# Patient Record
Sex: MALE | Race: WHITE | NOT HISPANIC OR LATINO | Employment: FULL TIME | URBAN - METROPOLITAN AREA
[De-identification: names, ages, dates, MRNs, and addresses within clinical notes are randomized per-mention and may not be internally consistent; named-entity substitution may affect disease eponyms.]

---

## 2017-03-28 ENCOUNTER — OFFICE VISIT (OUTPATIENT)
Dept: FAMILY MEDICINE | Facility: CLINIC | Age: 22
End: 2017-03-28

## 2017-03-28 DIAGNOSIS — M54.6 ACUTE RIGHT-SIDED THORACIC BACK PAIN: ICD-10-CM

## 2017-03-28 DIAGNOSIS — M25.561 ACUTE PAIN OF RIGHT KNEE: ICD-10-CM

## 2017-03-28 DIAGNOSIS — R07.1 CHEST PAIN ON BREATHING: Primary | ICD-10-CM

## 2017-03-28 ASSESSMENT — PATIENT HEALTH QUESTIONNAIRE - PHQ9: 5. POOR APPETITE OR OVEREATING: NOT AT ALL

## 2017-03-28 ASSESSMENT — ANXIETY QUESTIONNAIRES
1. FEELING NERVOUS, ANXIOUS, OR ON EDGE: NOT AT ALL
2. NOT BEING ABLE TO STOP OR CONTROL WORRYING: NOT AT ALL
7. FEELING AFRAID AS IF SOMETHING AWFUL MIGHT HAPPEN: NOT AT ALL
GAD7 TOTAL SCORE: 0
3. WORRYING TOO MUCH ABOUT DIFFERENT THINGS: NOT AT ALL
5. BEING SO RESTLESS THAT IT IS HARD TO SIT STILL: NOT AT ALL
IF YOU CHECKED OFF ANY PROBLEMS ON THIS QUESTIONNAIRE, HOW DIFFICULT HAVE THESE PROBLEMS MADE IT FOR YOU TO DO YOUR WORK, TAKE CARE OF THINGS AT HOME, OR GET ALONG WITH OTHER PEOPLE: NOT DIFFICULT AT ALL
6. BECOMING EASILY ANNOYED OR IRRITABLE: NOT AT ALL

## 2017-03-28 ASSESSMENT — PAIN SCALES - GENERAL: PAINLEVEL: MODERATE PAIN (4)

## 2017-03-28 NOTE — NURSING NOTE
22 year old  Chief Complaint   Patient presents with     Pain     right leg, knee and rib        Blood pressure 132/81, pulse 64, temperature 97.8  F (36.6  C), temperature source Oral, SpO2 99 %. There is no height or weight on file to calculate BMI.  Patient Active Problem List   Diagnosis     ADHD (attention deficit hyperactivity disorder)     S/P  Shunt at age 3     MRSA (methicillin resistant Staphylococcus aureus) carrier     Cognitive disorder     Pain in joint, shoulder region     Premature ejaculation     Adjustment disorder with mixed anxiety and depressed mood       Wt Readings from Last 2 Encounters:   08/12/16 223 lb (101.2 kg)   05/20/16 216 lb 12 oz (98.3 kg)     BP Readings from Last 3 Encounters:   03/28/17 132/81   09/27/16 123/75   08/12/16 113/74         Current Outpatient Prescriptions   Medication     FLUoxetine (PROZAC) 40 MG capsule     No current facility-administered medications for this visit.        Social History   Substance Use Topics     Smoking status: Never Smoker     Smokeless tobacco: Never Used     Alcohol use No       There are no preventive care reminders to display for this patient.    No results found for: PAP      March 28, 2017 9:17 AM

## 2017-03-28 NOTE — MR AVS SNAPSHOT
After Visit Summary   3/28/2017    Donald Jackson    MRN: [id number]           Patient Information     Date Of Birth          1995        Visit Information        Provider Department      3/28/2017 9:20 AM Cherry Mac MD [facility]        Today's Diagnoses     Chest pain on breathing    -  1    Acute pain of right knee        Acute right-sided thoracic back pain           Follow-ups after your visit        Additional Services     [facility] PT, HAND, AND CHIROPRACTIC REFERRAL       **This order will print in the [facility] Scheduling Office**    Physical Therapy, Hand Therapy and Chiropractic Care are available through:    *[facility]  *[facility]  *[facility]    Call one number to schedule at any of the above locations: [phone].    Your provider has referred you to: Physical Therapy at [facility] or [facility]    Indication/Reason for Referral: Thoracic Pain and right sided  Onset of Illness: 1 week  Therapy Orders: Evaluate and Treat  Special Programs: None  Special Request: None    [employer] Card      Additional Comments for the Therapist or Chiropractor:     Please be aware that coverage of these services is subject to the terms and limitations of your health insurance plan.  Call member services at your health plan with any benefit or coverage questions.      Please bring the following to your appointment:    *Your personal calendar for scheduling future appointments  *Comfortable clothing            SPORTS MEDICINE REFERRAL       Your provider has referred you to:  [facility]: Sports Medicine Clinic [facility] [phone]   [email or web address]    Please be aware that coverage of these services is subject to the terms and limitations of your health insurance plan.  Call member services at your health plan with any benefit or coverage questions.      Please bring the following to your appointment:    >>    Any x-rays, CTs or MRIs which have been performed.  Contact the facility where they were done to arrange for  prior to your scheduled appointment.  Any new CT, MRI or other procedures ordered by your specialist must be performed at a [place] facility or coordinated by your clinic's referral office.    >>   List of current medications   >>   This referral request   >>   Any documents/labs given to you for this referral                  Future tests that were ordered for you today     Open Future Orders        Priority Expected Expires Ordered    XR Chest 2 Views Routine 3/28/2017 3/28/2018 3/28/2017            Who to contact     Please call your clinic at [phone] to:    Ask questions about your health    Make or cancel appointments    Discuss your medicines    Learn about your test results    Speak to your doctor   If you have compliments or concerns about an experience at your clinic, or if you wish to file a complaint, please contact [facility] Patient Relations at [phone] or email us at [email or web address]         Additional Information About Your Visit        [employer] Information     [employer] gives you secure access to your electronic health record. If you see a primary care provider, you can also send messages to your care team and make appointments. If you have questions, please call your primary care clinic.  If you do not have a primary care provider, please call [phone] and they will assist you.      [employer] is an electronic gateway that provides easy, online access to your medical records. With [employer], you can request a clinic appointment, read your test results, renew a prescription or communicate with your care team.     To access your existing account, please contact your [facility] or call [phone] for assistance.        Care EveryWhere ID     This is your Care EveryWhere ID. This could be used by other  organizations to access your [place] medical records  [id number]        Your Vitals Were     Pulse Temperature Pulse Oximetry             64 97.8  F (36.6  C) (Oral) 99%          Blood Pressure from Last 3 Encounters:   03/28/17 132/81   09/27/16 123/75   08/12/16 113/74    Weight from Last 3 Encounters:   08/12/16 223 lb (101.2 kg)   05/20/16 216 lb 12 oz (98.3 kg)   12/26/14 189 lb (85.7 kg)              We Performed the Following     [facility] PT, HAND, AND CHIROPRACTIC REFERRAL     SPORTS MEDICINE REFERRAL        Primary Care Provider Office Phone # Fax #    Edward Strong [phone]       [facility] [address] [place] [id number]        Thank you!     Thank you for choosing [facility]  for your care. Our goal is always to provide you with excellent care. Hearing back from our patients is one way we can continue to improve our services. Please take a few minutes to complete the written survey that you may receive in the mail after your visit with us. Thank you!             Your Updated Medication List - Protect others around you: Learn how to safely use, store and throw away your medicines at www.disposemymeds.org.      Notice  As of 3/28/2017 10:01 AM    You have not been prescribed any medications.

## 2017-03-28 NOTE — PROGRESS NOTES
"Donald Jackson is a 22 year old male here for the following issues:    Rib pain  Donald is a 21 yo male who reports pain at the right side of his back, extending from the shoulder blade, down his back. It is painful when he takes a deep breath but he denies cough, SOB or wheezing. Symptoms ongoing this past week. He stopped working out at the gym as it exacerbated the pain. He took ibuprofen, which helped. He has hx of  shunt that tracks down the right side of his neck. He denies any injury, no headache or fever.        Right knee pain   This has been ongoing x 2 months. Pain started when he was squatting down. R>L. He also reports he twisted his right knee during a basketball game, when he twisted it. He denies swelling. He is wearing a knee brace. No pain at rest. However he reports it feels as though it is going to \"give out\" while walking. If he tries running, then he has worsened pain the next day and has difficulty bearing weight.       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     Situational stress  He was prescribed Fluoxetine 40mg dose daily, last August. He admits to \"missing a lot of doses\". He is not sure he needs it. Relationship with his father has improved. His dad shows an interest in his new granddaughter and wants to help. Relationship with his ex girlfriend has improved as well. He is taking pride in being a good dad.    PHQ9 score = 3  LIAM 7 score = 0    Weight gain  He is currently #235, up from last year. He reports it is due to \"stress eating\".   Wt Readings from Last 2 Encounters:   08/12/16 223 lb (101.2 kg)   05/20/16 216 lb 12 oz (98.3 kg)     He is not eating a very healthy diet. Not consistent with exercising.    Patient Active Problem List   Diagnosis     ADHD (attention " deficit hyperactivity disorder)     S/P  Shunt at age 3     MRSA (methicillin resistant Staphylococcus aureus) carrier     Cognitive disorder     Pain in joint, shoulder region     Premature ejaculation     Adjustment disorder with mixed anxiety and depressed mood       Current Outpatient Prescriptions   Medication Sig Dispense Refill     FLUoxetine (PROZAC) 40 MG capsule Take 1 capsule (40 mg) by mouth daily 90 capsule 1       Allergies   Allergen Reactions     Penicillin [Penicillins]         EXAM  /81 (BP Location: Right arm, Patient Position: Chair, Cuff Size: Adult Large)  Pulse 64  Temp 97.8  F (36.6  C) (Oral)  SpO2 99%  Gen: Alert, pleasant, NAD, overweight  HEENT:  Conjunctiva nl, TM normal bilaterally, OP clear, no posterior erythema  Neck: no LAD. Palpable  shunt at anterior right neck, nontender  COR: S1,S2, no murmur  Lungs: CTA bilaterally, no rhonchi, wheezes or rales  Abdomen: Soft, non tender, normal bowel sounds, no HSM or mass  MS: cannot reproduce the right sided thoracic pain with palpation over the area.   Right knee: no tenderness with palpation over the bony landmarks. No effusion, redness or swelling.     CXR: Calcification and soft tissue prominence surrounding old  shunt along the right chest wall, uncertain etiology, consider chest CT  CT chest :OLD  shunt tracking along right pectoral muscle with calcifications. No fluid collection noted, no infiltrate    Assessment:  (R07.1) Chest pain on breathing  (primary encounter diagnosis)  Comment: right sided chest pain with breathing, no shortness of breath  Plan: XR Chest 2 Views        Refer for CXR. He has history of  shunt, right side, no fever or redness. O2 sat excellent, no cough or SOB  Consider chest CT scan in this patient.   Pt notified of results, benign, suspect musculoskeletal strain     (M25.561) Acute pain of right knee  Comment: knee feels like it wants to give out, ?ACL tear  Plan: SPORTS MEDICINE REFERRAL         Refer to sports medicine, hold on sports for now.     (M54.6) Acute right-sided thoracic back pain  Comment: etiology is unclear, no injury though may re related to musculoskeletal strain. He has a  shunt, no fever. O2 sat 99%  Plan: [facility] PT, HAND, AND CHIROPRACTIC REFERRAL        Refer to [facility] for PT, pursue CXR as above.    Cherry Mac MD  Internal Medicine/Pediatrics

## 2017-03-29 ENCOUNTER — OFFICE VISIT (OUTPATIENT)
Dept: ORTHOPEDICS | Facility: CLINIC | Age: 22
End: 2017-03-29

## 2017-03-29 DIAGNOSIS — G89.29 CHRONIC PAIN OF RIGHT KNEE: Primary | ICD-10-CM

## 2017-03-29 DIAGNOSIS — R29.898 WEAKNESS OF RIGHT HIP: ICD-10-CM

## 2017-03-29 DIAGNOSIS — M25.561 CHRONIC PAIN OF RIGHT KNEE: Primary | ICD-10-CM

## 2017-03-29 ASSESSMENT — ANXIETY QUESTIONNAIRES: GAD7 TOTAL SCORE: 0

## 2017-03-29 ASSESSMENT — PATIENT HEALTH QUESTIONNAIRE - PHQ9: SUM OF ALL RESPONSES TO PHQ QUESTIONS 1-9: 3

## 2017-03-29 NOTE — MR AVS SNAPSHOT
After Visit Summary   3/29/2017    Donald Jackson    MRN: [id number]           Patient Information     Date Of Birth          1995        Visit Information        Provider Department      3/29/2017 3:45 PM Janine Brush MD [facility] Sports Medicine        Today's Diagnoses     Chronic pain of right knee    -  1    Weakness of right hip           Follow-ups after your visit        Additional Services     PHYSICAL THERAPY REFERRAL (Internal)       Physical Therapy Referral                  Follow-up notes from your care team     Return in about 6 weeks (around 5/10/2017), or if symptoms worsen or fail to improve.      Who to contact     Please call your clinic at [phone] to:    Ask questions about your health    Make or cancel appointments    Discuss your medicines    Learn about your test results    Speak to your doctor   If you have compliments or concerns about an experience at your clinic, or if you wish to file a complaint, please contact [facility] Patient Relations at [phone] or email us at [email or web address]         Additional Information About Your Visit        MyChart Information     [employer] gives you secure access to your electronic health record. If you see a primary care provider, you can also send messages to your care team and make appointments. If you have questions, please call your primary care clinic.  If you do not have a primary care provider, please call [phone] and they will assist you.      [employer] is an electronic gateway that provides easy, online access to your medical records. With [employer], you can request a clinic appointment, read your test results, renew a prescription or communicate with your care team.     To access your existing account, please contact your [facility] or call [phone] for assistance.        Care EveryWhere ID     This is your Care EveryWhere ID.  This could be used by other organizations to access your [place] medical records  [id number]         Blood Pressure from Last 3 Encounters:   03/28/17 132/81   09/27/16 123/75   08/12/16 113/74    Weight from Last 3 Encounters:   08/12/16 223 lb (101.2 kg)   05/20/16 216 lb 12 oz (98.3 kg)   12/26/14 189 lb (85.7 kg)              We Performed the Following     PHYSICAL THERAPY REFERRAL (Internal)        Primary Care Provider Office Phone # Fax #    Edward Strong [phone]       [facility] [address] [place] [address]  [facility] [address]        Thank you!     Thank you for choosing [facility] SPORTS MEDICINE  for your care. Our goal is always to provide you with excellent care. Hearing back from our patients is one way we can continue to improve our services. Please take a few minutes to complete the written survey that you may receive in the mail after your visit with us. Thank you!             Your Updated Medication List - Protect others around you: Learn how to safely use, store and throw away your medicines at www.disposemymeds.org.      Notice  As of 3/29/2017  4:32 PM    You have not been prescribed any medications.

## 2017-03-29 NOTE — PROGRESS NOTES
Subjective:   Donald Jackson is a 22 year old male who is here for right knee pain. Works out, does squats and felt a pop then played basketball and felt a pop about 2-3 months ago. Weakness in the leg the following day.   Goes to the gym.  Next day feels like he'll fall.      Background:   Date of injury: 1/2017       PAST MEDICAL, SOCIAL, SURGICAL AND FAMILY HISTORY: He  has a past medical history of Hydrocephalus; Lattice degeneration; and Other forms of retinal detachment(361.89).  He  has a past surgical history that includes Retinal reattachment (10/2010); Retinopexy Laser Prophylaxis Break (s) OD (right eye) (10/2011); shunt in head; strabismus surery; and Tonsillectomy.  His family history includes DIABETES in his father and paternal grandfather. There is no history of Retinal detachment, Amblyopia, or Glaucoma.  He reports that he has never smoked. He has never used smokeless tobacco. He reports that he does not drink alcohol or use illicit drugs.    ALLERGIES: He is allergic to penicillin [penicillins].    CURRENT MEDICATIONS: He currently has no medications in their medication list.     REVIEW OF SYSTEMS: 10 point review of systems is negative except as noted above.     Exam:   There were no vitals taken for this visit.           CONSTITUTIONIAL: healthy, alert, no distress and cooperative  HEAD: Normocephalic. No masses, lesions, tenderness or abnormalities  SKIN: no suspicious lesions or rashes  GAIT: normal  NEUROLOGIC: Non-focal, Normal muscle tone and strength, reflexes normal, sensation grossly normal.  PSYCHIATRIC: affect normal/bright and mentation appears normal.    MUSCULOSKELETAL: right knee pain      Inspection:       AP/lateral alignment normal  Tender: suprapatella, patellar facets      Non-tender: MCL, LCL, lateral joint line, medial joint line, IT band, posterior knee   Active Range of Motion: all normal  Strength: quad  5/5, Hamstrings  5/5, Gastroc  5/5, Tibialis anterior  5/5,  Peroneals  5/5 and core strength  5-/5 hip abductors and other core muscles   Special tests: normal Valgus stress test, normal Varus, negative Lachman's test, negative posterior drawer, no posterolateral corner signs, negative Jarret's, no apprehension with lateral stress of the patella.    Weakness hip abductors     Assessment/Plan:   Pt is an overweight 23 yo male with PMhx of  shunt presenting with right knee pain  1. Right knee pain- small effusion, patella tracking painful, PT suggested  2. Weakness right hip abductors- PT  RTC 6 weeks  X-RAY INTERPRETATION:   X-Ray of the Right Knee: 2-view, ap, lateral   ordered and interpreted in the office today was negative for fracture, subluxation or joint space abnormality.

## 2017-03-29 NOTE — LETTER
3/29/2017      RE: Donald Jackson  [address] [facility] [facility] [address]        Subjective:   Donald Jackson is a 22 year old male who is here for right knee pain. Works out, does squats and felt a pop then played basketball and felt a pop about 2-3 months ago. Weakness in the leg the following day.   Goes to the gym.  Next day feels like he'll fall.      Background:   Date of injury: 1/2017       PAST MEDICAL, SOCIAL, SURGICAL AND FAMILY HISTORY: He  has a past medical history of Hydrocephalus; Lattice degeneration; and Other forms of retinal detachment(361.89).  He  has a past surgical history that includes Retinal reattachment (10/2010); Retinopexy Laser Prophylaxis Break (s) OD (right eye) (10/2011); shunt in head; strabismus surery; and Tonsillectomy.  His family history includes DIABETES in his father and paternal grandfather. There is no history of Retinal detachment, Amblyopia, or Glaucoma.  He reports that he has never smoked. He has never used smokeless tobacco. He reports that he does not drink alcohol or use illicit drugs.    ALLERGIES: He is allergic to penicillin [penicillins].    CURRENT MEDICATIONS: He currently has no medications in their medication list.     REVIEW OF SYSTEMS: 10 point review of systems is negative except as noted above.     Exam:   There were no vitals taken for this visit.           CONSTITUTIONIAL: healthy, alert, no distress and cooperative  HEAD: Normocephalic. No masses, lesions, tenderness or abnormalities  SKIN: no suspicious lesions or rashes  GAIT: normal  NEUROLOGIC: Non-focal, Normal muscle tone and strength, reflexes normal, sensation grossly normal.  PSYCHIATRIC: affect normal/bright and mentation appears normal.    MUSCULOSKELETAL: right knee pain      Inspection:       AP/lateral alignment normal  Tender: suprapatella, patellar facets      Non-tender: MCL, LCL, lateral joint line, medial joint line, IT band, posterior knee   Active Range of Motion: all  normal  Strength: quad  5/5, Hamstrings  5/5, Gastroc  5/5, Tibialis anterior  5/5, Peroneals  5/5 and core strength  5-/5 hip abductors and other core muscles   Special tests: normal Valgus stress test, normal Varus, negative Lachman's test, negative posterior drawer, no posterolateral corner signs, negative Jarret's, no apprehension with lateral stress of the patella.    Weakness hip abductors     Assessment/Plan:   Pt is an overweight 23 yo male with PMhx of  shunt presenting with right knee pain  1. Right knee pain- small effusion, patella tracking painful, PT suggested  2. Weakness right hip abductors- PT  RTC 6 weeks  X-RAY INTERPRETATION:   X-Ray of the Right Knee: 2-view, ap, lateral   ordered and interpreted in the office today was negative for fracture, subluxation or joint space abnormality.    Janine Brush MD

## 2017-04-09 VITALS
OXYGEN SATURATION: 99 % | BODY MASS INDEX: 35.74 KG/M2 | DIASTOLIC BLOOD PRESSURE: 81 MMHG | WEIGHT: 235 LBS | SYSTOLIC BLOOD PRESSURE: 132 MMHG | TEMPERATURE: 97.8 F | HEART RATE: 64 BPM

## 2017-08-30 ENCOUNTER — THERAPY VISIT (OUTPATIENT)
Dept: PHYSICAL THERAPY | Facility: CLINIC | Age: 22
End: 2017-08-30
Payer: COMMERCIAL

## 2017-08-30 DIAGNOSIS — M25.561 CHRONIC PAIN OF BOTH KNEES: Primary | ICD-10-CM

## 2017-08-30 DIAGNOSIS — G89.29 CHRONIC PAIN OF BOTH KNEES: Primary | ICD-10-CM

## 2017-08-30 DIAGNOSIS — M25.562 CHRONIC PAIN OF BOTH KNEES: Primary | ICD-10-CM

## 2017-08-30 PROCEDURE — 97110 THERAPEUTIC EXERCISES: CPT | Mod: GP | Performed by: PHYSICAL THERAPIST

## 2017-08-30 PROCEDURE — 97161 PT EVAL LOW COMPLEX 20 MIN: CPT | Mod: GP | Performed by: PHYSICAL THERAPIST

## 2017-08-30 ASSESSMENT — ACTIVITIES OF DAILY LIVING (ADL)
PAIN: THE SYMPTOM AFFECTS MY ACTIVITY SLIGHTLY
HOW_WOULD_YOU_RATE_THE_OVERALL_FUNCTION_OF_YOUR_KNEE_DURING_YOUR_USUAL_DAILY_ACTIVITIES?: NORMAL
GO DOWN STAIRS: ACTIVITY IS NOT DIFFICULT
STIFFNESS: I DO NOT HAVE THE SYMPTOM
AS_A_RESULT_OF_YOUR_KNEE_INJURY,_HOW_WOULD_YOU_RATE_YOUR_CURRENT_LEVEL_OF_DAILY_ACTIVITY?: NORMAL
KNEEL ON THE FRONT OF YOUR KNEE: ACTIVITY IS FAIRLY DIFFICULT
GO UP STAIRS: ACTIVITY IS NOT DIFFICULT
SQUAT: ACTIVITY IS SOMEWHAT DIFFICULT
SIT WITH YOUR KNEE BENT: ACTIVITY IS NOT DIFFICULT
RAW_SCORE: 60.31
KNEE_ACTIVITY_OF_DAILY_LIVING_SUM: 56
GIVING WAY, BUCKLING OR SHIFTING OF KNEE: I DO NOT HAVE THE SYMPTOM
RISE FROM A CHAIR: ACTIVITY IS NOT DIFFICULT
HOW_WOULD_YOU_RATE_THE_CURRENT_FUNCTION_OF_YOUR_KNEE_DURING_YOUR_USUAL_DAILY_ACTIVITIES_ON_A_SCALE_FROM_0_TO_100_WITH_100_BEING_YOUR_LEVEL_OF_KNEE_FUNCTION_PRIOR_TO_YOUR_INJURY_AND_0_BEING_THE_INABILITY_TO_PERFORM_ANY_OF_YOUR_USUAL_DAILY_ACTIVITIES?: 70
LIMPING: I DO NOT HAVE THE SYMPTOM
STAND: ACTIVITY IS NOT DIFFICULT
SWELLING: I DO NOT HAVE THE SYMPTOM
WALK: NOT ANSWERED
KNEE_ACTIVITY_OF_DAILY_LIVING_SCORE: 86.16
WEAKNESS: THE SYMPTOM AFFECTS MY ACTIVITY SLIGHTLY

## 2017-08-30 NOTE — PROGRESS NOTES
"[facility] Initial Evaluation -- Lower Extremity    Evaluation Date: August 30, 2017  Donald Jackson is a 22 year old male with a (L) knee condition.   Referral: Dr. Brush  Work mechanical stresses: [employer] - Lifting, standing; Sitting (classes)   Employment status: Working normal hours; Student  Leisure mechanical stresses: Lifting weights 6 x week (Legs 1 x week), Cardio 3-4 x week  Functional disability score: See KOS flow sheet  VAS score (0-10): 3-7/10 varying intensity  Patient goals/expectations:  Reduce pain in knee to allow pain free squatting activities    HISTORY:    Present symptoms: L > R ant knee  Pain quality (sharp/shooting/stabbing/aching/burning/cramping):  achy    Present since (onset date): Jan 2017   Symptoms (improving/unchaning/worsening):  unchanging.      Symptoms commenced as a result of: Playing basketball, pivoted wrong and felt \"pop\"  Condition occurred in the following environment: During recreation/sport     Symptoms at onset: (L) knee  Paresthesia (yes/no):  No  Spinal history: No   Cough/Sneeze (pos/neg):  Neg    Constant symptoms: None  Intermittent symptoms: As above    Symptoms are worse with the following: Always Rising, Always First few steps, Sometimes Walking and Always Squatting   Symptoms are better with the following: Always When still and Other - ice    Continued use makes the pain (better/worse/no effect): Worse    Disturbed night (yes/no): No      Pain at rest (yes/no):  No  Site (back/hip/knee/ankle/foot):  NA    Other questions (swelling/clicking/locking/giving way/falling):  No     Previous episodes: No  Previous treatments: None    Specific Questions:  General health (excellent/good/fair/poor):  Good  Pertinent medical history includes: Overweight  Medications (nil/NSAIDS/analg/steroids/anticoag/other):  None  Medical allergies:  None  Imaging (none/Xray/MRI/other):  Xray - Neg  Recent or major surgery (yes/no):  Retina, Shunt  Night pain " (yes/no):  No  Accidents (yes/no):  No  Unexplained weight loss (yes/no):  No  Barriers at home: None  Other red flags: None    Sites for physical examination (back/hip/knee/ankle/foot/other): Knee    EXAMINATION    Posture:  Sitting (good/fair/poor): NT    Correction of Posture (better/worse/no effect/NA): NT  Standing (good/fair/poor): NT  Other observations:  NT    Neurological: (NA/motor/sensory/reflexes/dural): NT    Baselines (pain or functional activity): Painful squatting    Extremities (Hip / Knee / Ankle / Foot): Knee    Movement Loss Javy Mod Min Nil Pain   Flexion   X  (R) 135 (L) 130   Extension    X 5 hyper (B)     Passive Movement (+/- over pressure)/(PDM/ERP):  Min loss w/passive OP (L) ext  Resisted Test Response (pain): Quad 5/5 (B); H-S 5/5 (B); Hip Abd 4/5 (B)  Other Tests: NT    Spine:  Movement loss: NT  Effect of repeated movements: NT  Effect of static positioning: NT  Spine testing (not relevant/relevant/secondary problem): Not relevant    Baseline Symptoms: 2/10-6/10 after 10 reps squatting  Repeated Tests Symptom Response Mechanical Response   Active/Passive movement, resisted test, functional test During -  Produce, Abolish, Increase, Decrease, NE After -  Better, Worse, NB, NW, NE Effect -   ? or ? ROM, strength or key functional test No   Effect   Passive ext w/self OP (Standing) No Effect    No Effect    Inc ROM (L) knee 7-0-132 deg    1-2/10 after 10 reps squatting    Effect of static positioning       NT         Provisional Classification (Extremity/Spine):  Extremity - Derangement      Princicple of Management:   Education:  Etiology, specificity of exercise and rationale behind repeated movements    Equipment provided:  None  Exercise and dosage:  Repeated ext w/self OP x 10-15 reps, 5-6 x daily    ASSESSMENT/PLAN:    Patient is a 22 year old male with both sides knee complaints.    Patient has the following significant findings with corresponding treatment plan.                 Diagnosis 1:  Knee Pain    Pain -  self management, education, directional preference exercise and home program  Decreased ROM/flexibility - manual therapy, therapeutic exercise and home program  Decreased joint mobility - manual therapy, therapeutic exercise and home program  Decreased strength - therapeutic exercise, therapeutic activities and home program  Impaired muscle performance - neuro re-education and home program  Decreased function - therapeutic activities and home program    Therapy Evaluation Codes:   1) History comprised of:   Personal factors that impact the plan of care:      None.    Comorbidity factors that impact the plan of care are:      Overweight.     Medications impacting care: None.  2) Examination of Body Systems comprised of:   Body structures and functions that impact the plan of care:      Knee.   Activity limitations that impact the plan of care are:      Sitting, Squatting/kneeling and Walking.  3) Clinical presentation characteristics are:   Stable/Uncomplicated.  4) Decision-Making    Low complexity using standardized patient assessment instrument and/or measureable assessment of functional outcome.  Cumulative Therapy Evaluation is: Low complexity.    Previous and current functional limitations:  (See Goal Flow Sheet for this information)    Short term and Long term goals: (See Goal Flow Sheet for this information)     Communication ability:  Patient appears to be able to clearly communicate and understand verbal and written communication and follow directions correctly.  Treatment Explanation - The following has been discussed with the patient:   RX ordered/plan of care  Anticipated outcomes  Possible risks and side effects  This patient would benefit from PT intervention to resume normal activities.   Rehab potential is good.    Frequency:  1 X week, once daily  Duration:  for 6 weeks  Discharge Plan:  Achieve all LTG.  Independent in home treatment program.  Reach maximal  therapeutic benefit.    Please refer to the daily flowsheet for treatment today, total treatment time and time spent performing 1:1 timed codes.

## 2018-02-14 PROBLEM — M25.562 CHRONIC PAIN OF BOTH KNEES: Status: RESOLVED | Noted: 2017-08-30 | Resolved: 2018-02-14

## 2018-02-14 PROBLEM — G89.29 CHRONIC PAIN OF BOTH KNEES: Status: RESOLVED | Noted: 2017-08-30 | Resolved: 2018-02-14

## 2018-02-14 PROBLEM — M25.561 CHRONIC PAIN OF BOTH KNEES: Status: RESOLVED | Noted: 2017-08-30 | Resolved: 2018-02-14

## 2018-05-16 ENCOUNTER — OFFICE VISIT (OUTPATIENT)
Dept: URGENT CARE | Facility: URGENT CARE | Age: 23
End: 2018-05-16
Payer: COMMERCIAL

## 2018-05-16 VITALS
DIASTOLIC BLOOD PRESSURE: 72 MMHG | OXYGEN SATURATION: 98 % | HEART RATE: 63 BPM | BODY MASS INDEX: 34.37 KG/M2 | TEMPERATURE: 97.7 F | WEIGHT: 226 LBS | SYSTOLIC BLOOD PRESSURE: 121 MMHG

## 2018-05-16 DIAGNOSIS — S06.0X0A CONCUSSION WITHOUT LOSS OF CONSCIOUSNESS, INITIAL ENCOUNTER: Primary | ICD-10-CM

## 2018-05-16 PROCEDURE — 99214 OFFICE O/P EST MOD 30 MIN: CPT | Performed by: INTERNAL MEDICINE

## 2018-05-16 NOTE — MR AVS SNAPSHOT
After Visit Summary   5/16/2018    Donald Jackson    MRN: [id number]           Patient Information     Date Of Birth          1995        Visit Information        Provider Department      5/16/2018 5:00 PM Kellen Salomon MD [facility]        Today's Diagnoses     Concussion without loss of consciousness, initial encounter    -  1      Care Instructions    Healing after a Concussion  Rest  Rest is the best treatment for a concussion. Avoid physical activity until you see your doctor. Avoid activities that cause your symptoms to get worse or make you feel tired. This includes physical activities, watching TV, texting or playing video games.  Don't nap during the day. If you do nap, make sure it is for less than an hour and before 3 p.m. If you find it is hard to fall asleep, talk to your doctor. You may need medicine to help you sleep.  If symptoms have not become worse, you do not need to be wakened and checked on at night.  School  You can rest your brain by staying at home for 1 to 2 days. It is best to get back into the school routine.   At school, you may have trouble taking tests or working on a computer. Symptoms may get worse in band, choir, busy classes or a noisy lunchroom. A doctor can work with the school if you need a plan to help you succeed.  Work  You may need to change your work routine as you recover. A doctor can help you create a plan for the conditions at your job.  Treat pain    It is best to avoid taking medicines, but if needed, take Tylenol (acetaminophen) for headaches and pain every 4 to 6 hours.    Do not take drugstore medicines such as ibuprofen, Advil, Motrin, Benadryl, Aleve, sleep aids or Tylenol PM. These drugs may cause new problems.    If you cannot manage your pain with Tylenol, call your doctor or go to the emergency department.  Watch symptoms closely  Each day, keep track of your symptoms. This will help your doctor see how well you are  healing. Write down the symptom, how often it occurs, how long it lasts, and what makes it better or worse.  Possible symptoms: headache, stomach upset, feeling confused or dizzy, motion sickness, and personality changes.  Returning to activity  Take your time returning to activity. A doctor can help you decide what levels of activity are best for you. If you're returning to a sport, you should see a health care provider before you do.  If you have questions, call  Your doctor, Concussion hotline: [phone], or Athletic medicine: [phone].   For informational purposes only. Not to replace the advice of your health care provider.  Copyright   2014 [employer]. All rights reserved. [employer] 283008 - Rev 12/16.            Follow-ups after your visit        Who to contact     If you have questions or need follow up information about today's clinic visit or your schedule please contact [facility] directly at [phone].  Normal or non-critical lab and imaging results will be communicated to you by Cloud[employer], letter or phone within 4 business days after the clinic has received the results. If you do not hear from us within 7 days, please contact the clinic through Rumgrt or phone. If you have a critical or abnormal lab result, we will notify you by phone as soon as possible.  Submit refill requests through NetVision or call your pharmacy and they will forward the refill request to us. Please allow 3 business days for your refill to be completed.          Additional Information About Your Visit        [employer] Information     Net[employer] gives you secure access to your electronic health record. If you see a primary care provider, you can also send messages to your care team and make appointments. If you have questions, please call your primary care clinic.  If you do not have a primary care provider, please call [phone] and they will assist you.        Care EveryWhere ID     This is your  Care EveryWhere ID. This could be used by other organizations to access your [facility] medical records  [id number]        Your Vitals Were     Pulse Temperature Pulse Oximetry BMI (Body Mass Index)          63 97.7  F (36.5  C) (Oral) 98% 34.37 kg/m2         Blood Pressure from Last 3 Encounters:   05/16/18 121/72   03/28/17 132/81   09/27/16 123/75    Weight from Last 3 Encounters:   05/16/18 226 lb (102.5 kg)   03/28/17 235 lb (106.6 kg)   08/12/16 223 lb (101.2 kg)              Today, you had the following     No orders found for display       Primary Care Provider Office Phone # Fax #    Edward Strong [phone]       0 [address] [address]        Equal Access to Services     ÁNGELA DEL TORO : Hadii seven Duran, wasimon church, qayemi kaalmaneelam martins, emilie adorno . So [facility] [place] [facility] [phone].    ATENCIÓN: Si habla español, tiene a basurto disposición servicios gratuitos de asistencia lingüística. Taya al [phone].    We comply with applicable federal civil rights laws and [place] laws. We do not discriminate on the basis of race, color, national origin, age, disability, sex, sexual orientation, or gender identity.            Thank you!     Thank you for choosing [facility]  for your [facility]. Our goal is always to provide you with excellent care. Hearing back from our patients is one way we can continue to improve our services. Please take a few minutes to complete the written survey that you may receive in the mail after your visit with us. Thank you!             Your Updated Medication List - Protect others around you: Learn how to safely use, store and throw away your medicines at www.disposemymeds.org.      Notice  As of 5/16/2018  5:49 PM    You have not been prescribed any medications.

## 2018-05-16 NOTE — PATIENT INSTRUCTIONS
Healing after a Concussion  Rest  Rest is the best treatment for a concussion. Avoid physical activity until you see your doctor. Avoid activities that cause your symptoms to get worse or make you feel tired. This includes physical activities, watching TV, texting or playing video games.  Don't nap during the day. If you do nap, make sure it is for less than an hour and before 3 p.m. If you find it is hard to fall asleep, talk to your doctor. You may need medicine to help you sleep.  If symptoms have not become worse, you do not need to be wakened and checked on at night.  School  You can rest your brain by staying at home for 1 to 2 days. It is best to get back into the school routine.   At school, you may have trouble taking tests or working on a computer. Symptoms may get worse in band, choir, busy classes or a noisy lunchroom. A doctor can work with the school if you need a plan to help you succeed.  Work  You may need to change your work routine as you recover. A doctor can help you create a plan for the conditions at your job.  Treat pain    It is best to avoid taking medicines, but if needed, take Tylenol (acetaminophen) for headaches and pain every 4 to 6 hours.    Do not take drugstore medicines such as ibuprofen, Advil, Motrin, Benadryl, Aleve, sleep aids or Tylenol PM. These drugs may cause new problems.    If you cannot manage your pain with Tylenol, call your doctor or go to the emergency department.  Watch symptoms closely  Each day, keep track of your symptoms. This will help your doctor see how well you are healing. Write down the symptom, how often it occurs, how long it lasts, and what makes it better or worse.  Possible symptoms: headache, stomach upset, feeling confused or dizzy, motion sickness, and personality changes.  Returning to activity  Take your time returning to activity. A doctor can help you decide what levels of activity are best for you. If you're returning to a sport, you should  see a health care provider before you do.  If you have questions, call  Your doctor, Concussion hotline: [phone], or Athletic medicine: [phone].   For informational purposes only. Not to replace the advice of your health care provider.  Copyright   2014 [employer]. All rights reserved. [employer] 446167 - Rev 12/16.

## 2018-05-16 NOTE — PROGRESS NOTES
SUBJECTIVE:  Donald Jackson is an 23 year old male who presents for head injury.  Yesterday evening was working on his car when the simon of the car came down some and hit his head.  No loc.  Did not have any dizziness when it happened.  Just felt the pain from the bump initially.  Slept okay last night.  Headache today on top of right head where it hit.  Did feel slightly nauseated earlier today, no vomiting, eating fine.  No numbness or weakness anywhere.  Has h/o retinal detachment, but no changes from baseline. No incoordination today.  A couple times today he's forgotten something he was going to do, but doesn't notice any significant memory loss.   No speech changes or word finding troubles.       has a past medical history of Hydrocephalus; Lattice degeneration; and Other forms of retinal detachment(361.89).  shunt, prematurity.    Family History   Problem Relation Age of Onset     DIABETES Father      Type 2      DIABETES Paternal Grandfather      Type 2      Retinal detachment No family hx of      Amblyopia No family hx of      Glaucoma No family hx of        ALLERGIES:  Penicillin [penicillins]    No current outpatient prescriptions on file.     No current facility-administered medications for this visit.          ROS:  ROS is done and is negative for general, constitutional, eye, ENT, Respiratory, cardiovascular, GI, , Skin, musculoskeletal except as noted elsewhere.      OBJECTIVE:  /72  Pulse 63  Temp 97.7  F (36.5  C) (Oral)  Wt 226 lb (102.5 kg)  SpO2 98%  BMI 34.37 kg/m2  GENERAL APPEARANCE: Alert, in no acute distress  HEAD: old surgical scars present.  No edema, bruising or erythema  EYES: normal, EOMs intact and PERRLA  EARS: External ears normal. Canals clear. TM's normal.  NOSE:normal  OROPHARYNX:normal  NECK:No adenopathy,masses or thyromegaly  RESP: normal and clear to auscultation  CV:regular rate and rhythm and no murmurs, clicks, or gallops  ABDOMEN: Abdomen soft, non-tender.  BS normal. No masses, organomegaly  SKIN: no ulcers, lesions or rash  MUSCULOSKELETAL:Musculoskeletal normal  NEURO: CN 2-12 grossly intact.  Strength 5/5 and symmetric in bilateral upper and lower extremities.  DTRs 2+ and symmetric in bilateral upper and lower extremities.  Sensation to light touch grossly intact in bilateral upper and lower extremities.    RESULTS  .  No results found for this or any previous visit (from the past 48 hour(s)).    ASSESSMENT/PLAN:    ASSESSMENT / PLAN:  (S06.0X0A) Concussion without loss of consciousness, initial encounter  (primary encounter diagnosis)  Comment: hx and sxs c/w mild concussion.  Currently no sign of fracture or bleed or nerve damage  Plan: I reviewed supportive care, expected course, and signs of concern.  Follow up as needed or if he does not improve within 5 day(s) or if worsens in any way.  Reviewed red flag symptoms and is to go to the ER if experiences any of these.      See [facility] for orders, medications, letters, patient instructions    Kellen Salomon M.D.

## 2019-06-20 NOTE — PROGRESS NOTES
SUBJECTIVE:   CC: Donald Jacksno is an 24 year old male who presents for preventive health visit. He is a new patient to this provider. Last CPE was in 2014. Past medical history is significant for  shunt age 3 with mild cognitive impairment, depression and ADHD.  He is doing well overall.  He is working and has a 2 year old daughter that he takes care of 1/2 time.    He has the following additional concerns he would like to discuss:  1. Left Shoulder Pain. No known injury.   Joint or Musculoskeletal Pain  Onset: one month  Description:   Location: Left shoulder AC joint  Character: Dull ache  Intensity: moderate  Progression of Symptoms: worsening  Accompanying Signs & Symptoms: Other symptoms: none  History: Previous similar pain: no    Precipitating factors: Trauma or overuse: YES  Alleviating factors: Improved by: Icy Hot      2. Right Foot/Ankle pain  Joint or Musculoskeletal Pain.    Description:   Location: right ankle   Character: Dull ache  Intensity: moderate  Progression of Symptoms: worsening when cardio increased  Accompanying Signs & Symptoms: Other symptoms: none  History: Previous similar pain: no    Precipitating factors: Trauma or overuse: YES  Alleviating factors: Improved by: Ice      3. Latissimus Dorsi pain  Joint or Musculoskeletal Pain. No known injury. When he is breathing, he has pain.   Description:   Location: Right latissimus dorsi  Character: Dull ache  Intensity: moderate  Progression of Symptoms: same  Accompanying Signs & Symptoms: Other symptoms: none  History: Previous similar pain: no    Precipitating factors: Trauma or overuse: YES  Alleviating factors: Improved by: massage    He admits that all of the above are likely due to marked increase in physical activity.  He has been working out regularly and quite strenuously with weight lifting for the past several months. He has lost weight and wants to continue with this. None of the above have stopped him from  exercising.      Healthy Habits:    Do you get at least three servings of calcium containing foods daily (dairy, green leafy vegetables, etc.)? yes    Amount of exercise or daily activities, outside of work: 4-5 day(s) per week, 1.5 hrs/day.     Problems taking medications regularly No    Medication side effects: No    Have you had an eye exam in the past two years? yes    Do you see a dentist twice per year? No, his insurance does not cover    Do you have sleep apnea, excessive snoring or daytime drowsiness?no  STD testing offered and declined.  Immunizations reviewed and are up to date.      Today's PHQ-2 Score:   PHQ-2 ( 1999 Pf[employer]) 6/26/2019   Q1: Little interest or pleasure in doing things 0   Q2: Feeling down, depressed or hopeless 1   PHQ-2 Score 1       Patient Active Problem List    Diagnosis Date Noted     Adjustment disorder with mixed anxiety and depressed mood 08/12/2016     Priority: Medium     Premature ejaculation 05/18/2015     Priority: Medium     Pain in joint, shoulder region 03/13/2015     Priority: Medium     Cognitive disorder 07/31/2013     Priority: Medium     MRSA (methicillin resistant Staphylococcus aureus) carrier 02/20/2009     Priority: Medium     With recurring abscesses       ADHD (attention deficit hyperactivity disorder) 11/14/2008     Priority: Medium     S/P  Shunt at age 3 11/14/2008     Priority: Medium       Past Medical History:   Diagnosis Date     Hydrocephalus      Lattice degeneration      Other forms of retinal detachment(361.89)        Past Surgical History:   Procedure Laterality Date     RETINAL REATTACHMENT  10/2010    left eye     RETINOPEXY LASER PROPHYLAXIS BREAK(S) OD (RIGHT EYE)  10/2011     shunt in head      a couple as a child     strabismus surery      age 5     TONSILLECTOMY       wisdom teeth         Family History   Problem Relation Age of Onset     Diabetes Father         Type 2      Diabetes Paternal Grandfather         Type 2      Retinal detachment  No family hx of      Amblyopia No family hx of      Glaucoma No family hx of        Social History     Tobacco Use     Smoking status: Never Smoker     Smokeless tobacco: Never Used   Substance Use Topics     Alcohol use: No       Social History     Social History Narrative    FAMILY INFORMATION     Date: May 23, 2008    Parent #1      Name: STEPHANIA FIGUEROA  Gender: MALE    : 1968     Education: DVM/MSC/PHD   Occupation: RESEARCH ASSOCIATE        Siblings:  HENRY CEJA        Relationship Status of Parent(s):     Who does the child live with? PARENTS    What language(s) is/are spoken at home? Albanian/ENGLISH            Lives alone.  Has a daughter 4-5 days per week.  Dad is in [place]. Mom is in [place]. Sister is in [place] in medical school.  Born in [place] came to [place] age 1. Parents .    Has a good support system.    Feels safe in all environments.    Wears seatbelt 100% of the time    Denies history of abuse, past or present, physical, sexual or emotional.    Annalise Carreon PA-C    19                   No current outpatient medications on file.       Reviewed orders with patient. Reviewed health maintenance and updated orders accordingly - Yes  BP Readings from Last 3 Encounters:   19 119/72   18 121/72   17 132/81    Wt Readings from Last 3 Encounters:   19 93.2 kg (205 lb 8 oz)   18 102.5 kg (226 lb)   17 106.6 kg (235 lb)                Reviewed and updated as needed this visit by clinical staff  Tobacco  Allergies  Meds  Problems  Med Hx  Surg Hx  Fam Hx  Soc Hx          Reviewed and updated as needed this visit by Provider  Tobacco  Allergies  Meds  Problems  Med Hx  Surg Hx  Fam Hx  Soc Hx           ROS:  CONSTITUTIONAL: NEGATIVE for fever, chills, change in weight  INTEGUMENTARY/SKIN: NEGATIVE for worrisome rashes, moles or lesions  EYES: NEGATIVE for vision changes or irritation  ENT: NEGATIVE for ear, mouth and throat  "problems  RESP: NEGATIVE for significant cough or SOB  CV: NEGATIVE for chest pain, palpitations or peripheral edema  GI: NEGATIVE for nausea, abdominal pain, heartburn, or change in bowel habits   male: negative for dysuria, hematuria, decreased urinary stream, erectile dysfunction, urethral discharge  MUSCULOSKELETAL:POSITIVE  for LEFT shoulder, RIGHT ankle, and RIGHT mid back pain.  NEURO: NEGATIVE for weakness, dizziness or paresthesias  PSYCHIATRIC: NEGATIVE for changes in mood or affect    OBJECTIVE:   /72 (BP Location: Right arm, Patient Position: Sitting, Cuff Size: Adult Large)   Pulse 52   Temp 97.9  F (36.6  C) (Oral)   Ht 1.727 m (5' 7.99\")   Wt 93.2 kg (205 lb 8 oz)   SpO2 99%   BMI 31.25 kg/m    EXAM:  GENERAL: healthy, alert and no distress  EYES: Eyes grossly normal to inspection, PERRL and conjunctivae and sclerae normal  HENT: ear canals and TM's normal, nose and mouth without ulcers or lesions  NECK: no adenopathy, no asymmetry, masses, or scars and thyroid normal to palpation.  No bruits.  shunt palpable on the right  RESP: lungs clear to auscultation - no rales, rhonchi or wheezes  CV: regular rate and rhythm, normal S1 S2, no S3 or S4, no murmur, click or rub, no peripheral edema and peripheral pulses strong  ABDOMEN: soft, nontender, no hepatosplenomegaly, no masses and bowel sounds normal  MS: no gross musculoskeletal defects noted, no clubbing, edema or cyanosis of extremities.  Left shoulder: No obvious deformity.  Mild tenderness over the AC joint and the bicep tendon insertions. HEATHER.  Strength intact  Palpable tenderness over the right lateral area.    Right ankle/heel without obvious deformity.  HEATHER. Strength intact.  No pain currently.  No redness or swelling.   Pulses = and appropriate bilaterally to DP and PT  SKIN: no suspicious lesions or rashes  NEURO: Normal strength and tone, mentation intact and speech normal  PSYCH: mentation appears normal, affect " "normal/bright  LYMPH: no cervical, supraclavicular, axillary, or inguinal adenopathy      ASSESSMENT/PLAN:       ICD-10-CM    1. Routine general medical examination at a health care facility Z00.00 CBC with Diff Plt ([facility])   2. Acute pain of left shoulder M25.512 [facility] PT, HAND, AND CHIROPRACTIC REFERRAL   3. Screening for lipid disorders Z13.220 Lipid Panel ([facility])       Shoulder pain appears to be mild AC joint injury. Recommend PHYSICAL THERAPY, REST on weights until you see the PHYSICAL THERAPY.  Other issues are from overuse.  Rest encouraged, Ice, Ibuprofen and stretching.    COUNSELING:  Reviewed preventive health counseling, as reflected in patient instructions       Regular exercise       Healthy diet/nutrition       Vision screening       Immunizations    Up to date.       Osteoporosis Prevention/Bone Health    Estimated body mass index is 31.25 kg/m  as calculated from the following:    Height as of this encounter: 1.727 m (5' 7.99\").    Weight as of this encounter: 93.2 kg (205 lb 8 oz).       reports that he has never smoked. He has never used smokeless tobacco.      Counseling Resources:  ATP IV Guidelines  Pooled Cohorts Equation Calculator  FRAX Risk Assessment  ICSI Preventive Guidelines  Dietary Guidelines for Americans, 2010  USDA's MyPlate  ASA Prophylaxis  Lung CA Screening    Bekah Carreon PA-C  [facility]    IRangel, am serving as a scribe to document services personally performed by Bekah Carreon PA-C, based on data collection and the provider's statements to me. Bekah Carreon PA-C, has reviewed, edited, and approved the above note.     "

## 2019-06-20 NOTE — PATIENT INSTRUCTIONS
Rest ice and ibuprofen for soft tissue/overuse issues that we talked about.    I am always happy to send you to PHYSICAL THERAPY especially for the left shoulder AC joint issue.  Order placed.    Stretching all areas.  Decrease amount of weights and may need to rest for 5-7 days.        Preventive Health Recommendations  Male Ages 21 - 25     Yearly exam:             See your health care provider every year in order to  o   Review health changes.   o   Discuss preventive care.    o   Review your medicines if your doctor has prescribed any.    You should be tested each year for STDs (sexually transmitted diseases).     Talk to your provider about cholesterol testing.      If you are at risk for diabetes, you should have a diabetes test (fasting glucose).    Shots: Get a flu shot each year. Get a tetanus shot every 10 years.     Nutrition:    Eat at least 5 servings of fruits and vegetables daily.     Eat whole-grain bread, whole-wheat pasta and brown rice instead of white grains and rice.     Get adequate calcium and Vitamin D.     Lifestyle    Exercise for at least 150 minutes a week (30 minutes a day, 5 days a week). This will help you control your weight and prevent disease.     Limit alcohol to one drink per day.     No smoking.     Wear sunscreen to prevent skin cancer.     See your dentist every six months for an exam and cleaning.

## 2019-06-26 ENCOUNTER — OFFICE VISIT (OUTPATIENT)
Dept: FAMILY MEDICINE | Facility: CLINIC | Age: 24
End: 2019-06-26

## 2019-06-26 VITALS
HEART RATE: 52 BPM | WEIGHT: 205.5 LBS | HEIGHT: 68 IN | TEMPERATURE: 97.9 F | BODY MASS INDEX: 31.14 KG/M2 | DIASTOLIC BLOOD PRESSURE: 72 MMHG | SYSTOLIC BLOOD PRESSURE: 119 MMHG | OXYGEN SATURATION: 99 %

## 2019-06-26 DIAGNOSIS — Z13.220 SCREENING FOR LIPID DISORDERS: ICD-10-CM

## 2019-06-26 DIAGNOSIS — Z00.00 ROUTINE GENERAL MEDICAL EXAMINATION AT A HEALTH CARE FACILITY: Primary | ICD-10-CM

## 2019-06-26 DIAGNOSIS — M25.512 ACUTE PAIN OF LEFT SHOULDER: ICD-10-CM

## 2019-06-26 LAB
% GRANULOCYTES: 55.3 %G (ref 40–75)
CHOLEST SERPL-MCNC: 171 MG/DL (ref 0–200)
CHOLEST/HDLC SERPL: 2.9 {RATIO} (ref 0–5)
ERYTHROCYTE [DISTWIDTH] IN BLOOD BY AUTOMATED COUNT: 14.2 %
FASTING SPECIMEN: YES
GRANULOCYTES #: 3.9 K/UL (ref 1.6–8.3)
HCT VFR BLD AUTO: 46.2 % (ref 40–53)
HDLC SERPL-MCNC: 60 MG/DL
HEMOGLOBIN: 14.5 G/DL (ref 13.3–17.7)
LDLC SERPL CALC-MCNC: 101 MG/DL (ref 0–129)
LYMPHOCYTES # BLD AUTO: 2.5 K/UL (ref 0.8–5.3)
LYMPHOCYTES NFR BLD AUTO: 34.9 %L (ref 20–48)
MCH RBC QN AUTO: 25.2 PG (ref 26.5–35)
MCHC RBC AUTO-ENTMCNC: 31.4 G/DL (ref 32–36)
MCV RBC AUTO: 80.3 FL (ref 78–100)
MID #: 0.7 K/UL (ref 0–2.2)
MID %: 9.8 %M (ref 0–20)
PLATELET # BLD AUTO: 183 K/UL (ref 150–450)
RBC # BLD AUTO: 5.75 M/UL (ref 4.4–5.9)
TRIGL SERPL-MCNC: 49 MG/DL (ref 0–150)
VLDL-CHOLESTEROL: 10 (ref 7–32)
WBC # BLD AUTO: 7.1 K/UL (ref 4–11)

## 2019-06-26 SDOH — HEALTH STABILITY: PHYSICAL HEALTH: ON AVERAGE, HOW MANY DAYS PER WEEK DO YOU ENGAGE IN MODERATE TO STRENUOUS EXERCISE (LIKE A BRISK WALK)?: 4 DAYS

## 2019-06-26 SDOH — HEALTH STABILITY: PHYSICAL HEALTH: ON AVERAGE, HOW MANY MINUTES DO YOU ENGAGE IN EXERCISE AT THIS LEVEL?: 90 MIN

## 2019-06-26 SDOH — ECONOMIC STABILITY: FOOD INSECURITY: WITHIN THE PAST 12 MONTHS, YOU WORRIED THAT YOUR FOOD WOULD RUN OUT BEFORE YOU GOT THE MONEY TO BUY MORE.: NEVER TRUE

## 2019-06-26 SDOH — ECONOMIC STABILITY: FOOD INSECURITY: WITHIN THE PAST 12 MONTHS, THE FOOD YOU BOUGHT JUST DIDN'T LAST AND YOU DIDN'T HAVE MONEY TO GET MORE.: NEVER TRUE

## 2019-06-26 SDOH — ECONOMIC STABILITY: FOOD INSECURITY: HOW HARD IS IT FOR YOU TO PAY FOR THE VERY BASICS LIKE FOOD, HOUSING, MEDICAL CARE, AND HEATING?: NOT HARD AT ALL

## 2019-06-26 SDOH — ECONOMIC STABILITY: TRANSPORTATION INSECURITY: IN THE PAST 12 MONTHS, HAS LACK OF TRANSPORTATION KEPT YOU FROM MEDICAL APPOINTMENTS OR FROM GETTING MEDICATIONS?: NO

## 2019-06-26 SDOH — HEALTH STABILITY: MENTAL HEALTH
DO YOU FEEL STRESS - TENSE, RESTLESS, NERVOUS, OR ANXIOUS, OR UNABLE TO SLEEP AT NIGHT BECAUSE YOUR MIND IS TROUBLED ALL THE TIME - THESE DAYS?: NOT AT ALL

## 2019-06-26 ASSESSMENT — MIFFLIN-ST. JEOR: SCORE: 1891.51

## 2019-06-26 ASSESSMENT — ACTIVITIES OF DAILY LIVING (ADL): LACK_OF_TRANSPORTATION: NO

## 2019-06-26 NOTE — NURSING NOTE
"24 year old  Chief Complaint   Patient presents with     Physical     Shoulder Pain     left shoulder pain     Musculoskeletal Problem     right foot/ankle pain aound the heel     Musculoskeletal Problem     lat pain, thinks pulled muscle       Blood pressure 119/72, pulse 52, temperature 97.9  F (36.6  C), temperature source Oral, height 1.727 m (5' 7.99\"), weight 93.2 kg (205 lb 8 oz), SpO2 99 %. Body mass index is 31.25 kg/m .  Patient Active Problem List   Diagnosis     ADHD (attention deficit hyperactivity disorder)     S/P  Shunt at age 3     MRSA (methicillin resistant Staphylococcus aureus) carrier     Cognitive disorder     Pain in joint, shoulder region     Premature ejaculation     Adjustment disorder with mixed anxiety and depressed mood       Wt Readings from Last 2 Encounters:   06/26/19 93.2 kg (205 lb 8 oz)   05/16/18 102.5 kg (226 lb)     BP Readings from Last 3 Encounters:   06/26/19 119/72   05/16/18 121/72   03/28/17 132/81         No current outpatient medications on file.     No current facility-administered medications for this visit.        Social History     Tobacco Use     Smoking status: Never Smoker     Smokeless tobacco: Never Used   Substance Use Topics     Alcohol use: No     Drug use: No       Health Maintenance Due   Topic Date Due     HIV SCREENING  01/06/2009     PHQ-2  01/01/2019       No results found for: PAP      June 26, 2019 9:39 AM    "

## 2019-07-08 ENCOUNTER — THERAPY VISIT (OUTPATIENT)
Dept: PHYSICAL THERAPY | Facility: CLINIC | Age: 24
End: 2019-07-08
Attending: PHYSICIAN ASSISTANT

## 2019-07-08 DIAGNOSIS — M25.512 ACUTE PAIN OF LEFT SHOULDER: ICD-10-CM

## 2019-07-08 PROCEDURE — 97110 THERAPEUTIC EXERCISES: CPT | Mod: GP | Performed by: PHYSICAL THERAPIST

## 2019-07-08 PROCEDURE — 97161 PT EVAL LOW COMPLEX 20 MIN: CPT | Mod: GP | Performed by: PHYSICAL THERAPIST

## 2019-07-08 PROCEDURE — 97530 THERAPEUTIC ACTIVITIES: CPT | Mod: GP | Performed by: PHYSICAL THERAPIST

## 2019-07-08 NOTE — PROGRESS NOTES
[facility] Initial Evaluation -- Upper Extremity    Evaluation Date: July 8, 2019  Donald Jackson is a 24 year old male with a Lt shoulder condition.   Referral: GP  Work mechanical stresses: Prolonged sitting, computer work  Employment status:  Working, normal hours  Leisure mechanical stresses: Cardio and weights 4-5 x week  Functional disability score (SPADI): See flowsheet  VAS score (0-10): 5/10  Handedness (R/L):  Right  Patient goals/expectations:  Reduce pain and perform upper body workouts without pain    HISTORY    Present symptoms: Lt superior shoulder.    Pain quality (sharp/shooting/stabbing/aching/burning/cramping):  sharp    Present since (onset date):  May 2019    Symptoms (improving/unchanging/worsening):  unchanging.    Symptoms commenced as a result of: Lifting weights   Condition occurred in the following environment: Recreation/sport    Symptoms at onset: As above  Paresthesia (yes/no):  No  Spinal history: No   Cough/Sneeze (pos/neg):  Neg    Constant symptoms: None  Intermittent symptoms: As above    Symptoms are worse with the following: Sometimes Bending, Sometimes Turning neck, Sometimes Dressing, Always Reaching, Sometimes When still, Sometimes Sleeping (prone/sup/side R/L) - Lt and Time of day - Always as the day progresses and Always PM   Symptoms are better with the following: Other - Sometimes stretching    Continued use makes the pain (better/worse/no effect): Worse    Disturbed night (yes/no):  No    Pain at rest (yes/no): Yes  Site (neck/shoulder/elbow/wrist/hand): shoulder    Other questions (swelling/catching/clicking/locking/subluxing):  None    Previous episodes: No  Previous treatments: None    Specific Questions:  General health (excellent/good/fair/poor):  Good  Pertinent medical history includes: see chart  Medications (nil/NSAIDS/analg/steroids/anticoag/other):  NSAIDS  Medical allergies:  None  Imaging (None/Xray/MRI/Other): None  Recent or major  surgery (yes/no): See chart  Night pain (yes/no): No  Accidents (yes/no): No  Unexplained weight loss (yes/no): No  Barriers at home: None  Other red flags: None    Sites for physical examination (neck/shoulder/elbow/wrist/hand):  Neck, shoulder    EXAMINATION    Posture:  Sitting (good/fair/poor): Poor  Correction of posture (better/worse/no effect/NA): NE  Standing (good/fair/poor):   Other observations:      Neurological (NA/motor/sensory/reflexes/dural): See strength testing below.    Baselines (pain or functional activity): Reaching overhead, lifting, turning neck side-side    Extremities (Shoulder/Elbow/Wrist/Hand): Shoulder    Movement Loss Javy Mod Min Nil Pain   Flexion    X Slight ERP   Extension    X    Abduction    X Slight ERP   Internal Rotation    X    External Rotation    X       Passive Movement (+/- overpressure)/(PDM/ERP):  NT  Resisted Test Response (pain): Flex 5/5 (B); Abd 5/5 (B); ER 5/5 (B); IR 5/5 (B); Elbow Flex 5/5 (B)  Other Tests: NT    Spine:  Movement Loss: Flex WNL; Retr WNL; Ext WNL; SB WNL (B); Rotation 5/5 (B).  Effect of repeated movements: Retr/Ext - NE, NE, NE ROM  Effect of static positioning: NT  Spine testing (not relevant/relevant/secondary problem): ?    Baseline Symptoms:   Repeated Tests Symptom Response Mechanical Response   Active/Passive movement, resisted test, functional test During - Produce, Abolish, Increase, Decrease, NE After -   Better, Worse, NB, NW, NE Effect -   ? or ? ROM, strength or key functional test No Effect   NT due to screening and clearing C/S       Effect of static positioning       NT           Provisional Classification (Extremity/Spine): Spine - Inconclusive/Other - Mechanically Inconclusive      Principle of Management:  Education:  Posture, specificity of exercise and rationale with repeated movements, test/re test with provoking activities  Equipment provided:  None  Exercise and dosage:  Retr/ext 10-15 reps every 2  hrs    ASSESSMENT/PLAN:    Patient is a 24 year old male with left side shoulder complaints.    Patient has the following significant findings with corresponding treatment plan.                Diagnosis 1:  Lt Shoulder pain    Pain -  self management, education, directional preference exercise and home program  Decreased ROM/flexibility - manual therapy, therapeutic exercise and home program  Decreased joint mobility - manual therapy, therapeutic exercise and home program  Decreased strength - therapeutic exercise, therapeutic activities and home program  Impaired muscle performance - neuro re-education and home program  Decreased function - therapeutic activities and home program  Impaired posture - neuro re-education and home program    .Previous and current functional limitations:  (See Goal Flow Sheet for this information)    Short term and Long term goals: (See Goal Flow Sheet for this information)     Communication ability:  Patient appears to be able to clearly communicate and understand verbal and written communication and follow directions correctly.  Treatment Explanation - The following has been discussed with the patient:   RX ordered/plan of care  Anticipated outcomes  Possible risks and side effects  This patient would benefit from PT intervention to resume normal activities.   Rehab potential is good.    Frequency:  1 X week, once daily  Duration:  for 6 weeks  Discharge Plan:  Achieve all LTG.  Independent in home treatment program.  Reach maximal therapeutic benefit.    Please refer to the daily flowsheet for treatment today, total treatment time and time spent performing 1:1 timed codes.

## 2019-07-29 ENCOUNTER — THERAPY VISIT (OUTPATIENT)
Dept: PHYSICAL THERAPY | Facility: CLINIC | Age: 24
End: 2019-07-29

## 2019-07-29 DIAGNOSIS — M25.512 ACUTE PAIN OF LEFT SHOULDER: ICD-10-CM

## 2019-07-29 PROCEDURE — 97110 THERAPEUTIC EXERCISES: CPT | Mod: GP | Performed by: PHYSICAL THERAPIST

## 2019-07-29 PROCEDURE — 97112 NEUROMUSCULAR REEDUCATION: CPT | Mod: GP | Performed by: PHYSICAL THERAPIST

## 2019-10-02 ENCOUNTER — HEALTH MAINTENANCE LETTER (OUTPATIENT)
Age: 24
End: 2019-10-02

## 2020-01-31 ENCOUNTER — OFFICE VISIT (OUTPATIENT)
Dept: OPHTHALMOLOGY | Facility: CLINIC | Age: 25
End: 2020-01-31
Attending: OPHTHALMOLOGY
Payer: COMMERCIAL

## 2020-01-31 DIAGNOSIS — H35.413 BILATERAL RETINAL LATTICE DEGENERATION: ICD-10-CM

## 2020-01-31 DIAGNOSIS — Z86.69 HISTORY OF RETINAL DETACHMENT: Primary | ICD-10-CM

## 2020-01-31 PROCEDURE — G0463 HOSPITAL OUTPT CLINIC VISIT: HCPCS | Mod: ZF

## 2020-01-31 ASSESSMENT — CUP TO DISC RATIO
OS_RATIO: 0.4
OD_RATIO: 0.5

## 2020-01-31 ASSESSMENT — EXTERNAL EXAM - RIGHT EYE: OD_EXAM: NORMAL

## 2020-01-31 ASSESSMENT — VISUAL ACUITY
CORRECTION_TYPE: GLASSES
OD_CC: 20/30
METHOD: SNELLEN - LINEAR
OD_CC+: +2
OS_CC+: -1
OS_CC: 20/20

## 2020-01-31 ASSESSMENT — TONOMETRY
IOP_METHOD: TONOPEN
OS_IOP_MMHG: 10
OD_IOP_MMHG: 10

## 2020-01-31 ASSESSMENT — REFRACTION_WEARINGRX
OS_AXIS: 084
OS_SPHERE: -9.75
OD_AXIS: 089
OD_SPHERE: -6.75
OS_CYLINDER: +4.25
SPECS_TYPE: SVL
OD_CYLINDER: +2.75

## 2020-01-31 ASSESSMENT — SLIT LAMP EXAM - LIDS
COMMENTS: NORMAL
COMMENTS: NORMAL

## 2020-01-31 ASSESSMENT — EXTERNAL EXAM - LEFT EYE: OS_EXAM: NORMAL

## 2020-01-31 ASSESSMENT — CONF VISUAL FIELD: OD_INFERIOR_NASAL_RESTRICTION: 3

## 2020-01-31 NOTE — NURSING NOTE
Chief Complaints and History of Present Illnesses   Patient presents with     Floaters Right Eye     Chief Complaint(s) and History of Present Illness(es)     Floaters Right Eye               Comments     New flashes and floaters in RE for the past week. Pt notes nasal part of his vision has appeared darker.  Pt also notes pressure like eye pain in RE for the past month.  No redness or dryness.    SHERINE Hoffman January 31, 2020 7:51 AM

## 2020-01-31 NOTE — PROGRESS NOTES
CC: H/o RD    INTERVAL HISTORY: Last visit here 11/2016. Notes new flashes and floaters in the right eye x 1 month - worse in the past week. Denies VF changes. Also notes pressure like sensation in right eye for the past month.     HPI: Hx of RD OS s/p SB 2010 and multiple laser pexy OD.  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 Retinopathy of prematurity (ROP)   shunt  H/o amblyopia OD Tx with patches, had surgery OD in past per patietn        PAST OCULAR HISTORY  Retinal pexy OD multiple 6541-1099  SBP OS 2010  ?strabismus surgery OD (patient unsure as child)      RETINAL IMAGING  None      ASSESSMENT & PLAN:  1.  RD OD  s/p pexy 10/2011, 2010   - Far IT periphery from lattice with atrophic holes   - No further spread of Retinal detachment , well contained by laser   - No new retinal tears/RD    - recheck 1 year   - d/w patient S/Sx RD 1/2020    2. H/o RD OS   -S/p SCLERAL BUCKLE 2010   -attached    3. Lattice OU   - No tears/rd noted on depressed exam   - Retinal detachment/retinal tear precautions discussed with patient    - Contact clinic immediately for new floaters/flashers or shadows in vision      4. Syneresis OU      5. H/o amblyopia OD   - ?prior strabismus surgery, unsure   - was premature         Return to clinic:  1 year, OCT OU    Edvin Corral MD  Ophthalmology Resident, PGY-3    ATTESTATION     Attending Physician Attestation:      Complete documentation of historical and exam elements from today's encounter can be found in the full encounter summary report (not reduplicated in this progress note).  I personally obtained the chief complaint(s) and history of present illness.  I confirmed and edited as necessary the review of systems, past medical/surgical history, family history, social history, and examination findings as documented by others; and I examined the patient myself.  I personally reviewed the relevant tests, images, and reports as documented above.  I personally reviewed the ophthalmic  test(s) associated with this encounter, agree with the interpretation(s) as documented by the resident/fellow, and have edited the corresponding report(s) as necessary.   I formulated and edited as necessary the assessment and plan and discussed the findings and management plan with the patient and family    Shasha Santos MD, PhD  , Vitreoretinal Surgery  Department of Ophthalmology  [facility]

## 2020-04-15 PROBLEM — M25.512 ACUTE PAIN OF LEFT SHOULDER: Status: RESOLVED | Noted: 2019-07-08 | Resolved: 2020-04-15

## 2020-04-15 NOTE — PROGRESS NOTES
Discharge Note    Progress reporting period is from initial evaluation date (please see noted date below) to Jul 29, 2019.  Linked Episodes   Type: Episode: Status: Noted: Resolved: Last update: Updated by:   PHYSICAL THERAPY Lt Shoulder 7.8.19 Active 7/8/2019 7/29/2019 11:40 AM Ifeanyi Li, PT      Comments:       Donald failed to follow up and current status is unknown.  Please see information below for last relevant information on current status.  Patient seen for 2 visits.    SUBJECTIVE  Subjective changes noted by patient:  pt reports he has been doing the cervical extension with retraction. atleast 2x per day, 30 repetitions. pt reports it has helped considerably, will do it and it goes away. pt states he is sore and did deadlifts which increased his pain.   .  Current pain level is 0/10.     Previous pain level was  2/10.   Changes in function:  Yes (See Goal flowsheet attached for changes in current functional level)  Adverse reaction to treatment or activity: None    OBJECTIVE  Changes noted in objective findings: Cervical AROM: cervical retraction min limitation. cervical extension min limitation. nil loss flexion, rotation or sidebending B     ASSESSMENT/PLAN  Diagnosis: Lt Shoulder Pain   Updated problem list and treatment plan:   Pain - HEP  STG/LTGs have been met or progress has been made towards goals:  Yes, please see goal flowsheet for most current information  Assessment of Progress: current status is unknown.    Last current status: Pt is progressing well   Self Management Plans:  HEP  I have re-evaluated this patient and find that the nature, scope, duration and intensity of the therapy is appropriate for the medical condition of the patient.  [place] continues to require the following intervention to meet STG and LTG's:  HEP.    Recommendations:  Discharge with current home program.  Patient to follow up with MD as needed.    Please refer to the daily flowsheet for treatment today, total  treatment time and time spent performing 1:1 timed codes.

## 2020-08-16 ENCOUNTER — OFFICE VISIT (OUTPATIENT)
Dept: URGENT CARE | Facility: URGENT CARE | Age: 25
End: 2020-08-16
Payer: COMMERCIAL

## 2020-08-16 ENCOUNTER — ANCILLARY PROCEDURE (OUTPATIENT)
Dept: GENERAL RADIOLOGY | Facility: CLINIC | Age: 25
End: 2020-08-16
Attending: PHYSICIAN ASSISTANT
Payer: COMMERCIAL

## 2020-08-16 VITALS
TEMPERATURE: 97.7 F | HEART RATE: 85 BPM | WEIGHT: 215 LBS | SYSTOLIC BLOOD PRESSURE: 142 MMHG | BODY MASS INDEX: 32.7 KG/M2 | DIASTOLIC BLOOD PRESSURE: 88 MMHG | RESPIRATION RATE: 16 BRPM | OXYGEN SATURATION: 99 %

## 2020-08-16 DIAGNOSIS — R51.9 NONINTRACTABLE HEADACHE, UNSPECIFIED CHRONICITY PATTERN, UNSPECIFIED HEADACHE TYPE: ICD-10-CM

## 2020-08-16 DIAGNOSIS — R07.9 RIGHT-SIDED CHEST PAIN: ICD-10-CM

## 2020-08-16 DIAGNOSIS — Z98.2 S/P VP SHUNT: Primary | ICD-10-CM

## 2020-08-16 LAB
BASOPHILS # BLD AUTO: 0.1 10E9/L (ref 0–0.2)
BASOPHILS NFR BLD AUTO: 0.6 %
D DIMER PPP FEU-MCNC: <0.3 UG/ML FEU (ref 0–0.5)
DIFFERENTIAL METHOD BLD: ABNORMAL
EOSINOPHIL # BLD AUTO: 0 10E9/L (ref 0–0.7)
EOSINOPHIL NFR BLD AUTO: 0.2 %
ERYTHROCYTE [DISTWIDTH] IN BLOOD BY AUTOMATED COUNT: 14.9 % (ref 10–15)
ERYTHROCYTE [SEDIMENTATION RATE] IN BLOOD BY WESTERGREN METHOD: 1 MM/H (ref 0–15)
HCT VFR BLD AUTO: 47.1 % (ref 40–53)
HGB BLD-MCNC: 15.2 G/DL (ref 13.3–17.7)
LYMPHOCYTES # BLD AUTO: 2.1 10E9/L (ref 0.8–5.3)
LYMPHOCYTES NFR BLD AUTO: 26.5 %
MCH RBC QN AUTO: 25.5 PG (ref 26.5–33)
MCHC RBC AUTO-ENTMCNC: 32.3 G/DL (ref 31.5–36.5)
MCV RBC AUTO: 79 FL (ref 78–100)
MONOCYTES # BLD AUTO: 0.6 10E9/L (ref 0–1.3)
MONOCYTES NFR BLD AUTO: 7 %
NEUTROPHILS # BLD AUTO: 5.3 10E9/L (ref 1.6–8.3)
NEUTROPHILS NFR BLD AUTO: 65.7 %
PLATELET # BLD AUTO: 188 10E9/L (ref 150–450)
RBC # BLD AUTO: 5.97 10E12/L (ref 4.4–5.9)
TROPONIN I SERPL-MCNC: <0.015 UG/L (ref 0–0.04)
WBC # BLD AUTO: 8 10E9/L (ref 4–11)

## 2020-08-16 PROCEDURE — 86140 C-REACTIVE PROTEIN: CPT | Performed by: PHYSICIAN ASSISTANT

## 2020-08-16 PROCEDURE — 85379 FIBRIN DEGRADATION QUANT: CPT | Performed by: PHYSICIAN ASSISTANT

## 2020-08-16 PROCEDURE — 99215 OFFICE O/P EST HI 40 MIN: CPT | Performed by: PHYSICIAN ASSISTANT

## 2020-08-16 PROCEDURE — 71046 X-RAY EXAM CHEST 2 VIEWS: CPT

## 2020-08-16 PROCEDURE — 85652 RBC SED RATE AUTOMATED: CPT | Performed by: PHYSICIAN ASSISTANT

## 2020-08-16 PROCEDURE — 85025 COMPLETE CBC W/AUTO DIFF WBC: CPT | Performed by: PHYSICIAN ASSISTANT

## 2020-08-16 PROCEDURE — 36415 COLL VENOUS BLD VENIPUNCTURE: CPT | Performed by: PHYSICIAN ASSISTANT

## 2020-08-16 PROCEDURE — 84484 ASSAY OF TROPONIN QUANT: CPT | Performed by: PHYSICIAN ASSISTANT

## 2020-08-16 PROCEDURE — 93000 ELECTROCARDIOGRAM COMPLETE: CPT | Performed by: PHYSICIAN ASSISTANT

## 2020-08-16 NOTE — PROGRESS NOTES
SUBJECTIVE:  Donald Jackson is a 25 year old male who presents to the office with the CC of chest pain.  Patient complains of chest pain.  The pain is characterized as mild and moderate sharp located left and right side chest at times with radiation to none. Symptoms began 4 day(s) ago, intermittent episodes     Pain is associated with painful breathing  Pain is exacerbated by deep breaths  Pain is relieved by motrin.  Cardiac risk factors: none  Also having headaches at times, has V/P shunt    Past Medical History:   Diagnosis Date     Hydrocephalus (H)      Lattice degeneration      Other forms of retinal detachment(361.89)      Allergies   Allergen Reactions     Penicillin [Penicillins]      Social History     Tobacco Use     Smoking status: Never Smoker     Smokeless tobacco: Never Used   Substance Use Topics     Alcohol use: No     Family History   Problem Relation Age of Onset     Diabetes Father         Type 2      Diabetes Paternal Grandfather         Type 2      Retinal detachment No family hx of      Amblyopia No family hx of      Glaucoma No family hx of      Macular Degeneration No family hx of        ROS:CONSTITUTIONAL:NEGATIVE for fever, chills, change in weight  INTEGUMENTARY/SKIN: NEGATIVE for worrisome rashes, moles or lesions  ENT/MOUTH: NEGATIVE for ear, mouth and throat problems  RESP:POSITIVE for painful breathing at times  CV: POSITIVE for right side chest wall pain, tenderness  GI: NEGATIVE for nausea, abdominal pain, heartburn, or change in bowel habits  : negative for dysuria, hematuria, decreased urinary stream, erectile dysfunction  MUSCULOSKELETAL: POSITIVE  for chest wall tenderness  HEAD: POSITIVE for mild headaches, right side  NEURO: NEGATIVE for weakness, dizziness or paresthesias    EXAM:  BP (!) 142/88   Pulse 85   Temp 97.7  F (36.5  C) (Tympanic)   Resp 16   Wt 97.5 kg (215 lb)   SpO2 99%   BMI 32.70 kg/m    GENERAL APPEARANCE: healthy, alert and no distress  EYES: EOMI,   PERRL, conjunctiva clear  HENT: ear canals and TM's normal.  Nose and mouth without ulcers, erythema or lesions  NECK: supple, nontender, no lymphadenopathy  RESP: lungs clear to auscultation - no rales, rhonchi or wheezes  CV: regular rates and rhythm, normal S1 S2, no murmur noted  ABDOMEN:  soft, nontender, no HSM or masses and bowel sounds normal  Extremities: no peripheral edema or tenderness, peripheral pulses normal  MS: extremities normal- no gross deformities noted, no erythema, FROM noted in all extremities  NEURO: Normal strength and tone, sensory exam grossly normal,  normal speech and mentation  SKIN: no suspicious lesions or rashes     Office EKG demonstrates:  appears normal, NSR,normal axis, normal intervals, no acute ST/T changes c/w ischemia,no LVH by voltage criteria,unchanged from previous tracings    Results for orders placed or performed in visit on 08/16/20   XR Chest 2 Views     Status: None    Narrative    CHEST TWO VIEWS    8/16/2020 1:36 PM     HISTORY: Right-sided chest pain    COMPARISON: Chest CT on 3/29/2017.      Impression    IMPRESSION: PA and lateral views of the chest were obtained.  Cardiomediastinal silhouette is within normal limits. No suspicious  focal pulmonary opacities. No significant pleural effusion or  pneumothorax.    OSMIN STRAUSS MD   Results for orders placed or performed in visit on 08/16/20   CBC with platelets differential     Status: Abnormal   Result Value Ref Range    WBC 8.0 4.0 - 11.0 10e9/L    RBC Count 5.97 (H) 4.4 - 5.9 10e12/L    Hemoglobin 15.2 13.3 - 17.7 g/dL    Hematocrit 47.1 40.0 - 53.0 %    MCV 79 78 - 100 fl    MCH 25.5 (L) 26.5 - 33.0 pg    MCHC 32.3 31.5 - 36.5 g/dL    RDW 14.9 10.0 - 15.0 %    Platelet Count 188 150 - 450 10e9/L    % Neutrophils 65.7 %    % Lymphocytes 26.5 %    % Monocytes 7.0 %    % Eosinophils 0.2 %    % Basophils 0.6 %    Absolute Neutrophil 5.3 1.6 - 8.3 10e9/L    Absolute Lymphocytes 2.1 0.8 - 5.3 10e9/L    Absolute  Monocytes 0.6 0.0 - 1.3 10e9/L    Absolute Eosinophils 0.0 0.0 - 0.7 10e9/L    Absolute Basophils 0.1 0.0 - 0.2 10e9/L    Diff Method Automated Method    D dimer, quantitative     Status: None   Result Value Ref Range    D Dimer <0.3 0.0 - 0.50 ug/ml FEU   ESR: Erythrocyte sedimentation rate     Status: None   Result Value Ref Range    Sed Rate 1 0 - 15 mm/h   Troponin I     Status: None   Result Value Ref Range    Troponin I ES <0.015 0.000 - 0.045 ug/L       Assessment  / IMPRESSION/PLAN      ICD-10-CM    1. S/P  Shunt at age 3  Z98.2 Referral to Acute and Diagnostic Services (Day of diagnostic / First order acute)   2. Nonintractable headache, unspecified chronicity pattern, unspecified headache type  R51 ESR: Erythrocyte sedimentation rate     CRP, inflammation     Referral to Acute and Diagnostic Services (Day of diagnostic / First order acute)   3. Right-sided chest pain  R07.9 CBC with platelets differential     D dimer, quantitative     ESR: Erythrocyte sedimentation rate     Troponin I     XR Chest 2 Views       Orders Placed This Encounter     XR Chest 2 Views     CBC with platelets differential     D dimer, quantitative     ESR: Erythrocyte sedimentation rate     Troponin I     CRP, inflammation     Referral to Acute and Diagnostic Services (Day of diagnostic / First order acute)       EKG and troponin neg for cardiac damage  D-dimer negative for PE  CBC neg for infection  CRP and ESR pending, evaluate for Temporal Arteritis  Patient referred to [facility] for CT head due to headache and hx of  shunt

## 2020-08-17 LAB — CRP SERPL-MCNC: <2.9 MG/L (ref 0–8)

## 2020-08-18 ENCOUNTER — OFFICE VISIT (OUTPATIENT)
Dept: PEDIATRICS | Facility: CLINIC | Age: 25
End: 2020-08-18
Attending: PHYSICIAN ASSISTANT
Payer: COMMERCIAL

## 2020-08-18 ENCOUNTER — HOSPITAL ENCOUNTER (OUTPATIENT)
Dept: CT IMAGING | Facility: CLINIC | Age: 25
Discharge: HOME | End: 2020-08-18
Attending: PHYSICIAN ASSISTANT | Admitting: PHYSICIAN ASSISTANT
Payer: COMMERCIAL

## 2020-08-18 VITALS
OXYGEN SATURATION: 100 % | SYSTOLIC BLOOD PRESSURE: 115 MMHG | TEMPERATURE: 98.3 F | DIASTOLIC BLOOD PRESSURE: 65 MMHG | HEART RATE: 70 BPM

## 2020-08-18 DIAGNOSIS — R51.9 NONINTRACTABLE HEADACHE, UNSPECIFIED CHRONICITY PATTERN, UNSPECIFIED HEADACHE TYPE: ICD-10-CM

## 2020-08-18 DIAGNOSIS — Z98.2 S/P VP SHUNT: ICD-10-CM

## 2020-08-18 PROCEDURE — 99214 OFFICE O/P EST MOD 30 MIN: CPT | Performed by: PHYSICIAN ASSISTANT

## 2020-08-18 PROCEDURE — 70450 CT HEAD/BRAIN W/O DYE: CPT

## 2020-08-18 SDOH — SOCIAL STABILITY: SOCIAL NETWORK: ARE YOU MARRIED, WIDOWED, DIVORCED, SEPARATED, NEVER MARRIED, OR LIVING WITH A PARTNER?: NEVER MARRIED

## 2020-08-18 SDOH — HEALTH STABILITY: MENTAL HEALTH: HOW OFTEN DO YOU HAVE 6 OR MORE DRINKS ON ONE OCCASION?: NEVER

## 2020-08-18 SDOH — HEALTH STABILITY: MENTAL HEALTH: HOW OFTEN DO YOU HAVE A DRINK CONTAINING ALCOHOL?: NEVER

## 2020-08-18 SDOH — SOCIAL STABILITY: SOCIAL NETWORK: HOW OFTEN DO YOU ATTENT MEETINGS OF THE CLUB OR ORGANIZATION YOU BELONG TO?: NEVER

## 2020-08-18 SDOH — SOCIAL STABILITY: SOCIAL NETWORK: HOW OFTEN DO YOU GET TOGETHER WITH FRIENDS OR RELATIVES?: NEVER

## 2020-08-18 SDOH — HEALTH STABILITY: PHYSICAL HEALTH: ON AVERAGE, HOW MANY MINUTES DO YOU ENGAGE IN EXERCISE AT THIS LEVEL?: 50 MIN

## 2020-08-18 SDOH — HEALTH STABILITY: PHYSICAL HEALTH: ON AVERAGE, HOW MANY DAYS PER WEEK DO YOU ENGAGE IN MODERATE TO STRENUOUS EXERCISE (LIKE A BRISK WALK)?: 5 DAYS

## 2020-08-18 SDOH — HEALTH STABILITY: MENTAL HEALTH: HOW MANY STANDARD DRINKS CONTAINING ALCOHOL DO YOU HAVE ON A TYPICAL DAY?: PATIENT DECLINED

## 2020-08-18 SDOH — ECONOMIC STABILITY: INCOME INSECURITY: IN THE LAST 12 MONTHS, WAS THERE A TIME WHEN YOU WERE NOT ABLE TO PAY THE MORTGAGE OR RENT ON TIME?: NO

## 2020-08-18 SDOH — SOCIAL STABILITY: SOCIAL NETWORK
DO YOU BELONG TO ANY CLUBS OR ORGANIZATIONS SUCH AS CHURCH GROUPS UNIONS, FRATERNAL OR ATHLETIC GROUPS, OR SCHOOL GROUPS?: NO

## 2020-08-18 SDOH — SOCIAL STABILITY: SOCIAL NETWORK: HOW OFTEN DO YOU ATTEND CHURCH OR RELIGIOUS SERVICES?: NEVER

## 2020-08-18 SDOH — SOCIAL STABILITY: SOCIAL NETWORK: IN A TYPICAL WEEK, HOW MANY TIMES DO YOU TALK ON THE PHONE WITH FAMILY, FRIENDS, OR NEIGHBORS?: TWICE A WEEK

## 2020-08-18 NOTE — PROGRESS NOTES
SUBJECTIVE:   Donald Jackson is a 25 year old male presenting with a chief complaint of having headaches on the right side.  Onset of symptoms was 1 month(s) ago.  Course of illness is same.    Severity mild to moderate  Current and Associated symptoms: had headache  Treatment measures tried include OTC medications.  Predisposing factors include hx of hydrocephalus and  Shunt.    Past Medical History:   Diagnosis Date     Hydrocephalus (H)      Lattice degeneration      Other forms of retinal detachment(361.89)         Allergies   Allergen Reactions     Penicillin [Penicillins]      Family History   Problem Relation Age of Onset     Diabetes Father         Type 2      Diabetes Paternal Grandfather         Type 2      Retinal detachment No family hx of      Amblyopia No family hx of      Glaucoma No family hx of      Macular Degeneration No family hx of          Social History     Tobacco Use     Smoking status: Never Smoker     Smokeless tobacco: Never Used   Substance Use Topics     Alcohol use: No     Frequency: Never     Drinks per session: Patient refused     Binge frequency: Never       ROS:  CONSTITUTIONAL:POSITIVE  for mild headache  INTEGUMENTARY/SKIN: NEGATIVE for worrisome rashes, moles or lesions  EYES: POSITIVE for right eye irritation  ENT/MOUTH: NEGATIVE for ear, mouth and throat problems  RESP:NEGATIVE for significant cough or SOB  CV: NEGATIVE for chest pain, palpitations or peripheral edema  GI: NEGATIVE for nausea, abdominal pain, heartburn, or change in bowel habits  : negative for dysuria, hematuria, decreased urinary stream, erectile dysfunction  MUSCULOSKELETAL: NEGATIVE for significant arthralgias or myalgia  NEURO: NEGATIVE for weakness, dizziness or paresthesias    OBJECTIVE  :/65   Pulse 70   Temp 98.3  F (36.8  C) (Oral)   SpO2 100%   GENERAL APPEARANCE: healthy, alert and no distress  EYES: EOMI,  PERRL, conjunctiva clear  HENT: ear canals and TM's normal.  Nose and mouth  without ulcers, erythema or lesions  NECK: supple, nontender, no lymphadenopathy  RESP: lungs clear to auscultation - no rales, rhonchi or wheezes  CV: regular rates and rhythm, normal S1 S2, no murmur noted  ABDOMEN:  soft, nontender, no HSM or masses and bowel sounds normal  Extremities: no peripheral edema or tenderness, peripheral pulses normal  MS: extremities normal- no gross deformities noted, no erythema, FROM noted in all extremities  NEURO: Normal strength and tone, sensory exam grossly normal,  normal speech and mentation  SKIN: no suspicious lesions or rashes    Results for orders placed or performed during the hospital encounter of 08/18/20   CT Head w/o Contrast     Status: None    Narrative    CT SCAN OF THE HEAD WITHOUT CONTRAST   8/18/2020 11:17 AM     HISTORY: Headache, acute; normal neuro exam. Patient has a  shunt.    TECHNIQUE:  Axial images of the head and coronal reformations without  IV contrast material. Radiation dose for this scan was reduced using  automated exposure control, adjustment of the mA and/or kV according  to patient size, or iterative reconstruction technique.    COMPARISON: Head CT 11/9/2004.    FINDINGS: Right frontal approach ventriculostomy catheter remains in  place with tip terminating in the vicinity of the third ventricle.  Ventricular size is similar to prior with slitlike appearance of the  frontal horns of the lateral ventricles and slight prominence of the  posterior horns. 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 No new loss of gray-white matter differentiation. No  abnormal extra-axial fluid collection. No acute intracranial  hemorrhage. No midline shift or herniation.    The visualized portions of the sinuses and mastoids appear normal. The  bony calvarium and bones of the skull base appear intact.       Impression    IMPRESSION:     1. No evidence of acute intracranial pathology.  2. Right frontal approach ventriculostomy catheter  remains in place  with tip terminating in the vicinity of the third ventricle.  Ventricular size is unchanged since 3/25/2003.  3. 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    ISSA ANDUJAR MD       ASSESSMENT/PLAN      ICD-10-CM    1. S/P  Shunt at age 3  Z98.2 Referral to Acute and Diagnostic Services (Day of diagnostic / First order acute)     CT Head w/o Contrast   2. Nonintractable headache, unspecified chronicity pattern, unspecified headache type  R51 Referral to Acute and Diagnostic Services (Day of diagnostic / First order acute)     CT Head w/o Contrast       Orders Placed This Encounter     CT Head w/o Contrast     Ct scans shows no signs of hydrocephalus and shunt appears to be working  Motrin for headaches  Follow up with PCP as needed

## 2021-01-11 ENCOUNTER — OFFICE VISIT (OUTPATIENT)
Dept: OPHTHALMOLOGY | Facility: CLINIC | Age: 26
End: 2021-01-11
Attending: OPHTHALMOLOGY
Payer: COMMERCIAL

## 2021-01-11 DIAGNOSIS — H35.413 BILATERAL RETINAL LATTICE DEGENERATION: Primary | ICD-10-CM

## 2021-01-11 DIAGNOSIS — H35.413 BILATERAL RETINAL LATTICE DEGENERATION: ICD-10-CM

## 2021-01-11 PROCEDURE — 92134 CPTRZ OPH DX IMG PST SGM RTA: CPT | Performed by: OPHTHALMOLOGY

## 2021-01-11 PROCEDURE — G0463 HOSPITAL OUTPT CLINIC VISIT: HCPCS

## 2021-01-11 PROCEDURE — 99213 OFFICE O/P EST LOW 20 MIN: CPT | Performed by: OPHTHALMOLOGY

## 2021-01-11 ASSESSMENT — REFRACTION_WEARINGRX
OD_CYLINDER: +2.75
OS_SPHERE: -9.75
SPECS_TYPE: SVL
OS_CYLINDER: +4.25
OD_SPHERE: -6.75
OD_AXIS: 089
OS_AXIS: 084

## 2021-01-11 ASSESSMENT — VISUAL ACUITY
OS_CC: 20/20-
CORRECTION_TYPE: GLASSES
OD_CC: 20/30-2
METHOD: SNELLEN - LINEAR

## 2021-01-11 ASSESSMENT — TONOMETRY
OD_IOP_MMHG: 12
OS_IOP_MMHG: 12
IOP_METHOD: TONOPEN

## 2021-01-11 ASSESSMENT — EXTERNAL EXAM - LEFT EYE: OS_EXAM: NORMAL

## 2021-01-11 ASSESSMENT — CUP TO DISC RATIO
OS_RATIO: 0.4
OD_RATIO: 0.5

## 2021-01-11 ASSESSMENT — SLIT LAMP EXAM - LIDS
COMMENTS: NORMAL
COMMENTS: NORMAL

## 2021-01-11 ASSESSMENT — CONF VISUAL FIELD
OD_NORMAL: 1
OS_NORMAL: 1

## 2021-01-11 ASSESSMENT — EXTERNAL EXAM - RIGHT EYE: OD_EXAM: NORMAL

## 2021-01-11 NOTE — PROGRESS NOTES
CC: H/o RD    INTERVAL HISTORY: VA stable, no new F/F.  Fatigues when reading computer       PMH: Hx of RD OS s/p SB 2010 and multiple laser pexy OD.  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 Retinopathy of prematurity (ROP)   shunt  H/o amblyopia OD Tx with patches, had surgery OD in past per patietn        PAST OCULAR HISTORY  Retinal pexy OD multiple 7724-2974  SBP OS 2010  ?strabismus surgery OD (patient unsure as child)      RETINAL IMAGING  OCT 1-11-21  OD - retina normal, PHF attached  OS - retina normal, PH Fattached        ASSESSMENT & PLAN:  #.  RD OD  s/p pexy 10/2011, 2010   - Far IT periphery from lattice with atrophic holes   - No further spread of Retinal detachment , well contained by laser   - No new retinal tears/RD    - recheck 1 year   - d/w patient S/Sx RD 1/2021    #. H/o RD OS   -S/p SCLERAL BUCKLE 2010   -attached    #. Lattice OU      #. Syneresis OU      #. H/o amblyopia OD   - ?prior strabismus surgery, unsure   - was premature      # DIANE    - ATs         Return to clinic:  1 year, OCT OU, DFE OU        ATTESTATION     Attending Physician Attestation:      Complete documentation of historical and exam elements from today's encounter can be found in the full encounter summary report (not reduplicated in this progress note).  I personally obtained the chief complaint(s) and history of present illness.  I confirmed and edited as necessary the review of systems, past medical/surgical history, family history, social history, and examination findings as documented by others; and I examined the patient myself.  I personally reviewed the relevant tests, images, and reports as documented above.  I formulated and edited as necessary the assessment and plan and discussed the findings and management plan with the patient and family    Shasha Santos MD, PhD  , Vitreoretinal Surgery  Department of Ophthalmology  [facility]

## 2021-01-15 ENCOUNTER — HEALTH MAINTENANCE LETTER (OUTPATIENT)
Age: 26
End: 2021-01-15

## 2021-04-25 ENCOUNTER — MYC MEDICAL ADVICE (OUTPATIENT)
Dept: OPHTHALMOLOGY | Facility: CLINIC | Age: 26
End: 2021-04-25

## 2021-04-27 NOTE — TELEPHONE ENCOUNTER
Spoke to pt at 0935    New right eye floaters noticed more with computer work past two days  No flashing    Pain behind eye also past 2 days noticed more with computer work    No changes past 2 days     Vision stable    Offered appt this AM or afternoon and pt unable school/work    Scheduled tomorrow with Dr. Martines    Pt aware of date/time/location and aware to call for any sudden changes today    Roberto Enriquez RN 9:41 AM 04/27/21

## 2021-05-18 DIAGNOSIS — H35.413 BILATERAL RETINAL LATTICE DEGENERATION: Primary | ICD-10-CM

## 2021-09-04 ENCOUNTER — HEALTH MAINTENANCE LETTER (OUTPATIENT)
Age: 26
End: 2021-09-04

## 2021-12-22 ENCOUNTER — TELEPHONE (OUTPATIENT)
Dept: FAMILY MEDICINE | Facility: CLINIC | Age: 26
End: 2021-12-22
Payer: COMMERCIAL

## 2021-12-22 NOTE — TELEPHONE ENCOUNTER
Called patient because he was scheduled for a breathing problem, trying to gather more information. Patient states he is breathing well. His main concern is right sided upper back pain that is worse with movement and notices that when he breathes he can feel it. He denies recent or current cough, fever, feeling ill. He is going about his daily activities and sleeping per usual. He admits to concern that it could be related to his  shunt, but does not have any reason to associate the two. He is able to take a deep breath in without problem. He denies chest pain, difficulty breathing, SOB. OK for clinic visit and call back if symptoms worsen or change. Patient verbalized understanding and agrees with this plan.   Alejandra Menendez, MS RN-BC  12/22/21  4:24 PM

## 2021-12-22 NOTE — PROGRESS NOTES
SUBJECTIVE:   Donald Figueroa is a 26 year old male who presents to clinic today to discuss the following problem(s).    Abdominal pain  - LUQ for the past week  - RUQ started about a day later  - hasn't been working out as regularly and eating like crazy from stress  - on his feet all the time  - 40 lb weight gain over the past year  - most recently, patient reports he took some sort of high magnesium cleanse medication which caused significant diarrhea for a day, just prior to when symptoms started  - no blood with BMs  - less frequent BMs since his magnesium bowel cleanse  - non-tender to palpation  - no associated nausea, fever or chills, body aches  - no previous history of this   - no association with eating fatty or spicy foods     ROS: 10 point ROS neg other than the symptoms noted above in the HPI.      Past Medical History:   Diagnosis Date     Hydrocephalus (H)      Lattice degeneration      Other forms of retinal detachment(361.89)      Past Surgical History:   Procedure Laterality Date     RETINAL REATTACHMENT  10/2010    left eye     RETINOPEXY LASER PROPHYLAXIS BREAK(S) OD (RIGHT EYE)  10/2011     shunt in head      a couple as a child     strabismus surery      age 5     TONSILLECTOMY       wisdom teeth       Family History   Problem Relation Age of Onset     Diabetes Father         Type 2      Diabetes Paternal Grandfather         Type 2      Retinal detachment No family hx of      Amblyopia No family hx of      Glaucoma No family hx of      Macular Degeneration No family hx of      Social History     Tobacco Use     Smoking status: Never Smoker     Smokeless tobacco: Never Used   Substance Use Topics     Alcohol use: No     Drug use: No     Social History     Social History Narrative    FAMILY INFORMATION     Date: May 23, 2008    Parent #1      Name: STEPHANIA FIGUEROA  Gender: MALE    : 1968     Education: DVM/MSC/PHD   Occupation: RESEARCH ASSOCIATE        Siblings:  NAWAF BURTONHENRY  "       Relationship Status of Parent(s):     Who does the child live with? PARENTS    What language(s) is/are spoken at home? [place]an/ENGLISH            Lives alone.  Has a daughter 4-5 days per week.  Dad is in [place]. Mom is in [place]. Sister is in [place] in medical school.  Born in [place] came to [place] age 1. Parents .    Has a good support system.    Feels safe in all environments.    Wears seatbelt 100% of the time    Denies history of abuse, past or present, physical, sexual or emotional.    Annalise Carreon PA-C    06/26/19                   No current outpatient medications on file.     No current facility-administered medications for this visit.     I have reviewed the patient's past medical, surgical, family, and social history.     OBJECTIVE:   /79   Pulse 77   Temp 97.5  F (36.4  C)   Ht 1.727 m (5' 7.99\")   Wt 114.8 kg (253 lb 0.6 oz)   SpO2 95%   BMI 38.48 kg/m      Constitutional: well-appearing, appears stated age  Eyes: conjunctivae without erythema, sclera anicteric.   Cardiac: regular rate and rhythm, normal S1/S2, no murmur/rubs/gallops  Respiratory: lungs clear to auscultation bilaterally, normal work of breathing, no wheezes/crackles  Abdomen: soft, non-tender, non-distended, BS+, no palpable masses or HSM  Skin: no rashes, lesions, or wounds  Psych: affect is full and appropriate, speech is fluent and non-pressured    ASSESSMENT AND PLAN:     Donald was seen today for pain.    Diagnoses and all orders for this visit:    Abdominal pain, right upper quadrant    Abdominal pain, left upper quadrant    Broad DDx including constipation, viral gastritis, gallbladder, pancreas, GERD, muscle strain, kidney or kidney stone, diverticulitis. Given onset following magnesium bowel cleanse, chief suspicion is for possible post cleanse reaction. Possible gut musa change, possible increased gut irritability following cleanse. Reassured against infection given absence of fever or ongoing " nausea. Urinary issues seem less likely given bilateral symptoms. Reassured patient that I anticipate symptoms will resolve spontaneously. Could try heating pads and probiotics as well at OTC pain relievers. If symptoms persist or worsen I would consider U/S vs XR vs CT scan. Could also consider liver and pancreas labs.       Roberto Gomez MD  [facility]  12/22/2021, 5:59 PM

## 2021-12-23 ENCOUNTER — OFFICE VISIT (OUTPATIENT)
Dept: FAMILY MEDICINE | Facility: CLINIC | Age: 26
End: 2021-12-23
Payer: COMMERCIAL

## 2021-12-23 VITALS
TEMPERATURE: 97.5 F | HEIGHT: 68 IN | SYSTOLIC BLOOD PRESSURE: 127 MMHG | HEART RATE: 77 BPM | DIASTOLIC BLOOD PRESSURE: 79 MMHG | BODY MASS INDEX: 38.35 KG/M2 | OXYGEN SATURATION: 95 % | WEIGHT: 253.04 LBS

## 2021-12-23 DIAGNOSIS — R10.12 ABDOMINAL PAIN, LEFT UPPER QUADRANT: ICD-10-CM

## 2021-12-23 DIAGNOSIS — R10.11 ABDOMINAL PAIN, RIGHT UPPER QUADRANT: Primary | ICD-10-CM

## 2021-12-23 ASSESSMENT — MIFFLIN-ST. JEOR: SCORE: 2097.15

## 2021-12-23 ASSESSMENT — PAIN SCALES - GENERAL: PAINLEVEL: MODERATE PAIN (4)

## 2021-12-23 NOTE — NURSING NOTE
"26 year old  Chief Complaint   Patient presents with     Pain     right side upper back pain for about a week       Blood pressure 127/79, pulse 77, temperature 97.5  F (36.4  C), height 1.727 m (5' 7.99\"), weight 114.8 kg (253 lb 0.6 oz), SpO2 95 %. Body mass index is 38.48 kg/m .  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     No current facility-administered medications for this visit.       Social History     Tobacco Use     Smoking status: Never Smoker     Smokeless tobacco: Never Used   Substance Use Topics     Alcohol use: No     Drug use: No       Health Maintenance Due   Topic Date Due     ADVANCE CARE PLANNING  Never done     HIV SCREENING  Never done     HEPATITIS C SCREENING  Never done     PREVENTIVE CARE VISIT  06/26/2020     PHQ-2  01/01/2021     INFLUENZA VACCINE (1) 09/01/2021     COVID-19 Vaccine (3 - Booster for Pfizer series) 12/02/2021       No results found for: PAP      December 23, 2021 9:49 AM  "

## 2022-01-12 DIAGNOSIS — H35.413 BILATERAL RETINAL LATTICE DEGENERATION: Primary | ICD-10-CM

## 2022-01-13 ENCOUNTER — OFFICE VISIT (OUTPATIENT)
Dept: OPHTHALMOLOGY | Facility: CLINIC | Age: 27
End: 2022-01-13
Attending: OPHTHALMOLOGY

## 2022-01-13 DIAGNOSIS — H35.413 BILATERAL RETINAL LATTICE DEGENERATION: ICD-10-CM

## 2022-01-13 PROCEDURE — 92134 CPTRZ OPH DX IMG PST SGM RTA: CPT | Performed by: OPHTHALMOLOGY

## 2022-01-13 PROCEDURE — 92250 FUNDUS PHOTOGRAPHY W/I&R: CPT | Performed by: OPHTHALMOLOGY

## 2022-01-13 PROCEDURE — 92014 COMPRE OPH EXAM EST PT 1/>: CPT | Mod: GC | Performed by: OPHTHALMOLOGY

## 2022-01-13 PROCEDURE — G0463 HOSPITAL OUTPT CLINIC VISIT: HCPCS | Performed by: TECHNICIAN/TECHNOLOGIST

## 2022-01-13 PROCEDURE — 99207 FUNDUS AUTOFLUORESCENCE IMAGE (FAF) OU (BOTH EYES): CPT | Performed by: OPHTHALMOLOGY

## 2022-01-13 ASSESSMENT — REFRACTION_WEARINGRX
OD_SPHERE: -6.75
OD_CYLINDER: +2.75
SPECS_TYPE: SVL
OD_AXIS: 089
OS_AXIS: 084
OS_SPHERE: -9.75
OS_CYLINDER: +4.25

## 2022-01-13 ASSESSMENT — VISUAL ACUITY
METHOD: SNELLEN - LINEAR
CORRECTION_TYPE: GLASSES
OD_CC+: -2
OS_CC: 20/25
OD_CC: 20/25

## 2022-01-13 ASSESSMENT — EXTERNAL EXAM - LEFT EYE: OS_EXAM: NORMAL

## 2022-01-13 ASSESSMENT — SLIT LAMP EXAM - LIDS
COMMENTS: NORMAL
COMMENTS: NORMAL

## 2022-01-13 ASSESSMENT — CONF VISUAL FIELD
OD_NORMAL: 1
METHOD: COUNTING FINGERS
OS_NORMAL: 1

## 2022-01-13 ASSESSMENT — CUP TO DISC RATIO
OD_RATIO: 0.5
OS_RATIO: 0.4

## 2022-01-13 ASSESSMENT — TONOMETRY
IOP_METHOD: ICARE
OS_IOP_MMHG: 11
OD_IOP_MMHG: 11

## 2022-01-13 ASSESSMENT — EXTERNAL EXAM - RIGHT EYE: OD_EXAM: NORMAL

## 2022-01-13 NOTE — PROGRESS NOTES
"CC: H/o RD    INTERVAL HISTORY: VA was stable until about three days ago, when he had onset of new floaters and new superonasal \"curtain\" like obscuration. He has a history of flashes right eye, perhaps more frequent this week. He denies left eye vision changes. No recent trauma.     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 Retinopathy of prematurity (ROP)   shunt  H/o amblyopia OD Tx with patches, had surgery OD in past per patient    PAST OCULAR HISTORY  Retinal pexy OD multiple [id number]  SBP OS 2010  ?strabismus surgery OD (patient unsure as child)    RETINAL IMAGING  OCT 1/13/22  OD - central retina normal, PHF attached  OS - central retina normal, PHF attached    ASSESSMENT & PLAN:    #  RD OD  s/p pexy 10/2011, 2010   - Far IT periphery from lattice with atrophic holes   - Temporal SRF, noted prior, well contained by laser   - No new retinal tears/RD    - d/w patient S/Sx RD 1/2022    # H/o RD OS   -S/p SCLERAL BUCKLE 2010   -attached    # Lattice OU  # Syneresis OU  # H/o amblyopia OD  - ?prior strabismus surgery, unsure  - was premature  # DIANE    - ATs    RTC: as scheduled with dr. Santos   Retina detachment precautions were discussed with the patient (presence or increased in flashes, floaters or a curtain in the visual field) and was asked to return if any of the those occur   ~~~~~~~~~~~~~~~~~~~~~~~~~~~~~~~~~~   Complete documentation of historical and exam elements from today's encounter can be found in the full encounter summary report (not reduplicated in this progress note).  I personally obtained the chief complaint(s) and history of present illness.  I confirmed and edited as necessary the review of systems, past medical/surgical history, family history, social history, and examination findings as documented by others; and I examined the patient myself.  I personally reviewed the relevant tests, images, and reports as documented above.  I " formulated and edited as necessary the assessment and plan and discussed the findings and management plan with the patient and family    Meaghan Malagon MD   of Ophthalmology.  Retina Service   Department of Ophthalmology and Visual Neurosciences   [facility]  Phone: [phone]   Fax: [phone]

## 2022-02-19 ENCOUNTER — HEALTH MAINTENANCE LETTER (OUTPATIENT)
Age: 27
End: 2022-02-19

## 2022-03-01 ENCOUNTER — OFFICE VISIT (OUTPATIENT)
Dept: URGENT CARE | Facility: URGENT CARE | Age: 27
End: 2022-03-01
Payer: COMMERCIAL

## 2022-03-01 ENCOUNTER — TELEPHONE (OUTPATIENT)
Dept: NURSING | Facility: CLINIC | Age: 27
End: 2022-03-01

## 2022-03-01 VITALS
HEART RATE: 88 BPM | OXYGEN SATURATION: 96 % | WEIGHT: 262.4 LBS | BODY MASS INDEX: 39.91 KG/M2 | DIASTOLIC BLOOD PRESSURE: 72 MMHG | SYSTOLIC BLOOD PRESSURE: 107 MMHG | TEMPERATURE: 99.4 F

## 2022-03-01 DIAGNOSIS — N34.2 URETHRITIS: ICD-10-CM

## 2022-03-01 DIAGNOSIS — R30.0 DYSURIA: ICD-10-CM

## 2022-03-01 DIAGNOSIS — Z20.822 SUSPECTED 2019 NOVEL CORONAVIRUS INFECTION: Primary | ICD-10-CM

## 2022-03-01 DIAGNOSIS — R51.9 ACUTE NONINTRACTABLE HEADACHE, UNSPECIFIED HEADACHE TYPE: ICD-10-CM

## 2022-03-01 LAB
ALBUMIN UR-MCNC: NEGATIVE MG/DL
AMORPH CRY #/AREA URNS HPF: ABNORMAL /HPF
APPEARANCE UR: CLEAR
BACTERIA #/AREA URNS HPF: ABNORMAL /HPF
BASOPHILS # BLD AUTO: 0 10E3/UL (ref 0–0.2)
BASOPHILS NFR BLD AUTO: 0 %
BILIRUB UR QL STRIP: NEGATIVE
COLOR UR AUTO: YELLOW
EOSINOPHIL # BLD AUTO: 0 10E3/UL (ref 0–0.7)
EOSINOPHIL NFR BLD AUTO: 0 %
ERYTHROCYTE [DISTWIDTH] IN BLOOD BY AUTOMATED COUNT: 13.9 % (ref 10–15)
GLUCOSE UR STRIP-MCNC: NEGATIVE MG/DL
HCT VFR BLD AUTO: 48.1 % (ref 40–53)
HGB BLD-MCNC: 15.2 G/DL (ref 13.3–17.7)
HGB UR QL STRIP: ABNORMAL
IMM GRANULOCYTES # BLD: 0 10E3/UL
IMM GRANULOCYTES NFR BLD: 0 %
KETONES UR STRIP-MCNC: NEGATIVE MG/DL
LEUKOCYTE ESTERASE UR QL STRIP: NEGATIVE
LYMPHOCYTES # BLD AUTO: 2.2 10E3/UL (ref 0.8–5.3)
LYMPHOCYTES NFR BLD AUTO: 31 %
MCH RBC QN AUTO: 25 PG (ref 26.5–33)
MCHC RBC AUTO-ENTMCNC: 31.6 G/DL (ref 31.5–36.5)
MCV RBC AUTO: 79 FL (ref 78–100)
MONOCYTES # BLD AUTO: 1.2 10E3/UL (ref 0–1.3)
MONOCYTES NFR BLD AUTO: 16 %
MUCOUS THREADS #/AREA URNS LPF: PRESENT /LPF
NEUTROPHILS # BLD AUTO: 3.8 10E3/UL (ref 1.6–8.3)
NEUTROPHILS NFR BLD AUTO: 52 %
NITRATE UR QL: NEGATIVE
PH UR STRIP: 7 [PH] (ref 5–7)
PLATELET # BLD AUTO: 183 10E3/UL (ref 150–450)
RBC # BLD AUTO: 6.07 10E6/UL (ref 4.4–5.9)
RBC #/AREA URNS AUTO: ABNORMAL /HPF
SARS-COV-2 RNA RESP QL NAA+PROBE: POSITIVE
SP GR UR STRIP: 1.02 (ref 1–1.03)
UROBILINOGEN UR STRIP-ACNC: 0.2 E.U./DL
WBC # BLD AUTO: 7.2 10E3/UL (ref 4–11)
WBC #/AREA URNS AUTO: ABNORMAL /HPF

## 2022-03-01 PROCEDURE — U0003 INFECTIOUS AGENT DETECTION BY NUCLEIC ACID (DNA OR RNA); SEVERE ACUTE RESPIRATORY SYNDROME CORONAVIRUS 2 (SARS-COV-2) (CORONAVIRUS DISEASE [COVID-19]), AMPLIFIED PROBE TECHNIQUE, MAKING USE OF HIGH THROUGHPUT TECHNOLOGIES AS DESCRIBED BY CMS-2020-01-R: HCPCS | Performed by: NURSE PRACTITIONER

## 2022-03-01 PROCEDURE — U0005 INFEC AGEN DETEC AMPLI PROBE: HCPCS | Performed by: NURSE PRACTITIONER

## 2022-03-01 PROCEDURE — 85025 COMPLETE CBC W/AUTO DIFF WBC: CPT | Performed by: NURSE PRACTITIONER

## 2022-03-01 PROCEDURE — 99204 OFFICE O/P NEW MOD 45 MIN: CPT | Performed by: NURSE PRACTITIONER

## 2022-03-01 PROCEDURE — 36415 COLL VENOUS BLD VENIPUNCTURE: CPT | Performed by: NURSE PRACTITIONER

## 2022-03-01 PROCEDURE — 81001 URINALYSIS AUTO W/SCOPE: CPT | Performed by: NURSE PRACTITIONER

## 2022-03-01 RX ORDER — DOXYCYCLINE 100 MG/1
100 CAPSULE ORAL 2 TIMES DAILY
Qty: 14 CAPSULE | Refills: 0 | Status: SHIPPED | OUTPATIENT
Start: 2022-03-01 | End: 2022-03-08

## 2022-03-01 RX ORDER — ACETAMINOPHEN 500 MG
1000 TABLET ORAL ONCE
Status: COMPLETED | OUTPATIENT
Start: 2022-03-01 | End: 2022-03-01

## 2022-03-01 RX ADMIN — Medication 1000 MG: at 10:33

## 2022-03-01 ASSESSMENT — ENCOUNTER SYMPTOMS
MYALGIAS: 1
COUGH: 1
FEVER: 1
HEADACHES: 1
CHILLS: 1
FATIGUE: 1
DYSURIA: 1

## 2022-03-01 NOTE — TELEPHONE ENCOUNTER
Patient classified as COVID treatment eligible by Epic high risk algorithm:  Yes    Coronavirus (COVID-19) Notification    Reason for call  Notify of POSITIVE COVID-19 lab result, assess symptoms,  review [facility] recommendations    Lab Result   Lab test for 2019-nCoV rRt-PCR or SARS-COV-2 PCR  Oropharyngeal AND/OR nasopharyngeal swabs were POSITIVE for 2019-nCoV RNA [OR] SARS-COV-2 RNA (COVID-19) RNA     We have been unable to reach patient by phone at this time to notify of their Positive COVID-19 result.    Left voicemail message requesting a call back to [phone] [facility] for results.        A Positive COVID-19 letter will be sent via [employer] or the mail. (Exception, no letters sent to Presurgerical/Preprocedure Patients)    Bharati Au

## 2022-03-01 NOTE — TELEPHONE ENCOUNTER
Coronavirus (COVID-19) Notification    Caller Name (Patient, parent, daughter/son, grandparent, etc)  Patient     Reason for call  Notify of Positive Coronavirus (COVID-19) lab results, assess symptoms,  review [facility] recommendations    Lab Result    Lab test:  2019-nCoV rRt-PCR or SARS-CoV-2 PCR    Oropharyngeal AND/OR nasopharyngeal swabs is POSITIVE for 2019-nCoV RNA/SARS-COV-2 PCR (COVID-19 virus)      Gather patient reported symptoms   Assessment   Current Symptoms at time of phone call, reported by patient: (if no symptoms, document: No symptoms] Headache, body aches, sore throat   Date of symptom(s) onset (if applicable) 2/27/2022     If at time of call, Patients symptoms have worsened, the Patient should contact 911 or have someone drive them to Emergency Dept promptly:      If Patient calling 911, inform 911 personal that you have tested positive for the Coronavirus (COVID-19).  Place mask on and await 911 to arrive.    If Emergency Dept, If possible, please have another adult drive you to the Emergency Dept but you need to wear mask when in contact with other people.      Treatment Options:   Patient classified as COVID treatment eligible by Epic high risk algorithm: Yes  Is the patient symptomatic at the time of result notification? Yes. Was the onset of symptoms within the last 5 days? Yes.   There are now oral medications available for the treatment of COVID-19.  Taking one of these medications within the first five days of symptoms (when people may not yet feel severely ill) has been shown to make people feel better, prevent them from getting sicker, and preventing hospitalization and death.   Does the patient agree to have a visit with a provider to discuss treatment options? Yes. Is the patient seen at [facility]?  No: Warm transfer caller to [phone] to be scheduled with a virtual urgent provider.  During transfer, instruct  on appropriate time frame for visit  (within 5 days of symptoms onset). If patient is on day 5 and unable to be scheduled, scheduling team will provide [facility] website.     Review information with Patient    Your result was positive. This means you have COVID-19 (coronavirus).    How can I protect others?    These guidelines are for isolating before returning to work, school or .    If you DO have symptoms    Stay home and away from others     For at least 5 days after your symptoms started, AND    You are fever free for 24 hours (with no medicine that reduces fever), AND    Your other symptoms are better    Wear a mask for 10 full days anytime you are around others    If you DON'T have symptoms    Stay home and away from others for at least 5 days after your positive test    Wear a mask for 10 full days anytime you are around others    There may be different guidelines for healthcare facilities.  Please check with the specific sites before arriving.    If you have been told by a doctor that you were severely ill with COVID-19 or are immunocompromised, you should isolate for at least 10 days.    You should not go back to work until you meet the guidelines above for ending your home isolation. You don't need to be retested for COVID-19 before going back to work--studies show that you won't spread the virus if it's been at least 10 days since your symptoms started (or 20 days, if you have a weak immune system).    Employers, schools, and daycares: This is an official notice for this person's medical guidelines for returning in-person.  They must meet the above guidelines before going back to work, school or  in person.    You will receive a positive COVID-19 letter via [employer] or the mail soon with additional self-care information (exception, no letters will be sent to presurgical/preprocedure patients).    Would you like me to review some of that information with you now?  Yes    How can I take care of myself?      Get lots of rest. Drink  extra fluids (unless a doctor has told you not to).      Take Tylenol (acetaminophen) for fever or pain. If you have liver or kidney problems, ask your family doctor if it's okay to take Tylenol.     Take either:     650 mg (two 325 mg pills) every 4 to 6 hours, or     1,000 mg (two 500 mg pills) every 8 hours as needed.     Note: Do not take more than 3,000 mg in one day. Acetaminophen is found in many medicines (both prescribed and over-the-counter medicines). Read all labels to be sure you don't take too much.    For children, check the Tylenol bottle for the right dose (based on their age or weight).      If you have other health problems (like cancer, heart failure, an organ transplant or severe kidney disease): Call your specialty clinic if you don't feel better in the next 2 days.      Know when to call 911: Emergency warning signs include:    Trouble breathing or shortness of breath    Pain or pressure in the chest that doesn't go away    Feeling confused like you haven't felt before, or not being able to wake up    Bluish-colored lips or face        If you were tested for an upcoming procedure, please contact your provider for next steps.    Isabel Cheema LPN

## 2022-03-01 NOTE — PROGRESS NOTES
SUBJECTIVE:   Donald Jackson is a 27 year old male presenting with a chief complaint of   Chief Complaint   Patient presents with     Headache     Pt having hedache for 2 days, pt has a shunt since birth, pt is having pain on the right side of his head, pt took ibuprofen last night it did help with headache     Chills     pt had chills Sunday and Monday , pt dsaid chills are gone today     Fatigue     pt feels sleepy        He is an established patient of [facility].    URI Adult    Onset of symptoms was 2 day(s) ago.  Course of illness is worsening.    Severity moderate  Current and Associated symptoms: chills, cough - non-productive, bodyache, headache, fatigue and sleepy  Treatment measures tried include None tried.  Predisposing factors include None.    Urinary problem  Also reports burning sensation when he passes urine in the last 2 days  He denies urinary frequency, abdominal pain, back pain.  He denies exposure to STDs.      Review of Systems   Constitutional: Positive for chills, fatigue and fever.   Respiratory: Positive for cough.    Genitourinary: Positive for dysuria.   Musculoskeletal: Positive for myalgias.   Neurological: Positive for headaches.   All other systems reviewed and are negative.      Past Medical History:   Diagnosis Date     Hydrocephalus (H)      Lattice degeneration      Other forms of retinal detachment(361.89)      Family History   Problem Relation Age of Onset     Diabetes Father         Type 2      Diabetes Paternal Grandfather         Type 2      Retinal detachment No family hx of      Amblyopia No family hx of      Glaucoma No family hx of      Macular Degeneration No family hx of      Current Outpatient Medications   Medication Sig Dispense Refill     doxycycline hyclate (VIBRAMYCIN) 100 MG capsule Take 1 capsule (100 mg) by mouth 2 times daily for 7 days 14 capsule 0     Social History     Tobacco Use     Smoking status: Never Smoker     Smokeless tobacco: Never Used   Substance  Use Topics     Alcohol use: No       OBJECTIVE  /72   Pulse 88   Temp 99.4  F (37.4  C) (Axillary)   Wt 119 kg (262 lb 6.4 oz)   SpO2 96%   BMI 39.91 kg/m      Physical Exam  Vitals and nursing note reviewed.   Constitutional:       General: He is not in acute distress.     Appearance: He is well-developed. He is not diaphoretic.   HENT:      Head: Normocephalic and atraumatic.      Right Ear: Tympanic membrane and external ear normal.      Left Ear: Tympanic membrane and external ear normal.   Eyes:      Pupils: Pupils are equal, round, and reactive to light.   Pulmonary:      Effort: Pulmonary effort is normal. No respiratory distress.      Breath sounds: Normal breath sounds.   Musculoskeletal:      Cervical back: Normal range of motion and neck supple.   Lymphadenopathy:      Cervical: No cervical adenopathy.   Skin:     General: Skin is warm and dry.   Neurological:      Mental Status: He is alert.      Cranial Nerves: No cranial nerve deficit.         Labs:  Results for orders placed or performed in visit on 03/01/22 (from the past 24 hour(s))   UA Macro with Reflex to Micro and Culture - lab collect    Specimen: Urine, Midstream   Result Value Ref Range    Color Urine Yellow Colorless, Straw, Light Yellow, Yellow    Appearance Urine Clear Clear    Glucose Urine Negative Negative mg/dL    Bilirubin Urine Negative Negative    Ketones Urine Negative Negative mg/dL    Specific Gravity Urine 1.020 1.003 - 1.035    Blood Urine Small (A) Negative    pH Urine 7.0 5.0 - 7.0    Protein Albumin Urine Negative Negative mg/dL    Urobilinogen Urine 0.2 0.2, 1.0 E.U./dL    Nitrite Urine Negative Negative    Leukocyte Esterase Urine Negative Negative   CBC with Platelets & Differential    Narrative    The following orders were created for panel order CBC with Platelets & Differential.  Procedure                               Abnormality         Status                     ---------                                -----------         ------                     CBC with platelets and d...[[id number]]  Abnormal            Final result                 Please view results for these tests on the individual orders.   CBC with platelets and differential   Result Value Ref Range    WBC Count 7.2 4.0 - 11.0 10e3/uL    RBC Count 6.07 (H) 4.40 - 5.90 10e6/uL    Hemoglobin 15.2 13.3 - 17.7 g/dL    Hematocrit 48.1 40.0 - 53.0 %    MCV 79 78 - 100 fL    MCH 25.0 (L) 26.5 - 33.0 pg    MCHC 31.6 31.5 - 36.5 g/dL    RDW 13.9 10.0 - 15.0 %    Platelet Count 183 150 - 450 10e3/uL    % Neutrophils 52 %    % Lymphocytes 31 %    % Monocytes 16 %    % Eosinophils 0 %    % Basophils 0 %    % Immature Granulocytes 0 %    Absolute Neutrophils 3.8 1.6 - 8.3 10e3/uL    Absolute Lymphocytes 2.2 0.8 - 5.3 10e3/uL    Absolute Monocytes 1.2 0.0 - 1.3 10e3/uL    Absolute Eosinophils 0.0 0.0 - 0.7 10e3/uL    Absolute Basophils 0.0 0.0 - 0.2 10e3/uL    Absolute Immature Granulocytes 0.0 <=0.4 10e3/uL   Urine Microscopic   Result Value Ref Range    Bacteria Urine Few (A) None Seen /HPF    RBC Urine 5-10 (A) 0-2 /HPF /HPF    WBC Urine 0-5 0-5 /HPF /HPF    Mucus Urine Present (A) None Seen /LPF    Amorphous Crystals Urine Few (A) None Seen /HPF    Narrative    Urine Culture not indicated       ASSESSMENT:      ICD-10-CM    1. Suspected 2019 novel coronavirus infection  Z20.822 Symptomatic; Unknown COVID-19 Virus (Coronavirus) by PCR Nose   2. Acute nonintractable headache, unspecified headache type  R51.9 CBC with Platelets & Differential     acetaminophen (TYLENOL) tablet 1,000 mg   3. Dysuria  R30.0 UA Macro with Reflex to Micro and Culture - lab collect     Urine Microscopic   4. Urethritis  N34.2 doxycycline hyclate (VIBRAMYCIN) 100 MG capsule        PLAN:  Plan of care as above  I recommend follow up with PCP in 3 days or sooner if symptoms are getting worse  Advised to take medications as prescribed  Side effects of medications discussed  Over the counter  medications discussed  Worrisome symptoms are discussed and instructions to go to the ER.  All questions are answered and patient verbalized understanding and agrees with this plan.  Praveena Sepulveda  [facility]  Family Nurse Practitoner          Patient Instructions       Patient Education     Urethritis in Men  The urethra is the tube that runs from the bladder through the penis. When the urethra is inflamed, it is called urethritis. The urethra becomes swollen and causes burning pain when you urinate. Other symptoms of urethritis may include itching or tingling of the penis, or pus discharge from the penis. You may also have pain with sex and masturbation. Some men may not have symptoms.      An inflamed urethra can cause pain during urination.   What causes urethritis?  Urethritis can be caused by a bacterial or viral infection. Such an infection can lead to conditions such as a urinary tract infection (UTI) or sexually transmitted infections (STIs). Urethritis can also be caused by injury or sensitivity or allergy to chemicals in lotions and other products.   How is urethritis diagnosed?  Your healthcare provider will examine you and ask about your symptoms and health history. You may also have one or more of the following tests:     Urine test. This is done to take urine samples and have them checked for problems.    Blood test. A blood sample is taken and checked for problems.    Urethral discharge culture. A fluid sample is taken from inside the urethra and checked under a microscope. To get the sample, a cotton swab is inserted into the opening of the penis and into the urethra.    Cystoscopy. This test lets the healthcare provider look for problems in the urinary tract. The test uses a thin, flexible tube (cystoscope). It has a light and camera attached. The scope is inserted into the urethra.  How is urethritis treated?  Treatment depends on the cause of urethritis. If it s from a bacterial infection, medicines  that fight infection (antibiotics) will be given. Your healthcare provider can tell you more about your treatment options. In the meantime, your symptoms can be treated. To ease pain and swelling, anti-inflammatory medicines, such as ibuprofen, may be given. If not treated, symptoms may get worse. Also, scar tissue can form in the urethra, causing it to narrow.   When to call your healthcare provider   Call your healthcare provider right away if you have any of the following:     Fever of  100.4  F ( 38 C ) or higher, or as directed by your provider    Blood in the urine or semen    Burning pain with urination    Increased urge to urinate    Discharge from the penis    Itching, soreness, or swelling in the penis or groin    Pain during sex or masturbation    Inability to urinate  Preventing STIs  When it comes to sex, it s important to take care and be safe. Any sexual contact with the penis, vagina, anus, or mouth can spread an STI. The only sure way to prevent STIs is not having sex. But there are ways to make sex safer. Use a latex condom each time you have sex. And talk with your partner about STIs before you have sex.   [employer] last reviewed this educational content on 2/1/2020 2000-2021 [employer]. All rights reserved. This information is not intended as a substitute for professional medical care. Always follow your healthcare professional's instructions.           Patient Education       COVID-19: Vaccines and Prevention  The best prevention is to have no contact with the SARS-CoV-2 virus, to follow safety precautions, and to get vaccinated. The FDA has approved several vaccines to prevent COVID-19. One vaccine has been approved for people as young as 5. Vaccines are available to prevent COVID-19 and reduce the severity of illness if you get the virus. No vaccine is ever 100% effective in preventing any illness, but the COVID-19 vaccines work well and are safe. Talk with your healthcare  provider about your risks and which vaccine may be best for you and your family.  Expert groups, including ACOG and CDC, advise pregnant or breastfeeding people to be vaccinated.  The vaccines are given as a shot (injection) in the arm muscle. There are 2 COVID-19 vaccine choices:    1-dose vaccine (Earl & Earl)    2-dose vaccine (either Pfizer or Moderna). If you get the 2-dose vaccine, the second dose is given several weeks after the first.  Normally healthy people are considered fully vaccinated 2 weeks after getting the 1-dose or the second shot of the 2-dose vaccine.  Who needs a 3-dose primary series?  People with a very weak immune system may not build up enough antibodies to fight COVID-19 after getting the first 2 doses of the 2-dose vaccine. This includes people who have had a solid organ transplant or who have a condition such as cancer or HIV that causes a very weak immune system. For these people, an extra dose of the 2-dose vaccine (Pfizer or Moderna) is advised. It's considered part of their vaccine series (initial series), not a booster. The extra shot is given at least 28 days after the second dose. Talk with your healthcare provider about your case and risk.  Booster shots  People age 12 or older can get a COVID-19 booster shot. Talk with your healthcare provider about which vaccine is advised for you depending on your age and needs.  Pfizer booster  A booster shot of the Pfizer vaccine is available for people as young as 12. It's given several months after the initial series.  Moderna booster  A Moderna booster shot is available for people 18 or older. It's given several months after the initial series.  Earl & Earl (J&J) booster  A booster shot of the Earl & Earl vaccine is available for people 18 or older. It's given at least 2 months after the initial J&J shot.  Mix and match  If you are 18 or older, you may choose which vaccine you get as a booster. This is called mix and  "match. Talk with your healthcare provider to learn more.  Take precautions: Travel and other outings  Stay informed about COVID-19 in your area. Follow local instructions about being in public. Be aware of events in your community that may be postponed or canceled, such as school and sporting events. You may be advised not to attend public gatherings.   Follow safety precautions in your community. You may be advised to stay at least 6 feet from others as much as possible. This is called social distancing. You may be advised to wear a mask. You may also be advised to stay at home and isolate yourself as much as possible. You may hear terms such as self-isolate, quarantine, stay at home, and shelter in place.  CDC recommends not traveling until you are fully vaccinated against COVID-19. This is because travel raises your chance of getting and spreading the infection. You are considered fully vaccinated 2 weeks after getting either the 1-dose or the last dose of the 2-dose vaccine. Be aware of travel precautions, both within the [place] and abroad. When traveling in the [place] be aware of mask requirements on public transportation such as airplanes, subways, and trains. Here are the most current CDC travel guidelines.    When you are at home    Wash your hands often. Use soap and clean, running water for at least 20 seconds.    If you don't have access to soap and water, use an alcohol-based hand  often. Make sure it has at least 60% alcohol.    Don't touch your eyes, nose, or mouth unless you have clean hands.    Don t kiss someone who is sick.    If you need to cough or sneeze, do it into a tissue. Then throw the tissue into the trash. If you don't have tissues, cough or sneeze into the bend of your elbow.    When possible, don't touch \"high-touch\" shared surfaces such as doorknobs and handles, cabinet handles, and light switches.    Clean frequently-touched home surfaces often with disinfectant. This includes " desk surfaces, printers, phones, kitchen counters, tables, fridge door handle, bathroom surfaces, and any soiled surface. Closely follow disinfectant label instructions. You can find them at [facility][note 3] s detailed cleaning website.    Check your home supplies. Consider keeping a 2-week supply of medicines, food, and other needed household items. Ask your healthcare provider and check with your insurance plan about ordering a 3-month supply of medicines you need regularly.    Make a plan for childcare, work, and ways to stay in touch with others. Know who will help you if you get sick.    Don't be around people who are sick.    Don t share eating or drinking utensils with sick people.    If you leave home      Stay informed about all safety instructions in your area.    Stay at least 6 feet away from all people as advised. This is called social distancing.    When possible, don't touch high-touch public surfaces such as doorknobs and handles, cabinet handles, and light switches. If you touch these surfaces, try to clean them first with a disinfecting wipe. Or touch them using a tissue or paper towel.    Use an alcohol-based hand  often. Make sure it has at least 60% alcohol.    Don't touch your eyes, nose, or mouth unless you have clean hands.    If you need to cough or sneeze, do it into a tissue. Then throw the tissue into the trash. If you don't have tissues, cough or sneeze into the bend of your elbow.    Wear a mask as advised. The CDC advises wearing a medical mask or cloth face mask with several layers of washable, breathable fabric and a nose wire. Or you can wear a disposable medical mask with a cloth mask over it. Wear a well-fitting mask that is snug around your nose, mouth, and chin. See CDC's guide to masks.  ? CDC advises all people older than 2 who are not fully vaccinated to wear a mask and stay 6 feet away from others while in public.  ? CDC recommends indoor masking for all teachers, staff,  students, and visitors to schools, regardless of vaccination status.  ? CDC advises people with weak immune systems, even if fully vaccinated, to continue wearing masks and to stay 6 feet away from others while in public.  ? Like other viruses, the virus that causes COVID-19 changes (mutates) all the time. This leads to several variants that are easily spread, including the delta and omicron variants. To protect against variants, CDC advises all people over age 2, including those who are fully vaccinated, to wear a mask indoors in public settings if you are in an area of high numbers of COVID-19 cases. See CDC's county data website for current transmission information in your area.    Regardless of vaccination status, certain people should not wear a face mask. This includes:  ? Children younger than 2 years old  ? Anyone with a health, developmental, or mental health condition that can be made worse by wearing a mask  ? Anyone who is unconscious or unable to remove the face covering without help.  If you are at a work site    Tell your supervisor if you are well but live with someone who has COVID-19.    Stay at least 6 feet away from all people.    Don't shake hands with anyone.    Don't attend in-person meetings, or limit how many you attend. Meet over phone or video if possible.    Don't use other people's desks, phones, equipment, or offices, if possible.    Wash your hands often. Use soap and clean, running water for at least 20 seconds.    If you don't have access to soap and water, use an alcohol-based hand  often. Make sure it has at least 60% alcohol.    Don't touch your eyes, nose, or mouth unless you have clean hands.    Wear a face mask as advised by your employer, CDC guidance, and your community's instructions.    When possible, don't touch high-touch public surfaces such as doorknobs and handles, cabinet handles, and light switches. If you touch these surfaces, clean them first with a  disinfecting wipe. Or touch them using a tissue or paper towel.    Use office dorothy one person at a time.    Consider not having office coffee or tea, or group foods.    Don t have meals in groups.    Clean work surfaces often with disinfectant. This includes desk surfaces, photocopier, printer, phones, kitchen counters, fridge door handle, bathroom surfaces, and others.    Don t touch other people s personal work tools, such as phones, keyboards, pens, and other items.    Don t touch other people s eating or drinking utensils.    If you need to cough or sneeze, do it into a tissue. Then throw the tissue into the trash. If you don't have tissues, cough or sneeze into the bend of your elbow.    If you have been exposed to a person with COVID-19  The risk of getting COVID-19 is much lower after exposure if you are fully vaccinated and boosted.  If you:    Received a booster shot    OR completed the initial series of Pfizer or Moderna vaccine within the last 5 months    OR completed the initial shot of J&J vaccine within the last 2 months  You don't need to stay home if you've been exposed and don't have symptoms . But you should get tested at least 5 days after being exposed and monitor for symptoms. Know your testing options with the CDC's COVID-19 Viral Testing Tool. Wear a well-fitting mask around others for 10 days after exposure.  If you have been exposed and have symptoms , get tested and stay home. If you test result is positive, isolate at home for 5 days. If you don't have symptoms or your symptoms are getting better after 5 days, you can leave your house. Continue to wear a mask around others for 5 more days. If you have a fever, continue to stay home until your fever goes away.  If you are not fully vaccinated or boosted and have been exposed to someone who is suspected of having COVID-19 or has tested positive for it:    Call your healthcare provider and follow all instructions. Stay home away from  others and monitor your health. This is called quarantine. CDC advises that you quarantine to help prevent the spread of COVID-19 once you've been exposed to someone with the infection. CDC advises quarantine for 5 days after exposure, followed by wearing a mask around others for 5 more days. But they recognize the hardship for many who are unable to quarantine. CDC advises if you can t quarantine, you must wear a mask at all times when around others for 10 days after exposure. Get tested at least 5 days after being exposed and watch for symptoms. If your test result is positive, isolate at home for 5 days. If you don't have symptoms or your symptoms are getting better after 5 days, you can leave your house. Continue to wear a mask around others for 5 days. If you have a fever, continue to stay home until your fever goes away. If your test is negative, you can stop isolation 5 days after exposure, but wear a mask around others for 10 days after exposure.    Take your temperature every morning and evening. This is to check for fever. Keep a record of the readings. If possible, stay away from others, especially those who are at higher risk of getting very sick from COVID-19.    Watch for symptoms of the virus such as cough or trouble breathing. If you develop any symptoms, isolate yourself right away. Call your provider first before going to any clinic or hospital. See  CDC's coronavirus self-.    If you've had COVID-19 in the past and have been re-exposed, contact your healthcare provider for advice. You are less likely to develop COVID-19 again, but it's possible. This is because of new variants of the virus and because some people can't mount a strong immune response after the illness. CDC recommends for all those exposed to get tested at least 5 days after exposure. If you have symptoms, quarantine right away and continue until a negative test confirms symptoms are not from COVID-19.  When to call your  healthcare provider  Call your healthcare provider if you think you have COVID-19 symptoms. These can include fever, cough, and trouble breathing. They may also include body aches, headache, chills or repeated shaking with chills, sore throat, loss of taste or smell, or diarrhea. Follow your healthcare provider's specific instructions.  Do I still need a flu shot?  During a pandemic, it's especially important to keep up on recommended vaccines for other illnesses. This is more true if you're at higher risk for severe illness from COVID-19, the flu, or pneumonia. This includes older adults and those who have long-term (chronic) health conditions. Getting a yearly flu vaccine is advised for everyone 6 months old and older, with rare exceptions. Health experts advise the flu vaccine to protect you and others. The flu vaccine helps protect those at high-risk for serious illness, and lowers the strain on hospitals during the COVID-19 pandemic.     Last modified date: 1/7/2022  [employer] last reviewed this educational content on 12/1/2021 2000-2022 [employer]. All rights reserved. This information is not intended as a substitute for professional medical care. Always follow your healthcare professional's instructions.

## 2022-03-01 NOTE — PATIENT INSTRUCTIONS
Patient Education     Urethritis in Men  The urethra is the tube that runs from the bladder through the penis. When the urethra is inflamed, it is called urethritis. The urethra becomes swollen and causes burning pain when you urinate. Other symptoms of urethritis may include itching or tingling of the penis, or pus discharge from the penis. You may also have pain with sex and masturbation. Some men may not have symptoms.      An inflamed urethra can cause pain during urination.   What causes urethritis?  Urethritis can be caused by a bacterial or viral infection. Such an infection can lead to conditions such as a urinary tract infection (UTI) or sexually transmitted infections (STIs). Urethritis can also be caused by injury or sensitivity or allergy to chemicals in lotions and other products.   How is urethritis diagnosed?  Your healthcare provider will examine you and ask about your symptoms and health history. You may also have one or more of the following tests:     Urine test. This is done to take urine samples and have them checked for problems.    Blood test. A blood sample is taken and checked for problems.    Urethral discharge culture. A fluid sample is taken from inside the urethra and checked under a microscope. To get the sample, a cotton swab is inserted into the opening of the penis and into the urethra.    Cystoscopy. This test lets the healthcare provider look for problems in the urinary tract. The test uses a thin, flexible tube (cystoscope). It has a light and camera attached. The scope is inserted into the urethra.  How is urethritis treated?  Treatment depends on the cause of urethritis. If it s from a bacterial infection, medicines that fight infection (antibiotics) will be given. Your healthcare provider can tell you more about your treatment options. In the meantime, your symptoms can be treated. To ease pain and swelling, anti-inflammatory medicines, such as ibuprofen, may be given. If not  treated, symptoms may get worse. Also, scar tissue can form in the urethra, causing it to narrow.   When to call your healthcare provider   Call your healthcare provider right away if you have any of the following:     Fever of  100.4  F ( 38 C ) or higher, or as directed by your provider    Blood in the urine or semen    Burning pain with urination    Increased urge to urinate    Discharge from the penis    Itching, soreness, or swelling in the penis or groin    Pain during sex or masturbation    Inability to urinate  Preventing STIs  When it comes to sex, it s important to take care and be safe. Any sexual contact with the penis, vagina, anus, or mouth can spread an STI. The only sure way to prevent STIs is not having sex. But there are ways to make sex safer. Use a latex condom each time you have sex. And talk with your partner about STIs before you have sex.   [employer] last reviewed this educational content on 2/1/2020 2000-2021 [employer]. All rights reserved. This information is not intended as a substitute for professional medical care. Always follow your healthcare professional's instructions.           Patient Education       COVID-19: Vaccines and Prevention  The best prevention is to have no contact with the SARS-CoV-2 virus, to follow safety precautions, and to get vaccinated. The FDA has approved several vaccines to prevent COVID-19. One vaccine has been approved for people as young as 5. Vaccines are available to prevent COVID-19 and reduce the severity of illness if you get the virus. No vaccine is ever 100% effective in preventing any illness, but the COVID-19 vaccines work well and are safe. Talk with your healthcare provider about your risks and which vaccine may be best for you and your family.  Expert groups, including ACOG and CDC, advise pregnant or breastfeeding people to be vaccinated.  The vaccines are given as a shot (injection) in the arm muscle. There are 2 COVID-19  vaccine choices:    1-dose vaccine (Earl & Earl)    2-dose vaccine (either Pfizer or Moderna). If you get the 2-dose vaccine, the second dose is given several weeks after the first.  Normally healthy people are considered fully vaccinated 2 weeks after getting the 1-dose or the second shot of the 2-dose vaccine.  Who needs a 3-dose primary series?  People with a very weak immune system may not build up enough antibodies to fight COVID-19 after getting the first 2 doses of the 2-dose vaccine. This includes people who have had a solid organ transplant or who have a condition such as cancer or HIV that causes a very weak immune system. For these people, an extra dose of the 2-dose vaccine (Pfizer or Moderna) is advised. It's considered part of their vaccine series (initial series), not a booster. The extra shot is given at least 28 days after the second dose. Talk with your healthcare provider about your case and risk.  Booster shots  People age 12 or older can get a COVID-19 booster shot. Talk with your healthcare provider about which vaccine is advised for you depending on your age and needs.  Pfizer booster  A booster shot of the Pfizer vaccine is available for people as young as 12. It's given several months after the initial series.  Moderna booster  A Moderna booster shot is available for people 18 or older. It's given several months after the initial series.  Earl & Earl (J&J) booster  A booster shot of the Earl & Earl vaccine is available for people 18 or older. It's given at least 2 months after the initial J&J shot.  Mix and match  If you are 18 or older, you may choose which vaccine you get as a booster. This is called mix and match. Talk with your healthcare provider to learn more.  Take precautions: Travel and other outings  Stay informed about COVID-19 in your area. Follow local instructions about being in public. Be aware of events in your community that may be postponed or canceled,  "such as school and sporting events. You may be advised not to attend public gatherings.   Follow safety precautions in your community. You may be advised to stay at least 6 feet from others as much as possible. This is called social distancing. You may be advised to wear a mask. You may also be advised to stay at home and isolate yourself as much as possible. You may hear terms such as self-isolate, quarantine, stay at home, and shelter in place.  CDC recommends not traveling until you are fully vaccinated against COVID-19. This is because travel raises your chance of getting and spreading the infection. You are considered fully vaccinated 2 weeks after getting either the 1-dose or the last dose of the 2-dose vaccine. Be aware of travel precautions, both within the [place] and abroad. When traveling in the [place] be aware of mask requirements on public transportation such as airplanes, subways, and trains. Here are the most current CDC travel guidelines.    When you are at home    Wash your hands often. Use soap and clean, running water for at least 20 seconds.    If you don't have access to soap and water, use an alcohol-based hand  often. Make sure it has at least 60% alcohol.    Don't touch your eyes, nose, or mouth unless you have clean hands.    Don t kiss someone who is sick.    If you need to cough or sneeze, do it into a tissue. Then throw the tissue into the trash. If you don't have tissues, cough or sneeze into the bend of your elbow.    When possible, don't touch \"high-touch\" shared surfaces such as doorknobs and handles, cabinet handles, and light switches.    Clean frequently-touched home surfaces often with disinfectant. This includes desk surfaces, printers, phones, kitchen counters, tables, fridge door handle, bathroom surfaces, and any soiled surface. Closely follow disinfectant label instructions. You can find them at [facility] s detailed cleaning website.    Check your home supplies. Consider " keeping a 2-week supply of medicines, food, and other needed household items. Ask your healthcare provider and check with your insurance plan about ordering a 3-month supply of medicines you need regularly.    Make a plan for childcare, work, and ways to stay in touch with others. Know who will help you if you get sick.    Don't be around people who are sick.    Don t share eating or drinking utensils with sick people.    If you leave home      Stay informed about all safety instructions in your area.    Stay at least 6 feet away from all people as advised. This is called social distancing.    When possible, don't touch high-touch public surfaces such as doorknobs and handles, cabinet handles, and light switches. If you touch these surfaces, try to clean them first with a disinfecting wipe. Or touch them using a tissue or paper towel.    Use an alcohol-based hand  often. Make sure it has at least 60% alcohol.    Don't touch your eyes, nose, or mouth unless you have clean hands.    If you need to cough or sneeze, do it into a tissue. Then throw the tissue into the trash. If you don't have tissues, cough or sneeze into the bend of your elbow.    Wear a mask as advised. The CDC advises wearing a medical mask or cloth face mask with several layers of washable, breathable fabric and a nose wire. Or you can wear a disposable medical mask with a cloth mask over it. Wear a well-fitting mask that is snug around your nose, mouth, and chin. See CDC's guide to masks.  ? CDC advises all people older than 2 who are not fully vaccinated to wear a mask and stay 6 feet away from others while in public.  ? CDC recommends indoor masking for all teachers, staff, students, and visitors to schools, regardless of vaccination status.  ? CDC advises people with weak immune systems, even if fully vaccinated, to continue wearing masks and to stay 6 feet away from others while in public.  ? Like other viruses, the virus that causes  COVID-19 changes (mutates) all the time. This leads to several variants that are easily spread, including the delta and omicron variants. To protect against variants, CDC advises all people over age 2, including those who are fully vaccinated, to wear a mask indoors in public settings if you are in an area of high numbers of COVID-19 cases. See CDC's county data website for current transmission information in your area.    Regardless of vaccination status, certain people should not wear a face mask. This includes:  ? Children younger than 2 years old  ? Anyone with a health, developmental, or mental health condition that can be made worse by wearing a mask  ? Anyone who is unconscious or unable to remove the face covering without help.  If you are at a work site    Tell your supervisor if you are well but live with someone who has COVID-19.    Stay at least 6 feet away from all people.    Don't shake hands with anyone.    Don't attend in-person meetings, or limit how many you attend. Meet over phone or video if possible.    Don't use other people's desks, phones, equipment, or offices, if possible.    Wash your hands often. Use soap and clean, running water for at least 20 seconds.    If you don't have access to soap and water, use an alcohol-based hand  often. Make sure it has at least 60% alcohol.    Don't touch your eyes, nose, or mouth unless you have clean hands.    Wear a face mask as advised by your employer, CDC guidance, and your community's instructions.    When possible, don't touch high-touch public surfaces such as doorknobs and handles, cabinet handles, and light switches. If you touch these surfaces, clean them first with a disinfecting wipe. Or touch them using a tissue or paper towel.    Use office dorothy one person at a time.    Consider not having office coffee or tea, or group foods.    Don t have meals in groups.    Clean work surfaces often with disinfectant. This includes desk  surfaces, photocopier, printer, phones, kitchen counters, fridge door handle, bathroom surfaces, and others.    Don t touch other people s personal work tools, such as phones, keyboards, pens, and other items.    Don t touch other people s eating or drinking utensils.    If you need to cough or sneeze, do it into a tissue. Then throw the tissue into the trash. If you don't have tissues, cough or sneeze into the bend of your elbow.    If you have been exposed to a person with COVID-19  The risk of getting COVID-19 is much lower after exposure if you are fully vaccinated and boosted.  If you:    Received a booster shot    OR completed the initial series of Pfizer or Moderna vaccine within the last 5 months    OR completed the initial shot of J&J vaccine within the last 2 months  You don't need to stay home if you've been exposed and don't have symptoms . But you should get tested at least 5 days after being exposed and monitor for symptoms. Know your testing options with the CDC's COVID-19 Viral Testing Tool. Wear a well-fitting mask around others for 10 days after exposure.  If you have been exposed and have symptoms , get tested and stay home. If you test result is positive, isolate at home for 5 days. If you don't have symptoms or your symptoms are getting better after 5 days, you can leave your house. Continue to wear a mask around others for 5 more days. If you have a fever, continue to stay home until your fever goes away.  If you are not fully vaccinated or boosted and have been exposed to someone who is suspected of having COVID-19 or has tested positive for it:    Call your healthcare provider and follow all instructions. Stay home away from others and monitor your health. This is called quarantine. CDC advises that you quarantine to help prevent the spread of COVID-19 once you've been exposed to someone with the infection. CDC advises quarantine for 5 days after exposure, followed by wearing a mask around  others for 5 more days. But they recognize the hardship for many who are unable to quarantine. CDC advises if you can t quarantine, you must wear a mask at all times when around others for 10 days after exposure. Get tested at least 5 days after being exposed and watch for symptoms. If your test result is positive, isolate at home for 5 days. If you don't have symptoms or your symptoms are getting better after 5 days, you can leave your house. Continue to wear a mask around others for 5 days. If you have a fever, continue to stay home until your fever goes away. If your test is negative, you can stop isolation 5 days after exposure, but wear a mask around others for 10 days after exposure.    Take your temperature every morning and evening. This is to check for fever. Keep a record of the readings. If possible, stay away from others, especially those who are at higher risk of getting very sick from COVID-19.    Watch for symptoms of the virus such as cough or trouble breathing. If you develop any symptoms, isolate yourself right away. Call your provider first before going to any clinic or hospital. See  CDC's coronavirus self-.    If you've had COVID-19 in the past and have been re-exposed, contact your healthcare provider for advice. You are less likely to develop COVID-19 again, but it's possible. This is because of new variants of the virus and because some people can't mount a strong immune response after the illness. CDC recommends for all those exposed to get tested at least 5 days after exposure. If you have symptoms, quarantine right away and continue until a negative test confirms symptoms are not from COVID-19.  When to call your healthcare provider  Call your healthcare provider if you think you have COVID-19 symptoms. These can include fever, cough, and trouble breathing. They may also include body aches, headache, chills or repeated shaking with chills, sore throat, loss of taste or smell, or  diarrhea. Follow your healthcare provider's specific instructions.  Do I still need a flu shot?  During a pandemic, it's especially important to keep up on recommended vaccines for other illnesses. This is more true if you're at higher risk for severe illness from COVID-19, the flu, or pneumonia. This includes older adults and those who have long-term (chronic) health conditions. Getting a yearly flu vaccine is advised for everyone 6 months old and older, with rare exceptions. Health experts advise the flu vaccine to protect you and others. The flu vaccine helps protect those at high-risk for serious illness, and lowers the strain on hospitals during the COVID-19 pandemic.     Last modified date: 1/7/2022  [employer] last reviewed this educational content on 12/1/2021 2000-2022 [employer]. All rights reserved. This information is not intended as a substitute for professional medical care. Always follow your healthcare professional's instructions.

## 2022-03-02 ENCOUNTER — VIRTUAL VISIT (OUTPATIENT)
Dept: FAMILY MEDICINE | Facility: CLINIC | Age: 27
End: 2022-03-02
Payer: COMMERCIAL

## 2022-03-02 DIAGNOSIS — U07.1 INFECTION DUE TO 2019 NOVEL CORONAVIRUS: Primary | ICD-10-CM

## 2022-03-02 PROCEDURE — 99213 OFFICE O/P EST LOW 20 MIN: CPT | Mod: 95 | Performed by: PREVENTIVE MEDICINE

## 2022-03-02 NOTE — PROGRESS NOTES
"Donald is a 27 year old who is being evaluated via a billable telephone visit.      What phone number would you like to be contacted at? [phone]  How would you like to obtain your AVS? MyChart    Assessment & Plan     Infection due to 2019 novel coronavirus  -MASSBP score of 4  -discussed options for treatment including Molnupiravir, Paxlovid, and monoclonal antibodies   -side effects of medications reviewed  -patient will decide if he would like to proceed with treatment  -also provided information on [facility] through [facility]     23 minutes spent on the date of the encounter doing chart review, history and exam, documentation and further activities per the note       BMI:   Estimated body mass index is 39.91 kg/m  as calculated from the following:    Height as of 12/23/21: 1.727 m (5' 7.99\").    Weight as of 3/1/22: 119 kg (262 lb 6.4 oz).     Return in about 3 days (around 3/5/2022) if symptoms worsen or fail to improve.    Candi Martinez MD MPH    [facility]    Subjective   Donald is a 27 year old who presents for the following health issues:    HPI       COVID-19 Symptom Review  How many days ago did these symptoms start? 4-5 days ago    Are any of the following symptoms significant for you?    New or worsening difficulty breathing? No    Worsening cough? Yes, I am coughing up mucus.    Fever or chills? No    Headache: YES    Sore throat: YES    Chest pain: YES    Diarrhea: YES    Body aches? YES    What treatments has patient tried? Tylenol patient seen urgent care 3/1/22  Does patient live in a nursing home, group home, or shelter? no  Does patient have a way to get food/medications during quarantined? NO    Symptoms started with chills on 2/27/22  Tested positive on 3/1/22  Still with headaches+  No fever  Chills are better  No emesis  No difficulties breathing                      MASSBP Score 3/2/2022   Age Greater than or equal to 65 years 0   BMI greater than or " equal to 35 kg/m2 2   Has Diabetes Mellitus 0   Has Chronic Kidney Disease 0   Has Cardiovascular Disease and 55 years or older 0   Has Chronic Respiratory Disease and 55 years or older 0   Has Hypertension and 55 years or older 0   Is Immunocompromised 0   Is Pregnant 0   Member of BIPOC community (Black/, /, ,  or , or  or [place] Native)  2   MASSBP Score 4   Has the patient had a positive COVID test outside our system?  No   What day did symptoms start?  2/27/2022     Review of Systems   Constitutional, HEENT, cardiovascular, pulmonary, gi and gu systems are negative, except as otherwise noted.      Objective           Vitals:  No vitals were obtained today due to virtual visit.    Physical Exam   healthy, alert and no distress  PSYCH: Alert and oriented times 3; coherent speech, normal   rate and volume, able to articulate logical thoughts, able   to abstract reason, no tangential thoughts, no hallucinations   or delusions  His affect is normal  RESP: No cough, no audible wheezing, able to talk in full sentences  Remainder of exam unable to be completed due to telephone visits    No results found for any visits on 03/02/22.    Office Visit on 03/01/2022   Component Date Value Ref Range Status     Color Urine 03/01/2022 Yellow  Colorless, Straw, Light Yellow, Yellow Final     Appearance Urine 03/01/2022 Clear  Clear Final     Glucose Urine 03/01/2022 Negative  Negative mg/dL Final     Bilirubin Urine 03/01/2022 Negative  Negative Final     Ketones Urine 03/01/2022 Negative  Negative mg/dL Final     Specific Gravity Urine 03/01/2022 1.020  1.003 - 1.035 Final     Blood Urine 03/01/2022 Small (A) Negative Final     pH Urine 03/01/2022 7.0  5.0 - 7.0 Final     Protein Albumin Urine 03/01/2022 Negative  Negative mg/dL Final     Urobilinogen Urine 03/01/2022 0.2  0.2, 1.0 E.U./dL Final     Nitrite Urine 03/01/2022 Negative  Negative  Final     Leukocyte Esterase Urine 03/01/2022 Negative  Negative Final     SARS CoV2 PCR 03/01/2022 Positive (A) Negative, Testing sent to reference lab. Results will be returned via unsolicited result Final    POSITIVE: SARS-CoV-2 (COVID-19) RNA detected, presumed positive.     WBC Count 03/01/2022 7.2  4.0 - 11.0 10e3/uL Final     RBC Count 03/01/2022 6.07 (A) 4.40 - 5.90 10e6/uL Final     Hemoglobin 03/01/2022 15.2  13.3 - 17.7 g/dL Final     Hematocrit 03/01/2022 48.1  40.0 - 53.0 % Final     MCV 03/01/2022 79  78 - 100 fL Final     MCH 03/01/2022 25.0 (A) 26.5 - 33.0 pg Final     MCHC 03/01/2022 31.6  31.5 - 36.5 g/dL Final     RDW 03/01/2022 13.9  10.0 - 15.0 % Final     Platelet Count 03/01/2022 183  150 - 450 10e3/uL Final     % Neutrophils 03/01/2022 52  % Final     % Lymphocytes 03/01/2022 31  % Final     % Monocytes 03/01/2022 16  % Final     % Eosinophils 03/01/2022 0  % Final     % Basophils 03/01/2022 0  % Final     % Immature Granulocytes 03/01/2022 0  % Final     Absolute Neutrophils 03/01/2022 3.8  1.6 - 8.3 10e3/uL Final     Absolute Lymphocytes 03/01/2022 2.2  0.8 - 5.3 10e3/uL Final     Absolute Monocytes 03/01/2022 1.2  0.0 - 1.3 10e3/uL Final     Absolute Eosinophils 03/01/2022 0.0  0.0 - 0.7 10e3/uL Final     Absolute Basophils 03/01/2022 0.0  0.0 - 0.2 10e3/uL Final     Absolute Immature Granulocytes 03/01/2022 0.0  <=0.4 10e3/uL Final     Bacteria Urine 03/01/2022 Few (A) None Seen /HPF Final     RBC Urine 03/01/2022 5-10 (A) 0-2 /HPF /HPF Final     WBC Urine 03/01/2022 0-5  0-5 /HPF /HPF Final     Mucus Urine 03/01/2022 Present (A) None Seen /LPF Final     Amorphous Crystals Urine 03/01/2022 Few (A) None Seen /HPF Final         Phone call duration: 11 minutes

## 2022-09-21 DIAGNOSIS — H35.413 BILATERAL RETINAL LATTICE DEGENERATION: Primary | ICD-10-CM

## 2022-10-22 ENCOUNTER — HEALTH MAINTENANCE LETTER (OUTPATIENT)
Age: 27
End: 2022-10-22

## 2022-11-29 ENCOUNTER — OFFICE VISIT (OUTPATIENT)
Dept: OPHTHALMOLOGY | Facility: CLINIC | Age: 27
End: 2022-11-29
Attending: OPHTHALMOLOGY
Payer: COMMERCIAL

## 2022-11-29 DIAGNOSIS — G89.29 CHRONIC NONINTRACTABLE HEADACHE, UNSPECIFIED HEADACHE TYPE: Primary | ICD-10-CM

## 2022-11-29 DIAGNOSIS — R51.9 CHRONIC NONINTRACTABLE HEADACHE, UNSPECIFIED HEADACHE TYPE: Primary | ICD-10-CM

## 2022-11-29 DIAGNOSIS — H35.413 BILATERAL RETINAL LATTICE DEGENERATION: ICD-10-CM

## 2022-11-29 PROCEDURE — G0463 HOSPITAL OUTPT CLINIC VISIT: HCPCS | Mod: 25

## 2022-11-29 PROCEDURE — 99213 OFFICE O/P EST LOW 20 MIN: CPT | Mod: GC | Performed by: OPHTHALMOLOGY

## 2022-11-29 PROCEDURE — 92134 CPTRZ OPH DX IMG PST SGM RTA: CPT | Performed by: OPHTHALMOLOGY

## 2022-11-29 ASSESSMENT — REFRACTION_WEARINGRX
OS_SPHERE: -9.75
OS_CYLINDER: +4.25
OD_CYLINDER: +2.75
OD_SPHERE: -6.75
OD_AXIS: 089
OS_AXIS: 084
SPECS_TYPE: SVL

## 2022-11-29 ASSESSMENT — CONF VISUAL FIELD
OS_INFERIOR_NASAL_RESTRICTION: 0
OD_NORMAL: 1
OD_SUPERIOR_NASAL_RESTRICTION: 0
OD_INFERIOR_TEMPORAL_RESTRICTION: 0
OS_INFERIOR_TEMPORAL_RESTRICTION: 0
OS_SUPERIOR_TEMPORAL_RESTRICTION: 0
OS_NORMAL: 1
OD_SUPERIOR_TEMPORAL_RESTRICTION: 0
OD_INFERIOR_NASAL_RESTRICTION: 0
OS_SUPERIOR_NASAL_RESTRICTION: 0

## 2022-11-29 ASSESSMENT — VISUAL ACUITY
OS_CC: 20/25
METHOD: SNELLEN - LINEAR
CORRECTION_TYPE: GLASSES
OD_CC: 20/30

## 2022-11-29 ASSESSMENT — EXTERNAL EXAM - LEFT EYE: OS_EXAM: NORMAL

## 2022-11-29 ASSESSMENT — CUP TO DISC RATIO
OD_RATIO: 0.5
OS_RATIO: 0.3

## 2022-11-29 ASSESSMENT — SLIT LAMP EXAM - LIDS
COMMENTS: NORMAL
COMMENTS: NORMAL

## 2022-11-29 ASSESSMENT — EXTERNAL EXAM - RIGHT EYE: OD_EXAM: NORMAL

## 2022-11-29 ASSESSMENT — TONOMETRY
IOP_METHOD: TONOPEN
OD_IOP_MMHG: 10
OS_IOP_MMHG: 10

## 2022-11-29 NOTE — PROGRESS NOTES
CC: H/o RD    INTERVAL HISTORY: No flashes or shadows. Had mild/mod pain around right eye, brief episodes moderate intensity, multiple per day, lasted several weeks but that is gone now. When he had this pain he felt nauseous too. Vision seems stable.       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  RD OD  s/p pexy 10/2011, 2010   - Far IT periphery from lattice with atrophic holes   - No further spread of Retinal detachment, well contained by laser   - No new retinal tears/RD    - recheck 1 year   - d/w patient S/Sx RD 1/2021    #. H/o RD OS   -S/p SCLERAL BUCKLE 2010   -attached    #. Lattice OU      #. Syneresis OU      #. H/o amblyopia OD   - ?prior strabismus surgery, unsure   - was premature    # DIANE    - artificial tears      # Pain OD   - ?etiology   - no inflammation   - patient has  shunt, will see neurologist soon (11/2022)       Return to clinic:  1 year, OCT OU, DFE each eye    Trina Mcdaniel MD  Ophthalmology Resident, PGY-3  [facility]          ATTESTATION     Attending Physician Attestation:      Complete documentation of historical and exam elements from today's encounter can be found in the full encounter summary report (not reduplicated in this progress note).  I personally obtained the chief complaint(s) and history of present illness.  I confirmed and edited as necessary the review of systems, past medical/surgical history, family history, social history, and examination findings as documented by others; and I examined the patient myself.  I personally reviewed  the relevant tests, images, and reports as documented above.  I personally reviewed the ophthalmic test(s) associated with this encounter, agree with the interpretation(s) as documented by the resident/fellow, and have edited the corresponding report(s) as necessary.   I formulated and edited as necessary the assessment and plan and discussed the findings and management plan with the patient and family    Shasha Santos MD, PhD  , Vitreoretinal Surgery  Department of Ophthalmology  [facility]

## 2022-11-29 NOTE — NURSING NOTE
Chief Complaints and History of Present Illnesses   Patient presents with     Lattice Degeneration Follow Up     Chief Complaint(s) and History of Present Illness(es)     Lattice Degeneration Follow Up            Laterality: both eyes    Duration: 10 months          Comments    Pt. States that he has had an occasional pain/throbbing sensation on the right side of head. Has not happened the last 2 weeks. No new flashes or floaters BE. No change in VA BE.   Mandy Cox COT 12:08 PM November 29, 2022

## 2022-11-30 ENCOUNTER — TELEPHONE (OUTPATIENT)
Dept: NEUROSURGERY | Facility: CLINIC | Age: 27
End: 2022-11-30

## 2022-11-30 ASSESSMENT — ENCOUNTER SYMPTOMS
NERVOUS/ANXIOUS: 0
DECREASED CONCENTRATION: 0
INSOMNIA: 0
PANIC: 0
DEPRESSION: 1

## 2022-12-01 NOTE — TELEPHONE ENCOUNTER
RECORDS RECEIVED FROM: internal   REASON FOR VISIT: Chronic nonintractable headache, unspecified headache type   Date of Appt: 12/6/22   NOTES (FOR ALL VISITS) STATUS DETAILS   OFFICE NOTE from referring provider Internal Dr Shasha Santos / Dr Trina Mcdaniel (resident) @ [facility]:  11/29/22 1/11/21 1/31/20   OFFICE NOTE from other specialist Internal Dr Malagon @ [facility]:  1/13/22    Jose WALKER @ [facility]:  8/18/20 8/16/20   MEDICATION LIST Internal    IMAGING  (FOR ALL VISITS)     CT (HEAD, NECK, SPINE) Internal [facility]:  CT Head 8/18/20

## 2022-12-06 ENCOUNTER — OFFICE VISIT (OUTPATIENT)
Dept: NEUROSURGERY | Facility: CLINIC | Age: 27
End: 2022-12-06
Payer: COMMERCIAL

## 2022-12-06 ENCOUNTER — PRE VISIT (OUTPATIENT)
Dept: NEUROSURGERY | Facility: CLINIC | Age: 27
End: 2022-12-06

## 2022-12-06 VITALS
HEART RATE: 78 BPM | RESPIRATION RATE: 16 BRPM | DIASTOLIC BLOOD PRESSURE: 83 MMHG | OXYGEN SATURATION: 97 % | SYSTOLIC BLOOD PRESSURE: 126 MMHG

## 2022-12-06 DIAGNOSIS — R51.9 CHRONIC NONINTRACTABLE HEADACHE, UNSPECIFIED HEADACHE TYPE: ICD-10-CM

## 2022-12-06 DIAGNOSIS — G89.29 CHRONIC NONINTRACTABLE HEADACHE, UNSPECIFIED HEADACHE TYPE: ICD-10-CM

## 2022-12-06 PROCEDURE — 99203 OFFICE O/P NEW LOW 30 MIN: CPT | Performed by: NEUROLOGICAL SURGERY

## 2022-12-06 ASSESSMENT — PAIN SCALES - GENERAL: PAINLEVEL: MILD PAIN (3)

## 2022-12-06 NOTE — PROGRESS NOTES
Service Date: 2022    Roberto Gomez MD  [facility]  [address] [place] [address]  [address]    RE:  Donald Figueroa  MRN:  [id number]  :  1995    Dear Dr. Gomez:    I had the opportunity to see Mr. Figueroa today in my clinic.  As you know, he is a 27-year-old man who was born in [place] with prematurity.  He had a shunt placed.  He had 3 or 4 surgeries since then until the age of 3 but has not had any revisions. About a month ago, he had an episode where he was having pain along the right side of his face, and this subsequently just went away on its own.  He said he has been under a lot of stress recently form a personal standpoint, but that has abated now.  He feels that his thinking is back to the way it normally was.  He does have ophthalmologic disorders including his retina.  This is being followed closely.    On examination, this is a very pleasant gentleman, in no apparent distress.  His shunt incisions appear to have healed nicely.  He had one in the frontal area and one in the parietal area.  The shunt on palpation appears normal.  Vision: He was able to read without too much difficulties.  Extraocular muscles are normal.  Face is symmetric.  Hearing is grossly intact.  He has no drift.  Strength is 5/5.  He does have some difficulty with coordination of his feet and his gait is somewhat wide based.  His station is normal.    His last CT scan was in .  It shows his ventricles to be well decompressed.  The shunt appears to be in good placement.    Mr. Figueroa is completely asymptomatic.  He has not had any imaging since , so I would like to get one in 2 years' time.  He should visit with us every 2 years but certainly be in contact with us if he develops any symptoms.    Sincerely,    Juan Carlos Danielson MD        D: 2022   T: 2022   MT: vasu    Name:     DONALD FIGUEROA  MRN:      [id number]        Account:      [id number]   :      1995            Service Date: 12/06/2022       Document: [id number]

## 2022-12-06 NOTE — LETTER
2022       RE: Donald Jackson  [address]  [place] [address]     Dear Colleague,    Thank you for referring your patient, Donald Jackson, to the [facility] [place] [facility]. Please see a copy of my visit note below.    Service Date: 2022    Roberto Gomez MD  [facility]  [address] [place]  [address]    RE:  Donald Jackson  MRN:  [id number]  :  1995    Dear Dr. Gomez:    I had the opportunity to see Mr. Jackson today in my clinic.  As you know, he is a 27-year-old man who was born in [place] with prematurity.  He had a shunt placed.  He had 3 or 4 surgeries since then until the age of 3 but has not had any revisions. About a month ago, he had an episode where he was having pain along the right side of his face, and this subsequently just went away on its own.  He said he has been under a lot of stress recently form a personal standpoint, but that has abated now.  He feels that his thinking is back to the way it normally was.  He does have ophthalmologic disorders including his retina.  This is being followed closely.    On examination, this is a very pleasant gentleman, in no apparent distress.  His shunt incisions appear to have healed nicely.  He had one in the frontal area and one in the parietal area.  The shunt on palpation appears normal.  Vision: He was able to read without too much difficulties.  Extraocular muscles are normal.  Face is symmetric.  Hearing is grossly intact.  He has no drift.  Strength is 5/5.  He does have some difficulty with coordination of his feet and his gait is somewhat wide based.  His station is normal.    His last CT scan was in .  It shows his ventricles to be well decompressed.  The shunt appears to be in good placement.    Mr. Jackson is completely asymptomatic.  He has not had any imaging since , so I would like to get one in 2  years' time.  He should visit with us every 2 years but certainly be in contact with us if he develops any symptoms.      D: 2022   T: 2022   MT: vasu    Name:     CAROLINE FIGUEROA  MRN:      [id number]        Account:      [id number]   :      1995           Service Date: 2022       Document: [id number]        Again, thank you for allowing me to participate in the care of your patient.      Sincerely,    Juan Carlos Danielson MD

## 2023-04-01 ENCOUNTER — HEALTH MAINTENANCE LETTER (OUTPATIENT)
Age: 28
End: 2023-04-01

## 2023-07-11 ENCOUNTER — E-VISIT (OUTPATIENT)
Dept: FAMILY MEDICINE | Facility: CLINIC | Age: 28
End: 2023-07-11

## 2023-07-11 DIAGNOSIS — F43.23 ADJUSTMENT DISORDER WITH MIXED ANXIETY AND DEPRESSED MOOD: Primary | ICD-10-CM

## 2023-07-11 PROCEDURE — 99421 OL DIG E/M SVC 5-10 MIN: CPT | Performed by: PREVENTIVE MEDICINE

## 2023-07-11 ASSESSMENT — PATIENT HEALTH QUESTIONNAIRE - PHQ9
SUM OF ALL RESPONSES TO PHQ QUESTIONS 1-9: 6
10. IF YOU CHECKED OFF ANY PROBLEMS, HOW DIFFICULT HAVE THESE PROBLEMS MADE IT FOR YOU TO DO YOUR WORK, TAKE CARE OF THINGS AT HOME, OR GET ALONG WITH OTHER PEOPLE: SOMEWHAT DIFFICULT
SUM OF ALL RESPONSES TO PHQ QUESTIONS 1-9: 6

## 2023-07-12 ASSESSMENT — PATIENT HEALTH QUESTIONNAIRE - PHQ9: SUM OF ALL RESPONSES TO PHQ QUESTIONS 1-9: 6

## 2023-07-12 NOTE — PATIENT INSTRUCTIONS
Using Antidepressants (Adult)  Depression is a type of mood disorder. It affects the way you think and feel. The most common symptom is a feeling of deep sadness. This feeling doesn't go away or get better on its own. But most types of depression can be helped with therapy and antidepressant medicines. ( Note: This covers antidepressant use in adults only.)     What do antidepressants do?  Antidepressants are medicines. They help restore the balance of certain chemicals in your brain. This can help ease your depression. You will likely feel better in 4 to 6 weeks. But you may need to take antidepressants for a year or more. This is to keep your symptoms from coming back. Some people need to take antidepressants for life. There are several types of these medicines. The main types are described below.   Selective serotonin reuptake inhibitors (SSRIs)  SSRIs work well for the treatment of depression. They tend to have fewer side effects than other antidepressants. Side effects can include:     Anxiety    Trouble sleeping    Nausea    Diarrhea    Sexual dysfunction    Headaches  In rare cases, they may make you more depressed. SSRIs shouldn t be mixed with certain other medicines. Tell your healthcare provider all the medicines, herbs, and supplements you are taking.   Tricyclic antidepressants  Tricyclics help severe or long-term depression. They have been used for many years with good results. Side effects can include:     Blurred vision    Dry mouth    Constipation  Monoamine oxidase inhibitors (MAOIs)  If tricyclics or SSRIs don't work for you, your healthcare provider may prescribe an MAOI. These types of medicines can work very well. But people taking MAOIs must stay away from certain foods and medicines. See below and ask your healthcare provider for more information.   Lithium  Lithium is a medicine that helps even out mood. It is used for:     Bipolar disorder    Unipolar depression not helped by other  antidepressants    A sudden (acute) episode of unipolar depression    Maintenance medicine to prevent more unipolar depression  Side effects can include:    Weight gain    Trembling    Loose stool    Nausea  Things to avoid if you take an MAOI  If you take an MAOI, don t have any of these foods, drinks, or medicines:     Beans    Aged cheese    Chocolate    Red wine    Most cold medicines    Other medicines (ask your healthcare provider)  To reduce the risk of lithium poisoning  Lithium poisoning means there is too much of it in your body. This can happen if you take too much in 1 dose or over time. To reduce the risk of lithium poisoning:     Take only the prescribed amount of lithium. If your depressive symptoms get worse, contact your healthcare provider. Never change your medicine dose on your own.    Drink plenty of fluids other than coffee, tea, and soda.    Limit salt in your diet.    Talk with your pharmacist before you use any other medicines. This includes prescribed medicines and over-the-counter medicines. This is to be sure the medicines won t interact with the lithium.    Never share your medicines or use another person's medicines, even if it is the same medicine and dose.    Keep all of your follow-up appointments.    Have your lithium blood level routinely checked as advised. You will need blood tests more often when your symptoms are not in control.  If you have side effects  The side effects of antidepressants are usually mild. But if you have side effects that bother you a lot, call your healthcare provider. They may change the dose or type of medicine to help. Never change your medicine dose or stop taking medicines on your own.   [employer] last reviewed this educational content on 11/1/2021 2000-2023 [employer]. All rights reserved. This information is not intended as a substitute for professional medical care. Always follow your healthcare professional's  instructions.          Depression: Tips to Help Yourself  As your healthcare providers help treat your depression, you can also help yourself. Keep in mind that your illness affects you emotionally, physically, mentally, and socially. So full recovery will take time. Take care of your body and your soul, and be patient with yourself as you get better.   Self-care    Educate yourself. Read about treatment and medicine options. If you have the energy, attend local conferences or support groups. Keep a list of useful websites and helpful books and use them as needed. This illness is not your fault. Don t blame yourself for your depression.    Manage early symptoms. If you notice symptoms returning, have triggers, or identify other factors that may lead to a depressive episode, get help as soon as possible. Ask trusted friends and family to monitor your behavior and let you know if they see anything of concern.    Work with your provider. Find a provider you can trust. Communicate honestly with that person. Share information on your treatment for depression and your reaction to medicines. You may need to try different medicines before finding the right one.    Be prepared for a crisis. Know what to do if you have a crisis. Keep the phone number of a crisis hotline handy. Know where your community's urgent care centers and the closest emergency department are.    Hold off on big decisions. Depression can cloud your judgment. So wait until you feel better before making major life decisions. These include changing jobs, moving, making an expensive purchase, or getting  or .    Be patient. Recovering from depression is a process. Don t be discouraged if it takes some time to feel better.    Keep it simple. Depression saps your energy and concentration. So you won t be able to do all the things you used to do. Set small goals and do what you can.    Be with others. Don t isolate yourself--you ll only feel  worse. Try to be with other people. And take part in fun activities when you can. Go to a movie, ballgame, [employer] service, or social event. Talk openly with people you can trust. And accept help when it s offered.    Take care of your body    People with depression often lose the desire to take care of themselves. That only makes their problems worse. During treatment and afterward, make a point to:     Exercise. It s a great way to take care of your body. And studies have shown that exercise helps fight depression. Aim for 30 minutes of moderate activity a day. Walking in small blocks of time (5-10 minutes) is a good way to start, but anything that gets you moving (gardening, house cleaning) counts.    Not use drugs or alcohol. These may ease the pain in the short term. But they ll only make your problems worse in the long run.    Get relief from stress. Ask your healthcare provider for relaxation exercises and techniques to help ease stress. Consider activities like meditation, yoga, progressive muscle relaxation, or jeovanny chi.    Eat right. A balanced and healthy diet helps keep your body healthy.    Get adequate sleep. Aim for 8 hours per night. Too much or too little sleep can cause other physical and emotional problems.  [employer] last reviewed this educational content on 1/1/2022 2000-2023 [employer]. All rights reserved. This information is not intended as a substitute for professional medical care. Always follow your healthcare professional's instructions.

## 2023-08-28 ENCOUNTER — TELEPHONE (OUTPATIENT)
Dept: FAMILY MEDICINE | Facility: CLINIC | Age: 28
End: 2023-08-28

## 2023-08-28 NOTE — TELEPHONE ENCOUNTER
Patient Quality Outreach    Patient is due for the following:   Physical Preventive Adult Physical      Topic Date Due    COVID-19 Vaccine (3 - Pfizer series) 07/28/2021    Diptheria Tetanus Pertussis (DTAP/TDAP/TD) Vaccine (9 - Td or Tdap) 06/26/2022    Flu Vaccine (1) 09/01/2023       Next Steps:   Schedule a Adult Preventative    Type of outreach:    Sent Smartdate message.    Next Steps:  Reach out within 90 days via Smartdate.    Max number of attempts reached: No. Will try again in 90 days if patient still on fail list.    Questions for provider review:    None           Beulah Corral MA        continue to monitor

## 2023-12-26 ENCOUNTER — TRANSFERRED RECORDS (OUTPATIENT)
Dept: HEALTH INFORMATION MANAGEMENT | Facility: CLINIC | Age: 28
End: 2023-12-26

## 2024-01-05 ENCOUNTER — OFFICE VISIT (OUTPATIENT)
Dept: FAMILY MEDICINE | Facility: CLINIC | Age: 29
End: 2024-01-05
Payer: COMMERCIAL

## 2024-01-05 VITALS
DIASTOLIC BLOOD PRESSURE: 88 MMHG | SYSTOLIC BLOOD PRESSURE: 132 MMHG | HEART RATE: 82 BPM | TEMPERATURE: 98.1 F | OXYGEN SATURATION: 98 %

## 2024-01-05 DIAGNOSIS — Z90.49 HISTORY OF APPENDECTOMY: Primary | ICD-10-CM

## 2024-01-05 LAB
BASO+EOS+MONOS # BLD AUTO: 0.8 10E3/UL (ref 0–2.2)
BASO+EOS+MONOS NFR BLD AUTO: 7 %
BASOPHILS # BLD AUTO: ABNORMAL 10*3/UL
BASOPHILS NFR BLD AUTO: ABNORMAL %
EOSINOPHIL # BLD AUTO: ABNORMAL 10*3/UL
EOSINOPHIL NFR BLD AUTO: ABNORMAL %
ERYTHROCYTE [DISTWIDTH] IN BLOOD BY AUTOMATED COUNT: 14 % (ref 10–15)
HCT VFR BLD AUTO: 42.6 % (ref 40–53)
HGB BLD-MCNC: 13.1 G/DL (ref 13.3–17.7)
IMM GRANULOCYTES # BLD: ABNORMAL 10*3/UL
IMM GRANULOCYTES NFR BLD: ABNORMAL %
LYMPHOCYTES # BLD AUTO: 3.2 10E3/UL (ref 0.8–5.3)
LYMPHOCYTES NFR BLD AUTO: 29 %
MCH RBC QN AUTO: 23.8 PG (ref 26.5–33)
MCHC RBC AUTO-ENTMCNC: 30.8 G/DL (ref 31.5–36.5)
MCV RBC AUTO: 78 FL (ref 78–100)
MONOCYTES # BLD AUTO: ABNORMAL 10*3/UL
MONOCYTES NFR BLD AUTO: ABNORMAL %
NEUTROPHILS # BLD AUTO: 6.8 10E3/UL (ref 1.6–8.3)
NEUTROPHILS NFR BLD AUTO: 63 %
PLATELET # BLD AUTO: 243 10E3/UL (ref 150–450)
RBC # BLD AUTO: 5.5 10E6/UL (ref 4.4–5.9)
WBC # BLD AUTO: 10.8 10E3/UL (ref 4–11)

## 2024-01-05 PROCEDURE — 80053 COMPREHEN METABOLIC PANEL: CPT | Performed by: FAMILY MEDICINE

## 2024-01-05 RX ORDER — IBUPROFEN 600 MG/1
600 TABLET, FILM COATED ORAL EVERY 6 HOURS PRN
COMMUNITY
End: 2024-07-31

## 2024-01-05 NOTE — PROGRESS NOTES
"  Assessment & Plan   Problem List Items Addressed This Visit    None  Visit Diagnoses       History of appendectomy    -  Primary    Relevant Orders    Adult General Surg Referral    Comprehensive metabolic panel    CBC with platelets differential          Agreed to referral to general surgery as noted above and will check basic labs today. Considered referral to ID as well but for now will hold off as infection appears to be under control.      31 minutes spent on the date of the encounter doing chart review, history and exam, documentation and further activities as noted.      MD SUSANNAH Lane [facility]    Subjective   Donald is a 28 year old, presenting for the following health issues:  Hospital F/U (Recent appendectomy in early Dec, appendix ruptured during surgery, returned to hospital with sepsis. Looking to switch from care with [facility] to the [facility] )      HPI     Recent appendectomy  - laparoscopic appy c/b intraoperative perforation  - discharged but returned less than 24 hours later with symptoms of infection  - suspected \"stump appendicitis\"  - repeat CT scan showed multiple small abscesses   - drain placed, culture grew pseudomonas aeruginsa  - drain study showed fistulous connection to appendiceal stump and cecum   - most recent ABX of levofloxacin and flagyl  Today  - feeling OK, has been walking at the gym  - does not want to go back to [facility] after the traumatic experience  - has finished his ABX   - would like to talk with general surgery at [facility]        Review of Systems   Constitutional, HEENT, cardiovascular, pulmonary, gi and gu systems are negative, except as otherwise noted.      Objective    /88 (BP Location: Left arm, Patient Position: Sitting, Cuff Size: Adult Large)   Pulse 82   Temp 98.1  F (36.7  C) (Skin)   SpO2 98%   There is no height or weight on file to calculate BMI.  Physical Exam   GENERAL: healthy, alert and no " distress  NECK: no adenopathy, no asymmetry, masses, or scars and thyroid normal to palpation  RESP: lungs clear to auscultation - no rales, rhonchi or wheezes  CV: regular rate and rhythm, normal S1 S2, no S3 or S4, no murmur, click or rub, no peripheral edema and peripheral pulses strong  ABDOMEN: scarring from recent surgeries shows no drainage, bleeding, erythema or swelling  MS: no gross musculoskeletal defects noted, no edema

## 2024-01-05 NOTE — TELEPHONE ENCOUNTER
REFERRAL INFORMATION:  Referring Provider: Dr. Roberto Gomez  Referring Clinic: [facility]  Reason for Visit/Diagnosis: HX of Appendectomy       FUTURE VISIT INFORMATION:  Appointment Date: 1/12/2024  Appointment Time: 7 AM     NOTES RECORD STATUS  DETAILS   OFFICE NOTE from Referring Provider Internal [facility]:  1/5/24, 12/23/21 - PCC OV with Dr. Gomez   OFFICE NOTE from Other Specialists Care Everywhere [facility]:  12/21/23 - GEN SURG OV with DENISE Irizarry  12/21/23 - ID OV with Dr. Messer  6/4/23 - UC OV with DENISE Smiley    [facility]:  12/3/23 - UC OV with DENISE Wade   HOSPITAL DISCHARGE SUMMARY/ ED VISITS  Care Everywhere [facility]:  12/5/23 - Admission with Dr. Herrera  12/3/23 - ED OV with Dr. Malcolm Lozano:  11/10/15 - ED OV with Dr. Jarrett   OPERATIVE REPORT Care Everywhere [facility]:  12/4/23 - OP Note for LAPAROSCOPIC APPENDECTOMY with Dr. Salcedo   PERTINENT LABS Care Everywhere / Internal    PATHOLOGY REPORTS (RELATED) Care Everywhere [facility]:  12/4/23 - Appendix (Case: [id number])   IMAGING (CT, MRI, US, XR)  Received [facility]:  12/15/23, 12/12/23, 12/6/23 - CT Abd/pelvis  12/12/23 - CT Peritoneal  12/12/23 - CT Abscess/Hematoma  12/6/23 - XR Abdomen    [facility]:  12/3/23 - CT Abd/pelvis     Records Requested    Facility  [facility]  Fax: [phone]  [facility]  Fax: [phone]   Outcome * 1/5/24 3:52 PM Faxed urgent request to [facility] and [facility] For images to be pushed to [place] PACs. - Kellen    * 1/9/24 7:44 AM Images received from [facility] and [facility] and attached to the patient in PACs. - Kellen

## 2024-01-05 NOTE — LETTER
January 5, 2024      RE: Donald Jackson  [address]  [place] [address]       To whom it may concern:    Donald Jackson was seen in our clinic today. He  may return to work with the following: Light duty-unable to lift more than 20 pounds on or about 1/5/2024  .    Sincerely,          Roberto Gomez MD

## 2024-01-05 NOTE — NURSING NOTE
"Donald  28 year old    Chief Complaint   Patient presents with    Hospital F/U     Recent appendectomy in early Dec, appendix ruptured during surgery, returned to hospital with sepsis. Looking [facility].             Blood pressure 132/88, pulse 82, temperature 98.1  F (36.7  C), temperature source Skin, SpO2 98%. There is no height or weight on file to calculate BMI.    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    Cognitive disorder    Pain in joint, shoulder region    Premature ejaculation    Adjustment disorder with mixed anxiety and depressed mood              Wt Readings from Last 2 Encounters:   03/01/22 119 kg (262 lb 6.4 oz)   12/23/21 114.8 kg (253 lb 0.6 oz)       BP Readings from Last 3 Encounters:   01/05/24 132/88   12/06/22 126/83   03/01/22 107/72                Current Outpatient Medications   Medication    ibuprofen (ADVIL/MOTRIN) 600 MG tablet     No current facility-administered medications for this visit.              Social History     Tobacco Use    Smoking status: Never    Smokeless tobacco: Never   Substance Use Topics    Alcohol use: No    Drug use: No              Health Maintenance Due   Topic Date Due    ADVANCE CARE PLANNING  Never done    HIV SCREENING  Never done    HEPATITIS C SCREENING  Never done    YEARLY PREVENTIVE VISIT  06/26/2020    DTAP/TDAP/TD IMMUNIZATION (9 - Td or Tdap) 06/26/2022    INFLUENZA VACCINE (1) 09/01/2023    COVID-19 Vaccine (3 - 2023-24 season) 09/01/2023            No results found for: \"PAP\"           January 5, 2024 2:30 PM    "

## 2024-01-06 LAB
ALBUMIN SERPL BCG-MCNC: 3.9 G/DL (ref 3.5–5.2)
ALP SERPL-CCNC: 80 U/L (ref 40–150)
ALT SERPL W P-5'-P-CCNC: 59 U/L (ref 0–70)
ANION GAP SERPL CALCULATED.3IONS-SCNC: 10 MMOL/L (ref 7–15)
AST SERPL W P-5'-P-CCNC: 26 U/L (ref 0–45)
BILIRUB SERPL-MCNC: 0.3 MG/DL
BUN SERPL-MCNC: 15 MG/DL (ref 6–20)
CALCIUM SERPL-MCNC: 9.1 MG/DL (ref 8.6–10)
CHLORIDE SERPL-SCNC: 102 MMOL/L (ref 98–107)
CREAT SERPL-MCNC: 0.87 MG/DL (ref 0.67–1.17)
DEPRECATED HCO3 PLAS-SCNC: 24 MMOL/L (ref 22–29)
EGFRCR SERPLBLD CKD-EPI 2021: >90 ML/MIN/1.73M2
GLUCOSE SERPL-MCNC: 77 MG/DL (ref 70–99)
POTASSIUM SERPL-SCNC: 4.1 MMOL/L (ref 3.4–5.3)
PROT SERPL-MCNC: 7.4 G/DL (ref 6.4–8.3)
SODIUM SERPL-SCNC: 136 MMOL/L (ref 135–145)

## 2024-01-10 ENCOUNTER — MYC MEDICAL ADVICE (OUTPATIENT)
Dept: FAMILY MEDICINE | Facility: CLINIC | Age: 29
End: 2024-01-10

## 2024-01-12 ENCOUNTER — PRE VISIT (OUTPATIENT)
Dept: SURGERY | Facility: CLINIC | Age: 29
End: 2024-01-12

## 2024-01-26 ENCOUNTER — PATIENT OUTREACH (OUTPATIENT)
Dept: SURGERY | Facility: CLINIC | Age: 29
End: 2024-01-26
Payer: COMMERCIAL

## 2024-01-26 NOTE — PROGRESS NOTES
Patient Telephone Reminder Call    Date of call:  01/26/24  Phone numbers:  Cell number on file:    Telephone Information:   Mobile [phone]       Reached patient/confirmed appointment:  No - left message:   on voicemail  Appointment with:   Dr. John Mariee  Reason for visit:  Appy  Remind patient that this visit is a consultation only, NO procedure:  Yes  Can this visit be changed to a video visit:  No

## 2024-01-27 ENCOUNTER — APPOINTMENT (OUTPATIENT)
Dept: GENERAL RADIOLOGY | Facility: CLINIC | Age: 29
DRG: 031 | End: 2024-01-27
Attending: EMERGENCY MEDICINE
Payer: COMMERCIAL

## 2024-01-27 ENCOUNTER — APPOINTMENT (OUTPATIENT)
Dept: CT IMAGING | Facility: CLINIC | Age: 29
DRG: 031 | End: 2024-01-27
Attending: EMERGENCY MEDICINE
Payer: COMMERCIAL

## 2024-01-27 ENCOUNTER — HOSPITAL ENCOUNTER (INPATIENT)
Facility: CLINIC | Age: 29
LOS: 10 days | Discharge: HOME | DRG: 031 | End: 2024-02-06
Attending: EMERGENCY MEDICINE | Admitting: NEUROLOGICAL SURGERY
Payer: COMMERCIAL

## 2024-01-27 DIAGNOSIS — T85.618A SHUNT MALFUNCTION, INITIAL ENCOUNTER: ICD-10-CM

## 2024-01-27 DIAGNOSIS — Z98.2 S/P VENTRICULAR SHUNT PLACEMENT: Primary | ICD-10-CM

## 2024-01-27 LAB
ALBUMIN SERPL BCG-MCNC: 4.2 G/DL (ref 3.5–5.2)
ALP SERPL-CCNC: 94 U/L (ref 40–150)
ALT SERPL W P-5'-P-CCNC: 53 U/L (ref 0–70)
ANION GAP SERPL CALCULATED.3IONS-SCNC: 12 MMOL/L (ref 7–15)
APTT PPP: 29 SECONDS (ref 22–38)
AST SERPL W P-5'-P-CCNC: 25 U/L (ref 0–45)
BASOPHILS # BLD AUTO: 0.1 10E3/UL (ref 0–0.2)
BASOPHILS NFR BLD AUTO: 0 %
BILIRUB SERPL-MCNC: 0.6 MG/DL
BUN SERPL-MCNC: 14.5 MG/DL (ref 6–20)
CALCIUM SERPL-MCNC: 9.5 MG/DL (ref 8.6–10)
CHLORIDE SERPL-SCNC: 103 MMOL/L (ref 98–107)
CREAT SERPL-MCNC: 0.93 MG/DL (ref 0.67–1.17)
CRP SERPL-MCNC: 23.4 MG/L
DEPRECATED HCO3 PLAS-SCNC: 23 MMOL/L (ref 22–29)
EGFRCR SERPLBLD CKD-EPI 2021: >90 ML/MIN/1.73M2
EOSINOPHIL # BLD AUTO: 0 10E3/UL (ref 0–0.7)
EOSINOPHIL NFR BLD AUTO: 0 %
ERYTHROCYTE [DISTWIDTH] IN BLOOD BY AUTOMATED COUNT: 15.3 % (ref 10–15)
FLUAV RNA SPEC QL NAA+PROBE: NEGATIVE
FLUBV RNA RESP QL NAA+PROBE: NEGATIVE
GLUCOSE BLDC GLUCOMTR-MCNC: 93 MG/DL (ref 70–99)
GLUCOSE SERPL-MCNC: 95 MG/DL (ref 70–99)
HCO3 BLDV-SCNC: 23 MMOL/L (ref 21–28)
HCT VFR BLD AUTO: 45.8 % (ref 40–53)
HGB BLD-MCNC: 14.8 G/DL (ref 13.3–17.7)
IMM GRANULOCYTES # BLD: 0.1 10E3/UL
IMM GRANULOCYTES NFR BLD: 1 %
LACTATE BLD-SCNC: 0.9 MMOL/L
LYMPHOCYTES # BLD AUTO: 3 10E3/UL (ref 0.8–5.3)
LYMPHOCYTES NFR BLD AUTO: 22 %
MAGNESIUM SERPL-MCNC: 1.8 MG/DL (ref 1.7–2.3)
MCH RBC QN AUTO: 23.8 PG (ref 26.5–33)
MCHC RBC AUTO-ENTMCNC: 32.3 G/DL (ref 31.5–36.5)
MCV RBC AUTO: 74 FL (ref 78–100)
MONOCYTES # BLD AUTO: 0.8 10E3/UL (ref 0–1.3)
MONOCYTES NFR BLD AUTO: 6 %
NEUTROPHILS # BLD AUTO: 9.9 10E3/UL (ref 1.6–8.3)
NEUTROPHILS NFR BLD AUTO: 71 %
NRBC # BLD AUTO: 0 10E3/UL
NRBC BLD AUTO-RTO: 0 /100
PCO2 BLDV: 31 MM HG (ref 40–50)
PH BLDV: 7.49 [PH] (ref 7.32–7.43)
PLATELET # BLD AUTO: 295 10E3/UL (ref 150–450)
PO2 BLDV: 41 MM HG (ref 25–47)
POTASSIUM SERPL-SCNC: 3.9 MMOL/L (ref 3.4–5.3)
PROCALCITONIN SERPL IA-MCNC: 0.05 NG/ML
PROT SERPL-MCNC: 8 G/DL (ref 6.4–8.3)
RBC # BLD AUTO: 6.21 10E6/UL (ref 4.4–5.9)
RSV RNA SPEC NAA+PROBE: NEGATIVE
SAO2 % BLDV: 81 % (ref 70–75)
SARS-COV-2 RNA RESP QL NAA+PROBE: NEGATIVE
SODIUM SERPL-SCNC: 138 MMOL/L (ref 135–145)
TROPONIN T SERPL HS-MCNC: <6 NG/L
WBC # BLD AUTO: 13.9 10E3/UL (ref 4–11)

## 2024-01-27 PROCEDURE — 87075 CULTR BACTERIA EXCEPT BLOOD: CPT

## 2024-01-27 PROCEDURE — 250N000011 HC RX IP 250 OP 636: Performed by: EMERGENCY MEDICINE

## 2024-01-27 PROCEDURE — 99285 EMERGENCY DEPT VISIT HI MDM: CPT | Performed by: EMERGENCY MEDICINE

## 2024-01-27 PROCEDURE — 99285 EMERGENCY DEPT VISIT HI MDM: CPT | Mod: 25 | Performed by: EMERGENCY MEDICINE

## 2024-01-27 PROCEDURE — 87637 SARSCOV2&INF A&B&RSV AMP PRB: CPT | Performed by: EMERGENCY MEDICINE

## 2024-01-27 PROCEDURE — 70450 CT HEAD/BRAIN W/O DYE: CPT

## 2024-01-27 PROCEDURE — 36415 COLL VENOUS BLD VENIPUNCTURE: CPT

## 2024-01-27 PROCEDURE — 85730 THROMBOPLASTIN TIME PARTIAL: CPT

## 2024-01-27 PROCEDURE — 87040 BLOOD CULTURE FOR BACTERIA: CPT | Performed by: EMERGENCY MEDICINE

## 2024-01-27 PROCEDURE — 84145 PROCALCITONIN (PCT): CPT | Performed by: EMERGENCY MEDICINE

## 2024-01-27 PROCEDURE — 84484 ASSAY OF TROPONIN QUANT: CPT | Performed by: EMERGENCY MEDICINE

## 2024-01-27 PROCEDURE — 82945 GLUCOSE OTHER FLUID: CPT

## 2024-01-27 PROCEDURE — 85025 COMPLETE CBC W/AUTO DIFF WBC: CPT | Performed by: EMERGENCY MEDICINE

## 2024-01-27 PROCEDURE — 87205 SMEAR GRAM STAIN: CPT

## 2024-01-27 PROCEDURE — 84157 ASSAY OF PROTEIN OTHER: CPT

## 2024-01-27 PROCEDURE — 70250 X-RAY EXAM OF SKULL: CPT

## 2024-01-27 PROCEDURE — 74177 CT ABD & PELVIS W/CONTRAST: CPT

## 2024-01-27 PROCEDURE — 82803 BLOOD GASES ANY COMBINATION: CPT

## 2024-01-27 PROCEDURE — 250N000009 HC RX 250: Performed by: EMERGENCY MEDICINE

## 2024-01-27 PROCEDURE — 71046 X-RAY EXAM CHEST 2 VIEWS: CPT

## 2024-01-27 PROCEDURE — 250N000011 HC RX IP 250 OP 636

## 2024-01-27 PROCEDURE — 80053 COMPREHEN METABOLIC PANEL: CPT | Performed by: EMERGENCY MEDICINE

## 2024-01-27 PROCEDURE — 89050 BODY FLUID CELL COUNT: CPT

## 2024-01-27 PROCEDURE — 36415 COLL VENOUS BLD VENIPUNCTURE: CPT | Performed by: EMERGENCY MEDICINE

## 2024-01-27 PROCEDURE — 86140 C-REACTIVE PROTEIN: CPT | Performed by: EMERGENCY MEDICINE

## 2024-01-27 PROCEDURE — 83735 ASSAY OF MAGNESIUM: CPT | Performed by: EMERGENCY MEDICINE

## 2024-01-27 PROCEDURE — 200N000002 HC R&B ICU UMMC

## 2024-01-27 PROCEDURE — 258N000003 HC RX IP 258 OP 636: Performed by: EMERGENCY MEDICINE

## 2024-01-27 PROCEDURE — 85610 PROTHROMBIN TIME: CPT

## 2024-01-27 PROCEDURE — 74018 RADEX ABDOMEN 1 VIEW: CPT

## 2024-01-27 RX ORDER — HYDROMORPHONE HYDROCHLORIDE 1 MG/ML
0.3 INJECTION, SOLUTION INTRAMUSCULAR; INTRAVENOUS; SUBCUTANEOUS ONCE
Status: COMPLETED | OUTPATIENT
Start: 2024-01-27 | End: 2024-01-27

## 2024-01-27 RX ORDER — LORAZEPAM 2 MG/ML
1 INJECTION INTRAMUSCULAR ONCE
Status: COMPLETED | OUTPATIENT
Start: 2024-01-27 | End: 2024-01-27

## 2024-01-27 RX ORDER — FENTANYL CITRATE 50 UG/ML
50 INJECTION, SOLUTION INTRAMUSCULAR; INTRAVENOUS ONCE
Status: COMPLETED | OUTPATIENT
Start: 2024-01-27 | End: 2024-01-27

## 2024-01-27 RX ORDER — IOPAMIDOL 755 MG/ML
125 INJECTION, SOLUTION INTRAVASCULAR ONCE
Status: COMPLETED | OUTPATIENT
Start: 2024-01-27 | End: 2024-01-27

## 2024-01-27 RX ORDER — IOPAMIDOL 755 MG/ML
100 INJECTION, SOLUTION INTRAVASCULAR ONCE
Status: DISCONTINUED | OUTPATIENT
Start: 2024-01-27 | End: 2024-01-27

## 2024-01-27 RX ORDER — ONDANSETRON 2 MG/ML
4 INJECTION INTRAMUSCULAR; INTRAVENOUS ONCE
Status: COMPLETED | OUTPATIENT
Start: 2024-01-27 | End: 2024-01-27

## 2024-01-27 RX ADMIN — SODIUM CHLORIDE, POTASSIUM CHLORIDE, SODIUM LACTATE AND CALCIUM CHLORIDE 1000 ML: 600; 310; 30; 20 INJECTION, SOLUTION INTRAVENOUS at 14:10

## 2024-01-27 RX ADMIN — IOPAMIDOL 129 ML: 755 INJECTION, SOLUTION INTRAVENOUS at 15:01

## 2024-01-27 RX ADMIN — FENTANYL CITRATE 50 MCG: 50 INJECTION, SOLUTION INTRAMUSCULAR; INTRAVENOUS at 21:14

## 2024-01-27 RX ADMIN — LORAZEPAM 1 MG: 2 INJECTION INTRAMUSCULAR; INTRAVENOUS at 21:22

## 2024-01-27 RX ADMIN — ONDANSETRON 4 MG: 2 INJECTION INTRAMUSCULAR; INTRAVENOUS at 14:05

## 2024-01-27 RX ADMIN — HYDROMORPHONE HYDROCHLORIDE 0.3 MG: 1 INJECTION, SOLUTION INTRAMUSCULAR; INTRAVENOUS; SUBCUTANEOUS at 17:17

## 2024-01-27 RX ADMIN — SODIUM CHLORIDE 72 ML: 9 INJECTION, SOLUTION INTRAVENOUS at 15:09

## 2024-01-27 ASSESSMENT — VISUAL ACUITY: OU: 1

## 2024-01-27 ASSESSMENT — ACTIVITIES OF DAILY LIVING (ADL)
ADLS_ACUITY_SCORE: 35
ADLS_ACUITY_SCORE: 35
ADLS_ACUITY_SCORE: 33
ADLS_ACUITY_SCORE: 35

## 2024-01-27 NOTE — ED TRIAGE NOTES
Patient is tearful with vomit on his sweater in triage - states to be in a lot of emotional pain (denies drinking/drugs), but states he has been nausea/vomiting/dizzy for about 24 hours now. Continues to cry.      Triage Assessment (Adult)       Row Name 01/27/24 1239          Triage Assessment    Airway WDL WDL        Respiratory WDL    Respiratory WDL WDL        Skin Circulation/Temperature WDL    Skin Circulation/Temperature WDL WDL        Cardiac WDL    Cardiac WDL WDL        Peripheral/Neurovascular WDL    Peripheral Neurovascular WDL WDL        Cognitive/Neuro/Behavioral WDL    Cognitive/Neuro/Behavioral WDL WDL

## 2024-01-27 NOTE — ED PROVIDER NOTES
"ED Provider Note  [facility]      History     Chief Complaint   Patient presents with     Nausea & Vomiting     Patient is tearful with vomit on his sweater in triage - states to be in a lot of emotional pain (denies drinking/drugs), but states he has been nausea/vomiting/dizzy for about 24 hours now. Continues to cry.      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  He is alert oriented and responsive, but appears confused about recent events and this is corroborated by a friend accompanying him today, who states that pt has been confused and forgetful since yesterday.  On presentation he is in obvious distress, tearing, and gagging/heaving.  Vitals are stable with mild hypertension.  Pt endorses abdominal tenderness, though states that it is not severe, however his  reports that yesterday pt was screaming in distress and complaining of severe abdominal pain.  Pt has no recollection of these events.    Patient endorses headache, especially \"pain behind my eyes\" and photophobia.  Denies neck stiffness or nuchal rigidity. Denies blurry vision, diplopia. Denies focal weakness, numbness, tingling, or other focal neurologic deficits. Denies fever or chills, however historical reliability is lacking and  is unable to comment on fevers/chills or other constitutional symptoms.    He denies drug use but his  states that he was in his room with a bottle of ibuprofen and possibly other medications yesterday and that he took \"edibles\" yesterday that contained cannabinoids. Both patient and his  deny any history of " "cannabis hyperemesis.    Chart Review: Donald Jackson was hospitalized at [facility] between 12/3/2023-12/5/2023 after presenting with symptoms of acute appendicitis.  Laparoscopic appendectomy was complicated by intraoperative perforation.  He was discharged from that hospitalization and but returned less than 24 hours later with symptoms of sepsis and was re-admitted from 12/5-12/15/2023. Repeat abdominal CT scan revealed multiple small abscesses during this second admission.  The drain was placed and revealed a fistula between the appendiceal stump and the cecum. Cultures collected from drain output grew Pseudomonas aeruginosa.  He was treated with levofloxacin and metronidazole and was discharged from the second hospitalization.    Medical History:  Adjustment disorder with mixed anxiety and depressed mood  Cognitive disorder  MRSA  ADHD  Chronic pain both knees  Lattice degeneration    Surgical History:  Appendectomy c/p intraoperative rupture 12/2023   shunt at age 3  Retinal reattachment  Retinopexy laser ppx (retinopathy of prematurity)  Strabismus  Tonsillectomy  [place] tooth extraction    Physical Exam   BP: (!) 134/92  Pulse: 91  Temp: 99.1  F (37.3  C)  Resp: 18  Height: 172.7 cm (5' 8\")  Weight: 119.7 kg (263 lb 14.4 oz)  SpO2: 99 %  Physical Exam  Constitutional:       Appearance: He is not ill-appearing.   HENT:      Head: Normocephalic and atraumatic.   Eyes:      General: Lids are normal. Vision grossly intact.      Extraocular Movements: Extraocular movements intact.      Right eye: Nystagmus present.      Left eye: Nystagmus present.      Conjunctiva/sclera:      Right eye: Right conjunctiva is not injected. No chemosis.     Left eye: Left conjunctiva is not injected. No chemosis.  Cardiovascular:      Rate and Rhythm: Normal rate and regular rhythm.      Heart sounds: Normal heart sounds.   Pulmonary:      Effort: Pulmonary effort is normal.      Breath sounds: Normal breath sounds. "   Abdominal:      Tenderness: There is abdominal tenderness. There is no guarding or rebound.      Comments: Laparoscopy scars visible with granulation tissue; no skin breakdown or drainage appreciated   Musculoskeletal:      Cervical back: Normal range of motion and neck supple. No rigidity or tenderness.   Neurological:      Mental Status: He is alert. He is disoriented and confused.      Cranial Nerves: No facial asymmetry.   Psychiatric:         Attention and Perception: He is attentive.         Mood and Affect: Mood is anxious. Affect is labile.         Behavior: Behavior is cooperative.         Thought Content: Thought content normal.         Cognition and Memory: Memory is impaired. He exhibits impaired recent memory. He does not exhibit impaired remote memory.         ED Course, Procedures, & Data      Procedures                No results found for any visits on 01/27/24.  Medications - No data to display  Labs Ordered and Resulted from Time of ED Arrival to Time of ED Departure - No data to display  No orders to display          Critical care was not performed.     Medical Decision Making  The patient's presentation was of high complexity (an acute health issue posing potential threat to life or bodily function).    The patient's evaluation involved:  review of external note(s) from 1 sources (PCP notes)  ordering and/or review of 3+ test(s) in this encounter (see separate area of note for details)  discussion of management or test interpretation with another health professional (NSG)    The patient's management necessitated moderate risk (prescription drug management including medications given in the ED), high risk (a decision regarding hospitalization) and further care after sign-out to  (see their note for further management).      Assessment & Plan    Donald Jackson is a 29 year old male with recent history of acute appendicitis and laparoscopic appendectomy complicated by appendiceal rupture  and intra-abdominal abscess.  Of note, he has a longstanding ventriculoperitoneal shunt which, in the context of intraperitoneal infection, places him at increased risk for communicating CNS infection. Given his AMS and risk factors, infectious workup was conducted with low clinical threshold for empiric treatment to cover CNS infection.    On examination, he had moderate tenderness to deep palpation in the RLQ but no rebound tenderness, no Rosving sign, no Psoas sign. He did not exhibit neck discomfort/nuchal rigidity or tenderness, and no Kernig or Brudzinski sign. The right fundus showed signs of prior retinopexy but there was no overt swelling of either optic disc. Nystagmus was noted, worse in extreme gaze directions, but this is more likely related to his history of ROP than a focal neurologic deficit or indicative of elevated intracranial pressure. He had no other focal weakness, paresthesia, and denies dysarthria, dysphagia, alcohol use disorder    Laboratory/Imaging Workup  Lactate: not elevated (0.9)  Blood Culture x 2 pending:   CBC: leukocytosis (13.9) with neutrophil predominance (9.9). RBCs are elevated (6.21) with low MCV and MCH and increased RDW, possibly due to increased hematopoiesis in the wake of stress/surgery during December.  CMP: no abnormalities  Troponin T: no abnormalities  CRP: modestly elevated at 23.40  XR Shunt Study: Connected shunt tube appears intact  CT Head w/o contrast: No evidence of acute intracranial hemorrhage or mass effect. Right frontal approach ventricular shunt catheters, tip within the left lateral ventricle. Interval increase in size of the lateral ventricles with colpocephaly,   concerning for shunt malfunction. Transependymal edema is noted. Obstruction    Presentation today with neutrophilic leukocytosis, confusion, mild tachycardia is concerning for CNS infection. CRP is modestly elevated and lactate is within normal limits. Vitals are stable but pulse is  elevated; he is not hypotensive. The most consequential diagnosis is CNS infection given his nausea, headache, confusion, altered mental status, and significant risk factor of recent intra-abdominal abscess in conjunction with long-standing ventriculoperitoneal shut. AMS due to sepsis/non-CNS infection is also possible. He is afebrile but meets SIRS criteria by pulse and WBCs.     Differential includes shunt malfunction with increased intracranial pressure, alcohol/drug intoxication, occult trauma, neoplasm/paraneoplastic syndrome, poisoning, psychogenic, or a CNS vascular accident. In the absence of focal neurologic deficits, trauma, neoplasm, and CNS vascular accident are unlikely. UDS pending.     On head CT, the ventricular shunt catheter tip is positioned appropriately in the left lateral ventricle. Interval increase in size of the lateral ventricles with colpocephaly raises concern for shunt malfunction.    Plan: Consult neurosurgery and neurology for guidance on risk/yield of lumbar puncture. Given concerns for shunt malfunction, recommend admission to [facility].    --    ED Attending Physician Attestation    I Kellen Lucas MD, cared for this patient with the Medical Student. I performed, or re-performed, the physical exam and medical decision-making. I have verified the accuracy of all the medical student findings and documentation above, and have edited as necessary.    Summary of HPI, PE, ED Course   Patient is a 29 year old male evaluated in the emergency department for headache, confusion and vomiting. Exam notable for nonfocal exam without meningismus but is slightly delayed in responses.  Inflammatory markers elevated.  Patient pending CT head and abdomen at time of sign out to Dr. Kramer.    Final diagnoses:   Shunt malfunction, initial encounter   Charles Jean, MS4  January 27, 2024 3:01 PM    Kellen Lucas MD  [facility] EMERGENCY DEPARTMENT  1/27/2024      Kellen Lucas MD  01/28/24 [id number]

## 2024-01-27 NOTE — LETTER
[facility] NEURO SURGICAL ICU  [address] [place] [address]  Phone: [phone]    January 28, 2024        Donald TYRA Renetta  [address]  [place] [address]          To whom it may concern:    RE: Otis F Renetta    The patient is currently admitted to the hospital for treatment, and as such, is unable to work at this time. It is unknown how long his treatment will last.     Sincerely,                Wilmer Schultz MD  [facility]  On-call: [phone]

## 2024-01-27 NOTE — ED PROVIDER NOTES
Patient signed out to me at change of shift by Dr. Monson, please see her note for full details.  Briefly, this patient has a history of a  shunt, placed at the age of 3, who apparently has not had any sort of neurosurgical intervention or revision since that time.  He now comes in complaining of nausea, vomiting, confusion, and some forgetfulness and confusion.  Of note, patient reports that yesterday he did consume some edibles.  Additionally, on December 5, he had appendicitis which was documented to rupture intraoperatively.    At time of signout, CTs were pending.  I did receive a phone call from the radiology staff indicating that there appears to be evidence of a shunt malfunction due to the degree of ventriculomegaly.    I subsequently spoke to neurosurgery who did review the images.  They are very concerned about this.  They did ask that we expand his infectious workup and I have added on a UA and a chest x-ray.  I have also added on INR in anticipation of operative intervention.  Abdominal CT is currently pending read.    I did ask patient placement if there was any chance we could directly admit this patient to 6A to the neurosurgery service, and this is not a possibility at this time.  Therefore he will need to go to the [facility] for stat neurosurgery consultation and consideration of operative intervention.  Spoke to Dr. Monroe in the [facility].     Jayshree Kramer MD  01/27/24 [id number]

## 2024-01-28 ENCOUNTER — APPOINTMENT (OUTPATIENT)
Dept: CT IMAGING | Facility: CLINIC | Age: 29
DRG: 031 | End: 2024-01-28
Payer: COMMERCIAL

## 2024-01-28 LAB
ALBUMIN UR-MCNC: 30 MG/DL
AMPHETAMINES UR QL SCN: ABNORMAL
ANION GAP SERPL CALCULATED.3IONS-SCNC: 11 MMOL/L (ref 7–15)
APPEARANCE CSF: CLEAR
APPEARANCE UR: CLEAR
BARBITURATES UR QL SCN: ABNORMAL
BENZODIAZ UR QL SCN: ABNORMAL
BILIRUB UR QL STRIP: NEGATIVE
BUN SERPL-MCNC: 12.9 MG/DL (ref 6–20)
BZE UR QL SCN: ABNORMAL
CALCIUM SERPL-MCNC: 9.2 MG/DL (ref 8.6–10)
CANNABINOIDS UR QL SCN: ABNORMAL
CHLORIDE SERPL-SCNC: 104 MMOL/L (ref 98–107)
COLOR CSF: COLORLESS
COLOR UR AUTO: YELLOW
CREAT SERPL-MCNC: 0.93 MG/DL (ref 0.67–1.17)
DEPRECATED HCO3 PLAS-SCNC: 21 MMOL/L (ref 22–29)
EGFRCR SERPLBLD CKD-EPI 2021: >90 ML/MIN/1.73M2
ERYTHROCYTE [DISTWIDTH] IN BLOOD BY AUTOMATED COUNT: 15.7 % (ref 10–15)
FENTANYL UR QL: ABNORMAL
GLUCOSE CSF-MCNC: 59 MG/DL (ref 40–70)
GLUCOSE SERPL-MCNC: 94 MG/DL (ref 70–99)
GLUCOSE UR STRIP-MCNC: NEGATIVE MG/DL
HCT VFR BLD AUTO: 47.3 % (ref 40–53)
HGB BLD-MCNC: 14.5 G/DL (ref 13.3–17.7)
HGB UR QL STRIP: ABNORMAL
KETONES UR STRIP-MCNC: 20 MG/DL
LEUKOCYTE ESTERASE UR QL STRIP: NEGATIVE
MAGNESIUM SERPL-MCNC: 1.9 MG/DL (ref 1.7–2.3)
MCH RBC QN AUTO: 24 PG (ref 26.5–33)
MCHC RBC AUTO-ENTMCNC: 30.7 G/DL (ref 31.5–36.5)
MCV RBC AUTO: 78 FL (ref 78–100)
MUCOUS THREADS #/AREA URNS LPF: PRESENT /LPF
NITRATE UR QL: NEGATIVE
OPIATES UR QL SCN: ABNORMAL
PCP QUAL URINE (ROCHE): ABNORMAL
PH UR STRIP: 6 [PH] (ref 5–7)
PHOSPHATE SERPL-MCNC: 3.5 MG/DL (ref 2.5–4.5)
PLATELET # BLD AUTO: 253 10E3/UL (ref 150–450)
POTASSIUM SERPL-SCNC: 4.3 MMOL/L (ref 3.4–5.3)
PROT CSF-MCNC: 8.3 MG/DL (ref 15–45)
RBC # BLD AUTO: 6.04 10E6/UL (ref 4.4–5.9)
RBC URINE: 4 /HPF
SODIUM SERPL-SCNC: 136 MMOL/L (ref 135–145)
SP GR UR STRIP: 1.03 (ref 1–1.03)
SQUAMOUS EPITHELIAL: <1 /HPF
TUBE # CSF: NORMAL
UROBILINOGEN UR STRIP-MCNC: NORMAL MG/DL
WBC # BLD AUTO: 7.3 10E3/UL (ref 4–11)
WBC URINE: <1 /HPF

## 2024-01-28 PROCEDURE — 8C01X6J COLLECTION OF CEREBROSPINAL FLUID FROM INDWELLING DEVICE IN NERVOUS SYSTEM: ICD-10-PCS | Performed by: NEUROLOGICAL SURGERY

## 2024-01-28 PROCEDURE — 250N000013 HC RX MED GY IP 250 OP 250 PS 637: Performed by: STUDENT IN AN ORGANIZED HEALTH CARE EDUCATION/TRAINING PROGRAM

## 2024-01-28 PROCEDURE — 81001 URINALYSIS AUTO W/SCOPE: CPT | Performed by: EMERGENCY MEDICINE

## 2024-01-28 PROCEDURE — 250N000011 HC RX IP 250 OP 636

## 2024-01-28 PROCEDURE — 36415 COLL VENOUS BLD VENIPUNCTURE: CPT

## 2024-01-28 PROCEDURE — 80307 DRUG TEST PRSMV CHEM ANLYZR: CPT | Performed by: EMERGENCY MEDICINE

## 2024-01-28 PROCEDURE — 250N000013 HC RX MED GY IP 250 OP 250 PS 637

## 2024-01-28 PROCEDURE — 70450 CT HEAD/BRAIN W/O DYE: CPT

## 2024-01-28 PROCEDURE — 85027 COMPLETE CBC AUTOMATED: CPT

## 2024-01-28 PROCEDURE — 0W110JG BYPASS CRANIAL CAVITY TO PERITONEAL CAVITY WITH SYNTHETIC SUBSTITUTE, OPEN APPROACH: ICD-10-PCS | Performed by: NEUROLOGICAL SURGERY

## 2024-01-28 PROCEDURE — 83735 ASSAY OF MAGNESIUM: CPT

## 2024-01-28 PROCEDURE — 80048 BASIC METABOLIC PNL TOTAL CA: CPT

## 2024-01-28 PROCEDURE — 250N000011 HC RX IP 250 OP 636: Performed by: STUDENT IN AN ORGANIZED HEALTH CARE EDUCATION/TRAINING PROGRAM

## 2024-01-28 PROCEDURE — 258N000003 HC RX IP 258 OP 636: Performed by: NEUROLOGICAL SURGERY

## 2024-01-28 PROCEDURE — 250N000011 HC RX IP 250 OP 636: Performed by: NEUROLOGICAL SURGERY

## 2024-01-28 PROCEDURE — 99255 IP/OBS CONSLTJ NEW/EST HI 80: CPT | Performed by: INTERNAL MEDICINE

## 2024-01-28 PROCEDURE — 84100 ASSAY OF PHOSPHORUS: CPT

## 2024-01-28 PROCEDURE — 70450 CT HEAD/BRAIN W/O DYE: CPT | Mod: 26 | Performed by: RADIOLOGY

## 2024-01-28 PROCEDURE — 999N000128 HC STATISTIC PERIPHERAL IV START W/O US GUIDANCE

## 2024-01-28 PROCEDURE — 200N000002 HC R&B ICU UMMC

## 2024-01-28 RX ORDER — CEFEPIME HYDROCHLORIDE 2 G/1
2 INJECTION, POWDER, FOR SOLUTION INTRAVENOUS EVERY 8 HOURS
Status: DISCONTINUED | OUTPATIENT
Start: 2024-01-28 | End: 2024-02-03

## 2024-01-28 RX ORDER — METRONIDAZOLE 250 MG/1
500 TABLET ORAL 3 TIMES DAILY
Status: DISCONTINUED | OUTPATIENT
Start: 2024-01-28 | End: 2024-02-03

## 2024-01-28 RX ORDER — DIPHENHYDRAMINE HCL 25 MG
50 CAPSULE ORAL EVERY 8 HOURS
Status: DISCONTINUED | OUTPATIENT
Start: 2024-01-29 | End: 2024-01-30

## 2024-01-28 RX ORDER — FLUMAZENIL 0.1 MG/ML
0.2 INJECTION, SOLUTION INTRAVENOUS
Status: ACTIVE | OUTPATIENT
Start: 2024-01-28 | End: 2024-01-30

## 2024-01-28 RX ORDER — LIDOCAINE 40 MG/G
CREAM TOPICAL
Status: DISCONTINUED | OUTPATIENT
Start: 2024-01-28 | End: 2024-02-05 | Stop reason: HOSPADM

## 2024-01-28 RX ORDER — NALOXONE HYDROCHLORIDE 0.4 MG/ML
0.4 INJECTION, SOLUTION INTRAMUSCULAR; INTRAVENOUS; SUBCUTANEOUS
Status: ACTIVE | OUTPATIENT
Start: 2024-01-28 | End: 2024-01-30

## 2024-01-28 RX ORDER — NALOXONE HYDROCHLORIDE 0.4 MG/ML
0.2 INJECTION, SOLUTION INTRAMUSCULAR; INTRAVENOUS; SUBCUTANEOUS
Status: ACTIVE | OUTPATIENT
Start: 2024-01-28 | End: 2024-01-30

## 2024-01-28 RX ORDER — DIPHENHYDRAMINE HCL 25 MG
50 CAPSULE ORAL ONCE
Status: COMPLETED | OUTPATIENT
Start: 2024-01-28 | End: 2024-01-28

## 2024-01-28 RX ORDER — ONDANSETRON 2 MG/ML
4 INJECTION INTRAMUSCULAR; INTRAVENOUS EVERY 6 HOURS PRN
Status: DISCONTINUED | OUTPATIENT
Start: 2024-01-28 | End: 2024-02-05

## 2024-01-28 RX ORDER — ACETAMINOPHEN 325 MG/1
650 TABLET ORAL EVERY 6 HOURS PRN
Status: DISCONTINUED | OUTPATIENT
Start: 2024-01-28 | End: 2024-02-05

## 2024-01-28 RX ADMIN — ACETAMINOPHEN 650 MG: 325 TABLET, FILM COATED ORAL at 09:43

## 2024-01-28 RX ADMIN — METRONIDAZOLE 500 MG: 500 TABLET ORAL at 21:06

## 2024-01-28 RX ADMIN — DIPHENHYDRAMINE HYDROCHLORIDE 50 MG: 25 CAPSULE ORAL at 20:06

## 2024-01-28 RX ADMIN — VANCOMYCIN HYDROCHLORIDE 2500 MG: 500 INJECTION, POWDER, LYOPHILIZED, FOR SOLUTION INTRAVENOUS at 18:49

## 2024-01-28 RX ADMIN — CEFEPIME 2 G: 2 INJECTION, POWDER, FOR SOLUTION INTRAVENOUS at 17:07

## 2024-01-28 RX ADMIN — ONDANSETRON 4 MG: 2 INJECTION INTRAMUSCULAR; INTRAVENOUS at 21:55

## 2024-01-28 ASSESSMENT — ACTIVITIES OF DAILY LIVING (ADL)
ADLS_ACUITY_SCORE: 22

## 2024-01-28 ASSESSMENT — VISUAL ACUITY: OU: NORMAL ACUITY

## 2024-01-28 NOTE — CONSULTS
"    [facility]    Consult Note - EGS Service  Date of Admission:  1/27/2024  Consult Requested by: NIELS   Reason for Consult:  shunt pseudocyst vs abscess    Assessment & Plan: Surgery   Donald Jackson is a 29 year old male admitted on 1/27/2024. He was found to have a 1.5 x 1.1 x 0.7 cm peritoneal fluid collection near the tip of  shunt catheter. His vitals are within normal limits and he no longer has leukocytosis. No acute surgical intervention nor need for antibiotics from an EGS standpoint.    - no acute surgical intervention   - no need for antibiotics from an EGS perspective   - EGS will sign off at this time. Thank you for allowing our team to participate in this patient's care.        Drains: PRESENT         - 1 Closed/Suction Drain(s)     Clinically Significant Risk Factors Present on Admission                       # Severe Obesity: Estimated body mass index is 40.12 kg/m  as calculated from the following:    Height as of this encounter: 1.727 m (5' 8\").    Weight as of this encounter: 119.7 kg (263 lb 14.3 oz).            The patient's care was discussed with the attending on service, Dr. Nicole.     Curtis Robbins MD  [facility]  Non-urgent messages: Securely message with [employer] (more info)  Text page via [employer] Paging/Directory     ______________________________________________________________________    Chief Complaint   BELL and N/V    History of Present Illness   Donald Jackson is a 29 year old male with PMH of prematurity, congenital hydrocephalus (s/p  shunt at ~age 3) recent acute appendicitis (admitted 12/03/2023) with laparoscopic appendectomy complicated by intraoperative perforation (12/5/2023) who presents with 2-3 days of headache, nausea, vomiting, and confusion. EGS consulted for  shunt associated fluid collection.     Pt had lap appendectomy in early December for acute appendicitis. " "His post operative course was complicated by intra-abdominal abscess that required drain placement. He has since had his drain(s) removed. He reports that he has had intermittent RLQ abdominal pain since then and has been able to \"wean\" himself down to \"one ibuprofen\" per day.    Past Medical History    Past Medical History:   Diagnosis Date    Hydrocephalus (H)     Lattice degeneration     Other forms of retinal detachment(361.89)        Past Surgical History   Past Surgical History:   Procedure Laterality Date    RETINAL REATTACHMENT  10/2010    left eye    RETINOPEXY LASER PROPHYLAXIS BREAK(S) OD (RIGHT EYE)  10/2011    shunt in head      a couple as a child    strabismus surery      age 5    TONSILLECTOMY      wisdom teeth       Prior to Admission Medications   Current Facility-Administered Medications   Medication    acetaminophen (TYLENOL) tablet 650 mg    flumazenil (ROMAZICON) injection 0.2 mg    lidocaine (LMX4) cream    lidocaine 1 % 1 mL    lidocaine 1 % 20 mL    naloxone (NARCAN) injection 0.2 mg    naloxone (NARCAN) injection 0.2 mg    naloxone (NARCAN) injection 0.4 mg    naloxone (NARCAN) injection 0.4 mg    sodium chloride (PF) 0.9% PF flush 3 mL    sodium chloride (PF) 0.9% PF flush 3 mL        Physical Exam   Vital Signs: Temp: 98.8  F (37.1  C) Temp src: Oral BP: (!) 128/98 Pulse: 87   Resp: 24 SpO2: 97 % O2 Device: None (Room air)    Weight: 263 lbs 14.25 oz  Intake/Output Summary (Last 24 hours) at 1/28/2024 1333  Last data filed at 1/28/2024 1200  Gross per 24 hour   Intake 360 ml   Output 529 ml   Net -169 ml     GENERAL: NAD, resting comfortably in bed  HEENT: atraumatic, normocephalic, no scleral icterus  CARDIO: normal rate and regular rhythm, no LE edema  PULM: no increased WOB, CTAB  ABD: non-distended, soft, and non-tender; incisions C/D/I  NEURO: CN II-XII grossly intact, no focal neuro deficits  PSYCH: appropriate affect and behavior    Data     I have personally reviewed the following " data over the past 24 hrs:    7.3  \   14.5   / 253     136 104 12.9 /  94   4.3 21 (L) 0.93 \     ALT: 53 AST: 25 AP: 94 TBILI: 0.6   ALB: 4.2 TOT PROTEIN: 8.0 LIPASE: N/A     Trop: <6 BNP: N/A     Procal: 0.05 CRP: 23.40 (H) Lactic Acid: 0.9       INR:  1.13 PTT:  29   D-dimer:  N/A Fibrinogen:  N/A       Imaging results reviewed over the past 24 hrs:   Recent Results (from the past 24 hour(s))   XR Shunt Malfunction Surgery Survey    Narrative    EXAM: XR SHUNT MALFUNCTION SURVEY  LOCATION: [facility]  DATE: 1/27/2024    INDICATION: headache s p  shunt  COMPARISON: None.      Impression    IMPRESSION: There is an abandoned shunt catheter at the right chest. The connected shunt tube is intact over the calvarium, neck, chest, and abdomen. No kinks or breaks. Excreted contrast is noted within the renal collecting system.   CT Head w/o Contrast    Addendum: 1/27/2024    Addendum/  impression:    Dr. Reid discussed the results with Dr. Kramer on 1/27/2024 3:57 PM CST by telephone.      Narrative    EXAM: CT HEAD W/O CONTRAST  LOCATION: [facility]  DATE: 1/27/2024    INDICATION: headache  COMPARISON: 08/18/2020  TECHNIQUE: Routine CT Head without IV contrast. Multiplanar reformats. Dose reduction techniques were used.    FINDINGS:  INTRACRANIAL CONTENTS: No evidence of acute intracranial hemorrhage or mass effect. Right frontal approach ventricular shunt catheters, tip within the left lateral ventricle. Interval increase in size of the lateral ventricles with colpocephaly,   concerning for shunt malfunction. Transependymal edema is noted. Obstruction    VISUALIZED ORBITS/SINUSES/MASTOIDS: The globes are unremarkable. The partially imaged paranasal sinuses, mastoid air cells and middle ear cavities are unremarkable.    BONES/SOFT TISSUES: The visualized skull base and calvarium are unremarkable.      Impression     IMPRESSION:    1.  No evidence of acute intracranial hemorrhage or mass effect.  2.  Right frontal approach ventricular shunt catheter, tip within the left lateral ventricle. Interval increase in size of the lateral ventricles with colpocephaly, concerning for shunt malfunction.   CT Abdomen Pelvis w Contrast    Narrative    EXAM: CT ABDOMEN PELVIS W CONTRAST  LOCATION: [facility]  DATE: 1/27/2024    INDICATION: Right lower quadrant pain, vomiting  COMPARISON: CT 12/15/2023  TECHNIQUE: CT scan of the abdomen and pelvis was performed following injection of IV contrast. Multiplanar reformats were obtained. Dose reduction techniques were used.  CONTRAST: 129mL isovue 370    FINDINGS:   LOWER CHEST: Unremarkable.    HEPATOBILIARY: Normal.    PANCREAS: Normal.    SPLEEN: Normal.    ADRENAL GLANDS: Normal.    KIDNEYS/BLADDER: Likely small bilateral renal cysts, no specific follow-up recommended. No hydronephrosis. Urinary bladder is unremarkable.    BOWEL: Prior appendectomy with interval resolution of right lower quadrant fluid collections. No obstruction or acute inflammatory change.    LYMPH NODES/PERITONEUM: No suspicious lymphadenopathy. Very small rim-enhancing fluid collection just to the right of the midline associated with the tip of one of the ventriculoperitoneal shunt catheter, measuring 1.5 x 1.1 x 0.7 cm (series 6 image   154). There is some surrounding fat stranding. No additional fluid collections visualized.    VASCULATURE: Unremarkable.    PELVIC ORGANS: Normal.    MUSCULOSKELETAL: No acute bony abnormality. Stable position of ventriculoperitoneal shunt catheters, larger diameter catheter tip in the right upper quadrant, smaller diameter catheter in the pelvis just to the right of the midline as above. No definite   kinking or discontinuity. Stable postsurgical changes in the anterior abdominal wall.      Impression    IMPRESSION:   1.  Very small peritoneal  fluid collection at the level of the pelvic inlet just to the right of the midline associated with one of the ventriculoperitoneal shunt catheter tips. Differential includes small abscess related to prior peritonitis versus   developing CSF pseudocyst.  2.  Interval removal of percutaneous drainage catheter without evidence of residual or recurrent fluid collection in the paracolic gutter.   Chest XR,  PA & LAT    Narrative    EXAM: XR CHEST 2 VIEWS  LOCATION: [facility]  DATE: 1/27/2024    INDICATION: leukocytosis; eval for infiltrate  COMPARISON: 08/16/2020      Impression    IMPRESSION: Similar appearance of the right  shunt is including a prior abandoned shunt with partial calcification along the tract of the catheter. Lungs are clear. No effusions. Heart size is normal. No acute osseous findings.   CT Head w/o Contrast    Narrative    EXAM: CT HEAD W/O CONTRAST  1/28/2024 4:53 AM     HISTORY:  Eval stability of vents s/p shunt externalization       COMPARISON:  1/27/2024    TECHNIQUE: Using multidetector thin collimation helical acquisition  technique, axial, coronal and sagittal CT images from the skull base  to the vertex were obtained without intravenous contrast.   (topogram) image(s) also obtained and reviewed.    FINDINGS:  Post surgical changes of right frontal approach ventriculostomy with  catheter terminating in the right lateral ventricle near the foramen  of Haile. Slightly decreased size of the temporal horn, trigone, and  occipital horns of the lateral ventricles. Stable size of the anterior  horns. Similar degree of transependymal flow.    No acute intracranial hemorrhage, mass effect, or midline shift. No  acute loss of gray-white matter differentiation in the cerebral  hemispheres. Clear basal cisterns.    The bony calvaria in the bones of the skull base are normal.  Pneumatized petrous temporal bones. The visualized portions of  the  paranasal sinuses and mastoid air cells are clear. Orbits are  unremarkable.       Impression    IMPRESSION: Slightly decreased ventriculomegaly with unchanged degree  of transependymal flow.    I have personally reviewed the examination and initial interpretation  and I agree with the findings.    MAKENNA MENDOZA MD         SYSTEM ID:  [id number]

## 2024-01-28 NOTE — H&P
"[facility]       NEUROSURGERY H&P NOTE    HPI:  Donald Jackson is a 29 year old male with PMH of prematurity, congenital hydrocephalus (s/p  shunt at ~age 3) recent acute appendicitis (admitted 12/03/2023) with laparoscopic appendectomy complicated by intraoperative perforation (12/5/2023) who presents with 2-3 days of headache, nausea, vomiting, and confusion. He is alert oriented and responsive, but appears confused about recent events and this is corroborated by a friend accompanying him today, who states that pt has been confused and forgetful since yesterday. He has been having on and off headaches for the past week, but more so in the last day. His headaches and pounding, especially behind the eyes, and associated with nausea and photophobia. He is repeating sentences and feels confused.     He denies recent fevers, chills chest pain, shortness of breath, and denies headaches, weakness, LOC, numbness/weakness/paresthesias in extremities, changes in sensation, taste, smell. He denies drug use but his  states that he was in his room with a bottle of ibuprofen and possibly other medications yesterday and that he took \"edibles\" yesterday that contained cannabinoids.     PAST MEDICAL HISTORY:   Past Medical History:   Diagnosis Date    Hydrocephalus (H)     Lattice degeneration     Other forms of retinal detachment(361.89)        PAST SURGICAL HISTORY:   Past Surgical History:   Procedure Laterality Date    RETINAL REATTACHMENT  10/2010    left eye    RETINOPEXY LASER PROPHYLAXIS BREAK(S) OD (RIGHT EYE)  10/2011    shunt in head      a couple as a child    strabismus surery      age 5    TONSILLECTOMY      wisdom teeth         FAMILY HISTORY:   Family History   Problem Relation Age of Onset    Diabetes Father         Type 2     Diabetes Paternal Grandfather         Type 2     Retinal detachment No family hx of     Amblyopia No family hx of     Glaucoma No " "family hx of     Macular Degeneration No family hx of        SOCIAL HISTORY:   Social History     Tobacco Use    Smoking status: Never    Smokeless tobacco: Never   Substance Use Topics    Alcohol use: No       MEDICATIONS:  No current outpatient medications on file.       Allergies:  Allergies   Allergen Reactions    Penicillins Hives and Unknown       ROS: 10 point ROS of systems including Constitutional, Eyes, Respiratory, Cardiovascular, Gastroenterology, Genitourinary, Integumentary, Muscularskeletal, Psychiatric were all negative except for pertinent positives noted in my HPI.    Physical exam:   Blood pressure 127/77, pulse 99, temperature 98.4  F (36.9  C), temperature source Oral, resp. rate 19, height 1.727 m (5' 8\"), weight 119.7 kg (263 lb 14.3 oz), SpO2 100%.  General: awake and alert  HEENT: Shunt incisions healed well  PULM: breathing comfortably on room air  NEUROLOGIC:  -- Awake; Alert; oriented x 3, confused with the situation and repeats sentences  -- Follows commands briskly  -- +repetition, calculation, and naming  -- Speech fluent, spontaneous. No aphasia or dysarthria.  -- no gaze preference. No apparent hemineglect.  Cranial Nerves:  -- visual fields full to confrontation, PERRL 3-2mm bilat and brisk, extraocular movements intact  -- face symmetrical, tongue midline  -- sensory V1-V3 intact bilaterally  -- palate elevates symmetrically, uvula midline  -- hearing grossly intact bilat  -- Trapezii 5/5 strength bilat symmetric  -- Cerebellar: Finger nose finger without dysmetria, intact rapid alternating motions bilaterally    Motor:  Normal bulk / tone; no tremor, rigidity, or bradykinesia.  No muscle wasting or fasciculations  No Pronator Drift     Delt Bi Tri Hand Flexion/  Extension Iliopsoas Quadriceps Hamstrings Tibialis Anterior Gastroc    C5 C6 C7 C8/T1 L2 L3 L4-S1 L4 S1   R 5 5 5 5 5 5 5 5 5   L 5 5 5 5 5 5 5 5 5     Sensory:  intact to LT x 4 extremities       Reflexes: no clonus       " Bi Tri BR Elena Pat Ach Bab     C5-6 C7-8 C6 UMN L2-4 S1 UMN   R 2+ 2+ 2+ Norm 2+ 2+ Norm   L 2+ 2+ 2+ Norm 2+ 2+ Norm      Gait: Defered      IMAGING:  Recent Results (from the past 24 hour(s))   XR Shunt Malfunction Surgery Survey    Narrative    EXAM: XR SHUNT MALFUNCTION SURVEY  LOCATION: [facility]  DATE: 1/27/2024    INDICATION: headache s p  shunt  COMPARISON: None.      Impression    IMPRESSION: There is an abandoned shunt catheter at the right chest. The connected shunt tube is intact over the calvarium, neck, chest, and abdomen. No kinks or breaks. Excreted contrast is noted within the renal collecting system.   CT Head w/o Contrast    Addendum: 1/27/2024    Addendum/  impression:    Dr. Reid discussed the results with Dr. Kramer on 1/27/2024 3:57 PM CST by telephone.      Narrative    EXAM: CT HEAD W/O CONTRAST  LOCATION: [facility]  DATE: 1/27/2024    INDICATION: headache  COMPARISON: 08/18/2020  TECHNIQUE: Routine CT Head without IV contrast. Multiplanar reformats. Dose reduction techniques were used.    FINDINGS:  INTRACRANIAL CONTENTS: No evidence of acute intracranial hemorrhage or mass effect. Right frontal approach ventricular shunt catheters, tip within the left lateral ventricle. Interval increase in size of the lateral ventricles with colpocephaly,   concerning for shunt malfunction. Transependymal edema is noted. Obstruction    VISUALIZED ORBITS/SINUSES/MASTOIDS: The globes are unremarkable. The partially imaged paranasal sinuses, mastoid air cells and middle ear cavities are unremarkable.    BONES/SOFT TISSUES: The visualized skull base and calvarium are unremarkable.      Impression    IMPRESSION:    1.  No evidence of acute intracranial hemorrhage or mass effect.  2.  Right frontal approach ventricular shunt catheter, tip within the left lateral ventricle. Interval increase in size of the lateral  ventricles with colpocephaly, concerning for shunt malfunction.   CT Abdomen Pelvis w Contrast    Narrative    EXAM: CT ABDOMEN PELVIS W CONTRAST  LOCATION: [facility]  DATE: 1/27/2024    INDICATION: Right lower quadrant pain, vomiting  COMPARISON: CT 12/15/2023  TECHNIQUE: CT scan of the abdomen and pelvis was performed following injection of IV contrast. Multiplanar reformats were obtained. Dose reduction techniques were used.  CONTRAST: 129mL isovue 370    FINDINGS:   LOWER CHEST: Unremarkable.    HEPATOBILIARY: Normal.    PANCREAS: Normal.    SPLEEN: Normal.    ADRENAL GLANDS: Normal.    KIDNEYS/BLADDER: Likely small bilateral renal cysts, no specific follow-up recommended. No hydronephrosis. Urinary bladder is unremarkable.    BOWEL: Prior appendectomy with interval resolution of right lower quadrant fluid collections. No obstruction or acute inflammatory change.    LYMPH NODES/PERITONEUM: No suspicious lymphadenopathy. Very small rim-enhancing fluid collection just to the right of the midline associated with the tip of one of the ventriculoperitoneal shunt catheter, measuring 1.5 x 1.1 x 0.7 cm (series 6 image   154). There is some surrounding fat stranding. No additional fluid collections visualized.    VASCULATURE: Unremarkable.    PELVIC ORGANS: Normal.    MUSCULOSKELETAL: No acute bony abnormality. Stable position of ventriculoperitoneal shunt catheters, larger diameter catheter tip in the right upper quadrant, smaller diameter catheter in the pelvis just to the right of the midline as above. No definite   kinking or discontinuity. Stable postsurgical changes in the anterior abdominal wall.      Impression    IMPRESSION:   1.  Very small peritoneal fluid collection at the level of the pelvic inlet just to the right of the midline associated with one of the ventriculoperitoneal shunt catheter tips. Differential includes small abscess related to prior peritonitis  versus   developing CSF pseudocyst.  2.  Interval removal of percutaneous drainage catheter without evidence of residual or recurrent fluid collection in the paracolic gutter.   Chest XR,  PA & LAT    Narrative    EXAM: XR CHEST 2 VIEWS  LOCATION: [facility]  DATE: 1/27/2024    INDICATION: leukocytosis; eval for infiltrate  COMPARISON: 08/16/2020      Impression    IMPRESSION: Similar appearance of the right  shunt is including a prior abandoned shunt with partial calcification along the tract of the catheter. Lungs are clear. No effusions. Heart size is normal. No acute osseous findings.         LABS:   Last Comprehensive Metabolic Panel:  Lab Results   Component Value Date     01/27/2024    POTASSIUM 3.9 01/27/2024    CHLORIDE 103 01/27/2024    CO2 23 01/27/2024    ANIONGAP 12 01/27/2024    GLC 93 01/27/2024    BUN 14.5 01/27/2024    CR 0.93 01/27/2024    GFRESTIMATED >90 01/27/2024    ANTHONY 9.5 01/27/2024         Lab Results   Component Value Date    WBC 13.9 01/27/2024    WBC 8.0 08/16/2020     Lab Results   Component Value Date    RBC 6.21 01/27/2024    RBC 5.97 08/16/2020     Lab Results   Component Value Date    HGB 14.8 01/27/2024    HGB 15.2 08/16/2020     Lab Results   Component Value Date    HCT 45.8 01/27/2024    HCT 47.1 08/16/2020     Lab Results   Component Value Date    MCV 74 01/27/2024    MCV 79 08/16/2020     Lab Results   Component Value Date    MCH 23.8 01/27/2024    MCH 25.5 08/16/2020     Lab Results   Component Value Date    MCHC 32.3 01/27/2024    MCHC 32.3 08/16/2020     Lab Results   Component Value Date    RDW 15.3 01/27/2024    RDW 14.9 08/16/2020     Lab Results   Component Value Date     01/27/2024     08/16/2020     INR   Date Value Ref Range Status   01/27/2024 1.13 0.85 - 1.15 Final        ASSESSMENT:  Donald Jackson is a 29 year old male with PMH of prematurity, congenital hydrocephalus (s/p  shunt at ~age 3)  "recent acute appendicitis (admitted 12/03/2023) with laparoscopic appendectomy complicated by intraoperative perforation (12/5/2023) who presents with 2-3 days of headache, nausea, vomiting, and confusion with CT head concerning for ventriculomegaly and a small peritoneal fluid collection at  the ventriculoperitoneal shunt catheter tip concerning for abscess versus CSF pseudocyst.    Clinically Significant Risk Factors Present on Admission                       # Severe Obesity: Estimated body mass index is 40.12 kg/m  as calculated from the following:    Height as of this encounter: 1.727 m (5' 8\").    Weight as of this encounter: 119.7 kg (263 lb 14.3 oz).           # Compression of brain     PLAN:  Shunt tap and shunt externalization now  CT head in the AM  Infectious work up  General surgery and ID consultations  Will hold off on antibiotics  Daily labs  Regular diet after shunt externalization    Gaurav Parry MD  Neurosurgery Resident  Pager- [phone]    The patient was discussed with Dr. Sheppard, neurosurgery chief resident, and Dr. Martinez, neurosurgery staff.    Please contact neurosurgery resident on call with questions.    Dial * * [phone], enter [phone] when prompted.     I have seen this patient with the resident and formulated a plan and agree with this note.  AMP   "

## 2024-01-28 NOTE — PLAN OF CARE
Neuro: intact, BOYCE, PERRLA, up with SBA, ambulated halls, tylenol for headache x1;  shunt drainage clear, > 20 x2 today, team notified will continue to monitor  Cardio: SR, SBP <140, afebrile; started abx  Respiratory: RA  GI/: voiding, BM; regular diet  Skin: intact      Handoff given to following RN. For VS and complete assessments, please see documentation flowsheets.

## 2024-01-28 NOTE — CONSULTS
General ID Green Service: Consult Note     Patient:  Donald Jackson, Date of birth 1995, Medical record number [id number]  Date of Visit:  January 28, 2024  Reason for consult: possible shunt infection         Assessment and Recommendations:     ID Problem list:  1. Headache, abdominal pain, chills  2. Shunt malfunction with concern for  shunt infection  a. Presumed complication of prior appendicitis and intraabdominal infection at OSH (12/2023)  b. Residual peritoneal fluid collection associated with one of the  shunt catheter tips on CT abdomen 1/27 concerning for abscess vs pseudocyst on radiologist read  c. S/p shunt externalization 1/28 by neurosurgery  3. H/o recent appendicitis c/b perforation, s/p appendectomy (12/5/2023 at [facility]) c/b peritonitis and intraabdominal abscesses  a. S/p IR drain 12/12//23 at OSH , cultures reportedly only grew Pseudomonas on aerobic cultures (fungal and anaerobic cultures were negative)  b. Follows with [facility], Dr. Don Messer  4. History of  shunt  5. History of penicillin allergy (hives)      Recs:  1. Start empiric IV vancomycin (dose by pharmacy), IV cefepime 2g q8hr, and PO metronidazole 500 q8hr; can plan to de-escalate based on culture data  2. Follow up pending 1/27 CSF cultures and fluid studies  3. Follow up pending blood cultures  4. Shunt management as per neurosurgery      Discussion:   Findings overall concerning for possible  shunt infection due to recent OSH appendicitis/perforation/peritonitis/intraabdominal abscesses where shunt infection was suspected but not externalized at that time. While awaiting on CSF cultures and blood cultures to guide antibiotic management, would suggest initiation of empiric antibiotics covering broadly for common intraabdominal bacteria (including for Pseudomonas that grew on OSH cultures previously).          Recs paged to primary team. Thank you for allowing us to participate in the care  of this patient. ID will continue to follow. Dr. Hernandez will assume care of ID Malcolm service tomorrow, 1/29. Can see [facility] schedule for paging details if questions.     80 minutes spent by me on the date of the encounter doing chart review, history and exam, documentation and further activities per the note    Richi Mejía MD    Infectious Diseases   01/28/2024           History of Present Illness:     29M with PMH including congenital hydrocephalus s/p  shunt (at age ~4 yo), recent acute appendicitis (admitted at [facility] 12/5-12/16, s/p appendectomy 12/5/23) c/b perforation, peritonitis, and intraabdominal abscesses, who was admitted 1/27 due to headaches.     Per patient report, he was admitted at [facility] 12/5-12/16/2023 for acute appendicitis with course complicated by perforation, sepsis, peritonitis, and intraabdominal abscesses. There was some concern at that time that his  shunt may have gotten infected in the course of his hospitalization there, but it was not externalized at that time. He got an IR drain 12/12 (cultuers reportedly only grew Pseudomonas) and was discharged on po levofloxacin, flagyl, fluconazole which he continued through 12/30 and then stopped under guidance of his [facility] ID provider, Dr. Don Messer. No PICC was placed at that time. No issues with taking the oral antibiotics. No rashes. He also had an abdominal drain that was removed on 12/21. He says his abdominal pain never fully resolved after his hospitalization there, and slowly worsened in recent weeks -- mainly central to right sided abdominal pain. He says over past ~3 days the abdominal pain was unbearable. He also noted new onset right sided headaches over past 3-days, confusion (per friend who lives with him), nausea/vomiting, and chills. No known fevers at home. No blurry vision. No neck stiffness. No other focal symptoms aside from 1-day diarrhea (resolved). He was admitted 1/27 for  surgical management, now s/p shunt externalization by neurosurgery on 1/28 with cultures pending.     He says headaches and abdominal pain have improved since admission. No focal complaints currently. No known history of MDR infections nor c.diff.          Review of Systems:   ROS obtained, pertinent positives and negatives as above.       Past Medical History:     Past Medical History:   Diagnosis Date     Hydrocephalus (H)      Lattice degeneration      Other forms of retinal detachment(361.89)      Past Surgical History:   Procedure Laterality Date     RETINAL REATTACHMENT  10/2010    left eye     RETINOPEXY LASER PROPHYLAXIS BREAK(S) OD (RIGHT EYE)  10/2011     shunt in head      a couple as a child     strabismus surery      age 5     TONSILLECTOMY       wisdom teeth       Otherwise as per HPI      Allergies:      Allergies   Allergen Reactions     Penicillins Hives and Unknown            Current Antimicrobials:   n/a       Family History:   Reviewed and noncontributory       Social History:     Social History     Tobacco Use     Smoking status: Never     Smokeless tobacco: Never   Substance Use Topics     Alcohol use: No     Drug use: No     L[place] [facility] [facility]  Only recent travel was to [place]  Otherwise as per HPI         Physical Exam:   Ranges forvital signs:  Temp:  [98  F (36.7  C)-98.8  F (37.1  C)] 98.8  F (37.1  C)  Pulse:  [69-99] 87  Resp:  [10-24] 24  BP: (118-139)/() 128/98  SpO2:  [95 %-100 %] 97 %  GENERAL:  Adult male, sitting in bed in no acute distress.   ENT:  Head is normocephalic, atraumatic. No tenderness on palation of scalp. Oropharynx is moist-appearing.  EYES:  Eyes have anicteric sclerae.   NECK:  Supple.  LUNGS:  Unlabored breathing on room air.  ABDOMEN:  Non-distended, soft, nontender..  EXT: Extremities warm and well perfused.  SKIN:  No acute rashes visible on exposed skin.  NEUROLOGIC:  Awake, alert, interactive. Externalized  catheter in place.  No neck stiffness.          Laboratory Data:     Inflammatory Markers    Recent Labs   Lab Test 24  1405 20  1319   SED  --  1   CRPI 23.40*  --        Hematology Studies    Recent Labs   Lab Test 24  0605 24  1405 24  1450 22  1038 20  1319 19  1004   WBC 7.3 13.9* 10.8 7.2 8.0  --    ANEU  --   --   --   --  5.3  --    AEOS  --   --   --   --  0.0  --    HGB 14.5 14.8 13.1* 15.2 15.2 14.5   MCV 78 74* 78 79 79 80.3    295 243 183 188  --        Metabolic Studies     Recent Labs   Lab Test 24  0605 24  1405 24  1450    138 136   POTASSIUM 4.3 3.9 4.1   CHLORIDE 104 103 102   CO2 * 23 24   BUN 12.9 14.5 15.0   CR 0.93 0.93 0.87   GFRESTIMATED >90 >90 >90       Hepatic Studies    Recent Labs   Lab Test 24  1405 24  1450   BILITOTAL 0.6 0.3   ALKPHOS 94 80   ALBUMIN 4.2 3.9   AST 25 26   ALT 53 59       Microbiology:  Culture   Date Value Ref Range Status   2024 No growth after 12 hours  Preliminary   2024 No growth after 12 hours  Preliminary       Urine Studies    Recent Labs   Lab Test 22  1038   LEUKEST Negative   WBCU 0-5       OSH ([facility]) culture data:  23 blood cultures: no growth  23 Cdiff screen: negative  23 IR aerobic cultures: +Pseudomonas, negative anaerobic cultures, negative fungal cultures  Organism Antibiotic Susceptibility   Pseudomonas aeruginosa Piperacillin/Tazobactam 16 mcg/mL: Susceptible   Pseudomonas aeruginosa Cefepime 4 mcg/mL: Susceptible   Pseudomonas aeruginosa Ciprofloxacin <=0.25 mcg/mL: Susceptible   Pseudomonas aeruginosa Levofloxacin <=0.5 mcg/mL: Susceptible   Pseudomonas aeruginosa Meropenem <=0.5 mcg/mL: Susceptible   Pseudomonas aeruginosa Tobramycin <=2 mcg/mL: Susceptible   Pseudomonas aeruginosa Ceftazidime 4 mcg/mL: Susceptible   Pseudomonas aeruginosa Aztreonam 16 mcg/mL: Intermediate              Imagin/27/24 CT head  report:  IMPRESSION:    1.  No evidence of acute intracranial hemorrhage or mass effect.  2.  Right frontal approach ventricular shunt catheter, tip within the left lateral ventricle. Interval increase in size of the lateral ventricles with colpocephaly, concerning for shunt malfunction.    1/27/24 CT abd/pelvis report:  IMPRESSION:   1.  Very small peritoneal fluid collection at the level of the pelvic inlet just to the right of the midline associated with one of the ventriculoperitoneal shunt catheter tips. Differential includes small abscess related to prior peritonitis versus   developing CSF pseudocyst.  2.  Interval removal of percutaneous drainage catheter without evidence of residual or recurrent fluid collection in the paracolic gutter.

## 2024-01-28 NOTE — PROGRESS NOTES
Admitted/transferred from: ER   Reason for admission/transfer:  shunt malfuction  2 RN skin assessment: completed by Ayala WOODRUFF and Bijan DAVALOS  Result of skin assessment and interventions/actions: Skin intact  Height, weight, drug calc weight: Done  Patient belongings (see Flowsheet)  MDRO education added to care planYes  ?

## 2024-01-28 NOTE — ED NOTES
Bed: UUEDH-G  Expected date:   Expected time:   Means of arrival:   Comments:  Hold: [facility] Transfer

## 2024-01-28 NOTE — PROCEDURES
Shunt Tap Procedure Note    Pre-procedure diagnosis:  Ventriculomegaly  Post-procedure diagnosis:  Same    Indications:  shunt malfunction     The reservoir was located and cleaned with chloraprep x2.  Using sterile technique the reservoir was located and punctured with a 25 gauge butterfly needle.  Some spontaneous flow was obtained, but the flow was not totally brisk.  A primed manometer was attached but the opening pressure could not be measures.  The needle was removed and site cleaned again with chloraprep.  The csf fluid was aspirated- total volume of ~3 cc.  The patient tolerated the procedure well.      Gaurav Parry MD  Neurosurgery Resident  Pager [phone]

## 2024-01-28 NOTE — PHARMACY-VANCOMYCIN DOSING SERVICE
Pharmacy Vancomycin Initial Note  Date of Service 2024  Patient's  1995  29 year old, male    Indication: Abscess and possible  shunt infection    Current estimated CrCl = Estimated Creatinine Clearance: 146 mL/min (based on SCr of 0.93 mg/dL).    Creatinine for last 3 days  2024:  2:05 PM Creatinine 0.93 mg/dL  2024:  6:05 AM Creatinine 0.93 mg/dL    Recent Vancomycin Level(s) for last 3 days  No results found for requested labs within last 3 days.      Vancomycin IV Administrations (past 72 hours)        No vancomycin orders with administrations in past 72 hours.                    Nephrotoxins and other renal medications (From now, onward)      Start     Dose/Rate Route Frequency Ordered Stop    24 0100  vancomycin (VANCOCIN) 1,500 mg in 0.9% NaCl 250 mL intermittent infusion         1,500 mg  over 90 Minutes Intravenous EVERY 8 HOURS 24 1630      24 1630  vancomycin (VANCOCIN) 2,500 mg in sodium chloride 0.9 % 500 mL intermittent infusion         2,500 mg  over 120 Minutes Intravenous ONCE 24 1630              Contrast Orders - past 72 hours (72h ago, onward)      Start     Dose/Rate Route Frequency Stop    24 1455  iopamidol (ISOVUE-370) solution 100 mL  Status:  Discontinued         100 mL Intravenous ONCE 24 1454    24 1455  iopamidol (ISOVUE-370) solution 125 mL         125 mL Intravenous ONCE 24 1501               Plan:  Start vancomycin 2500 mg IV x1 as loading dose (~20 mg/kg), followed by 1500 mg IV q8h.   Vancomycin monitoring method: Trough (Method 2 = manual dose calculation)  Vancomycin therapeutic monitoring goal: 15-20 mg/L  Pharmacy will check vancomycin levels as appropriate in 1-3 Days.    Serum creatinine levels will be ordered daily for the first week of therapy and at least twice weekly for subsequent weeks.      Yoselin Lizama [facility]

## 2024-01-28 NOTE — PROCEDURES
Shunt Externalization Procedure Note     Pre-procedure diagnosis:  Ventriculomegaly  Post-procedure diagnosis:  Same     Indications:  Shunt malfunction with concern for intraabdominal infection     The patient's right upper chest was shaved and was cleaned with ChloraPrep x 2.  The old shunt tubing and the new shunt tubing were located and at the level of clavicle the new shunt tubing was on the lateral aspect based on palpation as well as the imaging.  The area was draped off in a sterile fashion.  10 cc of local anesthetic was administered at the planned incision site ensuring that the shunt tubing was not damaged.  A 2 cm incision was made and blunt dissection was used to locate the new shunt tubing on the lateral aspect.  Using a right angle forceps the shunt tube was brought outside and the inferior aspect was bluntly dissected.  Two 2-0 silk ties were used to tie off the distal aspect of the shunt tubing and the shunt tube was about the level of the silk tie.  The proximal portion had brisk flow, and the CSF was clear.  20 cc of CSF was aspirated gently.  No resistance was noted.  CSF was sent for culture and analysis.  The proximal portion was then connected to Salcedo drainage system.  3-0 nylon was used to close the incision in a running fashion ensuring no damage to the shunt tubing.  The externalized portion of the shunt was placed on some gauze and was covered with Ioban. The patient tolerated the procedure well.     Dr. Larkin was present and available throughout the procedure    Gaurav Parry MD  Neurosurgery Resident  Pager: [phone]     Telephone Encounter by Yolanda Lemon LPN at 33/69/32 52:30 PM     Author:  Yolanda Lemon LPN Service:  (none) Author Type:  Registered Nurse     Filed:  02/20/18 02:14 PM Encounter Date:  2/9/2018 Status:  Signed     :  Yolanda Lemon LPN (Registered Nurse)            Addressed in my chart, orders faxed to [DN1.1M]       Revision History        User Key Date/Time User Provider Type Action    > DN1.1 02/20/18 02:14 PM Yolanda Lemon LPN Registered Nurse Sign    M - Manual

## 2024-01-29 ENCOUNTER — APPOINTMENT (OUTPATIENT)
Dept: CT IMAGING | Facility: CLINIC | Age: 29
DRG: 031 | End: 2024-01-29
Payer: COMMERCIAL

## 2024-01-29 LAB
ANION GAP SERPL CALCULATED.3IONS-SCNC: 8 MMOL/L (ref 7–15)
BUN SERPL-MCNC: 17.2 MG/DL (ref 6–20)
CALCIUM SERPL-MCNC: 8.7 MG/DL (ref 8.6–10)
CHLORIDE SERPL-SCNC: 103 MMOL/L (ref 98–107)
CREAT SERPL-MCNC: 1.02 MG/DL (ref 0.67–1.17)
DEPRECATED HCO3 PLAS-SCNC: 23 MMOL/L (ref 22–29)
EGFRCR SERPLBLD CKD-EPI 2021: >90 ML/MIN/1.73M2
ERYTHROCYTE [DISTWIDTH] IN BLOOD BY AUTOMATED COUNT: 14.9 % (ref 10–15)
GLUCOSE SERPL-MCNC: 89 MG/DL (ref 70–99)
HCT VFR BLD AUTO: 44.1 % (ref 40–53)
HGB BLD-MCNC: 14 G/DL (ref 13.3–17.7)
MCH RBC QN AUTO: 24.1 PG (ref 26.5–33)
MCHC RBC AUTO-ENTMCNC: 31.7 G/DL (ref 31.5–36.5)
MCV RBC AUTO: 76 FL (ref 78–100)
PLATELET # BLD AUTO: 243 10E3/UL (ref 150–450)
POTASSIUM SERPL-SCNC: 3.9 MMOL/L (ref 3.4–5.3)
RBC # BLD AUTO: 5.82 10E6/UL (ref 4.4–5.9)
SODIUM SERPL-SCNC: 134 MMOL/L (ref 135–145)
WBC # BLD AUTO: 9.1 10E3/UL (ref 4–11)

## 2024-01-29 PROCEDURE — 200N000002 HC R&B ICU UMMC

## 2024-01-29 PROCEDURE — 258N000003 HC RX IP 258 OP 636: Performed by: NEUROLOGICAL SURGERY

## 2024-01-29 PROCEDURE — 250N000013 HC RX MED GY IP 250 OP 250 PS 637: Performed by: NEUROLOGICAL SURGERY

## 2024-01-29 PROCEDURE — 85014 HEMATOCRIT: CPT | Performed by: NURSE PRACTITIONER

## 2024-01-29 PROCEDURE — 250N000011 HC RX IP 250 OP 636: Performed by: NEUROLOGICAL SURGERY

## 2024-01-29 PROCEDURE — 70450 CT HEAD/BRAIN W/O DYE: CPT | Mod: 26 | Performed by: RADIOLOGY

## 2024-01-29 PROCEDURE — 250N000011 HC RX IP 250 OP 636

## 2024-01-29 PROCEDURE — 80048 BASIC METABOLIC PNL TOTAL CA: CPT | Performed by: NURSE PRACTITIONER

## 2024-01-29 PROCEDURE — 250N000013 HC RX MED GY IP 250 OP 250 PS 637

## 2024-01-29 PROCEDURE — 999N000127 HC STATISTIC PERIPHERAL IV START W US GUIDANCE

## 2024-01-29 PROCEDURE — 70450 CT HEAD/BRAIN W/O DYE: CPT

## 2024-01-29 PROCEDURE — 36415 COLL VENOUS BLD VENIPUNCTURE: CPT | Performed by: NURSE PRACTITIONER

## 2024-01-29 RX ADMIN — METRONIDAZOLE 500 MG: 500 TABLET ORAL at 08:20

## 2024-01-29 RX ADMIN — VANCOMYCIN HYDROCHLORIDE 1500 MG: 10 INJECTION, POWDER, LYOPHILIZED, FOR SOLUTION INTRAVENOUS at 20:35

## 2024-01-29 RX ADMIN — DIPHENHYDRAMINE HYDROCHLORIDE 50 MG: 25 CAPSULE ORAL at 11:12

## 2024-01-29 RX ADMIN — ACETAMINOPHEN 650 MG: 325 TABLET, FILM COATED ORAL at 04:18

## 2024-01-29 RX ADMIN — CEFEPIME 2 G: 2 INJECTION, POWDER, FOR SOLUTION INTRAVENOUS at 01:38

## 2024-01-29 RX ADMIN — CEFEPIME 2 G: 2 INJECTION, POWDER, FOR SOLUTION INTRAVENOUS at 10:00

## 2024-01-29 RX ADMIN — HYALURONIDASE (HUMAN RECOMBINANT) 150 UNITS: 150 INJECTION, SOLUTION SUBCUTANEOUS at 15:11

## 2024-01-29 RX ADMIN — DIPHENHYDRAMINE HYDROCHLORIDE 50 MG: 25 CAPSULE ORAL at 19:44

## 2024-01-29 RX ADMIN — VANCOMYCIN HYDROCHLORIDE 1500 MG: 10 INJECTION, POWDER, LYOPHILIZED, FOR SOLUTION INTRAVENOUS at 11:49

## 2024-01-29 RX ADMIN — CEFEPIME 2 G: 2 INJECTION, POWDER, FOR SOLUTION INTRAVENOUS at 18:01

## 2024-01-29 RX ADMIN — METRONIDAZOLE 500 MG: 500 TABLET ORAL at 13:33

## 2024-01-29 RX ADMIN — METRONIDAZOLE 500 MG: 500 TABLET ORAL at 20:35

## 2024-01-29 ASSESSMENT — ACTIVITIES OF DAILY LIVING (ADL)
ADLS_ACUITY_SCORE: 22
ADLS_ACUITY_SCORE: 18
ADLS_ACUITY_SCORE: 22

## 2024-01-29 ASSESSMENT — VISUAL ACUITY
OU: BASELINE;GLASSES
OU: NORMAL ACUITY

## 2024-01-29 NOTE — PLAN OF CARE
Shift Summary:  Neuro: A&Ox4, SBA, strengths equal, PERRLA, tylenol x1 for headache, intermittently agitated/frustrated.  shunt drainage 0-32 ml/hr clear output. MD notified of 32 ml/hr output.  CV: afebrile, NSR 80s-100s, SBP goal <150, no interventions needed.   Resp: RA, LS clear  GI/: Regular diet, voiding spontaneously, 1 loose BM, intermittent nausea, emesis x1, PRN zofran given.  Skin: Flushed w/ severe itchiness of chest, arms, and face at start of shift, vanco paused and MD notified. Benadryl given with eventual relief, pt no longer complains of itchiness. No other skin issues noted.  Access: PIV x1    Plan: transfer to   For vital signs and complete assessments, please see documentation flowsheets.

## 2024-01-29 NOTE — PHARMACY-ADMISSION MEDICATION HISTORY
Pharmacist Admission Medication History    Admission medication history is complete. The information provided in this note is only as accurate as the sources available at the time of the update.    Information Source(s): Patient and CareEverywhere/SureScripts via in-person    Pertinent Information: Patient able to recall all medications, states no daily prescription medications at home and only taking ibuprofen as needed for pain control after recent appendix surgery    Changes made to PTA medication list:  Added: None  Deleted: None  Changed: None    Allergies reviewed with patient and updates made in EHR: yes    Medication History Completed By: Domingo Tapia [facility] 1/29/2024 9:31 AM    PTA Med List   Medication Sig Last Dose    ibuprofen (ADVIL/MOTRIN) 600 MG tablet Take 600 mg by mouth every 6 hours as needed for moderate pain Past Month

## 2024-01-29 NOTE — PROGRESS NOTES
[facility], [place]   01/29/2024  Neurosurgery Progress Note:    Assessment:  Donald Jackson is a 29 year old male with PMH of prematurity, congenital hydrocephalus (s/p  shunt at ~age 3) recent acute appendicitis (admitted 12/03/2023) with laparoscopic appendectomy complicated by intraoperative perforation (12/5/2023) who presents with 2-3 days of headache, nausea, vomiting, and confusion with CT head concerning for ventriculomegaly and a small peritoneal fluid collection at  the ventriculoperitoneal shunt catheter tip concerning for abscess versus CSF pseudocyst.     PPD-1 externalization of shunt.    Plan:  -- Follow CSF cultures  -- Continue vancomycin + cefepime + flagyl: will discuss with ID/pharmacy re: vancomycin reaction  - Serial neuro exams  - Pain control  - HOB > 30 degrees  - Advance diet as tolerates  - Bowel regimen  - PRN antiemetics  - IVF until taking adequate PO  - PT/OT  - SCDs for DVT proph  -- Okay to transfer to the floor  -----------------------------------  Luis E Baires  Neurosurgery Resident PGY3    Interval History: Developed red itchy rash throughout after vancomycin was started. Infusion stopped. Benadryl given. Stable exam. No change in exam.    Objective:   Temp:  [97.5  F (36.4  C)-98.8  F (37.1  C)] 98.4  F (36.9  C)  Pulse:  [] 94  Resp:  [9-29] 20  BP: (108-136)/() 108/59  SpO2:  [92 %-100 %] 98 %  I/O last 3 completed shifts:  In: 1695 [P.O.:1080; I.V.:615]  Out: 646 [Urine:350; Drains:296]    Gen: Appears comfortable, NAD  Neurologic:  - Alert & Oriented to person, place, time, and situation  - Follows commands briskly  - Speech fluent, spontaneous. No aphasia or dysarthria.  - No gaze preference. No apparent hemineglect.  - PERRL, EOMI  - Face symmetric with sensation intact to light touch  - Palate elevates symmetrically, uvula midline, tongue protrudes midline  - Trapezii muscles 5/5 bilaterally  - No pronator drift     Del Tr Bi WE  WF Gr   R 5 5 5 5 5 5   L 5 5 5 5 5 5    HF KE KF DF PF EHL   R 5 5 5 5 5 5   L 5 5 5 5 5 5     Reflexes 2+ throughout    Sensation intact and symmetric to light touch throughout    LABS:  Recent Labs   Lab 01/28/24  0605 01/27/24  2101 01/27/24  1405     --  138   POTASSIUM 4.3  --  3.9   CHLORIDE 104  --  103   CO2 21*  --  23   ANIONGAP 11  --  12   GLC 94 93 95   BUN 12.9  --  14.5   CR 0.93  --  0.93   ANTHONY 9.2  --  9.5       Recent Labs   Lab 01/28/24  0605   WBC 7.3   RBC 6.04*   HGB 14.5   HCT 47.3   MCV 78   MCH 24.0*   MCHC 30.7*   RDW 15.7*          IMAGING:  Recent Results (from the past 24 hour(s))   CT Head w/o Contrast    Narrative    EXAM: CT HEAD W/O CONTRAST  1/28/2024 4:53 AM     HISTORY:  Eval stability of vents s/p shunt externalization       COMPARISON:  1/27/2024    TECHNIQUE: Using multidetector thin collimation helical acquisition  technique, axial, coronal and sagittal CT images from the skull base  to the vertex were obtained without intravenous contrast.   (topogram) image(s) also obtained and reviewed.    FINDINGS:  Post surgical changes of right frontal approach ventriculostomy with  catheter terminating in the right lateral ventricle near the foramen  of Haile. Slightly decreased size of the temporal horn, trigone, and  occipital horns of the lateral ventricles. Stable size of the anterior  horns. Similar degree of transependymal flow.    No acute intracranial hemorrhage, mass effect, or midline shift. No  acute loss of gray-white matter differentiation in the cerebral  hemispheres. Clear basal cisterns.    The bony calvaria in the bones of the skull base are normal.  Pneumatized petrous temporal bones. The visualized portions of the  paranasal sinuses and mastoid air cells are clear. Orbits are  unremarkable.       Impression    IMPRESSION: Slightly decreased ventriculomegaly with unchanged degree  of transependymal flow.    I have personally reviewed the examination  and initial interpretation  and I agree with the findings.    MAKENNA MENDOZA MD         SYSTEM ID:  [id number]     I have seen this patient with the resident and formulated a plan and agree with this note.  AMP

## 2024-01-29 NOTE — PLAN OF CARE
Major Shift Events:  Alert and oriented x4, following commands and moving all extremities. Pupils equal and reactive. Drain with OP 1-16 clear. SR, BP stable, no interventions. RA, lungs clear. X1 Bm, voids spontaneously. Regular diet, good appetite. R piv infusing vanco caused extravasation. Work up completed and antidote given. Slightly swollen and erythremia.  SBA.   Plan: transfer to the floor when a bed becomes available.   For vital signs and complete assessments, please see documentation flowsheets.

## 2024-01-30 LAB
ANION GAP SERPL CALCULATED.3IONS-SCNC: 9 MMOL/L (ref 7–15)
BUN SERPL-MCNC: 15.2 MG/DL (ref 6–20)
CALCIUM SERPL-MCNC: 8.6 MG/DL (ref 8.6–10)
CHLORIDE SERPL-SCNC: 107 MMOL/L (ref 98–107)
CREAT SERPL-MCNC: 1.13 MG/DL (ref 0.67–1.17)
DEPRECATED HCO3 PLAS-SCNC: 22 MMOL/L (ref 22–29)
EGFRCR SERPLBLD CKD-EPI 2021: 90 ML/MIN/1.73M2
ERYTHROCYTE [DISTWIDTH] IN BLOOD BY AUTOMATED COUNT: 15.1 % (ref 10–15)
GLUCOSE SERPL-MCNC: 98 MG/DL (ref 70–99)
HCT VFR BLD AUTO: 40.8 % (ref 40–53)
HGB BLD-MCNC: 12.7 G/DL (ref 13.3–17.7)
MCH RBC QN AUTO: 23.5 PG (ref 26.5–33)
MCHC RBC AUTO-ENTMCNC: 31.1 G/DL (ref 31.5–36.5)
MCV RBC AUTO: 75 FL (ref 78–100)
PLATELET # BLD AUTO: 227 10E3/UL (ref 150–450)
POTASSIUM SERPL-SCNC: 3.9 MMOL/L (ref 3.4–5.3)
RBC # BLD AUTO: 5.41 10E6/UL (ref 4.4–5.9)
SODIUM SERPL-SCNC: 138 MMOL/L (ref 135–145)
VANCOMYCIN SERPL-MCNC: 24.7 UG/ML
WBC # BLD AUTO: 7.7 10E3/UL (ref 4–11)

## 2024-01-30 PROCEDURE — 999N000127 HC STATISTIC PERIPHERAL IV START W US GUIDANCE

## 2024-01-30 PROCEDURE — 258N000003 HC RX IP 258 OP 636: Performed by: NEUROLOGICAL SURGERY

## 2024-01-30 PROCEDURE — 36415 COLL VENOUS BLD VENIPUNCTURE: CPT | Performed by: NEUROLOGICAL SURGERY

## 2024-01-30 PROCEDURE — 250N000013 HC RX MED GY IP 250 OP 250 PS 637: Performed by: NEUROLOGICAL SURGERY

## 2024-01-30 PROCEDURE — 250N000011 HC RX IP 250 OP 636: Performed by: NEUROLOGICAL SURGERY

## 2024-01-30 PROCEDURE — 250N000013 HC RX MED GY IP 250 OP 250 PS 637

## 2024-01-30 PROCEDURE — 200N000002 HC R&B ICU UMMC

## 2024-01-30 PROCEDURE — 80048 BASIC METABOLIC PNL TOTAL CA: CPT | Performed by: NURSE PRACTITIONER

## 2024-01-30 PROCEDURE — 99233 SBSQ HOSP IP/OBS HIGH 50: CPT | Performed by: INTERNAL MEDICINE

## 2024-01-30 PROCEDURE — 85027 COMPLETE CBC AUTOMATED: CPT | Performed by: NURSE PRACTITIONER

## 2024-01-30 PROCEDURE — 36415 COLL VENOUS BLD VENIPUNCTURE: CPT | Performed by: NURSE PRACTITIONER

## 2024-01-30 PROCEDURE — 250N000011 HC RX IP 250 OP 636

## 2024-01-30 PROCEDURE — 80202 ASSAY OF VANCOMYCIN: CPT | Performed by: NEUROLOGICAL SURGERY

## 2024-01-30 RX ORDER — CARBOXYMETHYLCELLULOSE SODIUM 5 MG/ML
1 SOLUTION/ DROPS OPHTHALMIC
Status: DISCONTINUED | OUTPATIENT
Start: 2024-01-30 | End: 2024-02-06 | Stop reason: HOSPADM

## 2024-01-30 RX ORDER — HEPARIN SODIUM 5000 [USP'U]/.5ML
5000 INJECTION, SOLUTION INTRAVENOUS; SUBCUTANEOUS EVERY 8 HOURS
Status: DISCONTINUED | OUTPATIENT
Start: 2024-01-30 | End: 2024-02-06 | Stop reason: HOSPADM

## 2024-01-30 RX ORDER — DIPHENHYDRAMINE HCL 25 MG
50 CAPSULE ORAL EVERY 8 HOURS
Status: DISCONTINUED | OUTPATIENT
Start: 2024-01-31 | End: 2024-01-31

## 2024-01-30 RX ADMIN — VANCOMYCIN HYDROCHLORIDE 1500 MG: 10 INJECTION, POWDER, LYOPHILIZED, FOR SOLUTION INTRAVENOUS at 03:39

## 2024-01-30 RX ADMIN — DIPHENHYDRAMINE HYDROCHLORIDE 50 MG: 25 CAPSULE ORAL at 10:39

## 2024-01-30 RX ADMIN — HEPARIN SODIUM 5000 UNITS: 5000 INJECTION, SOLUTION INTRAVENOUS; SUBCUTANEOUS at 19:50

## 2024-01-30 RX ADMIN — CEFEPIME 2 G: 2 INJECTION, POWDER, FOR SOLUTION INTRAVENOUS at 18:25

## 2024-01-30 RX ADMIN — VANCOMYCIN HYDROCHLORIDE 1500 MG: 10 INJECTION, POWDER, LYOPHILIZED, FOR SOLUTION INTRAVENOUS at 19:00

## 2024-01-30 RX ADMIN — METRONIDAZOLE 500 MG: 500 TABLET ORAL at 19:50

## 2024-01-30 RX ADMIN — DIPHENHYDRAMINE HYDROCHLORIDE 50 MG: 25 CAPSULE ORAL at 18:25

## 2024-01-30 RX ADMIN — Medication 1 DROP: at 07:47

## 2024-01-30 RX ADMIN — CEFEPIME 2 G: 2 INJECTION, POWDER, FOR SOLUTION INTRAVENOUS at 01:05

## 2024-01-30 RX ADMIN — METRONIDAZOLE 500 MG: 500 TABLET ORAL at 14:26

## 2024-01-30 RX ADMIN — METRONIDAZOLE 500 MG: 500 TABLET ORAL at 07:47

## 2024-01-30 RX ADMIN — DIPHENHYDRAMINE HYDROCHLORIDE 50 MG: 25 CAPSULE ORAL at 03:06

## 2024-01-30 RX ADMIN — CEFEPIME 2 G: 2 INJECTION, POWDER, FOR SOLUTION INTRAVENOUS at 10:39

## 2024-01-30 RX ADMIN — VANCOMYCIN HYDROCHLORIDE 1500 MG: 10 INJECTION, POWDER, LYOPHILIZED, FOR SOLUTION INTRAVENOUS at 11:15

## 2024-01-30 ASSESSMENT — ACTIVITIES OF DAILY LIVING (ADL)
ADLS_ACUITY_SCORE: 18

## 2024-01-30 ASSESSMENT — VISUAL ACUITY
OU: BASELINE;GLASSES
OU: BASELINE;GLASSES

## 2024-01-30 NOTE — PROGRESS NOTES
Neurocritical Care Multi-Disciplinary Note    Reason for critical care admission: Status post externalization of shunt  Primary Team: NIELS  Date of Service:  01/30/2024  Date of Admission:  1/27/2024  Hospital Day: 4    Patient condition reviewed and discussed while on multidisciplinary rounds today. Please note these minor interventions that were initiated:  None.    The Neurocritical Care service will continue to follow peripherally while the patient is within the ICU. We are readily available should issues arise. Please feel free to contact us for critical care issues with which we may be of assistance. For all other concerns, please contact primary service first.     Please contact NCC team phone at *[phone]    MERRICK Caba, CNP  Neurocritical Care *[phone]

## 2024-01-30 NOTE — PROGRESS NOTES
[facility], [place]   01/30/2024  Neurosurgery Progress Note:    Assessment:  Donald Jackson is a 29 year old male with PMH of prematurity, congenital hydrocephalus (s/p  shunt at ~age 3) recent acute appendicitis (admitted 12/03/2023) with laparoscopic appendectomy complicated by intraoperative perforation (12/5/2023) who presents with 2-3 days of headache, nausea, vomiting, and confusion with CT head concerning for ventriculomegaly and a small peritoneal fluid collection at  the ventriculoperitoneal shunt catheter tip concerning for abscess versus CSF pseudocyst.     PPD-2 externalization of shunt.    Plan:  -- Follow CSF cultures  -- Continue vancomycin (w benadryl) + cefepime + flagyl:  - Serial neuro exams  - Pain control  - HOB > 30 degrees  - Advance diet as tolerates  - Bowel regimen  - PRN antiemetics  - IVF until taking adequate PO  - PT/OT  - SCDs for DVT proph  - Will start SQH  -- Okay to transfer to the floor  -----------------------------------  Gaurav Parry MD  Neurosurgery Resident  Pager: [phone]    Interval History: Exam improving from a cognitive standpoint./ Headaches minimal.    Objective:   Temp:  [97.6  F (36.4  C)-98.6  F (37  C)] 98  F (36.7  C)  Pulse:  [] 93  Resp:  [10-18] 16  BP: (113-144)/(54-98) 113/54  SpO2:  [95 %-100 %] 99 %  I/O last 3 completed shifts:  In: 1100 [P.O.:540; I.V.:560]  Out: 224 [Drains:224]    Gen: Appears comfortable, NAD  Neurologic:  - Alert & Oriented to person, place, time, and situation  - Follows commands briskly  - Speech fluent, spontaneous. No aphasia or dysarthria.  - No gaze preference. No apparent hemineglect.  - PERRL, EOMI  - Face symmetric with sensation intact to light touch  - Palate elevates symmetrically, uvula midline, tongue protrudes midline  - Trapezii muscles 5/5 bilaterally  - No pronator drift     Del Tr Bi WE WF Gr   R 5 5 5 5 5 5   L 5 5 5 5 5 5    HF KE KF DF PF EHL   R 5 5 5 5 5 5   L 5 5 5 5 5 5      Reflexes 2+ throughout    Sensation intact and symmetric to light touch throughout    LABS:  Recent Labs   Lab 01/29/24  0737 01/28/24  0605 01/27/24  2101 01/27/24  1405   * 136  --  138   POTASSIUM 3.9 4.3  --  3.9   CHLORIDE 103 104  --  103   CO2 23 21*  --  23   ANIONGAP 8 11  --  12   GLC 89 94 93 95   BUN 17.2 12.9  --  14.5   CR 1.02 0.93  --  0.93   ANTHONY 8.7 9.2  --  9.5       Recent Labs   Lab 01/29/24  0737   WBC 9.1   RBC 5.82   HGB 14.0   HCT 44.1   MCV 76*   MCH 24.1*   MCHC 31.7   RDW 14.9          IMAGING:  No results found for this or any previous visit (from the past 24 hour(s)).    I have seen this patient with the resident and formulated a plan and agree with this note.  AMP

## 2024-01-30 NOTE — PLAN OF CARE
Major Shift Events:  A&O x4. PERRLA intact. Moves all extremities. Up ad janene in room.  Externalized shunt to gravity drainage. Output clear, 4-11 mL out/hr. Drain site intact. VSS. Afebrile. Room air, lungs clear euqal bilaterally. Reg diet. Voids spontaneously. BM this shift. R forearm extravasation site improving. Pt denies pain overnight.    Plan: Monitor neuro status/externalized shunt. Treat and prevent infection. Transfer to floor when bed available. Monitor and treat per POC.   For vital signs and complete assessments, please see documentation flowsheets.

## 2024-01-30 NOTE — PROGRESS NOTES
General ID Service: Follow Up Note      Patient:  Donald Jackson, Date of birth 1995, Medical record number [id number]  Date of Visit:  January 30, 2024         Assessment and Recommendations:   ID Problem List:    Headache, abdominal pain, chills  Shunt malfunction with concern for  shunt infection  CSF studies wnl- Glucose 59, colorless fluid, protein 8.3, cultures ngtd from 1/27  Residual peritoneal fluid collection associated with one of the  shunt catheter tips on CT abdomen 1/27 concerning for abscess vs pseudocyst on radiologist read  S/p shunt externalization 1/28 by neurosurgery  H/o recent appendicitis c/b perforation, s/p appendectomy (12/5/2023 at [facility]) c/b peritonitis and intraabdominal abscesses  S/p IR drain 12/12//23 at OSH , cultures reportedly only grew Pseudomonas on aerobic cultures (fungal and anaerobic cultures were negative)  Followed with [facility], Dr. Don Messer  Pt quit antibiotics on 12/29/23  History of  shunt  History of penicillin allergy (hives)    Recommendations:    -repeat CRP  -stop vancomycin  -hold cefepime/flagyl   -ok to place shunt in pleural space per NSGY, would wait at least a few days to replace shunt in abdomen to evaluate possible abscess.     Discussion:    Patient with recent appendicitis c/b perforation, s/p appendectomy (12/5/2023 at [facility]) c/b peritonitis and intraabdominal abscesses growing pseudomonas. He has had an extended course of antibiotics and was much improved. CRP 12/21 was down to 3.1 (upper limit of normal 0.5). He was still on levofloxacin and flagyl as of 12/21. Appears the flagyl went through 12/30 and levofloxacin went through 12/31. ID note says fluconazole was stopped after discharge at [facility]. Abdominal drain was removed 12/26.    He presented here with malfunctioning shunt and fluid collection at the end in the abdomen. Question is if this is an abscess or pseudocyst. The shunt labs and  cultures are completely normal suggesting shunt is not infected. Hard to know if the distal shunt is infected.    Most symptoms could be explained by a shunt malfunction with pseudocyst. However, the elevated CRP is a little concerning.     I see no need for vancomycin so I would stop it. I would recheck CRP to see if that resolved with externalization of shunt. I would hold Cefepime and flagyl as he has had an extended course of antibiotics and unclear if the fluid collection is an abscess. His resolution of symptoms and well appearance make that less likely. I would observe off antibiotics a few days to evaluate.     I think if the shunt can go in the pleural space that would be fine as NSGY is able to do it. I would wait a few days before replacing it in the abdomen and decide based on the CRP.     Recs were discussed with primary team today. Don't hesitate to call with questions.     Attestation:  I have reviewed today's vital signs, medications, labs and imaging.  Katrina Hernandez MD  Pager [phone]          Interval History:       Patient feels very well. No headache. Chills. Nausea or vomiting. Patient is bored and hoping to get to floor to play video games while he waits. Reasonable and conversant.          Review of Systems:   Full 9 pt ROS obtained, pertinent positives and negatives as above.          Current Antimicrobials   Current:    Cefepime  Flagyl  Vancomycin         Physical Exam:   Ranges for vital signs:  Temp:  [97.6  F (36.4  C)-98.6  F (37  C)] 98.5  F (36.9  C)  Pulse:  [] 97  Resp:  [16-18] 16  BP: (113-144)/(54-81) 119/79  SpO2:  [94 %-100 %] 94 %    Intake/Output Summary (Last 24 hours) at 1/30/2024 1125  Last data filed at 1/30/2024 1100  Gross per 24 hour   Intake 1100 ml   Output 208 ml   Net 892 ml     Exam:  GENERAL:  well-developed, well-nourished, sitting in bed in no acute distress.   ENT:  Head is normocephalic, has bandage on top of head. Shunt externalized. Oropharynx is  moist without exudates or ulcers.  EYES:  Eyes have anicteric sclerae.    NECK:  Supple.  LUNGS:  Clear to auscultation.  CARDIOVASCULAR:  Regular rate and rhythm with no murmurs, gallops or rubs.  ABDOMEN:  Normal bowel sounds, soft, nontender.  EXT: Extremities warm and without edema.  SKIN:  No acute rashes.  Line is in place without any surrounding erythema.  NEUROLOGIC:  Grossly nonfocal.         Laboratory Data:   Reviewed.  Pertinent for: WBC 7.7, CRP 23 on 1/27    Culture data:    Collected Updated Procedure Result Status    01/27/2024 2301 01/30/2024 0728 Cerebrospinal fluid Aerobic Bacterial Culture Routine With Gram Stain [[id number]]    Cerebrospinal fluid from Drain    Preliminary result Component Value   Culture No growth after 2 days P   Gram Stain Result No organisms seen P    1+ WBC seen P             01/27/2024 2301 01/28/2024 0232 CSF Cell Count with Differential: [[id number]]    Cerebrospinal fluid from Drain    Final result Component Value   No component results          01/27/2024 2301 01/30/2024 0047 Anaerobic CSF culture [[id number]]   Cerebrospinal fluid from Drain    Preliminary result Component Value   Culture No anaerobic organisms isolated after 2 days P             01/27/2024 2301 01/28/2024 0232 Cell Count CSF [[id number]]   Cerebrospinal fluid from Drain    Final result Component Value   Tube Number none   Color Colorless   Clarity Clear          01/27/2024 1640 01/27/2024 1741 Symptomatic Influenza A/B, RSV, & SARS-CoV2 PCR (COVID-19) Nose [[id number]]    Swab from Nose    Final result Component Value   Influenza A PCR Negative   Influenza B PCR Negative   RSV PCR Negative   SARS CoV2 PCR Negative   NEGATIVE: SARS-CoV-2 (COVID-19) RNA not detected, presumed negative.          01/27/2024 1637 01/29/2024 2003 Blood Culture Peripheral Blood [[id number]]   Peripheral Blood    Preliminary result Component Value   Culture No growth after 2 days P             01/27/2024 1637  01/29/2024 2003 Blood Culture Peripheral Blood [[id number]]   Peripheral Blood    Preliminary result Component Value   Culture No growth after 2 days P                      Imaging:      CT HEAD W/O CONTRAST 1/29/2024 4:19 AM    History: assess for stability of vents in patient with externalized   shunt    Comparison: CT 1/28/2024, 1/27/2024.    Technique: Using multidetector thin collimation helical acquisition  technique, axial, coronal and sagittal CT images from the skull base  to the vertex were obtained without intravenous contrast.   (topogram) image(s) also obtained and reviewed.    Findings: Right frontal approach ventriculostomy catheter tip  terminates within the right lateral ventricle near the foramen of  Monro. There is associated markedly decreased size of the lateral and  third ventricles. Decreased periventricular white matter  hypoattenuation, likely related to transependymal flow. There is no  intracranial hemorrhage, mass effect, or midline shift. Gray/white  matter differentiation in both cerebral hemispheres is preserved. The  basal cisterns are clear.    The bony calvaria and the bones of the skull base are normal. The  visualized portions of the paranasal sinuses and mastoid air cells are  clear. Left scleral band.  Impression:  Impression:  1. New ventriculostomy catheter and resolution of hydrocephalus.  2. Decreased transependymal flow of CSF.    I have personally reviewed the examination and initial interpretation  and I agree with the findings.    WU MORELAND MD        SYSTEM ID:  [id number] Head w/o Contrast [[id number]]Resulted: 01/28/24 0603Order Status: CompletedUpdated: 01/28/24 0606Narrative:  EXAM: CT HEAD W/O CONTRAST  1/28/2024 4:53 AM    HISTORY:  Eval stability of vents s/p shunt externalization      COMPARISON:  1/27/2024    TECHNIQUE: Using multidetector thin collimation helical acquisition  technique, axial, coronal and sagittal CT images from the skull base  to  the vertex were obtained without intravenous contrast.   (topogram) image(s) also obtained and reviewed.    FINDINGS:  Post surgical changes of right frontal approach ventriculostomy with  catheter terminating in the right lateral ventricle near the foramen  of Haile. Slightly decreased size of the temporal horn, trigone, and  occipital horns of the lateral ventricles. Stable size of the anterior  horns. Similar degree of transependymal flow.    No acute intracranial hemorrhage, mass effect, or midline shift. No  acute loss of gray-white matter differentiation in the cerebral  hemispheres. Clear basal cisterns.    The bony calvaria in the bones of the skull base are normal.  Pneumatized petrous temporal bones. The visualized portions of the  paranasal sinuses and mastoid air cells are clear. Orbits are  unremarkable.  Impression:  IMPRESSION: Slightly decreased ventriculomegaly with unchanged degree  of transependymal flow.    I have personally reviewed the examination and initial interpretation  and I agree with the findings.    MAKENNA MENDOZA MD        SYSTEM ID:  [id number] XR, PA & LAT [[id number]]Collected: 01/27/24 1707Order Status: CompletedUpdated: 01/27/24 1732Narrative:  EXAM: XR CHEST 2 VIEWS  LOCATION: [facility]  DATE: 1/27/2024    INDICATION: leukocytosis; eval for infiltrate  COMPARISON: 08/16/2020  Impression:  IMPRESSION: Similar appearance of the right  shunt is including a prior abandoned shunt with partial calcification along the tract of the catheter. Lungs are clear. No effusions. Heart size is normal. No acute osseous findings.CT Abdomen Pelvis w Contrast [[id number]]Collected: 01/27/24 1528Order Status: CompletedUpdated: 01/27/24 1617Narrative:  EXAM: CT ABDOMEN PELVIS W CONTRAST  LOCATION: [facility]  DATE: 1/27/2024    INDICATION: Right lower quadrant pain, vomiting  COMPARISON: CT  12/15/2023  TECHNIQUE: CT scan of the abdomen and pelvis was performed following injection of IV contrast. Multiplanar reformats were obtained. Dose reduction techniques were used.  CONTRAST: 129mL isovue 370    FINDINGS:  LOWER CHEST: Unremarkable.    HEPATOBILIARY: Normal.    PANCREAS: Normal.    SPLEEN: Normal.    ADRENAL GLANDS: Normal.    KIDNEYS/BLADDER: Likely small bilateral renal cysts, no specific follow-up recommended. No hydronephrosis. Urinary bladder is unremarkable.    BOWEL: Prior appendectomy with interval resolution of right lower quadrant fluid collections. No obstruction or acute inflammatory change.    LYMPH NODES/PERITONEUM: No suspicious lymphadenopathy. Very small rim-enhancing fluid collection just to the right of the midline associated with the tip of one of the ventriculoperitoneal shunt catheter, measuring 1.5 x 1.1 x 0.7 cm (series 6 image  154). There is some surrounding fat stranding. No additional fluid collections visualized.    VASCULATURE: Unremarkable.    PELVIC ORGANS: Normal.    MUSCULOSKELETAL: No acute bony abnormality. Stable position of ventriculoperitoneal shunt catheters, larger diameter catheter tip in the right upper quadrant, smaller diameter catheter in the pelvis just to the right of the midline as above. No definite  kinking or discontinuity. Stable postsurgical changes in the anterior abdominal wall.  Impression:  IMPRESSION:  1.  Very small peritoneal fluid collection at the level of the pelvic inlet just to the right of the midline associated with one of the ventriculoperitoneal shunt catheter tips. Differential includes small abscess related to prior peritonitis versus  developing CSF pseudocyst.  2.  Interval removal of percutaneous drainage catheter without evidence of residual or recurrent fluid collection in the paracolic gutter.CT Head w/o Contrast [[id number]]Collected: 01/27/24 1526Order Status: CompletedUpdated: 01/27/24 1600Addenda:Addendum/ impression:    Dr. Reid discussed the results with Dr. Kramer on 1/27/2024 3:57 PM   CST by telephone.Signed: 01/27/24 1557 by Nick Reid MDNarrative:  EXAM: CT HEAD W/O CONTRAST  LOCATION: [facility]  DATE: 1/27/2024    INDICATION: headache  COMPARISON: 08/18/2020  TECHNIQUE: Routine CT Head without IV contrast. Multiplanar reformats. Dose reduction techniques were used.    FINDINGS:  INTRACRANIAL CONTENTS: No evidence of acute intracranial hemorrhage or mass effect. Right frontal approach ventricular shunt catheters, tip within the left lateral ventricle. Interval increase in size of the lateral ventricles with colpocephaly,  concerning for shunt malfunction. Transependymal edema is noted. Obstruction    VISUALIZED ORBITS/SINUSES/MASTOIDS: The globes are unremarkable. The partially imaged paranasal sinuses, mastoid air cells and middle ear cavities are unremarkable.    BONES/SOFT TISSUES: The visualized skull base and calvarium are unremarkable.  Impression:  IMPRESSION:    1.  No evidence of acute intracranial hemorrhage or mass effect.  2.  Right frontal approach ventricular shunt catheter, tip within the left lateral ventricle. Interval increase in size of the lateral ventricles with colpocephaly, concerning for shunt malfunction.XR Shunt Malfunction Surgery Survey [[id number]]Collected: 01/27/24 1521Order Status: CompletedUpdated: 01/27/24 1526Narrative:  EXAM: XR SHUNT MALFUNCTION SURVEY  LOCATION: [facility]  DATE: 1/27/2024    INDICATION: headache s p  shunt  COMPARISON: None.  Impression:  IMPRESSION: There is an abandoned shunt catheter at the right chest. The connected shunt tube is intact over the calvarium, neck, chest, and abdomen. No kinks or breaks. Excreted contrast is noted within the renal collecting system.   Statement Selected

## 2024-01-31 LAB
ANION GAP SERPL CALCULATED.3IONS-SCNC: 9 MMOL/L (ref 7–15)
APPEARANCE CSF: CLEAR
BASOPHILS # BLD AUTO: 0 10E3/UL (ref 0–0.2)
BASOPHILS NFR BLD AUTO: 0 %
BUN SERPL-MCNC: 13.1 MG/DL (ref 6–20)
CALCIUM SERPL-MCNC: 8.6 MG/DL (ref 8.6–10)
CHLORIDE SERPL-SCNC: 108 MMOL/L (ref 98–107)
COLOR CSF: COLORLESS
CREAT SERPL-MCNC: 0.98 MG/DL (ref 0.67–1.17)
CRP SERPL-MCNC: 17.8 MG/L
DEPRECATED HCO3 PLAS-SCNC: 22 MMOL/L (ref 22–29)
EGFRCR SERPLBLD CKD-EPI 2021: >90 ML/MIN/1.73M2
EOSINOPHIL # BLD AUTO: 0.2 10E3/UL (ref 0–0.7)
EOSINOPHIL NFR BLD AUTO: 3 %
ERYTHROCYTE [DISTWIDTH] IN BLOOD BY AUTOMATED COUNT: 15 % (ref 10–15)
ERYTHROCYTE [DISTWIDTH] IN BLOOD BY AUTOMATED COUNT: 15.2 % (ref 10–15)
ERYTHROCYTE [SEDIMENTATION RATE] IN BLOOD BY WESTERGREN METHOD: 9 MM/HR (ref 0–15)
GLUCOSE CSF-MCNC: 70 MG/DL (ref 40–70)
GLUCOSE SERPL-MCNC: 88 MG/DL (ref 70–99)
HCT VFR BLD AUTO: 42.5 % (ref 40–53)
HCT VFR BLD AUTO: 42.8 % (ref 40–53)
HGB BLD-MCNC: 13 G/DL (ref 13.3–17.7)
HGB BLD-MCNC: 13.3 G/DL (ref 13.3–17.7)
IMM GRANULOCYTES # BLD: 0.1 10E3/UL
IMM GRANULOCYTES NFR BLD: 1 %
LYMPHOCYTES # BLD AUTO: 2 10E3/UL (ref 0.8–5.3)
LYMPHOCYTES NFR BLD AUTO: 28 %
MCH RBC QN AUTO: 23.6 PG (ref 26.5–33)
MCH RBC QN AUTO: 24.1 PG (ref 26.5–33)
MCHC RBC AUTO-ENTMCNC: 30.6 G/DL (ref 31.5–36.5)
MCHC RBC AUTO-ENTMCNC: 31.1 G/DL (ref 31.5–36.5)
MCV RBC AUTO: 76 FL (ref 78–100)
MCV RBC AUTO: 79 FL (ref 78–100)
MONOCYTES # BLD AUTO: 0.7 10E3/UL (ref 0–1.3)
MONOCYTES NFR BLD AUTO: 9 %
NEUTROPHILS # BLD AUTO: 4.1 10E3/UL (ref 1.6–8.3)
NEUTROPHILS NFR BLD AUTO: 59 %
NRBC # BLD AUTO: 0 10E3/UL
NRBC BLD AUTO-RTO: 0 /100
PLATELET # BLD AUTO: 225 10E3/UL (ref 150–450)
PLATELET # BLD AUTO: 232 10E3/UL (ref 150–450)
POTASSIUM SERPL-SCNC: 3.9 MMOL/L (ref 3.4–5.3)
PROT CSF-MCNC: 7.3 MG/DL (ref 15–45)
RBC # BLD AUTO: 5.4 10E6/UL (ref 4.4–5.9)
RBC # BLD AUTO: 5.63 10E6/UL (ref 4.4–5.9)
RBC # CSF MANUAL: 42 /UL (ref 0–2)
SODIUM SERPL-SCNC: 139 MMOL/L (ref 135–145)
TUBE # CSF: ABNORMAL
WBC # BLD AUTO: 6.4 10E3/UL (ref 4–11)
WBC # BLD AUTO: 7.1 10E3/UL (ref 4–11)
WBC # CSF MANUAL: 3 /UL (ref 0–5)

## 2024-01-31 PROCEDURE — 250N000013 HC RX MED GY IP 250 OP 250 PS 637

## 2024-01-31 PROCEDURE — 89050 BODY FLUID CELL COUNT: CPT

## 2024-01-31 PROCEDURE — 250N000011 HC RX IP 250 OP 636

## 2024-01-31 PROCEDURE — 87075 CULTR BACTERIA EXCEPT BLOOD: CPT

## 2024-01-31 PROCEDURE — 85025 COMPLETE CBC W/AUTO DIFF WBC: CPT | Performed by: NURSE PRACTITIONER

## 2024-01-31 PROCEDURE — 36415 COLL VENOUS BLD VENIPUNCTURE: CPT

## 2024-01-31 PROCEDURE — 36415 COLL VENOUS BLD VENIPUNCTURE: CPT | Performed by: NURSE PRACTITIONER

## 2024-01-31 PROCEDURE — 250N000013 HC RX MED GY IP 250 OP 250 PS 637: Performed by: NEUROLOGICAL SURGERY

## 2024-01-31 PROCEDURE — 84157 ASSAY OF PROTEIN OTHER: CPT

## 2024-01-31 PROCEDURE — 85652 RBC SED RATE AUTOMATED: CPT

## 2024-01-31 PROCEDURE — 87205 SMEAR GRAM STAIN: CPT

## 2024-01-31 PROCEDURE — 999N000248 HC STATISTIC IV INSERT WITH US BY RN

## 2024-01-31 PROCEDURE — 82945 GLUCOSE OTHER FLUID: CPT

## 2024-01-31 PROCEDURE — 85027 COMPLETE CBC AUTOMATED: CPT

## 2024-01-31 PROCEDURE — 86140 C-REACTIVE PROTEIN: CPT

## 2024-01-31 PROCEDURE — 258N000003 HC RX IP 258 OP 636: Performed by: NEUROLOGICAL SURGERY

## 2024-01-31 PROCEDURE — 120N000002 HC R&B MED SURG/OB UMMC

## 2024-01-31 PROCEDURE — 80048 BASIC METABOLIC PNL TOTAL CA: CPT | Performed by: NURSE PRACTITIONER

## 2024-01-31 PROCEDURE — 250N000011 HC RX IP 250 OP 636: Performed by: NEUROLOGICAL SURGERY

## 2024-01-31 RX ADMIN — HEPARIN SODIUM 5000 UNITS: 5000 INJECTION, SOLUTION INTRAVENOUS; SUBCUTANEOUS at 12:30

## 2024-01-31 RX ADMIN — CEFEPIME 2 G: 2 INJECTION, POWDER, FOR SOLUTION INTRAVENOUS at 01:53

## 2024-01-31 RX ADMIN — HEPARIN SODIUM 5000 UNITS: 5000 INJECTION, SOLUTION INTRAVENOUS; SUBCUTANEOUS at 20:02

## 2024-01-31 RX ADMIN — HEPARIN SODIUM 5000 UNITS: 5000 INJECTION, SOLUTION INTRAVENOUS; SUBCUTANEOUS at 03:16

## 2024-01-31 RX ADMIN — DIPHENHYDRAMINE HYDROCHLORIDE 50 MG: 25 CAPSULE ORAL at 03:16

## 2024-01-31 RX ADMIN — Medication 1 DROP: at 22:09

## 2024-01-31 RX ADMIN — VANCOMYCIN HYDROCHLORIDE 1250 MG: 10 INJECTION, POWDER, LYOPHILIZED, FOR SOLUTION INTRAVENOUS at 04:22

## 2024-01-31 RX ADMIN — METRONIDAZOLE 500 MG: 500 TABLET ORAL at 08:35

## 2024-01-31 ASSESSMENT — ACTIVITIES OF DAILY LIVING (ADL)
ADLS_ACUITY_SCORE: 18

## 2024-01-31 NOTE — PLAN OF CARE
Major Shift Events:  Patient AxOx4, Neuros intact. VSS on RA. Denies pain/discomfort. EVD open to drain at all times. Drain Below EAM. Hourly output ranging from 3-17ml. EVD drain bag changed. Patient up SBA, with steady gait. Good appetite, voiding spontaneously, LBM yesterday.   Plan: Transfer to  when able. Cont. Monitor CSF output.   For vital signs and complete assessments, please see documentation flowsheets.     Goal Outcome Evaluation:      Plan of Care Reviewed With: patient    Overall Patient Progress: improvingOverall Patient Progress: improving

## 2024-01-31 NOTE — PLAN OF CARE
Transferred to: Unit 6A  at 1615  Belongings: Sent with patient   Jim removed? No: No jim in place  Central line removed? NA  Chart and medications sent with patient Yes  Family notified: Yes; patient will notify family.     Report called to 6A RN.  VSS. Neuro assessment intact. External drain output charted every hour. Denies pain or discomforts. IV TKO. Up SBA/IND

## 2024-01-31 NOTE — PHARMACY-VANCOMYCIN DOSING SERVICE
Pharmacy Vancomycin Note  Date of Service 2024  Patient's  1995   29 year old, male    Indication:  Abscess and possible  shunt infection  Day of Therapy: 3  Current vancomycin regimen:  1500 mg IV q8h  Current vancomycin monitoring method: Trough (Method 2 = manual dose calculation)  Current vancomycin therapeutic monitoring goal: 15-20 mg/L- closer to 20 mg/L for indication    Current estimated CrCl = Estimated Creatinine Clearance: 120.2 mL/min (based on SCr of 1.13 mg/dL).    Creatinine for last 3 days  2024:  6:05 AM Creatinine 0.93 mg/dL  2024:  7:37 AM Creatinine 1.02 mg/dL  2024:  7:20 AM Creatinine 1.13 mg/dL    Recent Vancomycin Levels (past 3 days)  2024:  5:46 PM Vancomycin 24.7 ug/mL    Vancomycin IV Administrations (past 72 hours)                     vancomycin (VANCOCIN) 1,500 mg in 0.9% NaCl 250 mL intermittent infusion (mg) 1,500 mg New Bag 24 1900     1,500 mg New Bag  1115     1,500 mg New Bag  0339     1,500 mg New Bag 24 2035     1,500 mg New Bag  1149    vancomycin (VANCOCIN) 2,500 mg in sodium chloride 0.9 % 500 mL intermittent infusion (mg) 2,500 mg New Bag 24 1849                    Nephrotoxins and other renal medications (From now, onward)      Start     Dose/Rate Route Frequency Ordered Stop    24 0400  vancomycin (VANCOCIN) 1,250 mg in 0.9% NaCl 250 mL intermittent infusion        See Hyperspace for full Linked Orders Report.    1,250 mg  over 90 Minutes Intravenous EVERY 8 HOURS 24 1905                 Contrast Orders - past 72 hours (72h ago, onward)      None            Interpretation of levels and current regimen:  Vancomycin level is reflective of supratherapeutic level    Has serum creatinine changed greater than 50% in last 72 hours: No    Urine output:  urine output is not being measured in volume    Renal Function: Stable    Plan:  Decrease Dose to Vancomycin 1250 mg iv q8h  Vancomycin monitoring  method: Trough (Method 2 = manual dose calculation)  Vancomycin therapeutic monitoring goal: 15-20 mg/L  Pharmacy will check vancomycin levels as appropriate in 1-3 Days.  Serum creatinine levels will be ordered daily for the first week of therapy and at least twice weekly for subsequent weeks.    Samara Hernandez, [place]

## 2024-01-31 NOTE — PROGRESS NOTES
Neurocritical Care Multi-Disciplinary Note    Reason for critical care admission: Status post externalization of shunt  Primary Team: [facility]  Date of Service:  01/31/2024  Date of Admission:  1/27/2024  Hospital Day: 5      29 year old male with PMH of prematurity, congenital hydrocephalus (s/p  shunt at ~age 3) ventriculomegaly and a small peritoneal fluid collection at  the ventriculoperitoneal shunt catheter tip concerning for abscess versus CSF pseudocyst.     PPD-3 externalization of shunt. Cont ABX per neurosurgery team     Patient condition reviewed and discussed while on multidisciplinary rounds today. Please note these minor interventions that were initiated:  None   Per Neurosurge ok to transfer to the floor     The Neurocritical Care service will continue to follow peripherally while the patient is within the ICU. We are readily available should issues arise. Please feel free to contact us for critical care issues with which we may be of assistance. For all other concerns, please contact primary service first.     Please contact [facility] team phone at *[phone]    Vania Lee [facility]   Neurocritical Care *[phone]

## 2024-01-31 NOTE — PLAN OF CARE
Major Shift Events:  No issues overnight. VSS. Neuros intact. IV abx continues. Voiding independently. Tolerating diet.   Plan: Transfer to floor.   For vital signs and complete assessments, please see documentation flowsheets.       Goal Outcome Evaluation:      Plan of Care Reviewed With: patient    Overall Patient Progress: improvingOverall Patient Progress: improving

## 2024-01-31 NOTE — PROGRESS NOTES
[facility], [place]   01/31/2024  Neurosurgery Progress Note:    Assessment:  Donald Jackson is a 29 year old male with PMH of prematurity, congenital hydrocephalus (s/p  shunt at ~age 3) recent acute appendicitis (admitted 12/03/2023) with laparoscopic appendectomy complicated by intraoperative perforation (12/5/2023) who presents with 2-3 days of headache, nausea, vomiting, and confusion with CT head concerning for ventriculomegaly and a small peritoneal fluid collection at  the ventriculoperitoneal shunt catheter tip concerning for abscess versus CSF pseudocyst.     PPD-3 externalization of shunt.    Plan:  -- Follow CSF cultures  -- Continue vancomycin (w benadryl) + cefepime + flagyl: Ongoing discussions with ID regarding shunt reimplantation timing  - Serial neuro exams  - Pain control  - HOB > 30 degrees  - Advance diet as tolerates  - Bowel regimen  - PRN antiemetics  - IVF until taking adequate PO  - PT/OT  - SCDs for DVT proph  - SQH  -- Okay to transfer to the floor  -----------------------------------  Gaurav Parry MD  Neurosurgery Resident  Pager: [phone]    Interval History: Exam improving from a cognitive standpoint./ Headaches minimal.    Objective:   Temp:  [97.6  F (36.4  C)-98.6  F (37  C)] 98.6  F (37  C)  Pulse:  [86-97] 86  Resp:  [16-18] 16  BP: (111-135)/(54-86) 135/74  SpO2:  [94 %-100 %] 100 %  I/O last 3 completed shifts:  In: 1110 [P.O.:720; I.V.:390]  Out: 186 [Drains:186]    Gen: Appears comfortable, NAD  Neurologic:  - Alert & Oriented to person, place, time, and situation  - Follows commands briskly  - Speech fluent, spontaneous. No aphasia or dysarthria.  - No gaze preference. No apparent hemineglect.  - PERRL, EOMI  - Face symmetric with sensation intact to light touch  - Palate elevates symmetrically, uvula midline, tongue protrudes midline  - Trapezii muscles 5/5 bilaterally  - No pronator drift     Del Tr Bi WE WF Gr   R 5 5 5 5 5 5   L 5 5 5 5 5 5     HF KE KF DF PF EHL   R 5 5 5 5 5 5   L 5 5 5 5 5 5     Reflexes 2+ throughout    Sensation intact and symmetric to light touch throughout    LABS:  Recent Labs   Lab 01/30/24  0720 01/29/24  0737 01/28/24  0605    134* 136   POTASSIUM 3.9 3.9 4.3   CHLORIDE 107 103 104   CO2 22 23 21*   ANIONGAP 9 8 11   GLC 98 89 94   BUN 15.2 17.2 12.9   CR 1.13 1.02 0.93   ANTHONY 8.6 8.7 9.2       Recent Labs   Lab 01/30/24  0720   WBC 7.7   RBC 5.41   HGB 12.7*   HCT 40.8   MCV 75*   MCH 23.5*   MCHC 31.1*   RDW 15.1*          IMAGING:  No results found for this or any previous visit (from the past 24 hour(s)).    I have seen this patient with the resident and formulated a plan and agree with this note.  AMP

## 2024-01-31 NOTE — PLAN OF CARE
Arrived from:   Belongings/meds: Phone, phone , wallet, De[employer], water bottle  2 RN Skin Assessment Completed by: Terri ORTEGA and Dora AVILEZ  Non-intact findings documented (yes/no/NA): Externalized shunt chest site covered, CDI. Band aid on head from  shunt tap. Old lap sites from appendectomy 12/2023. Old L forearm extravasation site red, marked w/ no extension

## 2024-02-01 ENCOUNTER — HOME INFUSION (PRE-WILLOW HOME INFUSION) (OUTPATIENT)
Dept: PHARMACY | Facility: CLINIC | Age: 29
End: 2024-02-01
Payer: COMMERCIAL

## 2024-02-01 LAB
ANION GAP SERPL CALCULATED.3IONS-SCNC: 9 MMOL/L (ref 7–15)
BACTERIA BLD CULT: NO GROWTH
BACTERIA BLD CULT: NO GROWTH
BUN SERPL-MCNC: 12.7 MG/DL (ref 6–20)
CALCIUM SERPL-MCNC: 9.2 MG/DL (ref 8.6–10)
CHLORIDE SERPL-SCNC: 104 MMOL/L (ref 98–107)
CREAT SERPL-MCNC: 0.98 MG/DL (ref 0.67–1.17)
DEPRECATED HCO3 PLAS-SCNC: 25 MMOL/L (ref 22–29)
EGFRCR SERPLBLD CKD-EPI 2021: >90 ML/MIN/1.73M2
ERYTHROCYTE [DISTWIDTH] IN BLOOD BY AUTOMATED COUNT: 15.6 % (ref 10–15)
ERYTHROCYTE [SEDIMENTATION RATE] IN BLOOD BY WESTERGREN METHOD: 7 MM/HR (ref 0–15)
GLUCOSE SERPL-MCNC: 93 MG/DL (ref 70–99)
HCT VFR BLD AUTO: 44.6 % (ref 40–53)
HGB BLD-MCNC: 14 G/DL (ref 13.3–17.7)
MCH RBC QN AUTO: 23.8 PG (ref 26.5–33)
MCHC RBC AUTO-ENTMCNC: 31.4 G/DL (ref 31.5–36.5)
MCV RBC AUTO: 76 FL (ref 78–100)
PLATELET # BLD AUTO: 247 10E3/UL (ref 150–450)
POTASSIUM SERPL-SCNC: 4 MMOL/L (ref 3.4–5.3)
RBC # BLD AUTO: 5.88 10E6/UL (ref 4.4–5.9)
SODIUM SERPL-SCNC: 138 MMOL/L (ref 135–145)
WBC # BLD AUTO: 7.2 10E3/UL (ref 4–11)

## 2024-02-01 PROCEDURE — 99231 SBSQ HOSP IP/OBS SF/LOW 25: CPT

## 2024-02-01 PROCEDURE — 80048 BASIC METABOLIC PNL TOTAL CA: CPT | Performed by: NURSE PRACTITIONER

## 2024-02-01 PROCEDURE — 85027 COMPLETE CBC AUTOMATED: CPT | Performed by: NURSE PRACTITIONER

## 2024-02-01 PROCEDURE — 250N000011 HC RX IP 250 OP 636

## 2024-02-01 PROCEDURE — 120N000002 HC R&B MED SURG/OB UMMC

## 2024-02-01 PROCEDURE — 85652 RBC SED RATE AUTOMATED: CPT

## 2024-02-01 PROCEDURE — 36415 COLL VENOUS BLD VENIPUNCTURE: CPT

## 2024-02-01 PROCEDURE — 99233 SBSQ HOSP IP/OBS HIGH 50: CPT | Performed by: INTERNAL MEDICINE

## 2024-02-01 RX ADMIN — HEPARIN SODIUM 5000 UNITS: 5000 INJECTION, SOLUTION INTRAVENOUS; SUBCUTANEOUS at 04:02

## 2024-02-01 RX ADMIN — HEPARIN SODIUM 5000 UNITS: 5000 INJECTION, SOLUTION INTRAVENOUS; SUBCUTANEOUS at 20:08

## 2024-02-01 RX ADMIN — HEPARIN SODIUM 5000 UNITS: 5000 INJECTION, SOLUTION INTRAVENOUS; SUBCUTANEOUS at 12:45

## 2024-02-01 ASSESSMENT — ACTIVITIES OF DAILY LIVING (ADL)
ADLS_ACUITY_SCORE: 18
DEPENDENT_IADLS:: INDEPENDENT
ADLS_ACUITY_SCORE: 18

## 2024-02-01 NOTE — PROGRESS NOTES
General ID Service: Follow Up Note      Patient:  Donald Jackson, Date of birth 1995, Medical record number [id number]  Date of Visit:  January 30, 2024         Assessment and Recommendations:   ID Problem List:    Headache, abdominal pain, chills- all much improved  Shunt malfunction with concern for  shunt infection  CSF studies wnl- Glucose 59, colorless fluid, protein 8.3, cultures ngtd from 1/27  Residual peritoneal fluid collection associated with one of the  shunt catheter tips on CT abdomen 1/27 concerning for abscess vs pseudocyst on radiologist read  S/p shunt externalization 1/28 by neurosurgery  H/o recent appendicitis c/b perforation, s/p appendectomy (12/5/2023 at [facility]) c/b peritonitis and intraabdominal abscesses  S/p IR drain 12/12//23 at OSH , cultures reportedly only grew Pseudomonas on aerobic cultures (fungal and anaerobic cultures were negative)  Followed with [facility], Dr. Don Messer  Pt quit antibiotics on 12/29/23  History of  shunt  History of penicillin allergy (hives)    Recommendations:    -patient doing well off antibiotics  -CRP decreased to 18 from 23, would repeat tomorrow or Saturday  -I would re-image with a CT abd/pelvis to re-evaluate fluid collection in abdomen tomorrow     Discussion:    Patient with recent appendicitis c/b perforation, s/p appendectomy (12/5/2023 at [facility]) c/b peritonitis and intraabdominal abscesses growing pseudomonas. He has had an extended course of antibiotics and was much improved. CRP 12/21 was down to 3.1 (upper limit of normal 0.5). He was still on levofloxacin and flagyl as of 12/21. Appears the flagyl went through 12/30 and levofloxacin went through 12/31. ID note says fluconazole was stopped after discharge at [facility]. Abdominal drain was removed 12/26.    He presented here with malfunctioning shunt and fluid collection at the end in the abdomen. Question is if this is an abscess or  pseudocyst. The shunt labs and cultures are completely normal suggesting shunt is not infected. Hard to know if the distal shunt is infected.    Most symptoms could be explained by a shunt malfunction with pseudocyst. However, the elevated CRP is a little concerning. It decreased to 17.8 but is still slightly elevated.    I think to decide if the shunt can go back in the abdomen I would image tomorrow and repeat CRP over the weekend.     We will follow along.     Attestation:  I have reviewed today's vital signs, medications, labs and imaging.  Katrina Hernandez MD  Pager [phone]          Interval History:       Patient feels very well. He says his abdominal pain is less than 1 and no more headaches or chills.          Review of Systems:   Full 9 pt ROS obtained, pertinent positives and negatives as above.          Current Antimicrobials   Current:    none         Physical Exam:   Ranges for vital signs:  Temp:  [97.9  F (36.6  C)-99  F (37.2  C)] 97.9  F (36.6  C)  Pulse:  [67-92] 72  Resp:  [16-20] 16  BP: (111-137)/(58-99) 115/58  SpO2:  [98 %-100 %] 100 %    Intake/Output Summary (Last 24 hours) at 1/30/2024 1125  Last data filed at 1/30/2024 1100  Gross per 24 hour   Intake 1100 ml   Output 208 ml   Net 892 ml     Exam:  GENERAL:  well-developed, well-nourished, sitting in bed in no acute distress.   ENT:  Head is normocephalic, has bandage on top of head. Shunt externalized. Oropharynx is moist without exudates or ulcers.  EYES:  Eyes have anicteric sclerae.    NECK:  Supple.  LUNGS:  Clear to auscultation.  CARDIOVASCULAR:  Regular rate and rhythm with no murmurs, gallops or rubs.  ABDOMEN:  Normal bowel sounds, soft, nontender.  EXT: Extremities warm and without edema.  SKIN:  No acute rashes.  Line is in place without any surrounding erythema.  NEUROLOGIC:  Grossly nonfocal.         Laboratory Data:   Reviewed.  Pertinent for: WBC 7.7, CRP 23 on 1/27    Culture data:    Collected Updated Procedure Result  Status    01/27/2024 2301 01/30/2024 0728 Cerebrospinal fluid Aerobic Bacterial Culture Routine With Gram Stain [[id number]]    Cerebrospinal fluid from Drain    Preliminary result Component Value   Culture No growth after 2 days P   Gram Stain Result No organisms seen P    1+ WBC seen P             01/27/2024 2301 01/28/2024 0232 CSF Cell Count with Differential: [[id number]]    Cerebrospinal fluid from Drain    Final result Component Value   No component results          01/27/2024 2301 01/30/2024 0047 Anaerobic CSF culture [[id number]]   Cerebrospinal fluid from Drain    Preliminary result Component Value   Culture No anaerobic organisms isolated after 2 days P             01/27/2024 2301 01/28/2024 0232 Cell Count CSF [[id number]]   Cerebrospinal fluid from Drain    Final result Component Value   Tube Number none   Color Colorless   Clarity Clear          01/27/2024 1640 01/27/2024 1741 Symptomatic Influenza A/B, RSV, & SARS-CoV2 PCR (COVID-19) Nose [[id number]]    Swab from Nose    Final result Component Value   Influenza A PCR Negative   Influenza B PCR Negative   RSV PCR Negative   SARS CoV2 PCR Negative   NEGATIVE: SARS-CoV-2 (COVID-19) RNA not detected, presumed negative.          01/27/2024 1637 01/29/2024 2003 Blood Culture Peripheral Blood [[id number]]   Peripheral Blood    Preliminary result Component Value   Culture No growth after 2 days P             01/27/2024 1637 01/29/2024 2003 Blood Culture Peripheral Blood [[id number]]   Peripheral Blood    Preliminary result Component Value   Culture No growth after 2 days P                      Imaging:      CT HEAD W/O CONTRAST 1/29/2024 4:19 AM    History: assess for stability of vents in patient with externalized   shunt    Comparison: CT 1/28/2024, 1/27/2024.    Technique: Using multidetector thin collimation helical acquisition  technique, axial, coronal and sagittal CT images from the skull base  to the vertex were obtained without  intravenous contrast.   (topogram) image(s) also obtained and reviewed.    Findings: Right frontal approach ventriculostomy catheter tip  terminates within the right lateral ventricle near the foramen of  Monro. There is associated markedly decreased size of the lateral and  third ventricles. Decreased periventricular white matter  hypoattenuation, likely related to transependymal flow. There is no  intracranial hemorrhage, mass effect, or midline shift. Gray/white  matter differentiation in both cerebral hemispheres is preserved. The  basal cisterns are clear.    The bony calvaria and the bones of the skull base are normal. The  visualized portions of the paranasal sinuses and mastoid air cells are  clear. Left scleral band.  Impression:  Impression:  1. New ventriculostomy catheter and resolution of hydrocephalus.  2. Decreased transependymal flow of CSF.    I have personally reviewed the examination and initial interpretation  and I agree with the findings.    WU MORELAND MD        SYSTEM ID:  [id number] Head w/o Contrast [[id number]]Resulted: 01/28/24 0603Order Status: CompletedUpdated: 01/28/24 0606Narrative:  EXAM: CT HEAD W/O CONTRAST  1/28/2024 4:53 AM    HISTORY:  Eval stability of vents s/p shunt externalization      COMPARISON:  1/27/2024    TECHNIQUE: Using multidetector thin collimation helical acquisition  technique, axial, coronal and sagittal CT images from the skull base  to the vertex were obtained without intravenous contrast.   (topogram) image(s) also obtained and reviewed.    FINDINGS:  Post surgical changes of right frontal approach ventriculostomy with  catheter terminating in the right lateral ventricle near the foramen  of Haile. Slightly decreased size of the temporal horn, trigone, and  occipital horns of the lateral ventricles. Stable size of the anterior  horns. Similar degree of transependymal flow.    No acute intracranial hemorrhage, mass effect, or midline shift.  No  acute loss of gray-white matter differentiation in the cerebral  hemispheres. Clear basal cisterns.    The bony calvaria in the bones of the skull base are normal.  Pneumatized petrous temporal bones. The visualized portions of the  paranasal sinuses and mastoid air cells are clear. Orbits are  unremarkable.  Impression:  IMPRESSION: Slightly decreased ventriculomegaly with unchanged degree  of transependymal flow.    I have personally reviewed the examination and initial interpretation  and I agree with the findings.    MAKENNA MENDOZA MD        SYSTEM ID:  [id number] XR, PA & LAT [[id number]]Collected: 01/27/24 1707Order Status: CompletedUpdated: 01/27/24 1732Narrative:  EXAM: XR CHEST 2 VIEWS  LOCATION: [facility]  DATE: 1/27/2024    INDICATION: leukocytosis; eval for infiltrate  COMPARISON: 08/16/2020  Impression:  IMPRESSION: Similar appearance of the right  shunt is including a prior abandoned shunt with partial calcification along the tract of the catheter. Lungs are clear. No effusions. Heart size is normal. No acute osseous findings.CT Abdomen Pelvis w Contrast [[id number]]Collected: 01/27/24 1528Order Status: CompletedUpdated: 01/27/24 1617Narrative:  EXAM: CT ABDOMEN PELVIS W CONTRAST  LOCATION: [facility]  DATE: 1/27/2024    INDICATION: Right lower quadrant pain, vomiting  COMPARISON: CT 12/15/2023  TECHNIQUE: CT scan of the abdomen and pelvis was performed following injection of IV contrast. Multiplanar reformats were obtained. Dose reduction techniques were used.  CONTRAST: 129mL isovue 370    FINDINGS:  LOWER CHEST: Unremarkable.    HEPATOBILIARY: Normal.    PANCREAS: Normal.    SPLEEN: Normal.    ADRENAL GLANDS: Normal.    KIDNEYS/BLADDER: Likely small bilateral renal cysts, no specific follow-up recommended. No hydronephrosis. Urinary bladder is unremarkable.    BOWEL: Prior appendectomy with interval  resolution of right lower quadrant fluid collections. No obstruction or acute inflammatory change.    LYMPH NODES/PERITONEUM: No suspicious lymphadenopathy. Very small rim-enhancing fluid collection just to the right of the midline associated with the tip of one of the ventriculoperitoneal shunt catheter, measuring 1.5 x 1.1 x 0.7 cm (series 6 image  154). There is some surrounding fat stranding. No additional fluid collections visualized.    VASCULATURE: Unremarkable.    PELVIC ORGANS: Normal.    MUSCULOSKELETAL: No acute bony abnormality. Stable position of ventriculoperitoneal shunt catheters, larger diameter catheter tip in the right upper quadrant, smaller diameter catheter in the pelvis just to the right of the midline as above. No definite  kinking or discontinuity. Stable postsurgical changes in the anterior abdominal wall.  Impression:  IMPRESSION:  1.  Very small peritoneal fluid collection at the level of the pelvic inlet just to the right of the midline associated with one of the ventriculoperitoneal shunt catheter tips. Differential includes small abscess related to prior peritonitis versus  developing CSF pseudocyst.  2.  Interval removal of percutaneous drainage catheter without evidence of residual or recurrent fluid collection in the paracolic gutter.CT Head w/o Contrast [[id number]]Collected: 01/27/24 1526Order Status: CompletedUpdated: 01/27/24 1600Addenda:Addendum/ impression:   Dr. Reid discussed the results with Dr. Kramer on 1/27/2024 3:57 PM   CST by telephone.Signed: 01/27/24 1557 by Nick Reid MDNarrative:  EXAM: CT HEAD W/O CONTRAST  LOCATION: [facility]  DATE: 1/27/2024    INDICATION: headache  COMPARISON: 08/18/2020  TECHNIQUE: Routine CT Head without IV contrast. Multiplanar reformats. Dose reduction techniques were used.    FINDINGS:  INTRACRANIAL CONTENTS: No evidence of acute intracranial hemorrhage or mass effect. Right frontal  approach ventricular shunt catheters, tip within the left lateral ventricle. Interval increase in size of the lateral ventricles with colpocephaly,  concerning for shunt malfunction. Transependymal edema is noted. Obstruction    VISUALIZED ORBITS/SINUSES/MASTOIDS: The globes are unremarkable. The partially imaged paranasal sinuses, mastoid air cells and middle ear cavities are unremarkable.    BONES/SOFT TISSUES: The visualized skull base and calvarium are unremarkable.  Impression:  IMPRESSION:    1.  No evidence of acute intracranial hemorrhage or mass effect.  2.  Right frontal approach ventricular shunt catheter, tip within the left lateral ventricle. Interval increase in size of the lateral ventricles with colpocephaly, concerning for shunt malfunction.XR Shunt Malfunction Surgery Survey [[id number]]Collected: 01/27/24 1521Order Status: CompletedUpdated: 01/27/24 1526Narrative:  EXAM: XR SHUNT MALFUNCTION SURVEY  LOCATION: [facility]  DATE: 1/27/2024    INDICATION: headache s p  shunt  COMPARISON: None.  Impression:  IMPRESSION: There is an abandoned shunt catheter at the right chest. The connected shunt tube is intact over the calvarium, neck, chest, and abdomen. No kinks or breaks. Excreted contrast is noted within the renal collecting system.

## 2024-02-01 NOTE — CONSULTS
Care Management Initial Consult    General Information  Assessment completed with: Patient, [place]  Type of CM/SW Visit: Initial Assessment    Primary Care Provider verified and updated as needed: Yes   Readmission within the last 30 days:     Reason for Consult: discharge planning  Advance Care Planning: Advance Care Planning Reviewed: no concerns identified       Communication Assessment  Patient's communication style: spoken language (English or Bilingual)    Hearing Difficulty or Deaf: no   Wear Glasses or Blind: no    Cognitive  Cognitive/Neuro/Behavioral: WDL  Level of Consciousness: alert  Arousal Level: opens eyes spontaneously  Orientation: oriented x 4  Mood/Behavior: calm, cooperative  Best Language: 0 - No aphasia  Speech: clear, logical    Living Environment:   People in home: other (see comments) (friend/roommmate)     Current living Arrangements: other (see comments) ([facility])      Able to return to prior arrangements: yes    Family/Social Support:  Care provided by: self  Provides care for: no one  Marital Status: Single  Other (specify) (friends)          Description of Support System: Supportive       Current Resources:   Patient receiving home care services: No     Community Resources: None  Equipment currently used at home: none  Supplies currently used at home: None    Employment/Financial:  Employment Status: employed full-time        Financial Concerns:     Referral to Financial Worker: No     Does the patient's insurance plan have a 3 day qualifying hospital stay waiver?  No    Lifestyle & Psychosocial Needs:  Social Determinants of Health     Food Insecurity: Low Risk  (1/2/2024)    Food Insecurity     Within the past 12 months, did you worry that your food would run out before you got money to buy more?: No     Within the past 12 months, did the food you bought just not last and you didn t have money to get more?: No   Depression: Not at risk (1/5/2024)    PHQ-2     PHQ-2 Score: 2   Housing  Stability: Low Risk  (1/2/2024)    Housing Stability     Do you have housing? : Yes     Are you worried about losing your housing?: No   Tobacco Use: Low Risk  (1/5/2024)    Patient History     Smoking Tobacco Use: Never     Smokeless Tobacco Use: Never     Passive Exposure: Not on file   Financial Resource Strain: Low Risk  (1/2/2024)    Financial Resource Strain     Within the past 12 months, have you or your family members you live with been unable to get utilities (heat, electricity) when it was really needed?: No   Alcohol Use: Not At Risk (8/18/2020)    AUDIT-C     Frequency of Alcohol Consumption: Never     Average Number of Drinks: Patient declined     Frequency of Binge Drinking: Never   Transportation Needs: Low Risk  (1/2/2024)    Transportation Needs     Within the past 12 months, has lack of transportation kept you from medical appointments, getting your medicines, non-medical meetings or appointments, work, or from getting things that you need?: No   Physical Activity: Sufficiently Active (8/18/2020)    Exercise Vital Sign     Days of Exercise per Week: 5 days     Minutes of Exercise per Session: 50 min   Interpersonal Safety: Low Risk  (1/5/2024)    Interpersonal Safety     Do you feel physically and emotionally safe where you currently live?: Yes     Within the past 12 months, have you been hit, slapped, kicked or otherwise physically hurt by someone?: No     Within the past 12 months, have you been humiliated or emotionally abused in other ways by your partner or ex-partner?: No   Stress: No Stress Concern Present (6/26/2019)    [place] [employer] - Occupational Stress Questionnaire     Feeling of Stress : Not at all   Social Connections: Socially Isolated (8/18/2020)    Social Connection and Isolation Panel [NHANES]     Frequency of Communication with Friends and Family: Twice a week     Frequency of Social Gatherings with Friends and Family: Never     Attends Protestant  "Services: Never     Active Member of Clubs or Organizations: No     Attends Club or Organization Meetings: Never     Marital Status: Never      Functional Status:  Prior to admission patient needed assistance:   Dependent ADLs:: Independent  Dependent IADLs:: Independent     Mental Health Status:  Mental Health Status: No Current Concerns       Chemical Dependency Status:  Chemical Dependency Status: No Current Concerns         Values/Beliefs:  Spiritual, Cultural Beliefs, Buddhist Practices, Values that affect care: no            Additional Information:  Patient status reviewed, discussed in discharge rounds. Patient previously on IV antibiotics. ICU RNCC sent referral to [facility] to check benefits. ID is now holding IV antibiotics.     Met with patient to complete initial care management assessment. Patient currently lives in a [facility] in [facility] with his roommate, Pramod. Patient denies any assistive devices at baseline, states he is independent with ADLs/IADLs. He was previously working part time at the  of a hotel. He denies current home or community services.     RNCC will continue to follow and assist with discharge planning as indicated.    [id number] Addendum:  Per [facility]: \"This patient does not have Home Infusion coverage through his [employer] plan. Pt will have to Self-pay for services with [facility].\"    Alee Duff, RN, BSN  6A RN Care Coordinator  Ph: [phone]   Pager: [phone]  "

## 2024-02-01 NOTE — PLAN OF CARE
Status:  Admitted 1/27 with nausea, vomiting, confusion/AMS, shunt malfunction with concern for  shunt infection. Hx acute appendicitis (12/2023) with appendectomy c/b intra-op perforation,  CT head concerning for ventriculomegaly and a small peritoneal fluid collection at  the ventriculoperitoneal shunt catheter tip concerning for abscess versus CSF pseudocyst.      Vitals: VSS  Neuros: A&Ox4, 5/5 t/o, Intermittent numbness to R thigh since surgery  IV: PIV TKO  Labs/Electrolytes: Redraw AM  Resp/trach: Denies SOB on RA  Diet: Regular diet  Bowel status: LBM 1/31  : Voiding spont   Skin: Externalized shunt chest site covered. CDI. L forearm extravasation site red, marked w/ marked w/ no extension. Old lap site from appenedectomy 122/2023  Pain: Denies  Activity: SBA  Plan: External shunt open to gravity, continue to Output Q1hr, continue to doc. Plan for further Abx.

## 2024-02-01 NOTE — PROVIDER NOTIFICATION
"  MD Parry paged at [phone] re:    \"Renetta in [phone] on 6a-FYI externalized shunt with 0-2ml/hr output overnight.  At [facility] level.  Thanks.  Istar [phone] \"  "

## 2024-02-01 NOTE — PLAN OF CARE
Status: Admitted 1/27 with nausea, vomiting, confusion/AMS, shunt malfunction with concern for  shunt infection. Hx acute appendicitis (12/2023) with appendectomy c/b intra-op perforation.  Vitals: VSS on RA  Neuros: A&Ox4, 5/5 throughout, intermittent numbness to R thigh (has been happening since appendectomy surgery)  IV: PIV TKO  Labs/Electrolytes: WNL  Resp/trach: Clear lungs, denies SOB  Diet: Regular, good PO  Bowel status: LBM today   : Voiding  Skin: Externalized shunt chest site covered, CDI. Band aid on head from  shunt tap. L forearm extravasation site red, marked w/ no extension. Old lap sites from appendectomy 12/2023.   Pain: Denies  Activity: SBA  Social: Friend visited this evening  Plan/updates: External shunt open to gravity, continue to document output q1 hr. ID following, abx plan TBD. Ongoing discussions w/ NSG and ID regarding shunt reimplantation timing. Continue POC       MD paged @ [phone]     verna vilar - No specific orders for what anatomic level external shunt should be at? does it matter? [place] [id number]    Call back - at ear level

## 2024-02-01 NOTE — PLAN OF CARE
Neuros: Unchanged; Intermittent numbness to R thigh since surgery  IV: PIV TKO  Resp/trach: WNL  Diet: Regular diet - Good po   Bowel status: BM 2/1  : Voiding spontaneously   Skin: Externalized shunt chest site covered. Old lap sites from appenedectomy   Pain: Denies  Activity: SBA  Plan: External shunt open to gravity- chamber emptied every 1 hour.

## 2024-02-01 NOTE — PROGRESS NOTES
[facility], [place]   02/01/2024  Neurosurgery Progress Note:    Assessment:  Donald Jackson is a 29 year old male with PMH of prematurity, congenital hydrocephalus (s/p  shunt at ~age 3) recent acute appendicitis (admitted 12/03/2023) with laparoscopic appendectomy complicated by intraoperative perforation (12/5/2023) who presents with 2-3 days of headache, nausea, vomiting, and confusion with CT head concerning for ventriculomegaly and a small peritoneal fluid collection at  the ventriculoperitoneal shunt catheter tip concerning for abscess versus CSF pseudocyst.     PPD-4 externalization of shunt.    Plan:  - Continue to follow CSF cultures  - Per ID recommendations, holding antibiotic treatment for now (since 1/31 AM)  - Will plan for repeat ESR/CRP, CSF studies, and CT abdomen & pelvis on 2/2/24  - Temporary plan for OR for  shunt placement on Monday (2/5) pending results of above tests  - Q4 hour neuro exams  - Pain control  - HOB > 30 degrees  - Advance diet as tolerates  - Bowel regimen  - PRN antiemetics  - IVF until taking adequate PO  - PT/OT  - SCDs for DVT proph  - SQH    -----------------------------------  Amparo García PA-C  Neurosurgery Department  Pager: [phone]      Interval History: Patient denies any headaches or nausea today. Stable exam. Will continue to follow CSF cultures with plan for repeat labs tomorrow to direct  shunt plan.    Objective:   Temp:  [97.9  F (36.6  C)-99  F (37.2  C)] 97.9  F (36.6  C)  Pulse:  [67-92] 72  Resp:  [16-20] 16  BP: (111-137)/(58-99) 115/58  SpO2:  [98 %-100 %] 100 %  I/O last 3 completed shifts:  In: 480 [P.O.:480]  Out: 127 [Drains:127]    Gen: Appears comfortable, NAD  Neurologic:  - Alert & Oriented to person, place, time, and situation  - Follows commands briskly  - Speech fluent, spontaneous. No aphasia or dysarthria.  - No gaze preference. No apparent hemineglect.  - PERRL, EOMI  - Face symmetric with sensation  intact to light touch  - Palate elevates symmetrically, uvula midline, tongue protrudes midline  - Trapezii muscles 5/5 bilaterally  - No pronator drift     Del Tr Bi WE WF Gr   R 5 5 5 5 5 5   L 5 5 5 5 5 5    HF KE KF DF PF EHL   R 5 5 5 5 5 5   L 5 5 5 5 5 5     Reflexes 2+ throughout    Sensation intact and symmetric to light touch throughout    LABS:  Recent Labs   Lab 02/01/24  0621 01/31/24  0505 01/30/24  0720    139 138   POTASSIUM 4.0 3.9 3.9   CHLORIDE 104 108* 107   CO2 25 22 22   ANIONGAP 9 9 9   GLC 93 88 98   BUN 12.7 13.1 15.2   CR 0.98 0.98 1.13   ANTHONY 9.2 8.6 8.6       Recent Labs   Lab 02/01/24  0621   WBC 7.2   RBC 5.88   HGB 14.0   HCT 44.6   MCV 76*   MCH 23.8*   MCHC 31.4*   RDW 15.6*          IMAGING:  No results found for this or any previous visit (from the past 24 hour(s)).    I have seen this patient with the resident and formulated a plan and agree with this note.  AMP

## 2024-02-01 NOTE — PROGRESS NOTES
Therapy: IVABX  Insurance: [employer] Open [employer]     This patient does not have Home Infusion coverage through his [employer] plan. Pt will have to Self-pay for services with [facility].     In reference to [facility] bank for admission date 01/27/2024 to check IV ABX coverage.   Please contact Intake with any questions, [phone] or In Basket pool, [facility] ([id number]).

## 2024-02-02 ENCOUNTER — APPOINTMENT (OUTPATIENT)
Dept: CT IMAGING | Facility: CLINIC | Age: 29
DRG: 031 | End: 2024-02-02
Payer: COMMERCIAL

## 2024-02-02 LAB
ANION GAP SERPL CALCULATED.3IONS-SCNC: 9 MMOL/L (ref 7–15)
APPEARANCE CSF: CLEAR
BACTERIA CSF CULT: NO GROWTH
BUN SERPL-MCNC: 13.2 MG/DL (ref 6–20)
CALCIUM SERPL-MCNC: 8.9 MG/DL (ref 8.6–10)
CHLORIDE SERPL-SCNC: 103 MMOL/L (ref 98–107)
COLOR CSF: COLORLESS
CREAT SERPL-MCNC: 1.01 MG/DL (ref 0.67–1.17)
CRP SERPL-MCNC: 9.85 MG/L
DEPRECATED HCO3 PLAS-SCNC: 26 MMOL/L (ref 22–29)
EGFRCR SERPLBLD CKD-EPI 2021: >90 ML/MIN/1.73M2
ERYTHROCYTE [DISTWIDTH] IN BLOOD BY AUTOMATED COUNT: 15.4 % (ref 10–15)
ERYTHROCYTE [SEDIMENTATION RATE] IN BLOOD BY WESTERGREN METHOD: 30 MM/HR (ref 0–15)
GLUCOSE CSF-MCNC: 64 MG/DL (ref 40–70)
GLUCOSE SERPL-MCNC: 92 MG/DL (ref 70–99)
GRAM STAIN RESULT: NORMAL
GRAM STAIN RESULT: NORMAL
HCT VFR BLD AUTO: 43.7 % (ref 40–53)
HGB BLD-MCNC: 13.8 G/DL (ref 13.3–17.7)
MCH RBC QN AUTO: 24.1 PG (ref 26.5–33)
MCHC RBC AUTO-ENTMCNC: 31.6 G/DL (ref 31.5–36.5)
MCV RBC AUTO: 76 FL (ref 78–100)
PLATELET # BLD AUTO: 274 10E3/UL (ref 150–450)
POTASSIUM SERPL-SCNC: 3.7 MMOL/L (ref 3.4–5.3)
PROT CSF-MCNC: 6.8 MG/DL (ref 15–45)
RBC # BLD AUTO: 5.72 10E6/UL (ref 4.4–5.9)
RBC # CSF MANUAL: 2 /UL (ref 0–2)
SODIUM SERPL-SCNC: 138 MMOL/L (ref 135–145)
TUBE # CSF: NORMAL
WBC # BLD AUTO: 7.5 10E3/UL (ref 4–11)
WBC # CSF MANUAL: 1 /UL (ref 0–5)

## 2024-02-02 PROCEDURE — 87075 CULTR BACTERIA EXCEPT BLOOD: CPT

## 2024-02-02 PROCEDURE — 80048 BASIC METABOLIC PNL TOTAL CA: CPT | Performed by: NURSE PRACTITIONER

## 2024-02-02 PROCEDURE — 85027 COMPLETE CBC AUTOMATED: CPT | Performed by: NURSE PRACTITIONER

## 2024-02-02 PROCEDURE — 99233 SBSQ HOSP IP/OBS HIGH 50: CPT | Performed by: INTERNAL MEDICINE

## 2024-02-02 PROCEDURE — 82945 GLUCOSE OTHER FLUID: CPT

## 2024-02-02 PROCEDURE — 74176 CT ABD & PELVIS W/O CONTRAST: CPT | Mod: 26 | Performed by: RADIOLOGY

## 2024-02-02 PROCEDURE — 85652 RBC SED RATE AUTOMATED: CPT

## 2024-02-02 PROCEDURE — 89050 BODY FLUID CELL COUNT: CPT

## 2024-02-02 PROCEDURE — 87205 SMEAR GRAM STAIN: CPT

## 2024-02-02 PROCEDURE — 120N000002 HC R&B MED SURG/OB UMMC

## 2024-02-02 PROCEDURE — 250N000011 HC RX IP 250 OP 636

## 2024-02-02 PROCEDURE — 99231 SBSQ HOSP IP/OBS SF/LOW 25: CPT

## 2024-02-02 PROCEDURE — 84157 ASSAY OF PROTEIN OTHER: CPT

## 2024-02-02 PROCEDURE — 74176 CT ABD & PELVIS W/O CONTRAST: CPT

## 2024-02-02 PROCEDURE — 86140 C-REACTIVE PROTEIN: CPT

## 2024-02-02 PROCEDURE — 36415 COLL VENOUS BLD VENIPUNCTURE: CPT | Performed by: NURSE PRACTITIONER

## 2024-02-02 RX ADMIN — HEPARIN SODIUM 5000 UNITS: 5000 INJECTION, SOLUTION INTRAVENOUS; SUBCUTANEOUS at 05:11

## 2024-02-02 RX ADMIN — HEPARIN SODIUM 5000 UNITS: 5000 INJECTION, SOLUTION INTRAVENOUS; SUBCUTANEOUS at 20:07

## 2024-02-02 RX ADMIN — HEPARIN SODIUM 5000 UNITS: 5000 INJECTION, SOLUTION INTRAVENOUS; SUBCUTANEOUS at 12:12

## 2024-02-02 ASSESSMENT — ACTIVITIES OF DAILY LIVING (ADL)
ADLS_ACUITY_SCORE: 18

## 2024-02-02 NOTE — PROGRESS NOTES
Care Management Follow Up    Length of Stay (days): 6    Expected Discharge Date: 02/06/2024     Concerns to be Addressed: discharge planning     Patient plan of care discussed at interdisciplinary rounds: Yes    Anticipated Discharge Disposition: Home  Anticipated Discharge Services: None  Anticipated Discharge DME: None    Patient/family educated on Medicare website which has current facility and service quality ratings: No  Education Provided on the Discharge Plan: Yes  Patient/Family in Agreement with the Plan: Yes    Referrals Placed by CM/SW:  [facility]  Private pay costs discussed: Not applicable    Additional Information:  Patient status reviewed, discussed in discharge rounds. ID has been holding off on antibiotics. Per providers this morning, not anticipating patient will need IV antibiotics for discharge. Informed them that patient does not have coverage for home IV antibiotics.    Referral to [facility] cancelled. If patient does end up needing IV antibiotics for discharge, will need to request self pay quotes from [facility], [place], and [facility].    [facility] will continue to follow.    Alee Duff RN, BSN  6A RN Care Coordinator  Ph: [phone]   Pager: [phone]

## 2024-02-02 NOTE — PLAN OF CARE
Status: Admitted 1/27 with nausea, vomiting, confusion/AMS, shunt malfunction with concern for  shunt infection. Hx acute appendicitis (12/2023) with appendectomy c/b intra-op perforation.  Vitals: VSS on RA  Neuros: A&Ox4, 5/5. Denied intermittent numbness to R thigh this shift   IV: PIV SL  Labs/Electrolytes: WNL. CT abdomen/pelvis completed this shift   Resp/trach: Clear lungs, denies SOB  Diet: Regular, good PO  Bowel status: LBM 2/1  : Voiding  Skin: Externalized shunt chest site covered, CDI-recording output hourly; notified MD x1 for output of 0 for 0400.  L forearm extravasation site red, marked w/ no extension. Old lap sites from appendectomy 12/2023.    Pain: Denies  Activity: SBA  Plan: External shunt open to gravity, continue to document output q1 hr. ID following, abx plan TBD. Plan for repeat ESR/CRP, CSF studies. Temporary plan for OR for  shunt placement on Monday (2/5) pending results of above tests.

## 2024-02-02 NOTE — PROVIDER NOTIFICATION
([id number])-Donald Jackson-ELLIS pt. drain output for 0400 is 0. Has otherwise been 1-2mL/hr since midnight. Thanks! Manju MARY RN. [facility]  [phone]

## 2024-02-02 NOTE — PROGRESS NOTES
[facility], [place]   02/02/2024  Neurosurgery Progress Note:    Assessment:  Donald Jackson is a 29 year old male with PMH of prematurity, congenital hydrocephalus (s/p  shunt at ~age 3) recent acute appendicitis (admitted 12/03/2023) with laparoscopic appendectomy complicated by intraoperative perforation (12/5/2023) who presents with 2-3 days of headache, nausea, vomiting, and confusion with CT head concerning for ventriculomegaly and a small peritoneal fluid collection at  the ventriculoperitoneal shunt catheter tip concerning for abscess versus CSF pseudocyst.     PPD-5 externalization of shunt.    Plan:  - Continue to follow CSF cultures  - Per ID recommendations, holding antibiotic treatment for now (since 1/31 AM)  - Will plan for repeat ESR/CRP, CSF studies, and CT abdomen & pelvis on 2/2/24  - Temporary plan for OR for  shunt placement on Monday (2/5) pending results of above tests  - Q4 hour neuro exams  - Pain control  - HOB > 30 degrees  - Advance diet as tolerates  - Bowel regimen  - PRN antiemetics  - IVF until taking adequate PO  - PT/OT  - SCDs for DVT proph  - SQH    -----------------------------------  Amparo García PA-C  Neurosurgery Department  Pager: [phone]      Interval History: JAXSON. No headaches or nausea. Stable exam.     Objective:   Temp:  [97.4  F (36.3  C)-98.2  F (36.8  C)] 97.8  F (36.6  C)  Pulse:  [70-84] 79  Resp:  [16-18] 16  BP: (104-144)/(62-82) 121/79  SpO2:  [97 %-100 %] 100 %  I/O last 3 completed shifts:  In: 1040 [P.O.:1040]  Out: 123.5 [Drains:123.5]    Gen: Appears comfortable, NAD  Neurologic:  - Alert & Oriented to person, place, time, and situation  - Follows commands briskly  - Speech fluent, spontaneous. No aphasia or dysarthria.  - No gaze preference. No apparent hemineglect.  - PERRL, EOMI  - Face symmetric with sensation intact to light touch  - Palate elevates symmetrically, uvula midline, tongue protrudes midline  -  Trapezii muscles 5/5 bilaterally  - No pronator drift     Del Tr Bi WE WF Gr   R 5 5 5 5 5 5   L 5 5 5 5 5 5    HF KE KF DF PF EHL   R 5 5 5 5 5 5   L 5 5 5 5 5 5     Reflexes 2+ throughout    Sensation intact and symmetric to light touch throughout    LABS:  Recent Labs   Lab 02/02/24  0435 02/01/24  0621 01/31/24  0505    138 139   POTASSIUM 3.7 4.0 3.9   CHLORIDE 103 104 108*   CO2 26 25 22   ANIONGAP 9 9 9   GLC 92 93 88   BUN 13.2 12.7 13.1   CR 1.01 0.98 0.98   ANTHONY 8.9 9.2 8.6       Recent Labs   Lab 02/02/24  0435   WBC 7.5   RBC 5.72   HGB 13.8   HCT 43.7   MCV 76*   MCH 24.1*   MCHC 31.6   RDW 15.4*          IMAGING:  Recent Results (from the past 24 hour(s))   CT Abdomen Pelvis w/o Contrast    Impression    RESIDENT PRELIMINARY INTERPRETATION  IMPRESSION:   1. Trace residual collection located at the tip of the  ventriculoperitoneal shunt, differential includes resolving pseudocyst  versus resolving abscess.  2. No substantial change in mildly complex 2.2 cm collection located  in the right hemipelvis compared to CT 1/27/2024, decreased in size  compared to CT 12/15/2023. Likely representing resolving abscess.       I have seen this patient with the resident and formulated a plan and agree with this note.  AMP

## 2024-02-02 NOTE — PLAN OF CARE
Status: Admitted 1/27 with nausea, vomiting, confusion/AMS, shunt malfunction with concern for  shunt infection. Hx acute appendicitis (12/2023) with appendectomy c/b intra-op perforation.  Vitals: VSS on RA  Neuros: A&Ox4, 5/5, intermittent numbness to R thigh (happening since appendectomy surgery)  IV: PIV SL  Labs/Electrolytes: WNL  Resp/trach: Clear lungs, denies SOB  Diet: Regular, good PO  Bowel status: LBM today   : Voiding  Skin: Externalized shunt chest site covered, CDI.  L forearm extravasation site red, marked w/ no extension. Old lap sites from appendectomy 12/2023.   Pain: Denies  Activity: SBA  Social: Daughter visited this evening  Plan: External shunt open to gravity, continue to document output q1 hr. ID following, abx plan TBD. Plan for repeat ESR/CRP, CSF studies, and CT abdomen & pelvis on 2/2. Temporary plan for OR for  shunt placement on Monday (2/5) pending results of above tests.

## 2024-02-02 NOTE — PLAN OF CARE
Neuros: Intact  IV: PIV SLd  Resp/trach: WNL  Diet: Regular diet - Good po   Bowel status: BM 2/1  : Voiding spontaneously   Skin: Externalized shunt chest site covered. Old lap sites from appenedectomy   Pain: Denies  Activity: SBA  Plan: External shunt open to gravity- chamber emptied every 1 hour.

## 2024-02-02 NOTE — PROGRESS NOTES
General ID Service: Follow Up Note      Patient:  Donald Jackson, Date of birth 1995, Medical record number [id number]  Date of Visit:  January 30, 2024         Assessment and Recommendations:   ID Problem List:    Headache, abdominal pain, chills- all resolved  Shunt malfunction with concern for  shunt infection  CSF studies wnl- Glucose 59, colorless fluid, protein 8.3, cultures ngtd from 1/27, repeat today also very reassuring  Residual peritoneal fluid collection associated with one of the  shunt catheter tips on CT abdomen 1/27 concerning for abscess vs pseudocyst on radiologist read  S/p shunt externalization 1/28 by neurosurgery  H/o recent appendicitis c/b perforation, s/p appendectomy (12/5/2023 at [facility]) c/b peritonitis and intraabdominal abscesses  S/p IR drain 12/12//23 at OSH , cultures reportedly only grew Pseudomonas on aerobic cultures (fungal and anaerobic cultures were negative)  Followed with [facility], Dr. Don Messer  Pt quit antibiotics on 12/29/23  CT abd/pelvis 2/2 shows a trace residual collection located at the tip of the ventriculoperitoneal shunt, differential includes resolving pseudocyst versus resolving abscess. No substantial change in mildly complex 2.2 cm collection located  in the right hemipelvis compared to CT 1/27/2024, decreased in size compared to CT 12/15/2023. Likely representing resolving abscess  History of  shunt  History of penicillin allergy (hives)    Recommendations:    -patient doing well off antibiotics  -CRP decreased from 23 to 18 to 9.   -I think if the shunt end can be placed distant from the former shunt site (perhaps on the L) it can go in the abdomen on Monday. If not I would do pleural site.   -Would get a CT abd/pelvis in 2 weeks as follow up. He can then follow with PCP or ID. Please call if follow up is desired.     Discussion:    Patient with recent appendicitis c/b perforation, s/p appendectomy (12/5/2023 at  [facility]) c/b peritonitis and intraabdominal abscesses growing pseudomonas. He has had an extended course of antibiotics and was much improved. CRP 12/21 was down to 3.1 (upper limit of normal 0.5). He was still on levofloxacin and flagyl as of 12/21. Appears the flagyl went through 12/30 and levofloxacin went through 12/31. ID note says fluconazole was stopped after discharge at [facility]. Abdominal drain was removed 12/26.    He presented here with malfunctioning shunt and fluid collection at the end in the abdomen. Question is if this is an abscess or pseudocyst. The shunt labs and cultures are completely normal suggesting shunt is not infected. Repeat CSF studies today very reassuring. Hard to know if the distal shunt is infected but much improved with externalization of shunt. I am guessing that it is not infected but hard to be certain. The prior tip will stay in place.    He does have the 2cm abscess in R hemipelvis that appears to be resolving. Otherwise appears all other abscesses are resolved.     I think if the shunt can go with the terminating end distant from the prior tip, it can go in the abdomen. Otherwise, I would place it in the pleural space to be sure it is far from the prior abscesses.     I would continue to monitor off antibiotics. I would image abdomen in 2 weeks to ensure R hemipelvis and small other fluid collection continue to resolve.     We will sign off. Please call with questions. I will be around next week.     Attestation:  I have reviewed today's vital signs, medications, labs and imaging.  Katrina Hernandez MD  Pager [phone]          Interval History:       Patient feels very well. He says his abdominal pain is gone and he feels well.          Review of Systems:   Full 9 pt ROS obtained, pertinent positives and negatives as above.          Current Antimicrobials   Current:    none         Physical Exam:   Ranges for vital signs:  Temp:  [97.4  F (36.3  C)-98  F (36.7   C)] 98  F (36.7  C)  Pulse:  [70-85] 85  Resp:  [16-18] 16  BP: (104-144)/(62-82) 128/81  SpO2:  [98 %-100 %] 100 %    Intake/Output Summary (Last 24 hours) at 1/30/2024 1125  Last data filed at 1/30/2024 1100  Gross per 24 hour   Intake 1100 ml   Output 208 ml   Net 892 ml     Exam:  GENERAL:  well-developed, well-nourished, sitting in bed in no acute distress.   ENT:  Head is normocephalic, has bandage on top of head. Shunt externalized. Oropharynx is moist without exudates or ulcers.  EYES:  Eyes have anicteric sclerae.    NECK:  Supple.  LUNGS:  Clear to auscultation.  CARDIOVASCULAR:  Regular rate and rhythm with no murmurs, gallops or rubs.  ABDOMEN:  Normal bowel sounds, soft, nontender.  EXT: Extremities warm and without edema.  SKIN:  No acute rashes.  Line is in place without any surrounding erythema.  NEUROLOGIC:  Grossly nonfocal.         Laboratory Data:   Reviewed.  Pertinent for: WBC 7.7, CRP 23 on 1/27    Culture data:    Collected Updated Procedure Result Status    01/27/2024 2301 01/30/2024 0728 Cerebrospinal fluid Aerobic Bacterial Culture Routine With Gram Stain [[id number]]    Cerebrospinal fluid from Drain    Preliminary result Component Value   Culture No growth after 2 days P   Gram Stain Result No organisms seen P    1+ WBC seen P             01/27/2024 2301 01/28/2024 0232 CSF Cell Count with Differential: [[id number]]    Cerebrospinal fluid from Drain    Final result Component Value   No component results          01/27/2024 2301 01/30/2024 0047 Anaerobic CSF culture [[id number]]   Cerebrospinal fluid from Drain    Preliminary result Component Value   Culture No anaerobic organisms isolated after 2 days P             01/27/2024 2301 01/28/2024 0232 Cell Count CSF [[id number]]   Cerebrospinal fluid from Drain    Final result Component Value   Tube Number none   Color Colorless   Clarity Clear          01/27/2024 1640 01/27/2024 1741 Symptomatic Influenza A/B, RSV, & SARS-CoV2 PCR  (COVID-19) Nose [[id number]]    Swab from Nose    Final result Component Value   Influenza A PCR Negative   Influenza B PCR Negative   RSV PCR Negative   SARS CoV2 PCR Negative   NEGATIVE: SARS-CoV-2 (COVID-19) RNA not detected, presumed negative.          01/27/2024 1637 01/29/2024 2003 Blood Culture Peripheral Blood [[id number]]   Peripheral Blood    Preliminary result Component Value   Culture No growth after 2 days P             01/27/2024 1637 01/29/2024 2003 Blood Culture Peripheral Blood [[id number]]   Peripheral Blood    Preliminary result Component Value   Culture No growth after 2 days P                      Imaging:      EXAMINATION: CT ABDOMEN PELVIS W/O CONTRAST, 2/2/2024 5:34 AM     INDICATION: eval for abscess/pseudocyst     COMPARISON STUDY: CT 1/27/2024, 12/15/2023.     TECHNIQUE: CT scan of the abdomen and pelvis was performed on  multidetector CT scanner using volumetric acquisition technique and  images were reconstructed in multiple planes with variable thickness  and reviewed on dedicated workstations. Noncontrast exam.     CT scan radiation dose is optimized to minimum requisite dose using  automated dose modulation techniques.     FINDINGS:     There are 2 ventricular peritoneal catheter is one terminates in the  fat just anterior to the gallbladder the other terminates within the  mid pelvis anteriorly.     Lower thorax: Heart size is normal. No pericardial effusion.  The  visualized lung bases are clear.     Liver: No mass. No intrahepatic biliary ductal dilation.     Biliary System: Normal gallbladder. No extrahepatic biliary ductal  dilation.     Pancreas: No mass or pancreatic ductal dilation.     Adrenal glands: No mass or nodules     Spleen: Normal.     Kidneys: No suspicious mass, obstructing calculus or hydronephrosis.     Gastrointestinal tract: Surgically absent appendix. Normal caliber  small bowel.  Colonic diverticulosis with no diverticulitis.      Mesentery/peritoneum/retroperitoneum: Trace residual collection  located at the tip of the ventriculoperitoneal shunt (series 5 image  150). No substantial change in mildly complex collection located in  the right hemipelvis (series 5 image 183) with subtle surrounding fat  stranding.     Lymph nodes: No significant lymphadenopathy by size criteria.  Unchanged prominent right periureteral lymph node (series 5 image  108).     Vasculature: The patency of vessels cannot be evaluated secondary to  the lack of IV contrast.     Pelvis: Urinary bladder is normal.     Osseous structures: No aggressive or acute osseous lesion.      Soft tissues: Postsurgical changes of the left lower quadrant.                                                                      IMPRESSION:   1. Trace residual collection located at the tip of the  ventriculoperitoneal shunt, differential includes resolving pseudocyst  versus resolving abscess.  2. No substantial change in mildly complex 2.2 cm collection located  in the right hemipelvis compared to CT 1/27/2024, decreased in size  compared to CT 12/15/2023. Likely representing resolving abscess.    CT HEAD W/O CONTRAST 1/29/2024 4:19 AM    History: assess for stability of vents in patient with externalized   shunt    Comparison: CT 1/28/2024, 1/27/2024.    Technique: Using multidetector thin collimation helical acquisition  technique, axial, coronal and sagittal CT images from the skull base  to the vertex were obtained without intravenous contrast.   (topogram) image(s) also obtained and reviewed.    Findings: Right frontal approach ventriculostomy catheter tip  terminates within the right lateral ventricle near the foramen of  Monro. There is associated markedly decreased size of the lateral and  third ventricles. Decreased periventricular white matter  hypoattenuation, likely related to transependymal flow. There is no  intracranial hemorrhage, mass effect, or midline shift.  Gray/white  matter differentiation in both cerebral hemispheres is preserved. The  basal cisterns are clear.    The bony calvaria and the bones of the skull base are normal. The  visualized portions of the paranasal sinuses and mastoid air cells are  clear. Left scleral band.  Impression:  Impression:  1. New ventriculostomy catheter and resolution of hydrocephalus.  2. Decreased transependymal flow of CSF.    I have personally reviewed the examination and initial interpretation  and I agree with the findings.    WU MORELAND MD        SYSTEM ID:  [id number] Head w/o Contrast [[id number]]Resulted: 01/28/24 0603Order Status: CompletedUpdated: 01/28/24 0606Narrative:  EXAM: CT HEAD W/O CONTRAST  1/28/2024 4:53 AM    HISTORY:  Eval stability of vents s/p shunt externalization      COMPARISON:  1/27/2024    TECHNIQUE: Using multidetector thin collimation helical acquisition  technique, axial, coronal and sagittal CT images from the skull base  to the vertex were obtained without intravenous contrast.   (topogram) image(s) also obtained and reviewed.    FINDINGS:  Post surgical changes of right frontal approach ventriculostomy with  catheter terminating in the right lateral ventricle near the foramen  of Haile. Slightly decreased size of the temporal horn, trigone, and  occipital horns of the lateral ventricles. Stable size of the anterior  horns. Similar degree of transependymal flow.    No acute intracranial hemorrhage, mass effect, or midline shift. No  acute loss of gray-white matter differentiation in the cerebral  hemispheres. Clear basal cisterns.    The bony calvaria in the bones of the skull base are normal.  Pneumatized petrous temporal bones. The visualized portions of the  paranasal sinuses and mastoid air cells are clear. Orbits are  unremarkable.  Impression:  IMPRESSION: Slightly decreased ventriculomegaly with unchanged degree  of transependymal flow.    I have personally reviewed the examination  and initial interpretation  and I agree with the findings.    MAKENNA MENDOZA MD        SYSTEM ID:  [id number] XR, PA & LAT [[id number]]Collected: 01/27/24 1707Order Status: CompletedUpdated: 01/27/24 1732Narrative:  EXAM: XR CHEST 2 VIEWS  LOCATION: [facility]  DATE: 1/27/2024    INDICATION: leukocytosis; eval for infiltrate  COMPARISON: 08/16/2020  Impression:  IMPRESSION: Similar appearance of the right  shunt is including a prior abandoned shunt with partial calcification along the tract of the catheter. Lungs are clear. No effusions. Heart size is normal. No acute osseous findings.CT Abdomen Pelvis w Contrast [[id number]]Collected: 01/27/24 1528Order Status: CompletedUpdated: 01/27/24 1617Narrative:  EXAM: CT ABDOMEN PELVIS W CONTRAST  LOCATION: [facility]  DATE: 1/27/2024    INDICATION: Right lower quadrant pain, vomiting  COMPARISON: CT 12/15/2023  TECHNIQUE: CT scan of the abdomen and pelvis was performed following injection of IV contrast. Multiplanar reformats were obtained. Dose reduction techniques were used.  CONTRAST: 129mL isovue 370    FINDINGS:  LOWER CHEST: Unremarkable.    HEPATOBILIARY: Normal.    PANCREAS: Normal.    SPLEEN: Normal.    ADRENAL GLANDS: Normal.    KIDNEYS/BLADDER: Likely small bilateral renal cysts, no specific follow-up recommended. No hydronephrosis. Urinary bladder is unremarkable.    BOWEL: Prior appendectomy with interval resolution of right lower quadrant fluid collections. No obstruction or acute inflammatory change.    LYMPH NODES/PERITONEUM: No suspicious lymphadenopathy. Very small rim-enhancing fluid collection just to the right of the midline associated with the tip of one of the ventriculoperitoneal shunt catheter, measuring 1.5 x 1.1 x 0.7 cm (series 6 image  154). There is some surrounding fat stranding. No additional fluid collections visualized.    VASCULATURE:  Unremarkable.    PELVIC ORGANS: Normal.    MUSCULOSKELETAL: No acute bony abnormality. Stable position of ventriculoperitoneal shunt catheters, larger diameter catheter tip in the right upper quadrant, smaller diameter catheter in the pelvis just to the right of the midline as above. No definite  kinking or discontinuity. Stable postsurgical changes in the anterior abdominal wall.  Impression:  IMPRESSION:  1.  Very small peritoneal fluid collection at the level of the pelvic inlet just to the right of the midline associated with one of the ventriculoperitoneal shunt catheter tips. Differential includes small abscess related to prior peritonitis versus  developing CSF pseudocyst.  2.  Interval removal of percutaneous drainage catheter without evidence of residual or recurrent fluid collection in the paracolic gutter.CT Head w/o Contrast [[id number]]Collected: 01/27/24 1526Order Status: CompletedUpdated: 01/27/24 1600Addenda:Addendum/ impression:   Dr. Reid discussed the results with Dr. Kramer on 1/27/2024 3:57 PM   CST by telephone.Signed: 01/27/24 1557 by Nick Reid MDNarrative:  EXAM: CT HEAD W/O CONTRAST  LOCATION: [facility]  DATE: 1/27/2024    INDICATION: headache  COMPARISON: 08/18/2020  TECHNIQUE: Routine CT Head without IV contrast. Multiplanar reformats. Dose reduction techniques were used.    FINDINGS:  INTRACRANIAL CONTENTS: No evidence of acute intracranial hemorrhage or mass effect. Right frontal approach ventricular shunt catheters, tip within the left lateral ventricle. Interval increase in size of the lateral ventricles with colpocephaly,  concerning for shunt malfunction. Transependymal edema is noted. Obstruction    VISUALIZED ORBITS/SINUSES/MASTOIDS: The globes are unremarkable. The partially imaged paranasal sinuses, mastoid air cells and middle ear cavities are unremarkable.    BONES/SOFT TISSUES: The visualized skull base and calvarium  are unremarkable.  Impression:  IMPRESSION:    1.  No evidence of acute intracranial hemorrhage or mass effect.  2.  Right frontal approach ventricular shunt catheter, tip within the left lateral ventricle. Interval increase in size of the lateral ventricles with colpocephaly, concerning for shunt malfunction.XR Shunt Malfunction Surgery Survey [[id number]]Collected: 01/27/24 1521Order Status: CompletedUpdated: 01/27/24 1526Narrative:  EXAM: XR SHUNT MALFUNCTION SURVEY  LOCATION: [facility]  DATE: 1/27/2024    INDICATION: headache s p  shunt  COMPARISON: None.  Impression:  IMPRESSION: There is an abandoned shunt catheter at the right chest. The connected shunt tube is intact over the calvarium, neck, chest, and abdomen. No kinks or breaks. Excreted contrast is noted within the renal collecting system.

## 2024-02-03 LAB
ANION GAP SERPL CALCULATED.3IONS-SCNC: 10 MMOL/L (ref 7–15)
BUN SERPL-MCNC: 18.2 MG/DL (ref 6–20)
CALCIUM SERPL-MCNC: 9 MG/DL (ref 8.6–10)
CHLORIDE SERPL-SCNC: 105 MMOL/L (ref 98–107)
CREAT SERPL-MCNC: 1.14 MG/DL (ref 0.67–1.17)
DEPRECATED HCO3 PLAS-SCNC: 25 MMOL/L (ref 22–29)
EGFRCR SERPLBLD CKD-EPI 2021: 89 ML/MIN/1.73M2
ERYTHROCYTE [DISTWIDTH] IN BLOOD BY AUTOMATED COUNT: 15.4 % (ref 10–15)
GLUCOSE SERPL-MCNC: 97 MG/DL (ref 70–99)
HCT VFR BLD AUTO: 42.3 % (ref 40–53)
HGB BLD-MCNC: 13.4 G/DL (ref 13.3–17.7)
MCH RBC QN AUTO: 23.8 PG (ref 26.5–33)
MCHC RBC AUTO-ENTMCNC: 31.7 G/DL (ref 31.5–36.5)
MCV RBC AUTO: 75 FL (ref 78–100)
PLATELET # BLD AUTO: 282 10E3/UL (ref 150–450)
POTASSIUM SERPL-SCNC: 4.4 MMOL/L (ref 3.4–5.3)
RBC # BLD AUTO: 5.63 10E6/UL (ref 4.4–5.9)
SODIUM SERPL-SCNC: 140 MMOL/L (ref 135–145)
WBC # BLD AUTO: 7.7 10E3/UL (ref 4–11)

## 2024-02-03 PROCEDURE — 80048 BASIC METABOLIC PNL TOTAL CA: CPT | Performed by: NURSE PRACTITIONER

## 2024-02-03 PROCEDURE — 85027 COMPLETE CBC AUTOMATED: CPT | Performed by: NURSE PRACTITIONER

## 2024-02-03 PROCEDURE — 250N000013 HC RX MED GY IP 250 OP 250 PS 637

## 2024-02-03 PROCEDURE — 120N000002 HC R&B MED SURG/OB UMMC

## 2024-02-03 PROCEDURE — 36415 COLL VENOUS BLD VENIPUNCTURE: CPT | Performed by: NURSE PRACTITIONER

## 2024-02-03 PROCEDURE — 250N000011 HC RX IP 250 OP 636

## 2024-02-03 RX ADMIN — Medication 1 DROP: at 19:27

## 2024-02-03 RX ADMIN — HEPARIN SODIUM 5000 UNITS: 5000 INJECTION, SOLUTION INTRAVENOUS; SUBCUTANEOUS at 20:06

## 2024-02-03 RX ADMIN — HEPARIN SODIUM 5000 UNITS: 5000 INJECTION, SOLUTION INTRAVENOUS; SUBCUTANEOUS at 04:05

## 2024-02-03 RX ADMIN — HEPARIN SODIUM 5000 UNITS: 5000 INJECTION, SOLUTION INTRAVENOUS; SUBCUTANEOUS at 11:04

## 2024-02-03 ASSESSMENT — ACTIVITIES OF DAILY LIVING (ADL)
ADLS_ACUITY_SCORE: 18

## 2024-02-03 NOTE — PROGRESS NOTES
[facility], [place]   02/03/2024  Neurosurgery Progress Note:    Assessment:  Donald Jackson is a 29 year old male with PMH of prematurity, congenital hydrocephalus (s/p  shunt at ~age 3) recent acute appendicitis (admitted 12/03/2023) with laparoscopic appendectomy complicated by intraoperative perforation (12/5/2023) who presents with 2-3 days of headache, nausea, vomiting, and confusion with CT head concerning for ventriculomegaly and a small peritoneal fluid collection at  the ventriculoperitoneal shunt catheter tip concerning for abscess versus CSF pseudocyst.     PPD-7 externalization of shunt.    Plan:  - Continue to follow CSF cultures  - Per ID recommendations, holding antibiotic treatment for now (since 1/31 AM)  - Plan for OR for ventriculopleural shunt placement on Monday (2/5) given presence of abdominal fluid collections; Thoracic surgery aware and available to assist  - Q4 hour neuro exams  - Pain control  - HOB > 30 degrees  - Advance diet as tolerates  - Bowel regimen  - PRN antiemetics  - IVF until taking adequate PO  - PT/OT  - SCDs for DVT proph  - SQH    -----------------------------------  Dora Hernandez MD, PhD  PGY-2 Neurosurgery    Please contact neurosurgery resident on call with questions.    Dial * * [phone], enter [phone] when prompted.         Interval History: JAXSON. CT abdomen with persistent but improved fluid collections.     Objective:   Temp:  [97.7  F (36.5  C)-98.7  F (37.1  C)] 98.7  F (37.1  C)  Pulse:  [85-95] 86  Resp:  [16] 16  BP: (103-128)/(68-90) 123/80  SpO2:  [95 %-100 %] 99 %  I/O last 3 completed shifts:  In: 1040 [P.O.:1040]  Out: 562.5 [Urine:350; Drains:212.5]    Gen: Appears comfortable, NAD  Neurologic:  - Alert & Oriented to person, place, time, and situation  - Follows commands briskly  - Speech fluent, spontaneous. No aphasia or dysarthria.  - No gaze preference. No apparent hemineglect.  - PERRL, EOMI  - Face symmetric with sensation  intact to light touch  - Palate elevates symmetrically, uvula midline, tongue protrudes midline  - Trapezii muscles 5/5 bilaterally  - No pronator drift     Del Tr Bi WE WF Gr   R 5 5 5 5 5 5   L 5 5 5 5 5 5    HF KE KF DF PF EHL   R 5 5 5 5 5 5   L 5 5 5 5 5 5     Reflexes 2+ throughout    Sensation intact and symmetric to light touch throughout    LABS:  Recent Labs   Lab 02/03/24  0718 02/02/24  0435 02/01/24  0621    138 138   POTASSIUM 4.4 3.7 4.0   CHLORIDE 105 103 104   CO2 25 26 25   ANIONGAP 10 9 9   GLC 97 92 93   BUN 18.2 13.2 12.7   CR 1.14 1.01 0.98   ANTHONY 9.0 8.9 9.2       Recent Labs   Lab 02/03/24  0718   WBC 7.7   RBC 5.63   HGB 13.4   HCT 42.3   MCV 75*   MCH 23.8*   MCHC 31.7   RDW 15.4*          IMAGING:  No results found for this or any previous visit (from the past 24 hour(s)).      I have seen this patient with the resident and formulated a plan and agree with this note.  AMP

## 2024-02-03 NOTE — PLAN OF CARE
Status: Admitted 1/27 with nausea, vomiting, confusion/AMS, shunt malfunction with concern for  shunt infection. Hx acute appendicitis (12/2023) with appendectomy c/b intra-op perforation.   Vitals: VSS on RA  Neuros: intact  IV: PIV SL  Labs/Electrolytes: WNL  Resp/trach: LSC  Diet: regular  Bowel status: BS+, LBM 2/3  : void spont to BR  Skin: Externalized shunt chest site covered, CDI-recording output hourly; L forearm extravasation site red, marked w/ no extension. Old lap sites from appendectomy   Pain: denies  Activity: SBA/GB in halls, IND in room, walked halls this shift w/staff  Social: family at bedside  Plan: externalized shunt open to gravity and documented q1hr, ID following, abx plan TBD, plan for OR for  shunt placement Monday 2/5  Updates this shift: drainage bad changed this shift

## 2024-02-03 NOTE — PLAN OF CARE
Goal Outcome Evaluation:    Status:  Admitted 1/27 with nausea, vomiting, confusion/AMS, shunt malfunction with concern for  shunt infection. Hx acute appendicitis (12/2023) with appendectomy c/b intra-op perforation.    Vitals: VSS on RA  Neuros: intact, 5/5 strengths throughout  IV: PIV SL  Labs/Electrolytes: no new lab results  Resp/trach: LSC on RA  Diet: Regular  Bowel status: LBM 2/1, BS+  : voids spontaneously to BR  Skin: Externalized shunt chest site covered, CDI-recording output hourly; L forearm extravasation site red, marked w/ no extension. Old lap sites from appendectomy 12/2023.      Pain: Denies  Activity: Up IND in Rm, SB/GB in halls.   Plan: document externalized shunt open to gravity Q1H. Shunt placement Monday, 2/5/24 at 12:45pm. Abd/pelvis CT in 2 weeks following shunt placement.

## 2024-02-03 NOTE — PLAN OF CARE
Status: Admitted 1/27 with nausea, vomiting, confusion/AMS, shunt malfunction with concern for  shunt infection. Hx acute appendicitis (12/2023) with appendectomy c/b intra-op perforation.   Vitals: VSS on RA  Neuros: intact  IV: PIV SL  Labs/Electrolytes: WNL  Resp/trach: LSC  Diet: regular  Bowel status: BS+, LBM 2/1  : void spont to BR  Skin: Externalized shunt chest site covered, CDI-recording output hourly; L forearm extravasation site red, marked w/ no extension. Old lap sites from appendectomy 12/2023.     Pain: denies  Activity: SBA/GB in halls, IND in room  Social: friend at bedside  Plan: externalized shunt open to gravity and documented q1hr, ID following, abx plan TBD, plan for OR for  shunt placement Monday 2/5  Updates this shift: shunt output @ 2200 = 0, team notified    To NSG @ 1805: pt [phone] [place] c/o abd discomfort at times on inhale. Thanks Lulu 6a    To NSG  @ 2215: pt [phone] externalized shunt had 0 output @ 2200, likely d/t pt change in position affecting gravity before RN came to assess. thanks 6a

## 2024-02-03 NOTE — PLAN OF CARE
"Status: Admitted 1/27 with nausea, vomiting, confusion/AMS, shunt malfunction with concern for  shunt infection. Hx acute appendicitis (12/2023) with appendectomy c/b intra-op perforation   Vitals: VSS  Neuros: intact  IV: PIV SL'd between IV abx  Labs/Electrolytes: WNL  Resp/trach: WNL  Diet: tolerating regular diet without difficulty  Bowel status: BM this shift, 2/3  : voiding without difficulty, continent  Skin: Externalized shunt chest site covered, CDI-recording output hourly; L forearm extravasation site red, marked w/ no extension. Old lap sites from appendectomy 12/2023   Pain: c/o infrequent, R abdominal pain that he rates between \"0.5-1 out of 10.\" Declines pain medications  Activity: up independently  Social: no visitors or phone calls this shift  Plan: OR Monday for re-internalization of shunt  Updates this shift: pt pleasant and cooperative    "

## 2024-02-04 ENCOUNTER — ANESTHESIA EVENT (OUTPATIENT)
Dept: SURGERY | Facility: CLINIC | Age: 29
DRG: 031 | End: 2024-02-04
Payer: COMMERCIAL

## 2024-02-04 LAB
ANION GAP SERPL CALCULATED.3IONS-SCNC: 9 MMOL/L (ref 7–15)
BUN SERPL-MCNC: 14.5 MG/DL (ref 6–20)
CALCIUM SERPL-MCNC: 8.7 MG/DL (ref 8.6–10)
CHLORIDE SERPL-SCNC: 103 MMOL/L (ref 98–107)
CREAT SERPL-MCNC: 1.06 MG/DL (ref 0.67–1.17)
CRP SERPL-MCNC: 5.18 MG/L
DEPRECATED HCO3 PLAS-SCNC: 25 MMOL/L (ref 22–29)
EGFRCR SERPLBLD CKD-EPI 2021: >90 ML/MIN/1.73M2
ERYTHROCYTE [DISTWIDTH] IN BLOOD BY AUTOMATED COUNT: 15.4 % (ref 10–15)
ERYTHROCYTE [SEDIMENTATION RATE] IN BLOOD BY WESTERGREN METHOD: 6 MM/HR (ref 0–15)
GLUCOSE SERPL-MCNC: 87 MG/DL (ref 70–99)
HCT VFR BLD AUTO: 43.7 % (ref 40–53)
HGB BLD-MCNC: 13.8 G/DL (ref 13.3–17.7)
MCH RBC QN AUTO: 24.2 PG (ref 26.5–33)
MCHC RBC AUTO-ENTMCNC: 31.6 G/DL (ref 31.5–36.5)
MCV RBC AUTO: 77 FL (ref 78–100)
PLATELET # BLD AUTO: 260 10E3/UL (ref 150–450)
POTASSIUM SERPL-SCNC: 3.9 MMOL/L (ref 3.4–5.3)
RBC # BLD AUTO: 5.7 10E6/UL (ref 4.4–5.9)
SODIUM SERPL-SCNC: 137 MMOL/L (ref 135–145)
WBC # BLD AUTO: 8.3 10E3/UL (ref 4–11)

## 2024-02-04 PROCEDURE — 80048 BASIC METABOLIC PNL TOTAL CA: CPT | Performed by: NURSE PRACTITIONER

## 2024-02-04 PROCEDURE — 120N000002 HC R&B MED SURG/OB UMMC

## 2024-02-04 PROCEDURE — 85027 COMPLETE CBC AUTOMATED: CPT | Performed by: NURSE PRACTITIONER

## 2024-02-04 PROCEDURE — 250N000011 HC RX IP 250 OP 636

## 2024-02-04 PROCEDURE — 86140 C-REACTIVE PROTEIN: CPT

## 2024-02-04 PROCEDURE — 85652 RBC SED RATE AUTOMATED: CPT

## 2024-02-04 PROCEDURE — 36415 COLL VENOUS BLD VENIPUNCTURE: CPT | Performed by: NURSE PRACTITIONER

## 2024-02-04 RX ADMIN — HEPARIN SODIUM 5000 UNITS: 5000 INJECTION, SOLUTION INTRAVENOUS; SUBCUTANEOUS at 04:09

## 2024-02-04 RX ADMIN — HEPARIN SODIUM 5000 UNITS: 5000 INJECTION, SOLUTION INTRAVENOUS; SUBCUTANEOUS at 12:09

## 2024-02-04 RX ADMIN — HEPARIN SODIUM 5000 UNITS: 5000 INJECTION, SOLUTION INTRAVENOUS; SUBCUTANEOUS at 20:02

## 2024-02-04 ASSESSMENT — ACTIVITIES OF DAILY LIVING (ADL)
ADLS_ACUITY_SCORE: 18

## 2024-02-04 NOTE — PROGRESS NOTES
[facility], [place]   02/04/2024  Neurosurgery Progress Note:    Assessment:  Donald Jackson is a 29 year old male with PMH of prematurity, congenital hydrocephalus (s/p  shunt at ~age 3) recent acute appendicitis (admitted 12/03/2023) with laparoscopic appendectomy complicated by intraoperative perforation (12/5/2023) who presents with 2-3 days of headache, nausea, vomiting, and confusion with CT head concerning for ventriculomegaly and a small peritoneal fluid collection at  the ventriculoperitoneal shunt catheter tip concerning for abscess versus CSF pseudocyst. Doing well off antibiotics. Planning for V-Pleural Shunt on 2/5.    PPD-8 externalization of shunt.    Plan:  - Continue to follow CSF cultures  - Per ID recommendations, holding antibiotic treatment for now (since 1/31 AM)  - Plan for OR for ventriculopleural shunt placement on Monday (2/5) given presence of abdominal fluid collections; Thoracic surgery aware and available to assist  - Q4 hour neuro exams  - Pain control  - HOB > 30 degrees  - NPO at midnight  - Bowel regimen  - PRN antiemetics  - PT/OT  - SCDs for DVT proph  - SQH to be held at midnight    -----------------------------------  ANN MARSHALL MD on 2/4/2024   PGY1 Neurosurgery Intern    Please contact neurosurgery resident on call with questions.    Dial * * [phone], enter [phone] when prompted.         Interval History: JAXSON. ESR WNL, CRP downtrending (5 <- 10). Planning for Vpleural shunt tomorrow.    Objective:   Temp:  [97.7  F (36.5  C)-99.2  F (37.3  C)] 97.8  F (36.6  C)  Pulse:  [77-98] 77  Resp:  [16] 16  BP: (112-131)/(72-92) 112/86  SpO2:  [96 %-100 %] 100 %  I/O last 3 completed shifts:  In: -   Out: 187 [Drains:187]    Gen: Appears comfortable, NAD  Neurologic:  - Alert & Oriented to person, place, time, and situation  - Follows commands briskly  - Speech fluent, spontaneous. No aphasia or dysarthria.  - No gaze preference. No apparent hemineglect.  -  PERRL, EOMI  - Face symmetric with sensation intact to light touch  - Palate elevates symmetrically, uvula midline, tongue protrudes midline  - Trapezii muscles 5/5 bilaterally  - No pronator drift     Del Tr Bi WE WF Gr   R 5 5 5 5 5 5   L 5 5 5 5 5 5    HF KE KF DF PF EHL   R 5 5 5 5 5 5   L 5 5 5 5 5 5     Reflexes 2+ throughout    Sensation intact and symmetric to light touch throughout    LABS:  Recent Labs   Lab 02/04/24  0653 02/03/24  0718 02/02/24  0435    140 138   POTASSIUM 3.9 4.4 3.7   CHLORIDE 103 105 103   CO2 25 25 26   ANIONGAP 9 10 9   GLC 87 97 92   BUN 14.5 18.2 13.2   CR 1.06 1.14 1.01   ANTHONY 8.7 9.0 8.9       Recent Labs   Lab 02/04/24  0653   WBC 8.3   RBC 5.70   HGB 13.8   HCT 43.7   MCV 77*   MCH 24.2*   MCHC 31.6   RDW 15.4*          IMAGING:  No results found for this or any previous visit (from the past 24 hour(s)).      I have seen this patient with the resident and formulated a plan and agree with this note.  AMP

## 2024-02-04 NOTE — PLAN OF CARE
Status: Admitted 1/27 with nausea, vomiting, confusion/AMS, shunt malfunction with concern for  shunt infection. Hx acute appendicitis (12/2023) with appendectomy c/b intra-op perforation.   Vitals: VSS on RA  Neuros: intact  IV: PIV SL  Labs/Electrolytes: WNL  Resp/trach: LSC  Diet: regular, NPO @ 0000  Bowel status: BS+, LBM 2/3  : void spont to BR  Skin: Externalized shunt chest site covered, CDI-recording output hourly; L forearm extravasation site red, marked w/ no extension. Old lap sites from appendectomy   Pain: denies  Activity: SBA/GB in halls, IND in room, 1st CHG bath done  Social: family at bedside  Plan: externalized shunt open to gravity and documented q1hr, OR for  shunt placement Monday 2/5 @ 1230, NPO @ 0000

## 2024-02-04 NOTE — PLAN OF CARE
Goal Outcome Evaluation:    Status:  Admitted 1/27 with nausea, vomiting, confusion/AMS, shunt malfunction with concern for  shunt infection. Hx acute appendicitis (12/2023) with appendectomy c/b intra-op perforation.    Vitals: VSS on RA  Neuros: intact, 5/5 strengths throughout  IV: PIV SL  Labs/Electrolytes: no new lab results  Resp/trach: LSC on RA  Diet: Regular  Bowel status: LBM 2/3, BS+  : voids spontaneously to BR  Skin: Externalized shunt chest site covered, CDI-recording output hourly; L forearm extravasation site red, marked w/ no extension. Old lap sites from appendectomy      Pain: Denies  Activity: Up IND in Rm, SB/GB in halls.   Plan: document externalized shunt open to gravity Q1H.  shunt placement Monday, 2/5/24 at 12:45pm. Abd/pelvis CT in 2 weeks following shunt placement.

## 2024-02-04 NOTE — PLAN OF CARE
"Status: Admitted 1/27 with nausea, vomiting, confusion/AMS, shunt malfunction with concern for  shunt infection. Hx acute appendicitis (12/2023) with appendectomy c/b intra-op perforation   Vitals: VSS  Neuros: intact  IV: PIV SL'd between IV abx  Labs/Electrolytes: WNL  Resp/trach: WNL  Diet: tolerating regular diet without difficulty  Bowel status: LBM 2/3  : voiding without difficulty, continent  Skin: Externalized shunt chest site covered, CDI-recording output hourly; L forearm extravasation site red, marked w/ no extension. Old lap sites from appendectomy 12/2023   Pain: c/o infrequent, R abdominal pain that he rates between \"0.5-1 out of 10.\" Declines pain medications  Activity: up independently  Social: no visitors or phone calls this shift  Plan: OR Monday for re-internalization of shunt at 1245  Updates this shift: pt pleasant and cooperative  "

## 2024-02-05 ENCOUNTER — APPOINTMENT (OUTPATIENT)
Dept: GENERAL RADIOLOGY | Facility: CLINIC | Age: 29
DRG: 031 | End: 2024-02-05
Attending: NEUROLOGICAL SURGERY
Payer: COMMERCIAL

## 2024-02-05 ENCOUNTER — ANESTHESIA (OUTPATIENT)
Dept: SURGERY | Facility: CLINIC | Age: 29
DRG: 031 | End: 2024-02-05
Payer: COMMERCIAL

## 2024-02-05 ENCOUNTER — APPOINTMENT (OUTPATIENT)
Dept: GENERAL RADIOLOGY | Facility: CLINIC | Age: 29
DRG: 031 | End: 2024-02-05
Payer: COMMERCIAL

## 2024-02-05 ENCOUNTER — APPOINTMENT (OUTPATIENT)
Dept: CT IMAGING | Facility: CLINIC | Age: 29
DRG: 031 | End: 2024-02-05
Payer: COMMERCIAL

## 2024-02-05 LAB
ABO/RH(D): NORMAL
ANION GAP SERPL CALCULATED.3IONS-SCNC: 6 MMOL/L (ref 7–15)
ANTIBODY SCREEN: NEGATIVE
APTT PPP: 30 SECONDS (ref 22–38)
BACTERIA CSF CULT: NO GROWTH
BUN SERPL-MCNC: 16.3 MG/DL (ref 6–20)
CALCIUM SERPL-MCNC: 9.1 MG/DL (ref 8.6–10)
CHLORIDE SERPL-SCNC: 109 MMOL/L (ref 98–107)
CREAT SERPL-MCNC: 1.13 MG/DL (ref 0.67–1.17)
DEPRECATED HCO3 PLAS-SCNC: 23 MMOL/L (ref 22–29)
EGFRCR SERPLBLD CKD-EPI 2021: 90 ML/MIN/1.73M2
ERYTHROCYTE [DISTWIDTH] IN BLOOD BY AUTOMATED COUNT: 15.5 % (ref 10–15)
GLUCOSE SERPL-MCNC: 120 MG/DL (ref 70–99)
GRAM STAIN RESULT: NORMAL
HCT VFR BLD AUTO: 43.8 % (ref 40–53)
HGB BLD-MCNC: 13.8 G/DL (ref 13.3–17.7)
INR PPP: 1.02 (ref 0.85–1.15)
MCH RBC QN AUTO: 23.9 PG (ref 26.5–33)
MCHC RBC AUTO-ENTMCNC: 31.5 G/DL (ref 31.5–36.5)
MCV RBC AUTO: 76 FL (ref 78–100)
PLATELET # BLD AUTO: 285 10E3/UL (ref 150–450)
POTASSIUM SERPL-SCNC: 3.8 MMOL/L (ref 3.4–5.3)
RBC # BLD AUTO: 5.77 10E6/UL (ref 4.4–5.9)
SODIUM SERPL-SCNC: 138 MMOL/L (ref 135–145)
SPECIMEN EXPIRATION DATE: NORMAL
WBC # BLD AUTO: 9.4 10E3/UL (ref 4–11)

## 2024-02-05 PROCEDURE — 85027 COMPLETE CBC AUTOMATED: CPT | Performed by: NURSE PRACTITIONER

## 2024-02-05 PROCEDURE — 250N000011 HC RX IP 250 OP 636

## 2024-02-05 PROCEDURE — 80048 BASIC METABOLIC PNL TOTAL CA: CPT | Performed by: NURSE PRACTITIONER

## 2024-02-05 PROCEDURE — 250N000011 HC RX IP 250 OP 636: Performed by: NEUROLOGICAL SURGERY

## 2024-02-05 PROCEDURE — 85610 PROTHROMBIN TIME: CPT

## 2024-02-05 PROCEDURE — 86900 BLOOD TYPING SEROLOGIC ABO: CPT

## 2024-02-05 PROCEDURE — 71046 X-RAY EXAM CHEST 2 VIEWS: CPT | Mod: 26 | Performed by: RADIOLOGY

## 2024-02-05 PROCEDURE — 250N000009 HC RX 250: Performed by: NEUROLOGICAL SURGERY

## 2024-02-05 PROCEDURE — 710N000010 HC RECOVERY PHASE 1, LEVEL 2, PER MIN: Performed by: NEUROLOGICAL SURGERY

## 2024-02-05 PROCEDURE — 70450 CT HEAD/BRAIN W/O DYE: CPT | Mod: 26 | Performed by: STUDENT IN AN ORGANIZED HEALTH CARE EDUCATION/TRAINING PROGRAM

## 2024-02-05 PROCEDURE — 250N000013 HC RX MED GY IP 250 OP 250 PS 637

## 2024-02-05 PROCEDURE — 360N000086 HC SURGERY LEVEL 6 W/ FLUORO, PER MIN: Performed by: NEUROLOGICAL SURGERY

## 2024-02-05 PROCEDURE — 87102 FUNGUS ISOLATION CULTURE: CPT | Performed by: NEUROLOGICAL SURGERY

## 2024-02-05 PROCEDURE — 70450 CT HEAD/BRAIN W/O DYE: CPT

## 2024-02-05 PROCEDURE — 87205 SMEAR GRAM STAIN: CPT | Performed by: NEUROLOGICAL SURGERY

## 2024-02-05 PROCEDURE — 87075 CULTR BACTERIA EXCEPT BLOOD: CPT | Performed by: NEUROLOGICAL SURGERY

## 2024-02-05 PROCEDURE — 370N000017 HC ANESTHESIA TECHNICAL FEE, PER MIN: Performed by: NEUROLOGICAL SURGERY

## 2024-02-05 PROCEDURE — 87070 CULTURE OTHR SPECIMN AEROBIC: CPT | Performed by: NEUROLOGICAL SURGERY

## 2024-02-05 PROCEDURE — 120N000002 HC R&B MED SURG/OB UMMC

## 2024-02-05 PROCEDURE — 258N000003 HC RX IP 258 OP 636: Performed by: NURSE PRACTITIONER

## 2024-02-05 PROCEDURE — 62230 REPLACE/REVISE BRAIN SHUNT: CPT | Mod: 62 | Performed by: THORACIC SURGERY (CARDIOTHORACIC VASCULAR SURGERY)

## 2024-02-05 PROCEDURE — 250N000025 HC SEVOFLURANE, PER MIN: Performed by: NEUROLOGICAL SURGERY

## 2024-02-05 PROCEDURE — 85730 THROMBOPLASTIN TIME PARTIAL: CPT

## 2024-02-05 PROCEDURE — 258N000003 HC RX IP 258 OP 636: Performed by: NEUROLOGICAL SURGERY

## 2024-02-05 PROCEDURE — 87206 SMEAR FLUORESCENT/ACID STAI: CPT | Performed by: NEUROLOGICAL SURGERY

## 2024-02-05 PROCEDURE — 61782 SCAN PROC CRANIAL EXTRA: CPT | Performed by: NEUROLOGICAL SURGERY

## 2024-02-05 PROCEDURE — 250N000009 HC RX 250

## 2024-02-05 PROCEDURE — 258N000003 HC RX IP 258 OP 636

## 2024-02-05 PROCEDURE — 70250 X-RAY EXAM OF SKULL: CPT | Mod: 26 | Performed by: RADIOLOGY

## 2024-02-05 PROCEDURE — 36415 COLL VENOUS BLD VENIPUNCTURE: CPT

## 2024-02-05 PROCEDURE — 999N000141 HC STATISTIC PRE-PROCEDURE NURSING ASSESSMENT: Performed by: NEUROLOGICAL SURGERY

## 2024-02-05 PROCEDURE — 999N000179 XR SURGERY CARM FLUORO LESS THAN 5 MIN W STILLS: Mod: TC

## 2024-02-05 PROCEDURE — 272N000001 HC OR GENERAL SUPPLY STERILE: Performed by: NEUROLOGICAL SURGERY

## 2024-02-05 PROCEDURE — 0W110J9 BYPASS CRANIAL CAVITY TO RIGHT PLEURAL CAVITY WITH SYNTHETIC SUBSTITUTE, OPEN APPROACH: ICD-10-PCS | Performed by: THORACIC SURGERY (CARDIOTHORACIC VASCULAR SURGERY)

## 2024-02-05 PROCEDURE — 62230 REPLACE/REVISE BRAIN SHUNT: CPT | Mod: 62 | Performed by: NEUROLOGICAL SURGERY

## 2024-02-05 PROCEDURE — 999N000065 XR SHUNT MALFUNCTION SURVEY

## 2024-02-05 PROCEDURE — 250N000011 HC RX IP 250 OP 636: Performed by: ANESTHESIOLOGY

## 2024-02-05 PROCEDURE — 99207 XR SHUNT MALFUNCTION SURVEY: CPT | Mod: 26 | Performed by: RADIOLOGY

## 2024-02-05 PROCEDURE — 278N000051 HC OR IMPLANT GENERAL: Performed by: NEUROLOGICAL SURGERY

## 2024-02-05 DEVICE — SHUNT CATH PERITONEAL ANTIBIOTIC-IMPREG ARES 122CM 93092: Type: IMPLANTABLE DEVICE | Site: PLEURAL SPACE | Status: FUNCTIONAL

## 2024-02-05 DEVICE — SHUNT VALVE CERTAS PLUS W / SIPHONGUARD 828804PL: Type: IMPLANTABLE DEVICE | Site: CRANIAL | Status: FUNCTIONAL

## 2024-02-05 RX ORDER — NALOXONE HYDROCHLORIDE 0.4 MG/ML
0.4 INJECTION, SOLUTION INTRAMUSCULAR; INTRAVENOUS; SUBCUTANEOUS
Status: DISCONTINUED | OUTPATIENT
Start: 2024-02-05 | End: 2024-02-06 | Stop reason: HOSPADM

## 2024-02-05 RX ORDER — ESMOLOL HYDROCHLORIDE 10 MG/ML
INJECTION INTRAVENOUS PRN
Status: DISCONTINUED | OUTPATIENT
Start: 2024-02-05 | End: 2024-02-05

## 2024-02-05 RX ORDER — ONDANSETRON 4 MG/1
4 TABLET, ORALLY DISINTEGRATING ORAL EVERY 30 MIN PRN
Status: DISCONTINUED | OUTPATIENT
Start: 2024-02-05 | End: 2024-02-05 | Stop reason: HOSPADM

## 2024-02-05 RX ORDER — DIPHENHYDRAMINE HCL 50 MG
50 CAPSULE ORAL ONCE
Status: COMPLETED | OUTPATIENT
Start: 2024-02-05 | End: 2024-02-05

## 2024-02-05 RX ORDER — ONDANSETRON 2 MG/ML
INJECTION INTRAMUSCULAR; INTRAVENOUS PRN
Status: DISCONTINUED | OUTPATIENT
Start: 2024-02-05 | End: 2024-02-05

## 2024-02-05 RX ORDER — DEXAMETHASONE SODIUM PHOSPHATE 4 MG/ML
INJECTION, SOLUTION INTRA-ARTICULAR; INTRALESIONAL; INTRAMUSCULAR; INTRAVENOUS; SOFT TISSUE PRN
Status: DISCONTINUED | OUTPATIENT
Start: 2024-02-05 | End: 2024-02-05

## 2024-02-05 RX ORDER — ONDANSETRON 2 MG/ML
4 INJECTION INTRAMUSCULAR; INTRAVENOUS EVERY 30 MIN PRN
Status: DISCONTINUED | OUTPATIENT
Start: 2024-02-05 | End: 2024-02-05 | Stop reason: HOSPADM

## 2024-02-05 RX ORDER — OXYCODONE HYDROCHLORIDE 10 MG/1
10 TABLET ORAL EVERY 4 HOURS PRN
Status: DISCONTINUED | OUTPATIENT
Start: 2024-02-05 | End: 2024-02-06 | Stop reason: HOSPADM

## 2024-02-05 RX ORDER — ONDANSETRON 4 MG/1
4 TABLET, ORALLY DISINTEGRATING ORAL EVERY 6 HOURS PRN
Status: DISCONTINUED | OUTPATIENT
Start: 2024-02-05 | End: 2024-02-06 | Stop reason: HOSPADM

## 2024-02-05 RX ORDER — HYDRALAZINE HYDROCHLORIDE 20 MG/ML
10-20 INJECTION INTRAMUSCULAR; INTRAVENOUS EVERY 30 MIN PRN
Status: DISCONTINUED | OUTPATIENT
Start: 2024-02-05 | End: 2024-02-06 | Stop reason: HOSPADM

## 2024-02-05 RX ORDER — ACETAMINOPHEN 325 MG/1
650 TABLET ORAL EVERY 4 HOURS PRN
Status: DISCONTINUED | OUTPATIENT
Start: 2024-02-08 | End: 2024-02-06 | Stop reason: HOSPADM

## 2024-02-05 RX ORDER — PROPOFOL 10 MG/ML
INJECTION, EMULSION INTRAVENOUS PRN
Status: DISCONTINUED | OUTPATIENT
Start: 2024-02-05 | End: 2024-02-05

## 2024-02-05 RX ORDER — FENTANYL CITRATE 50 UG/ML
INJECTION, SOLUTION INTRAMUSCULAR; INTRAVENOUS PRN
Status: DISCONTINUED | OUTPATIENT
Start: 2024-02-05 | End: 2024-02-05

## 2024-02-05 RX ORDER — SODIUM CHLORIDE, SODIUM LACTATE, POTASSIUM CHLORIDE, CALCIUM CHLORIDE 600; 310; 30; 20 MG/100ML; MG/100ML; MG/100ML; MG/100ML
INJECTION, SOLUTION INTRAVENOUS CONTINUOUS PRN
Status: DISCONTINUED | OUTPATIENT
Start: 2024-02-05 | End: 2024-02-05

## 2024-02-05 RX ORDER — SODIUM CHLORIDE 9 MG/ML
INJECTION, SOLUTION INTRAVENOUS CONTINUOUS
Status: ACTIVE | OUTPATIENT
Start: 2024-02-05 | End: 2024-02-06

## 2024-02-05 RX ORDER — ACETAMINOPHEN 325 MG/1
975 TABLET ORAL ONCE
Status: COMPLETED | OUTPATIENT
Start: 2024-02-05 | End: 2024-02-05

## 2024-02-05 RX ORDER — CEFEPIME HYDROCHLORIDE 2 G/1
2 INJECTION, POWDER, FOR SOLUTION INTRAVENOUS EVERY 8 HOURS
Status: COMPLETED | OUTPATIENT
Start: 2024-02-05 | End: 2024-02-06

## 2024-02-05 RX ORDER — SODIUM CHLORIDE, SODIUM LACTATE, POTASSIUM CHLORIDE, CALCIUM CHLORIDE 600; 310; 30; 20 MG/100ML; MG/100ML; MG/100ML; MG/100ML
INJECTION, SOLUTION INTRAVENOUS CONTINUOUS
Status: DISCONTINUED | OUTPATIENT
Start: 2024-02-05 | End: 2024-02-05 | Stop reason: HOSPADM

## 2024-02-05 RX ORDER — CEFAZOLIN SODIUM/WATER 2 G/20 ML
2 SYRINGE (ML) INTRAVENOUS SEE ADMIN INSTRUCTIONS
Status: DISCONTINUED | OUTPATIENT
Start: 2024-02-05 | End: 2024-02-05 | Stop reason: HOSPADM

## 2024-02-05 RX ORDER — HYDROMORPHONE HCL IN WATER/PF 6 MG/30 ML
0.4 PATIENT CONTROLLED ANALGESIA SYRINGE INTRAVENOUS
Status: DISCONTINUED | OUTPATIENT
Start: 2024-02-05 | End: 2024-02-06 | Stop reason: HOSPADM

## 2024-02-05 RX ORDER — NALOXONE HYDROCHLORIDE 0.4 MG/ML
0.2 INJECTION, SOLUTION INTRAMUSCULAR; INTRAVENOUS; SUBCUTANEOUS
Status: DISCONTINUED | OUTPATIENT
Start: 2024-02-05 | End: 2024-02-06 | Stop reason: HOSPADM

## 2024-02-05 RX ORDER — LIDOCAINE 40 MG/G
CREAM TOPICAL
Status: DISCONTINUED | OUTPATIENT
Start: 2024-02-05 | End: 2024-02-06 | Stop reason: HOSPADM

## 2024-02-05 RX ORDER — HYDROMORPHONE HCL IN WATER/PF 6 MG/30 ML
0.4 PATIENT CONTROLLED ANALGESIA SYRINGE INTRAVENOUS EVERY 5 MIN PRN
Status: DISCONTINUED | OUTPATIENT
Start: 2024-02-05 | End: 2024-02-05 | Stop reason: HOSPADM

## 2024-02-05 RX ORDER — LABETALOL HYDROCHLORIDE 5 MG/ML
10-40 INJECTION, SOLUTION INTRAVENOUS EVERY 10 MIN PRN
Status: DISCONTINUED | OUTPATIENT
Start: 2024-02-05 | End: 2024-02-06 | Stop reason: HOSPADM

## 2024-02-05 RX ORDER — CEFAZOLIN SODIUM/WATER 2 G/20 ML
2 SYRINGE (ML) INTRAVENOUS
Status: COMPLETED | OUTPATIENT
Start: 2024-02-05 | End: 2024-02-05

## 2024-02-05 RX ORDER — FENTANYL CITRATE 50 UG/ML
25 INJECTION, SOLUTION INTRAMUSCULAR; INTRAVENOUS EVERY 5 MIN PRN
Status: DISCONTINUED | OUTPATIENT
Start: 2024-02-05 | End: 2024-02-05 | Stop reason: HOSPADM

## 2024-02-05 RX ORDER — LIDOCAINE HYDROCHLORIDE 20 MG/ML
INJECTION, SOLUTION INFILTRATION; PERINEURAL PRN
Status: DISCONTINUED | OUTPATIENT
Start: 2024-02-05 | End: 2024-02-05

## 2024-02-05 RX ORDER — HYDROMORPHONE HCL IN WATER/PF 6 MG/30 ML
0.2 PATIENT CONTROLLED ANALGESIA SYRINGE INTRAVENOUS
Status: DISCONTINUED | OUTPATIENT
Start: 2024-02-05 | End: 2024-02-06 | Stop reason: HOSPADM

## 2024-02-05 RX ORDER — AMOXICILLIN 250 MG
1 CAPSULE ORAL 2 TIMES DAILY
Status: DISCONTINUED | OUTPATIENT
Start: 2024-02-05 | End: 2024-02-06 | Stop reason: HOSPADM

## 2024-02-05 RX ORDER — FENTANYL CITRATE 50 UG/ML
50 INJECTION, SOLUTION INTRAMUSCULAR; INTRAVENOUS EVERY 5 MIN PRN
Status: DISCONTINUED | OUTPATIENT
Start: 2024-02-05 | End: 2024-02-05 | Stop reason: HOSPADM

## 2024-02-05 RX ORDER — HYDROMORPHONE HCL IN WATER/PF 6 MG/30 ML
0.2 PATIENT CONTROLLED ANALGESIA SYRINGE INTRAVENOUS EVERY 5 MIN PRN
Status: DISCONTINUED | OUTPATIENT
Start: 2024-02-05 | End: 2024-02-05 | Stop reason: HOSPADM

## 2024-02-05 RX ORDER — ACETAMINOPHEN 325 MG/1
975 TABLET ORAL EVERY 8 HOURS
Status: DISCONTINUED | OUTPATIENT
Start: 2024-02-05 | End: 2024-02-06 | Stop reason: HOSPADM

## 2024-02-05 RX ORDER — BISACODYL 10 MG
10 SUPPOSITORY, RECTAL RECTAL DAILY PRN
Status: DISCONTINUED | OUTPATIENT
Start: 2024-02-05 | End: 2024-02-06 | Stop reason: HOSPADM

## 2024-02-05 RX ORDER — SODIUM CHLORIDE 9 MG/ML
INJECTION, SOLUTION INTRAVENOUS CONTINUOUS
Status: DISCONTINUED | OUTPATIENT
Start: 2024-02-05 | End: 2024-02-05

## 2024-02-05 RX ORDER — ONDANSETRON 2 MG/ML
4 INJECTION INTRAMUSCULAR; INTRAVENOUS EVERY 6 HOURS PRN
Status: DISCONTINUED | OUTPATIENT
Start: 2024-02-05 | End: 2024-02-06 | Stop reason: HOSPADM

## 2024-02-05 RX ORDER — PROCHLORPERAZINE MALEATE 10 MG
10 TABLET ORAL EVERY 6 HOURS PRN
Status: DISCONTINUED | OUTPATIENT
Start: 2024-02-05 | End: 2024-02-06 | Stop reason: HOSPADM

## 2024-02-05 RX ORDER — OXYCODONE HYDROCHLORIDE 5 MG/1
5 TABLET ORAL EVERY 4 HOURS PRN
Status: DISCONTINUED | OUTPATIENT
Start: 2024-02-05 | End: 2024-02-06 | Stop reason: HOSPADM

## 2024-02-05 RX ORDER — POLYETHYLENE GLYCOL 3350 17 G/17G
17 POWDER, FOR SOLUTION ORAL DAILY
Status: DISCONTINUED | OUTPATIENT
Start: 2024-02-06 | End: 2024-02-06 | Stop reason: HOSPADM

## 2024-02-05 RX ORDER — VANCOMYCIN HYDROCHLORIDE 1 G/200ML
1000 INJECTION, SOLUTION INTRAVENOUS EVERY 12 HOURS
Status: DISCONTINUED | OUTPATIENT
Start: 2024-02-06 | End: 2024-02-06 | Stop reason: HOSPADM

## 2024-02-05 RX ADMIN — SENNOSIDES AND DOCUSATE SODIUM 1 TABLET: 8.6; 5 TABLET ORAL at 19:56

## 2024-02-05 RX ADMIN — Medication 20 MG: at 12:44

## 2024-02-05 RX ADMIN — DEXMEDETOMIDINE HYDROCHLORIDE 12 MCG: 100 INJECTION, SOLUTION INTRAVENOUS at 13:32

## 2024-02-05 RX ADMIN — SUGAMMADEX 200 MG: 100 INJECTION, SOLUTION INTRAVENOUS at 15:37

## 2024-02-05 RX ADMIN — CEFEPIME HYDROCHLORIDE 2 G: 2 INJECTION, POWDER, FOR SOLUTION INTRAVENOUS at 16:48

## 2024-02-05 RX ADMIN — DIPHENHYDRAMINE HYDROCHLORIDE 50 MG: 50 CAPSULE ORAL at 19:56

## 2024-02-05 RX ADMIN — ESMOLOL HYDROCHLORIDE 20 MG: 10 INJECTION, SOLUTION INTRAVENOUS at 13:01

## 2024-02-05 RX ADMIN — ONDANSETRON 4 MG: 2 INJECTION INTRAMUSCULAR; INTRAVENOUS at 14:28

## 2024-02-05 RX ADMIN — FENTANYL CITRATE 50 MCG: 50 INJECTION INTRAMUSCULAR; INTRAVENOUS at 13:10

## 2024-02-05 RX ADMIN — FENTANYL CITRATE 25 MCG: 50 INJECTION, SOLUTION INTRAMUSCULAR; INTRAVENOUS at 16:34

## 2024-02-05 RX ADMIN — SODIUM CHLORIDE, POTASSIUM CHLORIDE, SODIUM LACTATE AND CALCIUM CHLORIDE: 600; 310; 30; 20 INJECTION, SOLUTION INTRAVENOUS at 12:05

## 2024-02-05 RX ADMIN — FENTANYL CITRATE 25 MCG: 50 INJECTION, SOLUTION INTRAMUSCULAR; INTRAVENOUS at 16:19

## 2024-02-05 RX ADMIN — OXYCODONE HYDROCHLORIDE 10 MG: 10 TABLET ORAL at 18:19

## 2024-02-05 RX ADMIN — SODIUM CHLORIDE: 9 INJECTION, SOLUTION INTRAVENOUS at 16:29

## 2024-02-05 RX ADMIN — Medication 10 MG: at 13:15

## 2024-02-05 RX ADMIN — HYDROMORPHONE HYDROCHLORIDE 0.2 MG: 0.2 INJECTION, SOLUTION INTRAMUSCULAR; INTRAVENOUS; SUBCUTANEOUS at 17:00

## 2024-02-05 RX ADMIN — ACETAMINOPHEN 975 MG: 325 TABLET, FILM COATED ORAL at 18:19

## 2024-02-05 RX ADMIN — HYDROMORPHONE HYDROCHLORIDE 0.5 MG: 1 INJECTION, SOLUTION INTRAMUSCULAR; INTRAVENOUS; SUBCUTANEOUS at 14:52

## 2024-02-05 RX ADMIN — Medication 2 G: at 15:12

## 2024-02-05 RX ADMIN — DEXAMETHASONE SODIUM PHOSPHATE 8 MG: 4 INJECTION, SOLUTION INTRA-ARTICULAR; INTRALESIONAL; INTRAMUSCULAR; INTRAVENOUS; SOFT TISSUE at 13:12

## 2024-02-05 RX ADMIN — ESMOLOL HYDROCHLORIDE 20 MG: 10 INJECTION, SOLUTION INTRAVENOUS at 13:36

## 2024-02-05 RX ADMIN — Medication 10 MG: at 14:01

## 2024-02-05 RX ADMIN — SODIUM CHLORIDE: 9 INJECTION, SOLUTION INTRAVENOUS at 11:37

## 2024-02-05 RX ADMIN — VANCOMYCIN HYDROCHLORIDE 2500 MG: 500 INJECTION, POWDER, LYOPHILIZED, FOR SOLUTION INTRAVENOUS at 20:42

## 2024-02-05 RX ADMIN — Medication 20 MG: at 14:05

## 2024-02-05 RX ADMIN — ACETAMINOPHEN 975 MG: 325 TABLET, FILM COATED ORAL at 10:04

## 2024-02-05 RX ADMIN — MIDAZOLAM 2 MG: 1 INJECTION INTRAMUSCULAR; INTRAVENOUS at 11:47

## 2024-02-05 RX ADMIN — Medication 20 MG: at 12:26

## 2024-02-05 RX ADMIN — ONDANSETRON 4 MG: 2 INJECTION INTRAMUSCULAR; INTRAVENOUS at 16:02

## 2024-02-05 RX ADMIN — FENTANYL CITRATE 50 MCG: 50 INJECTION INTRAMUSCULAR; INTRAVENOUS at 12:06

## 2024-02-05 RX ADMIN — Medication 50 MG: at 11:54

## 2024-02-05 RX ADMIN — FENTANYL CITRATE 50 MCG: 50 INJECTION INTRAMUSCULAR; INTRAVENOUS at 14:10

## 2024-02-05 RX ADMIN — ESMOLOL HYDROCHLORIDE 30 MG: 10 INJECTION, SOLUTION INTRAVENOUS at 13:21

## 2024-02-05 RX ADMIN — DEXMEDETOMIDINE HYDROCHLORIDE 12 MCG: 100 INJECTION, SOLUTION INTRAVENOUS at 14:50

## 2024-02-05 RX ADMIN — ESMOLOL HYDROCHLORIDE 30 MG: 10 INJECTION, SOLUTION INTRAVENOUS at 12:02

## 2024-02-05 RX ADMIN — FENTANYL CITRATE 50 MCG: 50 INJECTION INTRAMUSCULAR; INTRAVENOUS at 13:21

## 2024-02-05 RX ADMIN — Medication 20 MG: at 13:36

## 2024-02-05 RX ADMIN — Medication 2 G: at 12:09

## 2024-02-05 RX ADMIN — PROCHLORPERAZINE EDISYLATE 10 MG: 5 INJECTION INTRAMUSCULAR; INTRAVENOUS at 18:19

## 2024-02-05 RX ADMIN — LIDOCAINE HYDROCHLORIDE 100 MG: 20 INJECTION, SOLUTION INFILTRATION; PERINEURAL at 11:54

## 2024-02-05 RX ADMIN — PROPOFOL 200 MG: 10 INJECTION, EMULSION INTRAVENOUS at 11:54

## 2024-02-05 RX ADMIN — FENTANYL CITRATE 25 MCG: 50 INJECTION, SOLUTION INTRAMUSCULAR; INTRAVENOUS at 16:54

## 2024-02-05 RX ADMIN — SODIUM CHLORIDE: 9 INJECTION, SOLUTION INTRAVENOUS at 08:34

## 2024-02-05 ASSESSMENT — ACTIVITIES OF DAILY LIVING (ADL)
ADLS_ACUITY_SCORE: 18
ADLS_ACUITY_SCORE: 18
ADLS_ACUITY_SCORE: 20
ADLS_ACUITY_SCORE: 20
ADLS_ACUITY_SCORE: 18
ADLS_ACUITY_SCORE: 20
ADLS_ACUITY_SCORE: 18
ADLS_ACUITY_SCORE: 18
ADLS_ACUITY_SCORE: 20

## 2024-02-05 NOTE — ANESTHESIA POSTPROCEDURE EVALUATION
Patient: Donald Jackson    Procedure: Procedure(s):  RIGHT VENTRICULOPLEURAL Insertion  Implant Shunt Ventriculopleural       Anesthesia Type:  General    Note:  Disposition: Inpatient   Postop Pain Control: Uneventful            Sign Out: Well controlled pain   PONV: No   Neuro/Psych: Uneventful            Sign Out: Acceptable/Baseline neuro status   Airway/Respiratory: Uneventful            Sign Out: Acceptable/Baseline resp. status   CV/Hemodynamics: Uneventful            Sign Out: Acceptable CV status; No obvious hypovolemia; No obvious fluid overload   Other NRE: NONE   DID A NON-ROUTINE EVENT OCCUR? No           Last vitals:  Vitals Value Taken Time   /77 02/05/24 1630   Temp 36.5  C (97.7  F) 02/05/24 1551   Pulse 77 02/05/24 1632   Resp 10 02/05/24 1632   SpO2 100 % 02/05/24 1632   Vitals shown include unfiled device data.    Electronically Signed By: Buzz Neves MD  February 5, 2024  4:34 PM

## 2024-02-05 NOTE — BRIEF OP NOTE
[facility]    Brief Operative Note - Thoracic Surgery    Pre-operative diagnosis: Shunt malfunction, initial encounter [T85.955A]  Post-operative diagnosis Same as pre-operative diagnosis    Procedure: Pleural shunt placement    Surgeon: Surgeon(s) and Role:  Panel 1:     * Fabiola Martinez MD - Primary     * Renard Hilton MD - Resident - Assisting     * Dora Hernandez MD - Resident - Assisting  Panel 2:     * Almas Noriega MD - Primary     * Mayra Beltran MD - Resident - Assisting  Anesthesia: General   Estimated Blood Loss: 3 mL    Drains: None  Specimens: Please see neurosurgery note    Findings:   VPL shunt placed into right pleural space. Confirmed placement on CXR in OR .  Complications: None.    Please see operative notes from neurosurgery for additional details.    Mayra Beltran MD PGY-1  General Surgery Resident

## 2024-02-05 NOTE — ANESTHESIA CARE TRANSFER NOTE
Patient: Donald Jackson    Procedure: Procedure(s):  RIGHT VENTRICULOPLEURAL Insertion  Implant Shunt Ventriculopleural       Diagnosis: Shunt malfunction, initial encounter [T85.044S]  Diagnosis Additional Information: No value filed.    Anesthesia Type:   General     Note:    Oropharynx: oropharynx clear of all foreign objects  Level of Consciousness: awake  Oxygen Supplementation: nasal cannula  Level of Supplemental Oxygen (L/min / FiO2): 2  Independent Airway: airway patency satisfactory and stable  Dentition: dentition unchanged  Vital Signs Stable: post-procedure vital signs reviewed and stable  Report to RN Given: handoff report given  Patient transferred to: PACU    Handoff Report: Identifed the Patient, Identified the Reponsible Provider, Reviewed the pertinent medical history, Discussed the surgical course, Reviewed Intra-OP anesthesia mangement and issues during anesthesia, Set expectations for post-procedure period and Allowed opportunity for questions and acknowledgement of understanding      Vitals:  Vitals Value Taken Time   /79 02/05/24 1551   Temp     Pulse 84 02/05/24 1555   Resp 7 02/05/24 1555   SpO2 100 % 02/05/24 1555   Vitals shown include unfiled device data.    Electronically Signed By: MERRICK Lisa CRNA  February 5, 2024  3:56 PM

## 2024-02-05 NOTE — BRIEF OP NOTE
[facility]    Brief Operative Note    Pre-operative diagnosis: Shunt malfunction, initial encounter [T85.618A]  Post-operative diagnosis Same as pre-operative diagnosis    Procedure: RIGHT VENTRICULOPLEURAL Insertion, Right - Head  Implant Shunt Ventriculopleural, Right - Chest    Surgeon: Surgeon(s) and Role:  Panel 1:     * Fabiola Martinez MD - Primary     * Renard Hilton MD - Resident - Assisting     * oDra Hernandez MD - Resident - Assisting  Panel 2:     * Almas Noriega MD - Primary     * Mayra Beltran MD - Resident - Assisting  Anesthesia: General   Estimated Blood Loss: 10 ml     Drains: None  Specimens:   ID Type Source Tests Collected by Time Destination   A : CSF from shunt. Fine Needle Aspiration Other AFB CULTURE AND STAIN NON BLOOD, ANAEROBIC BACTERIAL CULTURE ROUTINE, GRAM STAIN, FUNGAL OR YEAST CULTURE ROUTINE, AEROBIC BACTERIAL CULTURE ROUTINE Renard Hilton MD 2/5/2024  2:29 PM      Findings:   Shunt revision with distal revision at valve and distal catheter. Final system is a right ventriculopleural shunt placed with new Codman Certas (set at 4). Retained distal catheter at the neck and abdomen.   Complications: None.  Implants:   Implant Name Type Inv. Item Serial No.  Lot No. LRB No. Used Action   SHUNT CATH PERITONEAL ANTIBIOTIC-IMPREG [id number] 122CM [id number] - [id number] Shunt SHUNT CATH PERITONEAL ANTIBIOTIC-IMPREG URSZULA 122CM [id number] [employer] [id number] Right 1 Implanted   Codman Certas Plus   [id number]  [id number] Right 1 Implanted

## 2024-02-05 NOTE — PLAN OF CARE
Goal Outcome Evaluation:    Status:  Admitted 1/27 with nausea, vomiting, confusion/AMS, shunt malfunction with concern for  shunt infection. Hx acute appendicitis (12/2023) with appendectomy c/b intra-op perforation.    Vitals: VSS on RA  Neuros: intact  IV: PIV SL  Labs/Electrolytes: no new lab results  Resp/trach: LSC on RA  Diet: NPO  Bowel status: LBM 2/3, BS+  : voids spontaneously to BR  Skin: Externalized shunt chest site covered, CDI-recording output hourly; L forearm extravasation site red, marked w/ no extension. Old lap sites from appendectomy      Pain: Denies  Activity: Up IND in Rm, SB/GB in halls.   Plan:  shunt placement today at 12:45 pm. 1st CHG bath completed with. Document externalized shunt open to gravity Q1H.

## 2024-02-05 NOTE — PROGRESS NOTES
[facility], [place]   02/05/2024  Neurosurgery Progress Note:    Assessment:  Donald Jackson is a 29 year old male with PMH of prematurity, congenital hydrocephalus (s/p  shunt at ~age 3) recent acute appendicitis (admitted 12/03/2023) with laparoscopic appendectomy complicated by intraoperative perforation (12/5/2023) who presents with 2-3 days of headache, nausea, vomiting, and confusion with CT head concerning for ventriculomegaly and a small peritoneal fluid collection at  the ventriculoperitoneal shunt catheter tip concerning for abscess versus CSF pseudocyst. Doing well off antibiotics. Planning for V-Pleural Shunt on 2/5.    PPD-8 externalization of shunt.    Plan:  - Continue to follow CSF cultures  - Per ID recommendations, holding antibiotic treatment for now (since 1/31 AM)  - Plan for OR for ventriculopleural shunt placement on Monday (2/5) given presence of abdominal fluid collections; Thoracic surgery aware and available to assist  - Q4 hour neuro exams  - Pain control  - HOB > 30 degrees  - NPO    - Bowel regimen  - PRN antiemetics  - PT/OT  - SCDs for DVT proph  - SQH held in anticipation for surgery today     -----------------------------------  MERRICK Govea, CNP  Department of Neurosurgery  Pager: [phone]          Interval History: Plan for OR today with Dr. Fabiola Martinez.      Objective:   Temp:  [97.5  F (36.4  C)-98.1  F (36.7  C)] 98  F (36.7  C)  Pulse:  [71-93] 71  Resp:  [16-17] 16  BP: (111-145)/() 137/83  SpO2:  [96 %-100 %] 99 %  I/O last 3 completed shifts:  In: -   Out: 158 [Drains:158]    Gen: Appears comfortable, NAD  Neurologic:  - Alert & Oriented to person, place, time, and situation  - Follows commands briskly  - Speech fluent, spontaneous. No aphasia or dysarthria.  - No gaze preference. No apparent hemineglect.  - PERRL, EOMI  - Face symmetric with sensation intact to light touch  - Palate elevates symmetrically, uvula midline,  tongue protrudes midline  - Trapezii muscles 5/5 bilaterally  - No pronator drift     Del Tr Bi WE WF Gr   R 5 5 5 5 5 5   L 5 5 5 5 5 5    HF KE KF DF PF EHL   R 5 5 5 5 5 5   L 5 5 5 5 5 5     Reflexes 2+ throughout    Sensation intact and symmetric to light touch throughout    LABS:  Recent Labs   Lab 02/05/24  0346 02/04/24  0653 02/03/24  0718    137 140   POTASSIUM 3.8 3.9 4.4   CHLORIDE 109* 103 105   CO2 23 25 25   ANIONGAP 6* 9 10   * 87 97   BUN 16.3 14.5 18.2   CR 1.13 1.06 1.14   ANTHONY 9.1 8.7 9.0       Recent Labs   Lab 02/05/24  0346   WBC 9.4   RBC 5.77   HGB 13.8   HCT 43.8   MCV 76*   MCH 23.9*   MCHC 31.5   RDW 15.5*          IMAGING:  No results found for this or any previous visit (from the past 24 hour(s)).      I have seen this patient with the resident and formulated a plan and agree with this note.  AMP

## 2024-02-05 NOTE — PLAN OF CARE
"   Status: Admitted 1/27 with nausea, vomiting, confusion/AMS, shunt malfunction with concern for  shunt infection. Hx acute appendicitis (12/2023) with appendectomy c/b intra-op perforation   Vitals: VSS  Neuros: intact  IV: PIV at 75 ml/hr of NS  Labs/Electrolytes: WNL  Resp/trach: WNL  Diet: tolerating regular diet without difficulty  Bowel status: LBM 2/3  : voiding without difficulty, continent  Skin: Externalized shunt chest site covered, CDI-recording output hourly; L forearm extravasation site red, marked w/ no extension. Old lap sites from appendectomy 12/2023   Pain: c/o infrequent, R abdominal pain that he rates between \"0.5-1 out of 10.\" Declines pain medications  Activity: up independently  Social: no visitors or phone calls this shift  Plan: OR today for re-internalization of shunt at 1245  Updates this shift: pt pleasant and cooperative        "

## 2024-02-05 NOTE — OP NOTE
Preoperative diagnosis: Suspected infection of ventricular-peritoneal shunt    Postoperative diagnosis: The same as preoperative diagnosis    Procedure:   1) RIGHT intrapleural placement of ventricular-pleural shunt  2) Fluoroscopy with interpretation of images    Anesthesia: General    Surgeon: Almas Noriega (present and participated in the entire procedure)    Resident surgeon: Mayra Beltran    EBL: 5 ml    Complications: None immediate      Procedure:     We joined the procedure after the shunt had been tunneled subcutaneously and brought out through a RIGHT lateral 3 cm chest wall incision.    We dissected down to the rib cage using cautery and blunt dissection and entered a rib space. The exposure was challenging due to his body habitus. We then placed the shunt into the pleural space under direct visualization, irrigated the incision and closed with absorbable sutures.    We verified intrapleural position with fluoroscopy; there was no kinking of the catheter and it moved freely with the lung.

## 2024-02-05 NOTE — ANESTHESIA PROCEDURE NOTES
Airway       Patient location during procedure: OR       Procedure Start/Stop Times: 2/5/2024 12:02 PM  Staff -        CRNA: Yovany Gray APRN CRNA       Performed By: CRNA  Consent for Airway        Urgency: elective  Indications and Patient Condition       Indications for airway management: yojana-procedural       Induction type:intravenous       Mask difficulty assessment: 2 - vent by mask + OA or adjuvant +/- NMBA    Final Airway Details       Final airway type: endotracheal airway       Successful airway: ETT - single and Oral  Endotracheal Airway Details        ETT size (mm): 7.5       Cuffed: yes       Successful intubation technique: direct laryngoscopy       DL Blade Type: [place] 2       Grade View of Cords: 1       Adjucts: stylet       Position: Left       Measured from: lips       Secured at (cm): 23       Bite block used: Soft    Post intubation assessment        Placement verified by: capnometry and equal breath sounds        Number of attempts at approach: 1       Number of other approaches attempted: 0       Secured with: tape       Ease of procedure: easy       Dentition: Intact and Unchanged    Medication(s) Administered   Medication Administration Time: 2/5/2024 12:02 PM

## 2024-02-06 ENCOUNTER — APPOINTMENT (OUTPATIENT)
Dept: CT IMAGING | Facility: CLINIC | Age: 29
DRG: 031 | End: 2024-02-06
Payer: COMMERCIAL

## 2024-02-06 VITALS
HEART RATE: 84 BPM | WEIGHT: 263.89 LBS | TEMPERATURE: 98.5 F | DIASTOLIC BLOOD PRESSURE: 77 MMHG | SYSTOLIC BLOOD PRESSURE: 132 MMHG | HEIGHT: 68 IN | OXYGEN SATURATION: 99 % | RESPIRATION RATE: 16 BRPM | BODY MASS INDEX: 39.99 KG/M2

## 2024-02-06 LAB
ANION GAP SERPL CALCULATED.3IONS-SCNC: 9 MMOL/L (ref 7–15)
BUN SERPL-MCNC: 13 MG/DL (ref 6–20)
CALCIUM SERPL-MCNC: 8.7 MG/DL (ref 8.6–10)
CHLORIDE SERPL-SCNC: 105 MMOL/L (ref 98–107)
CREAT SERPL-MCNC: 0.9 MG/DL (ref 0.67–1.17)
DEPRECATED HCO3 PLAS-SCNC: 23 MMOL/L (ref 22–29)
EGFRCR SERPLBLD CKD-EPI 2021: >90 ML/MIN/1.73M2
ERYTHROCYTE [DISTWIDTH] IN BLOOD BY AUTOMATED COUNT: 15.8 % (ref 10–15)
GLUCOSE SERPL-MCNC: 165 MG/DL (ref 70–99)
HCT VFR BLD AUTO: 41.2 % (ref 40–53)
HGB BLD-MCNC: 13 G/DL (ref 13.3–17.7)
MCH RBC QN AUTO: 24.3 PG (ref 26.5–33)
MCHC RBC AUTO-ENTMCNC: 31.6 G/DL (ref 31.5–36.5)
MCV RBC AUTO: 77 FL (ref 78–100)
PLATELET # BLD AUTO: 260 10E3/UL (ref 150–450)
POTASSIUM SERPL-SCNC: 4.2 MMOL/L (ref 3.4–5.3)
RBC # BLD AUTO: 5.35 10E6/UL (ref 4.4–5.9)
SODIUM SERPL-SCNC: 137 MMOL/L (ref 135–145)
WBC # BLD AUTO: 12.8 10E3/UL (ref 4–11)

## 2024-02-06 PROCEDURE — 85041 AUTOMATED RBC COUNT: CPT

## 2024-02-06 PROCEDURE — 250N000013 HC RX MED GY IP 250 OP 250 PS 637

## 2024-02-06 PROCEDURE — 99024 POSTOP FOLLOW-UP VISIT: CPT

## 2024-02-06 PROCEDURE — 80048 BASIC METABOLIC PNL TOTAL CA: CPT

## 2024-02-06 PROCEDURE — 36415 COLL VENOUS BLD VENIPUNCTURE: CPT

## 2024-02-06 PROCEDURE — 250N000011 HC RX IP 250 OP 636: Performed by: NEUROLOGICAL SURGERY

## 2024-02-06 PROCEDURE — 70450 CT HEAD/BRAIN W/O DYE: CPT

## 2024-02-06 PROCEDURE — 250N000011 HC RX IP 250 OP 636

## 2024-02-06 PROCEDURE — 70450 CT HEAD/BRAIN W/O DYE: CPT | Mod: 26 | Performed by: RADIOLOGY

## 2024-02-06 RX ORDER — AMOXICILLIN 250 MG
1 CAPSULE ORAL 2 TIMES DAILY
Qty: 30 TABLET | Refills: 0 | Status: SHIPPED | OUTPATIENT
Start: 2024-02-06 | End: 2024-03-18

## 2024-02-06 RX ORDER — POLYETHYLENE GLYCOL 3350 17 G/17G
17 POWDER, FOR SOLUTION ORAL DAILY
Qty: 510 G | Refills: 0 | Status: SHIPPED | OUTPATIENT
Start: 2024-02-06 | End: 2024-03-18

## 2024-02-06 RX ORDER — OXYCODONE HYDROCHLORIDE 5 MG/1
5 TABLET ORAL EVERY 4 HOURS PRN
Qty: 15 TABLET | Refills: 0 | Status: SHIPPED | OUTPATIENT
Start: 2024-02-06 | End: 2024-03-18

## 2024-02-06 RX ADMIN — ACETAMINOPHEN 975 MG: 325 TABLET, FILM COATED ORAL at 11:00

## 2024-02-06 RX ADMIN — CEFEPIME HYDROCHLORIDE 2 G: 2 INJECTION, POWDER, FOR SOLUTION INTRAVENOUS at 01:55

## 2024-02-06 RX ADMIN — CEFEPIME HYDROCHLORIDE 2 G: 2 INJECTION, POWDER, FOR SOLUTION INTRAVENOUS at 07:45

## 2024-02-06 RX ADMIN — VANCOMYCIN HYDROCHLORIDE 1000 MG: 1 INJECTION, SOLUTION INTRAVENOUS at 08:24

## 2024-02-06 RX ADMIN — SENNOSIDES AND DOCUSATE SODIUM 1 TABLET: 8.6; 5 TABLET ORAL at 07:47

## 2024-02-06 RX ADMIN — ACETAMINOPHEN 975 MG: 325 TABLET, FILM COATED ORAL at 01:00

## 2024-02-06 RX ADMIN — POLYETHYLENE GLYCOL 3350 17 G: 17 POWDER, FOR SOLUTION ORAL at 07:47

## 2024-02-06 ASSESSMENT — ACTIVITIES OF DAILY LIVING (ADL)
ADLS_ACUITY_SCORE: 20

## 2024-02-06 NOTE — PLAN OF CARE
Arrived from: PACU  Belongings/meds: Remain in patient's room   2 RN Skin Assessment Completed by: Dora Ricardo   Non-intact findings documented (yes/no/NA): Right head incision covered with primapore dressing marked, right neck incision with primapore-no drainage noted, right upper chest incision covered with primapore-no drainage noted, and lateral chest incision with adhesive strips.        Status: POD#0 S/p Insertion Implant Shunt Ventriculopleural. Hx acute appendicitis (12/2023) with appendectomy c/b intra-op perforation.     Vitals: VSS on RA. Con pulse ox in place   Neuros: Intact  IV: PIV infusing NS at 100 ml/hr   Resp/trach: WNL  Diet: Regular diet   Bowel status: 2/5/2024  : Voiding, bladder scanned x1 this shift. Voided 1000 ml this shift   Skin: Right head incision covered with primapore-dressing marked, right neck incision with primapore-no drainage noted, right upper chest incision covered with primapore-no drainage noted, and lateral chest incision with adhesive strips.    Pain: HA managed with Tylenol and Oxy   Activity: Independent in room, SBA/GB in halls  Social: Family and friends visited-supportive   Plan: Continue to monitor and follow POC

## 2024-02-06 NOTE — OR NURSING
Pt transferred from PACU to , vitally stable on 2L NC, antibiotics started in PACU, see MAR, no belongings in PACU, pt returns to inpatient room, XRAYS done, no CT ordered, plan for CT 2/6/24 AM

## 2024-02-06 NOTE — PROGRESS NOTES
THORACIC & FOREGUT SURGERY    S:  No acute overnight events.  Pt seen at bedside resting comfortably.       O:  Vitals:    02/05/24 2003 02/05/24 2307 02/06/24 0408 02/06/24 0730   BP: 136/79 117/63 115/73 132/77   BP Location: Left arm Left arm Left arm Left arm   Patient Position:   Semi-Gudino's    Cuff Size:   Adult Large    Pulse: 108 73 89 84   Resp: 18 18 16 16   Temp: 98.7  F (37.1  C) 98.4  F (36.9  C) 98.2  F (36.8  C) 98.5  F (36.9  C)   TempSrc: Oral Axillary Oral Oral   SpO2: 99% 98% 100% 99%   Weight:       Height:           A&O, NAD  Breathing non-labored, incisions healthy appearing  Appears well perfused  Soft, NDNT  Distal extremities warm    A/P: Donald Jackson is a 29 year old male status postRIGHT intrapleural placement of ventricular-pleural shunt.  Doing well from thoracic surgical standpoint.  -No thoracic surgery follow-up indicated  -Thoracic surgery to sign off  -Please feel free to call with questions    Lanre Nice PA-C

## 2024-02-06 NOTE — OP NOTE
PATIENT NAME: Donald Jackson  PATIENT MRN: [id number]  PATIENT ACCOUNT: [id number]  PATIENT YOB: 1995    NEUROSURGERY OPERATIVE REPORT     DATE OF SURGERY: 02/06/24     PREOPERATIVE DIAGNOSIS:  1. Shunt malfunction, initial encounter [T85.618A]      POSTOPERATIVE DIAGNOSIS:  1. Shunt malfunction, initial encounter [T85.618A]      PROCEDURES PERFORMED:  1. RIGHT VENTRICULOPLEURAL Insertion, Right - Head  2. Implant Shunt Ventriculopleural, Right - Chest     STAFF SURGEON:   Panel 1:     * Fabiola Martinez MD - Primary    Panel 2:     * Almas Noriega MD - Primary     RESIDENT SURGEONS:   Panel 1:     * Renard Hilton MD - Resident - Assisting     * Dora Hernandez MD - Resident - Assisting  Panel 2:     * Mayra Beltran MD - Resident - Assisting     ANESTHESIA: General endotracheal anesthesia     ESTIMATED BLOOD LOSS: 10 mL     IMPLANTS:     Implant Name Type Inv. Item Serial No.  Lot No. LRB No. Used Action   SHUNT CATH PERITONEAL ANTIBIOTIC-IMPREG URSZULA 122CM [id number] - [id number] Shunt SHUNT CATH PERITONEAL ANTIBIOTIC-IMPREG URSZULA 122CM [id number] [employer] [id number] Right 1 Implanted   Codman Certas Plus     [id number]   [id number] Right 1 Implanted      EXPLANTS: Hakim Valve, unknown model     DRAINS: None     FINDINGS:  Shunt revision with distal revision at valve and distal catheter. Final system is a right ventriculopleural shunt placed with new Codman Certas (set at 4). Retained distal catheter at the neck and abdomen.       COMPLICATIONS: None apparent     INDICATIONS: Donald Jackson is a 29 year old male with PMH of prematurity, congenital hydrocephalus (s/p  shunt at ~age 3) recent acute appendicitis (admitted 12/03/2023) with laparoscopic appendectomy complicated by intraoperative perforation (12/5/2023) who presented with 2-3 days of headache, nausea, vomiting, and confusion. He was found to have enlarged bilateral lateral ventricles concerning for shunt malfunction, likely  due to CSF pseudocyst formation in the setting of recent intra-abdominal inflammation. His shunt was externalized at the chest on 1/28/24 and he was monitored closely for signs of active infection in the inpatient setting. Infectious disease team thereafter cleared the patient for shunt re-internalization. However, CT of his abdomen showed persistent loculated fluid collections (stable to improved from prior imaging). Therefore decision was made to transition the patient's system to a ventriculopleural shunt.     DESCRIPTION OF PROCEDURE:  Donald Jackson was brought to the operating room and their identity was verified. The patient was transferred to the operating table and placed in supine position. General endotracheal anesthesia was obtained. The bed was turned 180 degrees. The head of bed was exchanged with a horseshoe support and the patient's right arm was extended at 90 degrees away from the body. The patient's head was turned slightly to the left. Stealth was registered to the patient. Preoperative antibiotics were administered.     The patient's right scalp, right neck, and right chest was cleaned, prepared, and draped in sterile fashion. Timeout was performed.     The patient's prior horseshoe incision over the right scalp was opened with #10 blade to the level of the bone. The skin flap was reflected back to expose his previously placed reservoir. The posterior limb of his incision was extended such that the entirety of his Hakim valve could be visualized. Hemostasis was achieved with a combination of monopolar and bipolar cautery.    Attention was then turned to tunneling. A blunt shunt tunneler was used to create a tunnel for the shunt tubing between the right scalp and the right chest lateral to the nipple. It was challenging to create the necessary turn over the clavicle therefore a skip incision was made in a transverse orientation on the right neck. The shunt tubing was then able to be passed from  the right scalp incision to the right lateral chest. A syringe filled with saline was connected to the distal catheter and saline was flushed through the entirety of the tubing before clamping the tubing at both ends with shod hemostats.     Attention was then returned to the right scalp. The patient's previously placed Hakim valve was disconnected from the distal catheter via cutting of the distal catheter. CSF was seen exiting the valve in steady drops. CSF was collected from this site and sent for routine labs. The circulating nurse attempted to remove the externalized shunt tubing from the chest under the drapes. A pre-primed Codman Certas valve set to 4 was connected to the proximal end of the distal catheter. The patients previously placed catheter connecting the reservoir to his Hakim valve was cut and connected to the new Codman Certas valve. Flow was noted from the distal catheter. The distal portion of the catheter was handed off to the Thoracic Surgery team for placement into the pleura. Please see their separately dictated operative note regarding the placement of the pleural catheter.     The Certas valve was secured to the pericranium with a vicryl suture. Thereafter, we closed in layers with inverted, interrupted 2-0 vicryl for the galea and 3-0 running nylon for the skin. Sterile dressings were applied. Upon removal of drapes, it was discovered that the patient's externalized shunt tubing was protruding through his chest incision from his initial shunt externalization. This incision was then prepped and draped in sterile fashion, and its nylon sutures were removed. The incision was explored with blunt dissection and approximately 6 cm of the shunt tubing was able to be removed. This incision was then closed with 3-0 nylon.     The patient was extubated and transferred to PACU in stable position. At the end of the procedure, sponge and needle counts were correct.     Dr. Martinez was present for the  critical portions of the procedure and immediately available for the remainder.     Operative note prepared by Dora Hernandez MD PhD, Neurosurgery PGY-2    I was present for the critical portions of the operation and immediately available for the remainder.  AMP

## 2024-02-06 NOTE — PHARMACY-VANCOMYCIN DOSING SERVICE
Pharmacy Vancomycin Initial Note  Date of Service 2024  Patient's  1995  29 year old, male    Indication: Surgical Prophylaxis    Current estimated CrCl = Estimated Creatinine Clearance: 120.2 mL/min (based on SCr of 1.13 mg/dL).    Creatinine for last 3 days  2/3/2024:  7:18 AM Creatinine 1.14 mg/dL  2024:  6:53 AM Creatinine 1.06 mg/dL  2024:  3:46 AM Creatinine 1.13 mg/dL    Recent Vancomycin Level(s) for last 3 days  No results found for requested labs within last 3 days.      Vancomycin IV Administrations (past 72 hours)        No vancomycin orders with administrations in past 72 hours.                    Nephrotoxins and other renal medications (From now, onward)      Start     Dose/Rate Route Frequency Ordered Stop    24  vancomycin (VANCOCIN) 2,500 mg in sodium chloride 0.9 % 500 mL intermittent infusion         2,500 mg  over 240 Minutes Intravenous ONCE 24              Contrast Orders - past 72 hours (72h ago, onward)      None            [employer] Prediction of Planned Initial Vancomycin Regimen  Loading dose: 2000 mg at 20:00 2024.  Regimen: 1000 mg IV every 12 hours.  Start time: 08:00 on 2024  Exposure target: AUC24 (range)400-600 mg/L.hr   AUC24,ss: 444 mg/L.hr  Probability of AUC24 > 400: 58 %  Ctrough,ss: 12.2 mg/L  Probability of Ctrough,ss > 20: 26 %  Probability of nephrotoxicity (Lodise BRADLEY ): 7 %        Plan:  Start vancomycin  2000 mg IV once, followed by 1000 mg IV every 12 hours. Per consult, duration of therapy is to be 24-hours post-op and so vancomycin has been ordered for this set duration. If therapy is to be continued, please re-consult pharmacy.    Vancomycin monitoring method: AUC  Vancomycin therapeutic monitoring goal: 400-600 mg*h/L  Pharmacy will check vancomycin levels as appropriate in 1-3 Days.    Serum creatinine levels will be ordered daily for the first week of therapy and at least twice weekly for subsequent  weeks.      Sissy De Paz, PharmD

## 2024-02-06 NOTE — DISCHARGE SUMMARY
[facility] Discharge Summary and Instructions    Donald Jackson MRN# [id number]   Age: 29 year old YOB: 1995     Date of Admission:  1/27/2024  Date of Discharge::  2/6/2024  Admitting Physician:  Fabiola Martinez MD  Discharge Physician:  Fabiola Martinez MD          Admission Diagnoses:   Shunt malfunction, initial encounter [T85.339X]          Discharge Diagnosis:     Shunt malfunction, initial encounter [T85.277P]     Clinically Significant Risk Factors Present on Admission      # Hydrocephalus         Procedures:   1/28/24 -  shunt tap    1/28/24 -  shunt externalization    2/5/24 - RIGHT VENTRICULOPLEURAL Insertion, Right - Head  Implant Shunt Ventriculopleural, Right - Chest    Codman Certas valve, setting of 4           Brief History of Illness:   Donald Jackson is a 29 year old male with PMH of prematurity, congenital hydrocephalus (s/p  shunt at ~age 3) recent acute appendicitis (admitted 12/03/2023) with laparoscopic appendectomy complicated by intraoperative perforation (12/5/2023) who presents with 2-3 days of headache, nausea, vomiting, and confusion with CT head concerning for ventriculomegaly and a small peritoneal fluid collection at  the ventriculoperitoneal shunt catheter tip concerning for abscess versus CSF pseudocyst.  Patient has elected to undergo above-mentioned procedure.           Hospital Course:   After admission,  shunt was tapped, which had some spontaneous flow, but not very brisk, opening pressure was unable to be measured. Patient underwent above-mentioned procedure on 2/5/24. Given concern for shunt malfunction and concern of intra-abdominal infection, the shunt was externalized and connected to a Salcedo drain system. CSF was sent for cultures and analysis. ID was consulted and patient was started on vancomycin, cefepime, and flagyl. Patient developed red itchy rash, therefore benadryl was initiated. Patient's cognitive exam and headaches improved.  "CSF cultures remained negative. ESR/ CRP down trended and CT A&P revealed stable to resolving abscess. It was determined best to proceed with ventriculo-pleural shunt with thoracic surgery.    Following the procedure the patient was transferred to neurosurgical floor. CT head revealed decreased ventricle size and shunt series revealed proper positioning of catheter. Patient received 24 hours of vancomycin and cefepime following procedure. On post operative day 1, he was ambulating, voiding without a jim, eating a regular diet, pain was well controlled and therefore he was discharged home.             Discharge Medications:     Current Discharge Medication List        START taking these medications    Details   oxyCODONE (ROXICODONE) 5 MG tablet Take 1 tablet (5 mg) by mouth every 4 hours as needed for moderate pain  Qty: 15 tablet, Refills: 0    Associated Diagnoses: S/P ventricular shunt placement      polyethylene glycol (MIRALAX) 17 GM/Dose powder Take 17 g by mouth daily  Qty: 510 g, Refills: 0    Associated Diagnoses: S/P ventricular shunt placement      senna-docusate (SENOKOT-S/PERICOLACE) 8.6-50 MG tablet Take 1 tablet by mouth 2 times daily  Qty: 30 tablet, Refills: 0    Associated Diagnoses: S/P ventricular shunt placement           CONTINUE these medications which have NOT CHANGED    Details   ibuprofen (ADVIL/MOTRIN) 600 MG tablet Take 600 mg by mouth every 6 hours as needed for moderate pain                    Exam:   Physical Exam  /77 (BP Location: Left arm)   Pulse 84   Temp 98.5  F (36.9  C) (Oral)   Resp 16   Ht 1.727 m (5' 8\")   Wt 119.7 kg (263 lb 14.3 oz)   SpO2 99%   BMI 40.12 kg/m    General: Appears comfortable, NAD  Wound: Incision, clean, dry, intact  Neurologic:  - Alert & Oriented to person, place, time, and situation  - Follows commands briskly  - Speech fluent, spontaneous. No aphasia or dysarthria.  - No gaze preference. No apparent hemineglect.  - PERRL, EOMI  - Face " symmetric with sensation intact to light touch  - Palate elevates symmetrically, uvula midline, tongue protrudes midline  - Trapezii muscles 5/5 bilaterally  - No pronator drift       Del Tr Bi WE WF Gr   R 5 5 5 5 5 5   L 5 5 5 5 5 5     HF KE KF DF PF EHL   R 5 5 5 5 5 5   L 5 5 5 5 5 5      Reflexes 2+ throughout     Sensation intact and symmetric to light touch throughout         Discharge Instructions and Follow-Up:     Discharge diet: Regular   Discharge activity: You may advance activity as tolerated. No strenuous exercise or heay lifting greater than 10 lbs for 4 weeks or until seen and cleared in clinic.     Discharge follow-up: Follow-up with Neurosurgery YANETH in 2 weeks for wound check/post-hospital evaluation.       Wound care: Ok to shower,however no scrubbing of the wound and no soaking of the wound, meaning no bathtubs or swimming pools. Pat dry only. Leave wound open to air.  Patient to have wound checked 2 weeks following surgery.    Wound location: scalp, right neck, and chest  Closure technique: suture  Dressing needs: None  Post-op care at follow-up: Keep dry and clean       Please call if you have:  1. increased pain, redness, drainage, swelling at your incision  2. fevers > 101.5 F degrees  3. with any questions or concerns.  You may reach the Neurosurgery clinic at [phone] during regular work hours. ER at [phone].    and ask for the Neurosurgery Resident on call at [phone], during off hours or weekends.         Discharge Disposition:     Discharged to home        Amparo García PA-C  Neurosurgery Department  Pager: [phone]

## 2024-02-06 NOTE — PROGRESS NOTES
Care Management Discharge Note    Discharge Date: 02/06/2024     Discharge Disposition: Home  Discharge Services: None  Discharge DME: None    Discharge Transportation: family or friend will provide    Private pay costs discussed: Not applicable    Does the patient's insurance plan have a 3 day qualifying hospital stay waiver?  No    Education Provided on the Discharge Plan: Yes  Persons Notified of Discharge Plans: Patient  Patient/Family in Agreement with the Plan: yes    Handoff Referral Completed: Yes    Additional Information:  Patient status reviewed, discussed in discharge rounds. Per provider, patient will discharge home today following final dose of IV antibiotics.     Handoff complete.     Alee Duff RN, BSN  6A RN Care Coordinator  Ph: [phone]   Pager: [phone]

## 2024-02-06 NOTE — PROGRESS NOTES
Discharge time/date: 2/6 1120  Walked or Wheelchair: wheelchair  PIV removed: yes  Reviewed AVS with patient: yes  Medication due times added to AVS in EPIC: yes  Verbalized understanding of discharge with teachback: yes  Medications retrieved from pharmacy: (local pharmacy)  Supplies sent home: n/a  Belongings from security with patient: n/a

## 2024-02-06 NOTE — PLAN OF CARE
Status: POD#1 S/p Insertion Implant Shunt Ventriculopleural. Hx acute appendicitis (12/2023) with appendectomy c/b intra-op perforation   Vitals: Q4; stable on RA  Neuros: Intact; baseline neuropathy to hands and feet   IV: PIV SL   Labs/Electrolytes: WDL   Resp/trach: WDL; continuous pulse OX in place  Diet: Regular  Bowel status: Passing gas; Bowel sounds +; last BM 2 2/4  : Voids spontaneously   Skin: R chest, armpit head & neck incision; preventative mepilex on sacrum   Pain: 2/10; acetaminophen given   Activity: Up independent in room   Social: Family is supportive; not at bedside   Plan: Continue POC; Pain control; discharge home pending medical stability   Updates this shift: CT scan 2/6 at 0400am       Plan of Care Reviewed With: patient

## 2024-02-07 LAB
BACTERIA CSF CULT: NO GROWTH
GRAM STAIN RESULT: NORMAL
GRAM STAIN RESULT: NORMAL

## 2024-02-10 LAB — BACTERIA CSF CULT: NO GROWTH

## 2024-02-11 LAB — BACTERIA CSF CULT: NORMAL

## 2024-02-14 LAB — BACTERIA CSF CULT: NORMAL

## 2024-02-16 ENCOUNTER — TELEPHONE (OUTPATIENT)
Dept: NEUROSURGERY | Facility: CLINIC | Age: 29
End: 2024-02-16
Payer: COMMERCIAL

## 2024-02-16 LAB — BACTERIA CSF CULT: NORMAL

## 2024-02-16 NOTE — TELEPHONE ENCOUNTER
Phone call to patient per request to eval c/o post-op discomfort.  Patient is is a 28 y/o gentleman about 10 days s/o right  shunt placement.  Report short lived episodes (1st time woke him in the middle of the night) complaints of lower abd discomfort.  Patient reports the discomfort does seem to be associated with abd incision for distal tubing insertion.  Denies discomfort at shunt insertion site.  Per report, he has taken oxycodone only once and continues with good pain control on ibuprofen and Tylenol.      Report active bowels, although seem more solid than the norm.  Continues to take stool softener and lax as directed.  Incision CDI, without signs of infection, remains afebrile.  Patient reports up and about with out other issues.    Reviewed red flag signs of infection and shunt malfunction symptoms.  Confirmed week end contact information in needed over the week end.  Patient verbalized understanding/ agreement with current plan.   Will keep 2 wk post-op appointment with Felipe Valadez 2/20/24 as planned.

## 2024-02-16 NOTE — TELEPHONE ENCOUNTER
[facility] Call Center    Phone Message    May a detailed message be left on voicemail: yes     Reason for Call: Pt said that Dr. Martinez placed a new shunt and he is experiencing a little bit of pain.  He would like to discuss this with a nurse.  Please call him back to discuss.  Thanks.

## 2024-02-16 NOTE — TELEPHONE ENCOUNTER
Attn: Juan Carver RN for Dr. Martinez   *Post surgery shunt patient report pain. Requesting to speak with nurse.     Susy Warner LPN  Neurosurgery

## 2024-02-19 LAB — BACTERIA CSF CULT: NORMAL

## 2024-02-20 ENCOUNTER — OFFICE VISIT (OUTPATIENT)
Dept: NEUROSURGERY | Facility: CLINIC | Age: 29
End: 2024-02-20
Payer: COMMERCIAL

## 2024-02-20 VITALS
WEIGHT: 263 LBS | RESPIRATION RATE: 16 BRPM | HEART RATE: 89 BPM | HEIGHT: 68 IN | OXYGEN SATURATION: 97 % | SYSTOLIC BLOOD PRESSURE: 136 MMHG | BODY MASS INDEX: 39.86 KG/M2 | DIASTOLIC BLOOD PRESSURE: 86 MMHG

## 2024-02-20 DIAGNOSIS — Z90.49 S/P APPENDECTOMY: Primary | ICD-10-CM

## 2024-02-20 PROCEDURE — 99024 POSTOP FOLLOW-UP VISIT: CPT | Performed by: NURSE PRACTITIONER

## 2024-02-20 ASSESSMENT — PAIN SCALES - GENERAL: PAINLEVEL: NO PAIN (0)

## 2024-02-20 NOTE — PROGRESS NOTES
[facility]  Department of Neurosurgery      Name: Donald Jackson  MRN: [id number]  Age: 29 year old  : 1995  Referring provider: No ref. provider found  2024      Chief Complaint:   Congenital Hydrocephalus  Shunt malfunction  S/p Ventriculopleural Shunt placement on 2024 (Codman Certas at 4)  Post op follow-up     History of Present Illness:   Donald Jackson is a 29 year old male with PMH of prematurity, congenital hydrocephalus (s/p  shunt at ~age 3) recent acute appendicitis (admitted 2023) with laparoscopic appendectomy complicated by intraoperative perforation (2023) at OSH. On 2023, patient presented with 2-3 days of headache, nausea, vomiting, and confusion with CT head concerning for ventriculomegaly and a small peritoneal fluid collection at  the ventriculoperitoneal shunt catheter tip concerning for abscess versus CSF pseudocyst.     After admission,  shunt was tapped, which had some spontaneous flow, but not very brisk, opening pressure was unable to be measured. Patient underwent above-mentioned procedure on 24. Given concern for shunt malfunction and concern of intra-abdominal infection, the shunt was externalized and connected to a Salcedo drain system. CSF was sent for cultures and analysis. ID was consulted and patient was started on vancomycin, cefepime, and flagyl. Patient developed red itchy rash, therefore benadryl was initiated. Patient's cognitive exam and headaches improved. CSF cultures remained negative. ESR/ CRP down trended and CT A&P revealed stable to resolving abscess. It was determined best to proceed with ventriculo-pleural shunt with thoracic surgery and this was performed on 2024 by Dr. Martinez.  CT head revealed decreased ventricle size and shunt series revealed proper positioning of catheter. Patient received 24 hours of vancomycin and cefepime following procedure. Patient was discharged from the hospital on 2024.      Today, patient presents for the post op evaluation alone. Since discharge, he had some abdominal discomfort which lasted for a day and then subsided. Today he denies any headache or other concerns. No fever or chills. Overall, he has been doing well. Surgical incisions has been healing well.     Prior to the recent hospital admission, he was working as a .He is planning on RTW on 3/11/2024.       Review of Systems:   Pertinent items are noted in HPI or as in patient entered ROS below, remainder of complete ROS is negative.        No data to display                 Active Medications:     Current Outpatient Medications:     ibuprofen (ADVIL/MOTRIN) 600 MG tablet, Take 600 mg by mouth every 6 hours as needed for moderate pain, Disp: , Rfl:     oxyCODONE (ROXICODONE) 5 MG tablet, Take 1 tablet (5 mg) by mouth every 4 hours as needed for moderate pain, Disp: 15 tablet, Rfl: 0    polyethylene glycol (MIRALAX) 17 GM/Dose powder, Take 17 g by mouth daily, Disp: 510 g, Rfl: 0    senna-docusate (SENOKOT-S/PERICOLACE) 8.6-50 MG tablet, Take 1 tablet by mouth 2 times daily, Disp: 30 tablet, Rfl: 0      Allergies:   Penicillins      Past Medical History:  Past Medical History:   Diagnosis Date    Hydrocephalus (H)     Lattice degeneration     Other forms of retinal detachment(361.89)      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    Cognitive disorder    Pain in joint, shoulder region    Premature ejaculation    Adjustment disorder with mixed anxiety and depressed mood    Shunt malfunction, initial encounter        Past Surgical History:  Past Surgical History:   Procedure Laterality Date    LAPAROSCOPIC ASSISTED IMPLANT SHUNT VENTRICULOPERITONEAL Right 2/5/2024    Procedure: Implant Shunt Ventriculopleural;  Surgeon: Almas Noriega MD;  Location:  OR     "OPTICAL TRACKING SYSTEM IMPLANT SHUNT VENTRICULOPERITONEAL Right 2/5/2024    Procedure: RIGHT VENTRICULOPLEURAL Insertion;  Surgeon: Fabiola Martinez MD;  Location: [facility] OR    RETINAL REATTACHMENT  10/2010    left eye    RETINOPEXY LASER PROPHYLAXIS BREAK(S) OD (RIGHT EYE)  10/2011    shunt in head      a couple as a child    strabismus surery      age 5    TONSILLECTOMY      wisdom teeth         Family History:   Family History   Problem Relation Age of Onset    Diabetes Father         Type 2     Diabetes Paternal Grandfather         Type 2     Retinal detachment No family hx of     Amblyopia No family hx of     Glaucoma No family hx of     Macular Degeneration No family hx of          Social History:   Social History     Tobacco Use    Smoking status: Never    Smokeless tobacco: Never   Substance Use Topics    Alcohol use: No    Drug use: No        Physical Exam:   /86 (BP Location: Right arm, Patient Position: Sitting, Cuff Size: Adult Large)   Pulse 89   Resp 16   Ht 1.727 m (5' 8\")   Wt 119.3 kg (263 lb)   SpO2 97%   BMI 39.99 kg/m     General: No acute distress.   Neuro: The patient is fully oriented. Speech is normal. Gait is normal with normal heel-toe walking.   Psych: Normal mood and affect. Behavior is normal.    Surgical Incision: Right neck and right chest incisions slightly red. No drainage or swelling noted. Right  shunt incision healing well.     Procedure:  With Codman Certas shunt device, right sided  shunt was checked and is set at 4.0. Verified x 3.       Imaging:  No recent imaging.      Assessment:  Congenital Hydrocephalus  Shunt malfunction  S/p Ventriculopleural Shunt placement on 2/5/2024 (Codman Certas at 4)  Post op follow-up     Plan:  Overall, patient recovering well after the recent surgery. Surgical sutures removed and patient tolerated well. Right sided neck incision and right chest incision slightly red without any drainage. Patient to follow-up with us as needed. "     Patient has a medical form from his employer that needs to be filled. He requested this to be send to his home address once it is completed.     We will complete a CT abdomen/ pelvis within a few days per recommendation by inpatient ID team. Patient to follow-up with his PCP after imaging.       Miley Valadez CNP  Department of Neurosurgery    I spent 35 minutes on patient care activities related to this encounter on the date of service, including time spent reviewing the chart, obtaining history and examination and in counseling the patient, and in documentation in the electronic medical record.

## 2024-02-20 NOTE — NURSING NOTE
Chief Complaint   Patient presents with    RECHECK     Here for post op follow up, confirmed with patient     Shraddha Hewitt

## 2024-02-20 NOTE — LETTER
2024       RE: Donald Jackson  [address]  [place] [address]       Dear Colleague,    Thank you for referring your patient, Donald Jackson, to the [facility] [place] [facility]. Please see a copy of my visit note below.    [facility]  Department of Neurosurgery      Name: Donald Jackson  MRN: [id number]  Age: 29 year old  : 1995  Referring provider: No ref. provider found  2024      Chief Complaint:   Congenital Hydrocephalus  Shunt malfunction  S/p Ventriculopleural Shunt placement on 2024 (Codman Certas at 4)  Post op follow-up     History of Present Illness:   Donald Jackson is a 29 year old male with PMH of prematurity, congenital hydrocephalus (s/p  shunt at ~age 3) recent acute appendicitis (admitted 2023) with laparoscopic appendectomy complicated by intraoperative perforation (2023) at OSH. On 2023, patient presented with 2-3 days of headache, nausea, vomiting, and confusion with CT head concerning for ventriculomegaly and a small peritoneal fluid collection at  the ventriculoperitoneal shunt catheter tip concerning for abscess versus CSF pseudocyst.     After admission,  shunt was tapped, which had some spontaneous flow, but not very brisk, opening pressure was unable to be measured. Patient underwent above-mentioned procedure on 24. Given concern for shunt malfunction and concern of intra-abdominal infection, the shunt was externalized and connected to a Salcedo drain system. CSF was sent for cultures and analysis. ID was consulted and patient was started on vancomycin, cefepime, and flagyl. Patient developed red itchy rash, therefore benadryl was initiated. Patient's cognitive exam and headaches improved. CSF cultures remained negative. ESR/ CRP down trended and CT A&P revealed stable to resolving abscess. It was determined best to proceed with  ventriculo-pleural shunt with thoracic surgery and this was performed on 2/5/2024 by Dr. Martinez.  CT head revealed decreased ventricle size and shunt series revealed proper positioning of catheter. Patient received 24 hours of vancomycin and cefepime following procedure. Patient was discharged from the hospital on 2/6/2024.     Today, patient presents for the post op evaluation alone. Since discharge, he had some abdominal discomfort which lasted for a day and then subsided. Today he denies any headache or other concerns. No fever or chills. Overall, he has been doing well. Surgical incisions has been healing well.     Prior to the recent hospital admission, he was working as a .He is planning on RTW on 3/11/2024.       Review of Systems:   Pertinent items are noted in HPI or as in patient entered ROS below, remainder of complete ROS is negative.        No data to display                 Active Medications:     Current Outpatient Medications:     ibuprofen (ADVIL/MOTRIN) 600 MG tablet, Take 600 mg by mouth every 6 hours as needed for moderate pain, Disp: , Rfl:     oxyCODONE (ROXICODONE) 5 MG tablet, Take 1 tablet (5 mg) by mouth every 4 hours as needed for moderate pain, Disp: 15 tablet, Rfl: 0    polyethylene glycol (MIRALAX) 17 GM/Dose powder, Take 17 g by mouth daily, Disp: 510 g, Rfl: 0    senna-docusate (SENOKOT-S/PERICOLACE) 8.6-50 MG tablet, Take 1 tablet by mouth 2 times daily, Disp: 30 tablet, Rfl: 0      Allergies:   Penicillins      Past Medical History:  Past Medical History:   Diagnosis Date    Hydrocephalus (H)     Lattice degeneration     Other forms of retinal detachment(361.89)      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    Cognitive disorder    Pain in joint, shoulder region    Premature ejaculation    Adjustment disorder with mixed anxiety and  "depressed mood    Shunt malfunction, initial encounter        Past Surgical History:  Past Surgical History:   Procedure Laterality Date    LAPAROSCOPIC ASSISTED IMPLANT SHUNT VENTRICULOPERITONEAL Right 2/5/2024    Procedure: Implant Shunt Ventriculopleural;  Surgeon: Almas Noriega MD;  Location: [facility] OR    OPTICAL TRACKING SYSTEM IMPLANT SHUNT VENTRICULOPERITONEAL Right 2/5/2024    Procedure: RIGHT VENTRICULOPLEURAL Insertion;  Surgeon: Fabiola Martinez MD;  Location: [facility] OR    RETINAL REATTACHMENT  10/2010    left eye    RETINOPEXY LASER PROPHYLAXIS BREAK(S) OD (RIGHT EYE)  10/2011    shunt in head      a couple as a child    strabismus surery      age 5    TONSILLECTOMY      wisdom teeth         Family History:   Family History   Problem Relation Age of Onset    Diabetes Father         Type 2     Diabetes Paternal Grandfather         Type 2     Retinal detachment No family hx of     Amblyopia No family hx of     Glaucoma No family hx of     Macular Degeneration No family hx of          Social History:   Social History     Tobacco Use    Smoking status: Never    Smokeless tobacco: Never   Substance Use Topics    Alcohol use: No    Drug use: No        Physical Exam:   /86 (BP Location: Right arm, Patient Position: Sitting, Cuff Size: Adult Large)   Pulse 89   Resp 16   Ht 1.727 m (5' 8\")   Wt 119.3 kg (263 lb)   SpO2 97%   BMI 39.99 kg/m     General: No acute distress.   Neuro: The patient is fully oriented. Speech is normal. Gait is normal with normal heel-toe walking.   Psych: Normal mood and affect. Behavior is normal.    Surgical Incision: Right neck and right chest incisions slightly red. No drainage or swelling noted. Right  shunt incision healing well.     Imaging:  No recent imaging.      Assessment:  Congenital Hydrocephalus  Shunt malfunction  S/p Ventriculopleural Shunt placement on 2/5/2024 (Codman Certas at 4)  Post op follow-up     Plan:  Overall, patient recovering well " after the recent surgery. Surgical sutures removed and patient tolerated well. Right sided neck incision and right chest incision slightly red without any drainage. Patient to follow-up with us as needed.     We will complete a CT abdomen/ pelvis within a few days per recommendation by inpatient ID team. Patient to follow-up with his PCP after imaging.       I spent 35 minutes on patient care activities related to this encounter on the date of service, including time spent reviewing the chart, obtaining history and examination and in counseling the patient, and in documentation in the electronic medical record.      Again, thank you for allowing me to participate in the care of your patient.      Sincerely,    MERRICK Diaz CNP

## 2024-02-21 ENCOUNTER — DOCUMENTATION ONLY (OUTPATIENT)
Dept: NEUROSURGERY | Facility: CLINIC | Age: 29
End: 2024-02-21
Payer: COMMERCIAL

## 2024-02-21 NOTE — PROGRESS NOTES
PAPERWORK DOCUMENTATION    How Paperwork is received:  In Person    Type of Paperwork: Other     Who is the paperwork for:  Patient    Received Date February 20, 2024    Who Received the paperwork:  other    Form to be Completed by:  Pema    Anticipated Completion Date: Feb 27th-29th,2024.     Date Completed Form Faxed to Requested Entity: Writer sent completed paperwork to patient's home address as requested on March 5th, 2024. A copy was also scanned into the patient's electronic medical record.     [facility]IMIT[facility]  Neurosurgery nurse navigator.

## 2024-02-27 SDOH — HEALTH STABILITY: PHYSICAL HEALTH: ON AVERAGE, HOW MANY DAYS PER WEEK DO YOU ENGAGE IN MODERATE TO STRENUOUS EXERCISE (LIKE A BRISK WALK)?: 1 DAY

## 2024-02-27 SDOH — HEALTH STABILITY: PHYSICAL HEALTH: ON AVERAGE, HOW MANY MINUTES DO YOU ENGAGE IN EXERCISE AT THIS LEVEL?: 30 MIN

## 2024-02-27 ASSESSMENT — SOCIAL DETERMINANTS OF HEALTH (SDOH): HOW OFTEN DO YOU GET TOGETHER WITH FRIENDS OR RELATIVES?: NEVER

## 2024-02-29 ENCOUNTER — NURSE TRIAGE (OUTPATIENT)
Dept: NURSING | Facility: CLINIC | Age: 29
End: 2024-02-29
Payer: COMMERCIAL

## 2024-02-29 NOTE — PROGRESS NOTES
Assessment & Plan   Problem List Items Addressed This Visit    None  Visit Diagnoses       Obstructed ventriculoperitoneal shunt, subsequent encounter    -  Primary    Relevant Orders    Adult Neurology  Referral    Adult Infectious Disease  Referral          Referrals placed as noted above. Encouraged [place] to contact me with any further questions or concerns. Letter provided today allowing Donald to return to work with restrictions. See letter tab for details.     Counseling  Appropriate preventive services were discussed with this patient, including applicable screening as appropriate for fall prevention, nutrition, physical activity, Tobacco-use cessation, weight loss and cognition.  Checklist reviewing preventive services available has been given to the patient.  Reviewed patient's diet, addressing concerns and/or questions.   He is at risk for lack of exercise and has been provided with information to increase physical activity for the benefit of his well-being.   Patient is at risk for social isolation and has been provided with information about the benefit of social connection.     35 minutes spent on the date of the encounter doing chart review, history and exam, documentation and further activities as noted.    Roberto Gomez MD  1:32 PM, March 5, 2024        Subjective   [place] is a 29 year old, presenting for the following health issues:  Physical (Pt just want to catch up with Dr. Gomez and a back to work form. Also will be needing some referral to urology and infectious disease.)    HPI   Hospital Follow Up  - seen at [facility] on 1/27/24 for  shunt malfunction  - per patient, the shunt became plugged due to an appendicitis   - surgery to correct the shunt on 2/5  - has followed up with neurology post hospitalization  - was advised by neurology to get a referral to neurology outpatient as well as ID for follow up on the appendicitis  - today reports no pain, fever, nausea,  "chills  - no dizziness/vertigo  - he does continue to report significant fatigue  - he notes persistent fluid at the site of laparoscopic incision at RIGHT axilla and wonders if this is concerning; no redness, pain, drainage or bleeding        Review of Systems  Constitutional, HEENT, cardiovascular, pulmonary, gi and gu systems are negative, except as otherwise noted.      Objective    /85   Pulse 94   Temp 98.2  F (36.8  C)   Ht 1.711 m (5' 7.36\")   Wt 123.8 kg (273 lb)   SpO2 97%   BMI 42.30 kg/m    Body mass index is 42.3 kg/m .  Physical Exam   GENERAL: alert and no distress  HEAD: surgical site for recent  correction appears clean, dry, intact with no significant swelling, erythema or drainage  NECK: no adenopathy, no asymmetry, masses, or scars  RESP: lungs clear to auscultation - no rales, rhonchi or wheezes  CV: regular rate and rhythm, normal S1 S2, no S3 or S4, no murmur, click or rub, no peripheral edema  TORSO: area of fluid collection at recent laparoscopic incision site, under RIGHT axilla with no redness, no pain, no drainage  ABDOMEN: soft, nontender, no hepatosplenomegaly, no masses and bowel sounds normal  MS: no gross musculoskeletal defects noted, no edema  NEURO: no obvious cognitive deficits, CNs 2-12 grossly intact, strength and coordination grossly normal throughout        Signed Electronically by: Roberto Gomez MD    "

## 2024-02-29 NOTE — TELEPHONE ENCOUNTER
"Called patient for symptom update.  Patient did go to local urgent care.  Assessed symptoms more related to GI.   Given Rx for Zofran.  Comfortable at home now.  HA/dizziness much improved.  Tolerating sips of clear fluids and saltine crackers.      Reviewed red flag symptoms related to shunt function. Patient verbalized understanding of symptoms and need for ED assessment prn.         Nurse Triage SBAR    Is this a 2nd Level Triage? YES, LICENSED PRACTITIONER REVIEW IS REQUIRED    Situation: patient for the last 2 days is having dizziness with nausea and headache.    Background:   RIGHT VENTRICULOPLEURAL Insertion   Fabiola Martinez MD  on  02-    Assessment:   Patient with dizziness, he is spinning and not the room.  Standing makes it worse  Nausea and self induced vomiting x 2  Not sure of fluid intake in the last 2 days.  Denies fever or chills  Denies ringing, buzzing or swooshing in his ears.   Denies ear drainage  Denies nasal drainage or sinus pressure  Headache all over present.   \"I feel it all in my head\"  Taking Ibuprofen every 6 hours, sometimes helps.  Pain 7/10 constant,  Denies diarrhea, normal BM this am.    Protocol Recommended Disposition:   Go to ED    Recommendation:   Increase fluid intake  Have roommate drive you , do not drive yourself    Routed to provider          Reason for Disposition   SEVERE dizziness (e.g., unable to stand, requires support to walk, feels like passing out now)    Additional Information   Negative: SEVERE difficulty breathing (e.g., struggling for each breath, speaks in single words)   Negative: Shock suspected (e.g., cold/pale/clammy skin, too weak to stand, low BP, rapid pulse)   Negative: Difficult to awaken or acting confused (e.g., disoriented, slurred speech)   Negative: Fainted, and still feels dizzy afterwards   Negative: Overdose (accidental or intentional) of medications   Negative: New neurologic deficit that is present now: * Weakness of the face, " "arm, or leg on one side of the body * Numbness of the face, arm, or leg on one side of the body * Loss of speech or garbled speech   Negative: Heart beating < 50 beats per minute OR > 140 beats per minute   Negative: Sounds like a life-threatening emergency to the triager    Answer Assessment - Initial Assessment Questions  1. DESCRIPTION: \"Describe your dizziness.\"       Dizzy when stands up,  he is spinning, not the room.  Nauseated, makes himself vomit. X 2 days,   not sure of fluid intake  2. LIGHTHEADED: \"Do you feel lightheaded?\" (e.g., somewhat faint, woozy, weak upon standing)      Very dizzy when standing  3. VERTIGO: \"Do you feel like either you or the room is spinning or tilting?\" (i.e. vertigo)      He is spinning  4. SEVERITY: \"How bad is it?\"  \"Do you feel like you are going to faint?\" \"Can you stand and walk?\"    - MILD: Feels slightly dizzy, but walking normally.    - MODERATE: Feels unsteady when walking, but not falling; interferes with normal activities (e.g., school, work).    - SEVERE: Unable to walk without falling, or requires assistance to walk without falling; feels like passing out now.       moderate  5. ONSET:  \"When did the dizziness begin?\"      2 days ago  6. AGGRAVATING FACTORS: \"Does anything make it worse?\" (e.g., standing, change in head position)      standing  7. HEART RATE: \"Can you tell me your heart rate?\" \"How many beats in 15 seconds?\"  (Note: not all patients can do this)        N/a  8. CAUSE: \"What do you think is causing the dizziness?\"      unknown  9. RECURRENT SYMPTOM: \"Have you had dizziness before?\" If Yes, ask: \"When was the last time?\" \"What happened that time?\"        10. OTHER SYMPTOMS: \"Do you have any other symptoms?\" (e.g., fever, chest pain, vomiting, diarrhea, bleeding)        Denies fever or diarrhea,   Pain 7/10 onstant  taking ibuprofen every 6 hours, sometimes helps.  Denies runny nose, Denies ringing in his ears, or swishing, or buzzing.. Denies being " "around anyone with sickness .  Headache all over,  \"I feel it all in my head\"  No diarrhea, normal BM today.  11. PREGNANCY: \"Is there any chance you are pregnant?\" \"When was your last menstrual period?\"    Protocols used: Dizziness-A-OH    "

## 2024-03-04 LAB — BACTERIA CSF CULT: NO GROWTH

## 2024-03-05 ENCOUNTER — OFFICE VISIT (OUTPATIENT)
Dept: FAMILY MEDICINE | Facility: CLINIC | Age: 29
End: 2024-03-05
Payer: COMMERCIAL

## 2024-03-05 ENCOUNTER — ANCILLARY PROCEDURE (OUTPATIENT)
Dept: CT IMAGING | Facility: CLINIC | Age: 29
End: 2024-03-05
Attending: NURSE PRACTITIONER
Payer: COMMERCIAL

## 2024-03-05 VITALS
BODY MASS INDEX: 42.85 KG/M2 | HEIGHT: 67 IN | HEART RATE: 94 BPM | SYSTOLIC BLOOD PRESSURE: 129 MMHG | OXYGEN SATURATION: 97 % | TEMPERATURE: 98.2 F | WEIGHT: 273 LBS | DIASTOLIC BLOOD PRESSURE: 85 MMHG

## 2024-03-05 DIAGNOSIS — T85.09XD OBSTRUCTED VENTRICULOPERITONEAL SHUNT, SUBSEQUENT ENCOUNTER: Primary | ICD-10-CM

## 2024-03-05 DIAGNOSIS — Z90.49 S/P APPENDECTOMY: ICD-10-CM

## 2024-03-05 PROCEDURE — 74177 CT ABD & PELVIS W/CONTRAST: CPT | Mod: GC | Performed by: RADIOLOGY

## 2024-03-05 RX ORDER — ONDANSETRON 4 MG/1
TABLET, ORALLY DISINTEGRATING ORAL
COMMUNITY
Start: 2024-02-29 | End: 2024-03-18

## 2024-03-05 RX ORDER — IOPAMIDOL 755 MG/ML
122 INJECTION, SOLUTION INTRAVASCULAR ONCE
Status: COMPLETED | OUTPATIENT
Start: 2024-03-05 | End: 2024-03-05

## 2024-03-05 RX ADMIN — IOPAMIDOL 122 ML: 755 INJECTION, SOLUTION INTRAVASCULAR at 09:28

## 2024-03-05 NOTE — LETTER
March 5, 2024      RE: Donald Jackson  [address]  [place] [address]       To whom it may concern:    Donald Jackson was seen in our clinic today, 3/5/2024. He  may return to work with the following: Light duty-unable to lift more than 10 pounds on or about 3/5/2024 .    Sincerely,      Roberto Gomez MD

## 2024-03-05 NOTE — NURSING NOTE
"29 year old  Chief Complaint   Patient presents with    Physical     Pt just want to catch up with Dr. Gomez and a back to work form. Also will be needing some referral to urology and infectious disease.       Blood pressure 129/85, pulse 94, temperature 98.2  F (36.8  C), height 1.711 m (5' 7.36\"), weight 123.8 kg (273 lb), SpO2 97%. Body mass index is 42.3 kg/m .  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    Cognitive disorder    Pain in joint, shoulder region    Premature ejaculation    Adjustment disorder with mixed anxiety and depressed mood    Shunt malfunction, initial encounter       Wt Readings from Last 2 Encounters:   03/05/24 123.8 kg (273 lb)   02/20/24 119.3 kg (263 lb)     BP Readings from Last 3 Encounters:   03/05/24 129/85   02/20/24 136/86   02/06/24 132/77         Current Outpatient Medications   Medication    ibuprofen (ADVIL/MOTRIN) 600 MG tablet    oxyCODONE (ROXICODONE) 5 MG tablet    polyethylene glycol (MIRALAX) 17 GM/Dose powder    senna-docusate (SENOKOT-S/PERICOLACE) 8.6-50 MG tablet     No current facility-administered medications for this visit.       Social History     Tobacco Use    Smoking status: Never    Smokeless tobacco: Never   Substance Use Topics    Alcohol use: No    Drug use: No       Health Maintenance Due   Topic Date Due    ADVANCE CARE PLANNING  Never done    HIV SCREENING  Never done    Pneumococcal Vaccine: Pediatrics (0 to 5 Years) and At-Risk Patients (6 to 64 Years) (2 of 2 - PCV) 03/16/2011    HEPATITIS C SCREENING  Never done    YEARLY PREVENTIVE VISIT  06/26/2020    COVID-19 Vaccine (3 - Pfizer risk series) 06/30/2021    DTAP/TDAP/TD IMMUNIZATION (9 - Td or Tdap) 06/26/2022    INFLUENZA VACCINE (1) 09/01/2023       No results found for: \"PAP\"      March 5, 2024 12:47 PM    "

## 2024-03-05 NOTE — DISCHARGE INSTRUCTIONS

## 2024-03-06 ENCOUNTER — MYC MEDICAL ADVICE (OUTPATIENT)
Dept: FAMILY MEDICINE | Facility: CLINIC | Age: 29
End: 2024-03-06

## 2024-03-06 DIAGNOSIS — T85.09XD OBSTRUCTED VENTRICULOPERITONEAL SHUNT, SUBSEQUENT ENCOUNTER: Primary | ICD-10-CM

## 2024-03-06 NOTE — TELEPHONE ENCOUNTER
Pt requesting neurosurgery referral (as opposed to neurology referral that he requested yesterday, 3/5/2024).  Placing referral per Dr. Gomez.

## 2024-03-12 ENCOUNTER — PATIENT OUTREACH (OUTPATIENT)
Dept: ONCOLOGY | Facility: CLINIC | Age: 29
End: 2024-03-12

## 2024-03-12 ENCOUNTER — ANCILLARY PROCEDURE (OUTPATIENT)
Dept: GENERAL RADIOLOGY | Facility: CLINIC | Age: 29
End: 2024-03-12
Attending: NEUROLOGICAL SURGERY
Payer: COMMERCIAL

## 2024-03-12 ENCOUNTER — OFFICE VISIT (OUTPATIENT)
Dept: NEUROSURGERY | Facility: CLINIC | Age: 29
End: 2024-03-12
Payer: COMMERCIAL

## 2024-03-12 ENCOUNTER — CARE COORDINATION (OUTPATIENT)
Dept: NEUROSURGERY | Facility: CLINIC | Age: 29
End: 2024-03-12

## 2024-03-12 VITALS
SYSTOLIC BLOOD PRESSURE: 135 MMHG | HEIGHT: 68 IN | BODY MASS INDEX: 40.92 KG/M2 | DIASTOLIC BLOOD PRESSURE: 86 MMHG | OXYGEN SATURATION: 95 % | HEART RATE: 89 BPM | WEIGHT: 270 LBS

## 2024-03-12 DIAGNOSIS — Z98.2 VENTRICULO-PERITONEAL SHUNT STATUS: ICD-10-CM

## 2024-03-12 DIAGNOSIS — Z98.2 VENTRICULO-PERITONEAL SHUNT STATUS: Primary | ICD-10-CM

## 2024-03-12 PROCEDURE — 99214 OFFICE O/P EST MOD 30 MIN: CPT | Mod: GC | Performed by: NEUROLOGICAL SURGERY

## 2024-03-12 PROCEDURE — 70250 X-RAY EXAM OF SKULL: CPT | Performed by: RADIOLOGY

## 2024-03-12 PROCEDURE — 71045 X-RAY EXAM CHEST 1 VIEW: CPT | Performed by: RADIOLOGY

## 2024-03-12 PROCEDURE — 74018 RADEX ABDOMEN 1 VIEW: CPT | Performed by: RADIOLOGY

## 2024-03-12 ASSESSMENT — PAIN SCALES - GENERAL: PAINLEVEL: NO PAIN (0)

## 2024-03-12 NOTE — NURSING NOTE
"Chief Complaint   Patient presents with    RECHECK     Imaging results     /86   Pulse 89   Ht 1.727 m (5' 8\")   Wt 122.5 kg (270 lb)   SpO2 95%   BMI 41.05 kg/m      Angélica Elliott CMA at 1:08 PM on 3/12/2024.        "

## 2024-03-12 NOTE — LETTER
"3/12/2024       RE: Donald Jackson  [address]  [place] [address]       Dear Colleague,    Thank you for referring your patient, Donald Jackson, to the [facility] NEUROSURGERY [facility]INIC [place] at [facility]. Please see a copy of my visit note below.    3/12/2024  Neurosurgery Clinic Visit    Chief Complaint: Thoracic subcutaneous collection concerning for distal shunt malfunction    History of present illness:  Donald Jackson is a 29 year old male presenting with a PMHx of hydrocephalus leading to a  shunt at the age of 3, and a recent history of an appendectomy c/b a perforation leading to an abdominal infection, resulting in a prolonged hospitalization with an externalized shunt at [facility] and a ventriculopleural shunt placement with Dr. Martinez, presenting today for assessment for possible subcutaneous accumulation at the chest insertion site. The patient reports that there has been an increasingly growing fluctuance on his right chest where the incision of the pleural access site for the shunt was. This fluctuance is non-tender, nonerythematous. On a CTCAP obtained recently, there appears to be a significant subcutaneous collection suggesting a distal shunt malfunction.      Physical exam:   /86   Pulse 89   Ht 1.727 m (5' 8\")   Wt 122.5 kg (270 lb)   SpO2 95%   BMI 41.05 kg/m    General: Awake and alert and in no acute distress.  Pulm: Breathing comfortably on room air  Neurologic:  - Face symmetric  - Pupils reactive, extraocular movements intact  - Full strength in upper and lower extremities  - Intact sensation to all extremities  - No clonus, patellar and bicep reflexes 2+  - Intact coordination  - Certas valve @ 4 confirmed during this visit with the shunt     Imaging:  All previous imaging pertinent to this encounter reviewed.  Pertinent reads:  CT CAP 3/5/2024  IMPRESSION:   1.  Partially visualized catheter within a " right chest wall  subcutaneous collection of simple fluid; the visualized portion of the  collection measures up to 7.3 x 5.3 cm. These findings suggest the  ventriculopleural shunt catheter is draining into the subcutaneous  tissues rather than the pleural space, although the pleural space is  not fully visualized. No pleural effusion at the lung bases. Recommend  dedicated chest CT for further evaluation.  2.  Nonspecific inflammatory findings in the pelvis surrounding the  distal segment of the ventriculoperitoneal shunt catheter, increased  compared to 2/2/2024, suspicious for ongoing/worsening  inflammatory/infectious findings associated with the catheter (i.e. an  infected catheter). No drainable intraperitoneal collection.    Assessment/Plan:  Donald is a 28 y/o M with a PMHx of hydrocephalus who underwent a shunt revision with Dr. Martinez due to perforated appendicitis leading to an abscess close to th distal tip of his previous  shunt. He underwent a ventriculopleural shunt placement with thoracic surgery on 2/2/2024. He reports increasingly a fluctuant collection at the incision on his R chest. CT-CAP demonstrates a 7.3x5.3 cm collection in the subcutaneous tissue.   - Will place an urgent referral to thoracic surgery for a distal Vpleural shunt revision    Patient seen and discussed with Dr. Fabiola Martinez MD.  Approximately 45 minutes were spent in chart and image review, evaluation of the patient and assessment of next steps.    Wilmer Schultz MD  PGY-1, Department of Neurosurgery  [facility]        REVIEWED ASSOCIATED CLINICAL DATA AND HISTORY FOUND IN THE MEDICAL RECORD:  Past Medical History:   Past Medical History:   Diagnosis Date    Hydrocephalus (H)     Lattice degeneration     Other forms of retinal detachment(361.89)        Surgical History:   Past Surgical History:   Procedure Laterality Date    LAPAROSCOPIC ASSISTED IMPLANT SHUNT VENTRICULOPERITONEAL Right 2/5/2024     Procedure: Implant Shunt Ventriculopleural;  Surgeon: Almas Noriega MD;  Location: [facility] OR    OPTICAL TRACKING SYSTEM IMPLANT SHUNT VENTRICULOPERITONEAL Right 2/5/2024    Procedure: RIGHT VENTRICULOPLEURAL Insertion;  Surgeon: Fabiola Martinez MD;  Location: [facility] OR    RETINAL REATTACHMENT  10/2010    left eye    RETINOPEXY LASER PROPHYLAXIS BREAK(S) OD (RIGHT EYE)  10/2011    shunt in head      a couple as a child    strabismus surery      age 5    TONSILLECTOMY      wisdom teeth         Social history:   Social History     Tobacco Use    Smoking status: Never    Smokeless tobacco: Never   Substance Use Topics    Alcohol use: No    Drug use: No       Family history:   Family History   Problem Relation Age of Onset    Diabetes Father         Type 2     Diabetes Paternal Grandfather         Type 2     Retinal detachment No family hx of     Amblyopia No family hx of     Glaucoma No family hx of     Macular Degeneration No family hx of        Medications:  Current Outpatient Medications   Medication    ibuprofen (ADVIL/MOTRIN) 600 MG tablet    ondansetron (ZOFRAN ODT) 4 MG ODT tab    oxyCODONE (ROXICODONE) 5 MG tablet    polyethylene glycol (MIRALAX) 17 GM/Dose powder    senna-docusate (SENOKOT-S/PERICOLACE) 8.6-50 MG tablet     No current facility-administered medications for this visit.       Allergies:     Allergies   Allergen Reactions    Penicillins Hives and Unknown   I have seen this patient with the resident and formulated a plan and agree with this note.  AMP      Again, thank you for allowing me to participate in the care of your patient.      Sincerely,    Fabiola Martinez MD

## 2024-03-12 NOTE — PATIENT INSTRUCTIONS
Dr Martinez referred you on to thoracic surgery, Dr Noriega for repositioning of Vent-Pleural shunt.

## 2024-03-12 NOTE — PROGRESS NOTES
Writer scheduled XR Shunt Series prior to patient appointment. Left patient voice mail.     Susy Warner LPN  Neurosurgery

## 2024-03-12 NOTE — PROGRESS NOTES
New Patient Oncology Nurse Navigator Note     Referring provider: Dr. Fabiola Martinez    Referring Clinic/Organization: [facility]  Referred to: Thoracic Surgery  Requested provider (if applicable): First available - did not specify   Referral Received: 03/12/24       Evaluation for :   Diagnosis   Z98.2 (ICD-10-CM) - Ventriculo-peritoneal shunt status   Additional Information:  Ventricular-Pleural shunt tubing pulled out into subQ space     Clinical History (per Nurse review of records provided):      03/12/2024 XR Shung Malfunction (bookmarked) showed:   Findings:  AP and lateral views of the skull, chest, and abdomen  performed. There is a high right frontal approach -shunt with its  tip in the expected location of the anterior horn of the right lateral  ventricle  There is a short segment of radiolucent catheter connecting  the ventriculostomy catheter to the valve, which cannot be assessed  but appears properly aligned.  There is no apparent discontinuity or  acute bend of the ventriculostomy catheter throughout its course  through the anterior neck and thorax, and into the abdomen. The  catheter tip is in the right pleural space  The lungs appear clear.  Bowel gas pattern is unremarkable.    Clinical Assessment / Barriers to Care (Per Nurse):    Never smoker  Records [facility]   Records Needed: None  Additional testing needed prior to consult: CT Chest  Referral updates and Plan:   3/12: Message sent to Thoracic Surgery NP, Cristy Hernandez, and Dr. Noriega's RNCC, [facility] to confirm this is seen in Thoracic Surgery and if so what work up is needed.    3/13: Per Luisa, she recommends updated CT chest as pt only has CT abd/pelvis. Ok to double book tomorrow in [facility] with Hari. Writer called [facility] and discussed the referral with him. He agreed to an in person appt with Dr. Noriega tomorrow and CT chest prior.     His call was transferred to NPS to schedule.    KATLIN IrvingN, RN, N  [facility]  [facility] Nurse Navigator  [phone] / [phone]

## 2024-03-12 NOTE — PROGRESS NOTES
"3/12/2024  Neurosurgery Clinic Visit    Chief Complaint: Thoracic subcutaneous collection concerning for distal shunt malfunction    History of present illness:  Donald Jackson is a 29 year old male presenting with a PMHx of hydrocephalus leading to a  shunt at the age of 3, and a recent history of an appendectomy c/b a perforation leading to an abdominal infection, resulting in a prolonged hospitalization with an externalized shunt at [facility] and a ventriculopleural shunt placement with Dr. Martinez, presenting today for assessment for possible subcutaneous accumulation at the chest insertion site. The patient reports that there has been an increasingly growing fluctuance on his right chest where the incision of the pleural access site for the shunt was. This fluctuance is non-tender, nonerythematous. On a CTCAP obtained recently, there appears to be a significant subcutaneous collection suggesting a distal shunt malfunction.      Physical exam:   /86   Pulse 89   Ht 1.727 m (5' 8\")   Wt 122.5 kg (270 lb)   SpO2 95%   BMI 41.05 kg/m    General: Awake and alert and in no acute distress.  Pulm: Breathing comfortably on room air  Neurologic:  - Face symmetric  - Pupils reactive, extraocular movements intact  - Full strength in upper and lower extremities  - Intact sensation to all extremities  - No clonus, patellar and bicep reflexes 2+  - Intact coordination  - Certas valve @ 4 confirmed during this visit with the shunt     Imaging:  All previous imaging pertinent to this encounter reviewed.  Pertinent reads:  CT CAP 3/5/2024  IMPRESSION:   1.  Partially visualized catheter within a right chest wall  subcutaneous collection of simple fluid; the visualized portion of the  collection measures up to 7.3 x 5.3 cm. These findings suggest the  ventriculopleural shunt catheter is draining into the subcutaneous  tissues rather than the pleural space, although the pleural space is  not fully visualized. No " pleural effusion at the lung bases. Recommend  dedicated chest CT for further evaluation.  2.  Nonspecific inflammatory findings in the pelvis surrounding the  distal segment of the ventriculoperitoneal shunt catheter, increased  compared to 2/2/2024, suspicious for ongoing/worsening  inflammatory/infectious findings associated with the catheter (i.e. an  infected catheter). No drainable intraperitoneal collection.    Assessment/Plan:  Donald is a 28 y/o M with a PMHx of hydrocephalus who underwent a shunt revision with Dr. Martinez due to perforated appendicitis leading to an abscess close to th distal tip of his previous  shunt. He underwent a ventriculopleural shunt placement with thoracic surgery on 2/2/2024. He reports increasingly a fluctuant collection at the incision on his R chest. CT-CAP demonstrates a 7.3x5.3 cm collection in the subcutaneous tissue.   - Will place an urgent referral to thoracic surgery for a distal Vpleural shunt revision    Patient seen and discussed with Dr. Fabiola Martinez MD.  Approximately 45 minutes were spent in chart and image review, evaluation of the patient and assessment of next steps.    Wilmer Schultz MD  PGY-1, Department of Neurosurgery  [facility]        REVIEWED ASSOCIATED CLINICAL DATA AND HISTORY FOUND IN THE MEDICAL RECORD:  Past Medical History:   Past Medical History:   Diagnosis Date    Hydrocephalus (H)     Lattice degeneration     Other forms of retinal detachment(361.89)        Surgical History:   Past Surgical History:   Procedure Laterality Date    LAPAROSCOPIC ASSISTED IMPLANT SHUNT VENTRICULOPERITONEAL Right 2/5/2024    Procedure: Implant Shunt Ventriculopleural;  Surgeon: Almas Noriega MD;  Location: [facility] OR    OPTICAL TRACKING SYSTEM IMPLANT SHUNT VENTRICULOPERITONEAL Right 2/5/2024    Procedure: RIGHT VENTRICULOPLEURAL Insertion;  Surgeon: Fabiola Martinez MD;  Location: [facility] OR    RETINAL REATTACHMENT  10/2010    left eye     RETINOPEXY LASER PROPHYLAXIS BREAK(S) OD (RIGHT EYE)  10/2011    shunt in head      a couple as a child    strabismus surery      age 5    TONSILLECTOMY      wisdom teeth         Social history:   Social History     Tobacco Use    Smoking status: Never    Smokeless tobacco: Never   Substance Use Topics    Alcohol use: No    Drug use: No       Family history:   Family History   Problem Relation Age of Onset    Diabetes Father         Type 2     Diabetes Paternal Grandfather         Type 2     Retinal detachment No family hx of     Amblyopia No family hx of     Glaucoma No family hx of     Macular Degeneration No family hx of        Medications:  Current Outpatient Medications   Medication    ibuprofen (ADVIL/MOTRIN) 600 MG tablet    ondansetron (ZOFRAN ODT) 4 MG ODT tab    oxyCODONE (ROXICODONE) 5 MG tablet    polyethylene glycol (MIRALAX) 17 GM/Dose powder    senna-docusate (SENOKOT-S/PERICOLACE) 8.6-50 MG tablet     No current facility-administered medications for this visit.       Allergies:     Allergies   Allergen Reactions    Penicillins Hives and Unknown   I have seen this patient with the resident and formulated a plan and agree with this note.  AMP

## 2024-03-13 DIAGNOSIS — Z98.2 S/P VP SHUNT: Primary | ICD-10-CM

## 2024-03-14 ENCOUNTER — PRE VISIT (OUTPATIENT)
Dept: PULMONOLOGY | Facility: CLINIC | Age: 29
End: 2024-03-14

## 2024-03-14 ENCOUNTER — ONCOLOGY VISIT (OUTPATIENT)
Dept: PULMONOLOGY | Facility: CLINIC | Age: 29
End: 2024-03-14
Payer: COMMERCIAL

## 2024-03-14 ENCOUNTER — PREP FOR PROCEDURE (OUTPATIENT)
Dept: SURGERY | Facility: CLINIC | Age: 29
End: 2024-03-14

## 2024-03-14 ENCOUNTER — TELEPHONE (OUTPATIENT)
Dept: SURGERY | Facility: CLINIC | Age: 29
End: 2024-03-14

## 2024-03-14 ENCOUNTER — NURSE TRIAGE (OUTPATIENT)
Dept: ONCOLOGY | Facility: CLINIC | Age: 29
End: 2024-03-14

## 2024-03-14 ENCOUNTER — HOSPITAL ENCOUNTER (OUTPATIENT)
Dept: CT IMAGING | Facility: HOSPITAL | Age: 29
Discharge: HOME | End: 2024-03-14
Attending: CLINICAL NURSE SPECIALIST | Admitting: CLINICAL NURSE SPECIALIST
Payer: COMMERCIAL

## 2024-03-14 VITALS
DIASTOLIC BLOOD PRESSURE: 90 MMHG | OXYGEN SATURATION: 98 % | HEART RATE: 84 BPM | BODY MASS INDEX: 41.9 KG/M2 | SYSTOLIC BLOOD PRESSURE: 136 MMHG | WEIGHT: 275.6 LBS

## 2024-03-14 DIAGNOSIS — Z98.2 S/P VP SHUNT: ICD-10-CM

## 2024-03-14 DIAGNOSIS — Z98.2 VENTRICULO-PERITONEAL SHUNT STATUS: ICD-10-CM

## 2024-03-14 DIAGNOSIS — Z98.2 VENTRICULOPLEURAL SHUNT STATUS: Primary | ICD-10-CM

## 2024-03-14 DIAGNOSIS — Z98.2 S/P VP SHUNT: Primary | ICD-10-CM

## 2024-03-14 LAB — RADIOLOGIST FLAGS: ABNORMAL

## 2024-03-14 PROCEDURE — 99024 POSTOP FOLLOW-UP VISIT: CPT | Performed by: THORACIC SURGERY (CARDIOTHORACIC VASCULAR SURGERY)

## 2024-03-14 PROCEDURE — 71250 CT THORAX DX C-: CPT

## 2024-03-14 RX ORDER — ACETAMINOPHEN 325 MG/1
975 TABLET ORAL ONCE
Status: CANCELLED | OUTPATIENT
Start: 2024-03-14 | End: 2024-03-14

## 2024-03-14 RX ORDER — ENOXAPARIN SODIUM 100 MG/ML
40 INJECTION SUBCUTANEOUS
Status: CANCELLED | OUTPATIENT
Start: 2024-03-14

## 2024-03-14 NOTE — TELEPHONE ENCOUNTER
Spoke with patient to schedule procedure with Dr. Noriega   Procedure was scheduled on 3/19 at [facility] OR  Patient will have H&P with PAC    Informed pt of surgery details:  Date:    Tuesday, March 19, 2024  Arrival Time:  6:00 am    Pt is aware surgery start time may change, and someone will reach out with the new arrival time, if so.    Patient is aware a COVID-19 test is needed before their procedure ONLY IF symptomatic.   (Patient is aware Thoracic is no longer requiring COVID-19 test)       Patient is aware a / is needed day of surgery.   Surgery Letter was sent via [employer],     Patient has my direct contact information for any further questions.

## 2024-03-14 NOTE — TELEPHONE ENCOUNTER
Called pt to confirm 3/19 surgery date with Dr. Noriega and got no answer. Left voicemail message with direct call back number [phone].    Leona Cabrera on 3/14/2024 at 4:15 PM

## 2024-03-14 NOTE — TELEPHONE ENCOUNTER
FUTURE VISIT INFORMATION      SURGERY INFORMATION:  Date: 3/19/24  Location: [facility] OR  Surgeon:  Almas Noriega MD  Anesthesia Type:  General  Procedure: RIGHT THORACOSCOPIC REPOSITIONING OF VENTRICULO-PLEURAL SHUNT CATHETER  Consult: 3/14/24    RECORDS REQUESTED FROM:       Primary Care Provider: Roberto Gomez MD - [facility]     Pertinent Medical History:S/p  shunt at age 3    Most recent EKG+ Tracin23 - [facility]     Most recent Sleep Study: 5/25/10    Records Requested     2024 4:17 PM  [address]  Fax: [phone]   Outcome Urgent request faxed to  for tracings     3/14/24 5:00 PM - Tracings received from  and sent to urgent scanning.

## 2024-03-14 NOTE — TELEPHONE ENCOUNTER
URGENT FINDING    1200 Per Dr. Zhang:  The ventricular pleural shunt is malpositioned in the right axillary soft tissues.       1202 Paged mario luo urgent finding results.

## 2024-03-14 NOTE — TELEPHONE ENCOUNTER
RECORDS STATUS - ALL OTHER DIAGNOSIS      RECORDS RECEIVED FROM: [facility] - Internal records   DATE RECEIVED: 3/14

## 2024-03-14 NOTE — PROGRESS NOTES
THORACIC SURGERY FOLLOW UP VISIT    Dear Dr. Gomez,  I saw Mr. Donald Jackson in follow-up today. The clinical summary follows:     DIAGNOSIS   Ventriculopleural shunt dislodgement  PROCEDURE   RIGHT intrapleural placement of  shunt (2/5/2024)      INTERVAL STUDIES  CT chest 3/14/2024: The shunt is now entirely in the chest wall    ETOH neg  TOB neg  BMI 41    SUBJECTIVE  Mr. Jackson is still asymptomatic.    EXAM  BP (!) 136/90 (BP Location: Left arm, Patient Position: Sitting, Cuff Size: Adult Large)   Pulse 84   Wt 125 kg (275 lb 9.6 oz)   SpO2 98%   BMI 41.90 kg/m    Well-healed right chest scar    From a personal perspective, he is a single father. His daughter is ~4 years old.      IMPRESSION (Z98.2) Ventriculopleural shunt status  (primary encounter diagnosis)       29 year-old man with a dislodged ventriculopleural shunt 5 weeks post placement    PLAN  I spent 15 min on the date of the encounter in chart review, patient visit, review of tests, documentation and/or discussion with other providers about the issues documented above. I reviewed the plan as follows:  He is scheduled for revision next week  All questions were answered and the patient and present family were in agreement with the plan.  I appreciate the opportunity to participate in the care of your patient and will keep you updated.  Sincerely,

## 2024-03-15 DIAGNOSIS — Z98.2 S/P VP SHUNT: Primary | ICD-10-CM

## 2024-03-17 LAB
ABO/RH(D): NORMAL
ANTIBODY SCREEN: NEGATIVE
SPECIMEN EXPIRATION DATE: NORMAL

## 2024-03-18 ENCOUNTER — ANESTHESIA EVENT (OUTPATIENT)
Dept: SURGERY | Facility: CLINIC | Age: 29
End: 2024-03-18
Payer: COMMERCIAL

## 2024-03-18 ENCOUNTER — OFFICE VISIT (OUTPATIENT)
Dept: SURGERY | Facility: CLINIC | Age: 29
End: 2024-03-18
Payer: COMMERCIAL

## 2024-03-18 ENCOUNTER — PRE VISIT (OUTPATIENT)
Dept: SURGERY | Facility: CLINIC | Age: 29
End: 2024-03-18

## 2024-03-18 ENCOUNTER — LAB (OUTPATIENT)
Dept: LAB | Facility: CLINIC | Age: 29
End: 2024-03-18
Payer: COMMERCIAL

## 2024-03-18 VITALS
BODY MASS INDEX: 43.1 KG/M2 | HEIGHT: 68 IN | WEIGHT: 284.4 LBS | TEMPERATURE: 97.8 F | DIASTOLIC BLOOD PRESSURE: 88 MMHG | OXYGEN SATURATION: 96 % | SYSTOLIC BLOOD PRESSURE: 131 MMHG | HEART RATE: 82 BPM | RESPIRATION RATE: 16 BRPM

## 2024-03-18 DIAGNOSIS — Z98.2 S/P VP SHUNT: ICD-10-CM

## 2024-03-18 DIAGNOSIS — Z01.818 PRE-OPERATIVE EXAMINATION: ICD-10-CM

## 2024-03-18 DIAGNOSIS — Z01.818 PRE-OPERATIVE EXAMINATION: Primary | ICD-10-CM

## 2024-03-18 LAB
ERYTHROCYTE [DISTWIDTH] IN BLOOD BY AUTOMATED COUNT: 16.6 % (ref 10–15)
HCT VFR BLD AUTO: 44.9 % (ref 40–53)
HGB BLD-MCNC: 14.2 G/DL (ref 13.3–17.7)
MCH RBC QN AUTO: 23.5 PG (ref 26.5–33)
MCHC RBC AUTO-ENTMCNC: 31.6 G/DL (ref 31.5–36.5)
MCV RBC AUTO: 75 FL (ref 78–100)
PLATELET # BLD AUTO: 232 10E3/UL (ref 150–450)
RBC # BLD AUTO: 6.03 10E6/UL (ref 4.4–5.9)
WBC # BLD AUTO: 8.8 10E3/UL (ref 4–11)

## 2024-03-18 PROCEDURE — 85027 COMPLETE CBC AUTOMATED: CPT | Performed by: PATHOLOGY

## 2024-03-18 PROCEDURE — 86900 BLOOD TYPING SEROLOGIC ABO: CPT | Performed by: PHYSICIAN ASSISTANT

## 2024-03-18 PROCEDURE — 99203 OFFICE O/P NEW LOW 30 MIN: CPT | Performed by: PHYSICIAN ASSISTANT

## 2024-03-18 PROCEDURE — 36415 COLL VENOUS BLD VENIPUNCTURE: CPT | Performed by: PATHOLOGY

## 2024-03-18 ASSESSMENT — ENCOUNTER SYMPTOMS: SEIZURES: 0

## 2024-03-18 ASSESSMENT — PAIN SCALES - GENERAL: PAINLEVEL: NO PAIN (0)

## 2024-03-18 NOTE — TELEPHONE ENCOUNTER
Called and spoke with pt to let him know surgery start time has changed. Pt stated he was already informed to arrive at 10:00 am.    Leona Cabrera on 3/18/2024 at 1:15 PM

## 2024-03-18 NOTE — H&P
Pre-Operative H & P     CC:  Preoperative exam to assess for increased cardiopulmonary risk while undergoing surgery and anesthesia.    Date of Encounter: 3/18/2024  Primary Care Physician:  Roberto Gomez     Reason for visit:   Encounter Diagnoses   Name Primary?    Pre-operative examination Yes    S/P  shunt        HPI  Donald Jackson is a 29 year old male who presents for pre-operative H & P in preparation for  Procedure Information       Case: [id number] Date/Time: 03/19/24 0800    Procedure: RIGHT THORACOSCOPIC REPOSITIONING OF VENTRICULO-PLEURAL SHUNT CATHETER (Right: Chest)    Anesthesia type: General    Diagnosis: S/P  shunt [Z98.2]    Pre-op diagnosis: S/P  shunt [Z98.2]    Location:  OR [address] [place] [address] OR    Providers: Almas Noriega MD            The patient is a 29-year-old man with a past medical history significant for obesity, MRSA and a history of hydrocephalus.  He underwent  shunt at 3 years old.  Most recently he had an acute appendicitis and underwent an appendectomy on 12/5/2023.  This was complicated by a perforation leading to an abdominal infection which was close to the distal tip of his previous  shunt.  He then underwent ventriculopleural shunt placement on 2/5/2024.  The patient was found to have a fluid collection in the subcutaneous tissue which is suggested that the ventriculopleural shunt catheter is draining into that tissue.  He was thus referred to thoracic surgery and now has been scheduled for the procedure as above.    History is obtained from the patient and chart review    Hx of abnormal bleeding or anti-platelet use: none      Past Medical History  Past Medical History:   Diagnosis Date    Hydrocephalus (H)     Lattice degeneration     Morbid obesity (H)     Other forms of retinal detachment(361.89)        Past Surgical History  Past Surgical History:   Procedure Laterality Date    LAPAROSCOPIC ASSISTED IMPLANT SHUNT VENTRICULOPERITONEAL Right 2/5/2024     Procedure: Implant Shunt Ventriculopleural;  Surgeon: Almas Noriega MD;  Location: [facility] OR    OPTICAL TRACKING SYSTEM IMPLANT SHUNT VENTRICULOPERITONEAL Right 2024    Procedure: RIGHT VENTRICULOPLEURAL Insertion;  Surgeon: Fabiola Martinez MD;  Location: [facility] OR    RETINAL REATTACHMENT  10/2010    left eye    RETINOPEXY LASER PROPHYLAXIS BREAK(S) OD (RIGHT EYE)  10/2011    shunt in head      a couple as a child    strabismus surery      age 5    TONSILLECTOMY      wisdom teeth         Prior to Admission Medications  Current Outpatient Medications   Medication Sig Dispense Refill    ibuprofen (ADVIL/MOTRIN) 600 MG tablet Take 600 mg by mouth every 6 hours as needed for moderate pain         Allergies  Allergies   Allergen Reactions    Penicillins Hives and Unknown       Social History  Social History     Socioeconomic History    Marital status: Single     Spouse name: Not on file    Number of children: Not on file    Years of education: Not on file    Highest education level: Bachelor's degree (e.g., BA, AB, BS)   Occupational History    Occupation: [employer]    Tobacco Use    Smoking status: Never    Smokeless tobacco: Never   Vaping Use    Vaping Use: Never used   Substance and Sexual Activity    Alcohol use: No    Drug use: No    Sexual activity: Not Currently     Partners: Female   Other Topics Concern    Parent/sibling w/ CABG, MI or angioplasty before 65F 55M? Not Asked   Social History Narrative    FAMILY INFORMATION     Date: May 23, 2008    Parent #1      Name: STEPHANIA FIGUEROA  Gender: MALE    : 1968     Education: DVM/MSC/PHD   Occupation: RESEARCH ASSOCIATE        Siblings:  HENRY CEJA        Relationship Status of Parent(s):     Who does the child live with? PARENTS    What language(s) is/are spoken at home? Wolof/ENGLISH            Lives alone.  Has a daughter 4-5 days per week.  Dad is in [place]. Mom is in [place]. Sister is in [place] in [facility]  school.  Born in [place] came to [place] 1. Parents .    Has a good support system.    Feels safe in all environments.    Wears seatbelt 100% of the time    Denies history of abuse, past or present, physical, sexual or emotional.    Annalise Carreon PA-C    06/26/19                 Social Determinants of Health     Financial Resource Strain: Low Risk  (2/27/2024)    Financial Resource Strain     Within the past 12 months, have you or your family members you live with been unable to get utilities (heat, electricity) when it was really needed?: No   Food Insecurity: Low Risk  (2/27/2024)    Food Insecurity     Within the past 12 months, did you worry that your food would run out before you got money to buy more?: No     Within the past 12 months, did the food you bought just not last and you didn t have money to get more?: No   Transportation Needs: Low Risk  (2/27/2024)    Transportation Needs     Within the past 12 months, has lack of transportation kept you from medical appointments, getting your medicines, non-medical meetings or appointments, work, or from getting things that you need?: No   Physical Activity: Insufficiently Active (2/27/2024)    Exercise Vital Sign     Days of Exercise per Week: 1 day     Minutes of Exercise per Session: 30 min   Stress: No Stress Concern Present (2/27/2024)    [employer] - Occupational Stress Questionnaire     Feeling of Stress : Not at all   Social Connections: Unknown (2/27/2024)    Social Connection and Isolation Panel [NHANES]     Frequency of Communication with Friends and Family: Not on file     Frequency of Social Gatherings with Friends and Family: Never     Attends Zoroastrian Services: Not on file     Active Member of Clubs or Organizations: Not on file     Attends Club or Organization Meetings: Not on file     Marital Status: Not on file   Interpersonal Safety: Low Risk  (1/5/2024)    Interpersonal Safety     Do you feel physically and  emotionally safe where you currently live?: Yes     Within the past 12 months, have you been hit, slapped, kicked or otherwise physically hurt by someone?: No     Within the past 12 months, have you been humiliated or emotionally abused in other ways by your partner or ex-partner?: No   Housing Stability: Low Risk  (2/27/2024)    Housing Stability     Do you have housing? : Yes     Are you worried about losing your housing?: No       Family History  Family History   Problem Relation Age of Onset    Diabetes Father         Type 2     Diabetes Paternal Grandfather         Type 2     Retinal detachment No family hx of     Amblyopia No family hx of     Glaucoma No family hx of     Macular Degeneration No family hx of     Anesthesia Reaction No family hx of     Deep Vein Thrombosis (DVT) No family hx of        Review of Systems  The complete review of systems is negative other than noted in the HPI or here.   Anesthesia Evaluation   Pt has had prior anesthetic. Type: General.    History of anesthetic complications  - .  patient reports he was told after his 12/5/23 procedure he woke up aggressive.    ROS/MED HX  ENT/Pulmonary:       Neurologic: Comment: Hydrocephalus s/p  shunt. Recently s/p ventriculopleural shunt 2/5/24.    (-) no seizures and no CVA   Cardiovascular:     (+)  - -   -  - -                                 Previous cardiac testing   Echo: Date: Results:    Stress Test:  Date: Results:    ECG Reviewed:  Date: 12/26/23 Results:  SR  Cath:  Date: Results:      METS/Exercise Tolerance: >4 METS    Hematologic:  - neg hematologic  ROS     Musculoskeletal:  - neg musculoskeletal ROS     GI/Hepatic:     (+)         appendicitis,        (-) GERD   Renal/Genitourinary:  - neg Renal ROS     Endo:     (+)               Obesity,       Psychiatric/Substance Use:  - neg psychiatric ROS     Infectious Disease:     (+)   MRSA,         Malignancy:  - neg malignancy ROS     Other:  - neg other ROS          /88 (BP  "Location: Right arm, Patient Position: Sitting, Cuff Size: Adult Large)   Pulse 82   Temp 97.8  F (36.6  C) (Oral)   Resp 16   Ht 1.727 m (5' 8\")   Wt 129 kg (284 lb 6.4 oz)   SpO2 96%   BMI 43.24 kg/m      Physical Exam   Constitutional: Awake, alert, cooperative, no apparent distress, and appears stated age.  Eyes: Pupils equal, round and reactive to light, extra ocular muscles intact, sclera clear, conjunctiva normal.  HENT: Normocephalic, oral pharynx with moist mucus membranes, good dentition. No goiter appreciated.   Respiratory: Clear to auscultation bilaterally, no crackles or wheezing.  Cardiovascular: Regular rate and rhythm, normal S1 and S2, and no murmur noted.  Carotids +2, no bruits. No edema. Palpable pulses to radial  DP and PT arteries.   GI: Normal bowel sounds, soft, non-distended, non-tender, no masses palpated, no hepatosplenomegaly.  Surgical scars: well healed  Lymph/Hematologic: No cervical lymphadenopathy and no supraclavicular lymphadenopathy.  Genitourinary:  defer  Skin: Warm and dry.  No rashes at anticipated surgical site.   Musculoskeletal: Full ROM of neck. There is no redness, warmth, or swelling of the joints. Gross motor strength is normal.    Neurologic: Awake, alert, oriented to name, place and time. Cranial nerves II-XII are grossly intact. Gait is normal.   Neuropsychiatric: Calm, cooperative. Normal affect.     Prior Labs/Diagnostic Studies   All labs and imaging personally reviewed    Latest Reference Range & Units 02/06/24 07:22   Sodium 135 - 145 mmol/L 137   Potassium 3.4 - 5.3 mmol/L 4.2   Chloride 98 - 107 mmol/L 105   Carbon Dioxide (CO2) 22 - 29 mmol/L 23   Urea Nitrogen 6.0 - 20.0 mg/dL 13.0   Creatinine 0.67 - 1.17 mg/dL 0.90   GFR Estimate >60 mL/min/1.73m2 >90   Calcium 8.6 - 10.0 mg/dL 8.7   Anion Gap 7 - 15 mmol/L 9   Glucose 70 - 99 mg/dL 165 (H)   (H): Data is abnormally high    EKG/ stress test - if available please see in ROS above       The patient's " records and results personally reviewed by this provider.     Outside records reviewed from: Care Everywhere    LAB/DIAGNOSTIC STUDIES TODAY:       Latest Reference Range & Units 03/18/24 07:48   WBC 4.0 - 11.0 10e3/uL 8.8   Hemoglobin 13.3 - 17.7 g/dL 14.2   Hematocrit 40.0 - 53.0 % 44.9   Platelet Count 150 - 450 10e3/uL 232   RBC Count 4.40 - 5.90 10e6/uL 6.03 (H)   MCV 78 - 100 fL 75 (L)   MCH 26.5 - 33.0 pg 23.5 (L)   MCHC 31.5 - 36.5 g/dL 31.6   RDW 10.0 - 15.0 % 16.6 (H)   (H): Data is abnormally high  (L): Data is abnormally low          Assessment    Donald Jackson is a 29 year old male seen as a PAC referral for risk assessment and optimization for anesthesia.    Plan/Recommendations  Pt will be optimized for the proposed procedure.  See below for details on the assessment, risk, and preoperative recommendations    NEUROLOGY  - No history of TIA, CVA or seizure  - History of hydrocephalus s/p  shunt. Now s/p Ventriculopleural shunt with drainage into the subcutaneous tissue   -Post Op delirium risk factors:  No risk identified    ENT  - No current airway concerns.  Will need to be reassessed day of surgery.  Mallampati: I  TM: > 3    CARDIAC  - No history of CAD, Hypertension, and Afib  - METS (Metabolic Equivalents)  Patient performs 4 or more METS exercise without symptoms            Total Score: 0      RCRI-Low risk: Class 2 0.9% complication rate            Total Score: 1    RCRI: High Risk Surgery        PULMONARY  - Obstructive Sleep Apnea  No current risk of obstructive sleep apnea   SAVANNA Low Risk            Total Score: 2    SAVANNA: BMI over 35 kg/m2    SAVANNA: Male      - Denies asthma or inhaler use  - Tobacco History    History   Smoking Status    Never   Smokeless Tobacco    Never       GI  - Denies GERD  PONV Medium Risk  Total Score: 2           1 AN PONV: Patient is not a current smoker    1 AN PONV: Intended Post Op Opioids        /RENAL  - Baseline Creatinine  0.90    ENDOCRINE    - BMI:  "Estimated body mass index is 43.24 kg/m  as calculated from the following:    Height as of this encounter: 1.727 m (5' 8\").    Weight as of this encounter: 129 kg (284 lb 6.4 oz).  Class 3 Obesity (BMI > 40)  - No history of Diabetes Mellitus    HEME  VTE Low Risk 0.5%            Total Score: 3    VTE: BMI greater than 39    VTE: Male      - No history of abnormal bleeding or antiplatelet use.  ~ Type and screen completed for the patient     ID  ~ MRSA - contact precautions as indicated    Different anesthesia methods/types have been discussed with the patient, but they are aware that the final plan will be decided by the assigned anesthesia provider on the date of service.    The patient is optimized for their procedure. AVS with information on surgery time/arrival time, meds and NPO status given by nursing staff. No further diagnostic testing indicated.      On the day of service:     Prep time: 8 minutes  Visit time: 9 minutes  Documentation time: 19 minutes  ------------------------------------------  Total time: 36 minutes      Edda Fitzgerald PA-C  Preoperative Assessment Center  [facility]  Clinic and Surgery Center  Phone: [phone]  Fax: [phone]    "

## 2024-03-18 NOTE — PATIENT INSTRUCTIONS
Preparing for Your Surgery      Name:  Donald Jackson   MRN:  [id number]   :  1995   Today's Date:  3/18/2024       Arriving for surgery:  Surgery date:  2024  Arrival time:  6:00 am    Please come to:     Please come to:      [facility] [address]  [address] [place]  [address]      The [facility] Patient /Visitor Ramp is located at [address]. Patients and visitors who self-park will receive the reduced hospital parking rate. If the Patient /Visitor Ramp is full, please follow the signs to the  parking located at the main hospital entrance.     parking is available ( 24 hours/ 7 days a week)    Discounted parking pass options are available for patients and visitors. They can be purchased at the Exitround desk at the [facility] hospital entrance.    -    Stop at the security desk and they will direct surgery patients to the 3rd floor Surgery Waiting Room. [phone]     -  If you are in need of directions, wheelchair or escort please stop at the Information/security desk in the lobby.       What can I eat or drink?  -  You may eat and drink normally up to 8 hours prior to arrival time. (Until Midnight)  -  You may have clear liquids until 2 hours prior to arrival time. (Until 4 am)    Examples of clear liquids:  Water  Clear broth  Juices (apple, white grape, white cranberry  and cider) without pulp  Noncarbonated, powder based beverages  (lemonade and Dell-Aid)  Sodas (Sprite, 7-Up, ginger ale and seltzer)  Coffee or tea (without milk or cream)  Gatorade    -  No Alcohol or cannabis products for at least 24 hours before surgery.     Which medicines can I take?    Hold Aspirin for 7 days before surgery.   Hold Multivitamins for 7 days before surgery.  Hold Supplements for 7 days before surgery.  Hold Ibuprofen (Advil, Motrin) for 1 day(s) before surgery--unless otherwise directed by surgeon.  Hold Naproxen  (Aleve) for 4 days before surgery.      -  PLEASE TAKE these medications the day of surgery:  NONE      How do I prepare myself?  - Please take 2 showers (one the night prior to surgery and one the morning of surgery) using Scrubcare or Hibiclens soap.    Use this soap only from the neck to your toes.     Leave the soap on your skin for one minute--then rinse thoroughly.      You may use your own shampoo and conditioner. No other hair products.   - Please remove all jewelry and body piercings.  - No lotions, deodorants or fragrance.  - No makeup or fingernail polish.   - Bring your ID and insurance card.    -If you use a CPAP machine, please bring the CPAP machine, tubing, and mask to hospital.    -If you have a Deep Brain Stimulator, Spinal Cord Stimulator, or any Neuro Stimulator device---you must bring the remote control to the hospital.      ALL PATIENTS GOING HOME THE SAME DAY OF SURGERY ARE REQUIRED TO HAVE A RESPONSIBLE ADULT TO DRIVE AND BE IN ATTENDANCE WITH THEM FOR 24 HOURS FOLLOWING SURGERY.    Covid testing policy as of 12/06/2022  Your surgeon will notify and schedule you for a COVID test if one is needed before surgery--please direct any questions or COVID symptoms to your surgeon      Questions or Concerns:    - For any questions regarding the day of surgery or your hospital stay, please contact the Pre Admission Nursing Office at [phone].       - If you have health changes between today and your surgery, please call your surgeon.       - For questions after surgery, please call your surgeons office.           Current Visitor Guidelines    You may have 2 visitors in the pre op area.    Visiting hours: 8 a.m. to 8:30 p.m.    Patients confirmed or suspected to have symptoms of COVID 19 or flu:     No visitors allowed for adult patients.   Children (under age 18) can have 1 named visitor.     People who are sick or showing symptoms of COVID 19 or flu:    Are not allowed to visit patients--we can  only make exceptions in special situations.       Please follow these guidelines for your visit:          Please maintain social distance          Masking is optional--however at times you may be asked to wear a mask for the safety of yourself and others     Clean your hands with alcohol hand . Do this when you arrive at and leave the building and patient room,    And again after you touch your mask or anything in the room.     Go directly to and from the room you are visiting.     Stay in the patient s room during your visit. Limit going to other places in the hospital as much as possible     Leave bags and jackets at home or in the car.     For everyone s health, please don t come and go during your visit. That includes for smoking   during your visit.

## 2024-03-19 ENCOUNTER — APPOINTMENT (OUTPATIENT)
Dept: GENERAL RADIOLOGY | Facility: CLINIC | Age: 29
End: 2024-03-19
Attending: STUDENT IN AN ORGANIZED HEALTH CARE EDUCATION/TRAINING PROGRAM
Payer: COMMERCIAL

## 2024-03-19 ENCOUNTER — APPOINTMENT (OUTPATIENT)
Dept: CT IMAGING | Facility: CLINIC | Age: 29
End: 2024-03-19
Attending: NURSE PRACTITIONER
Payer: COMMERCIAL

## 2024-03-19 ENCOUNTER — HOSPITAL ENCOUNTER (OUTPATIENT)
Facility: CLINIC | Age: 29
Discharge: HOME | End: 2024-03-19
Attending: THORACIC SURGERY (CARDIOTHORACIC VASCULAR SURGERY) | Admitting: THORACIC SURGERY (CARDIOTHORACIC VASCULAR SURGERY)
Payer: COMMERCIAL

## 2024-03-19 ENCOUNTER — ANESTHESIA (OUTPATIENT)
Dept: SURGERY | Facility: CLINIC | Age: 29
End: 2024-03-19
Payer: COMMERCIAL

## 2024-03-19 ENCOUNTER — APPOINTMENT (OUTPATIENT)
Dept: GENERAL RADIOLOGY | Facility: CLINIC | Age: 29
End: 2024-03-19
Attending: NURSE PRACTITIONER
Payer: COMMERCIAL

## 2024-03-19 VITALS
SYSTOLIC BLOOD PRESSURE: 100 MMHG | HEART RATE: 91 BPM | DIASTOLIC BLOOD PRESSURE: 68 MMHG | HEIGHT: 68 IN | TEMPERATURE: 98.2 F | WEIGHT: 281.97 LBS | RESPIRATION RATE: 16 BRPM | BODY MASS INDEX: 42.73 KG/M2 | OXYGEN SATURATION: 100 %

## 2024-03-19 DIAGNOSIS — T85.618A SHUNT MALFUNCTION, INITIAL ENCOUNTER: Primary | ICD-10-CM

## 2024-03-19 DIAGNOSIS — Z98.2 S/P VP SHUNT: ICD-10-CM

## 2024-03-19 PROCEDURE — 272N000002 HC OR SUPPLY OTHER OPNP: Performed by: THORACIC SURGERY (CARDIOTHORACIC VASCULAR SURGERY)

## 2024-03-19 PROCEDURE — 278N000051 HC OR IMPLANT GENERAL: Performed by: THORACIC SURGERY (CARDIOTHORACIC VASCULAR SURGERY)

## 2024-03-19 PROCEDURE — 32601 THORACOSCOPY DIAGNOSTIC: CPT | Performed by: ANESTHESIOLOGY

## 2024-03-19 PROCEDURE — 70450 CT HEAD/BRAIN W/O DYE: CPT

## 2024-03-19 PROCEDURE — 710N000012 HC RECOVERY PHASE 2, PER MINUTE: Performed by: THORACIC SURGERY (CARDIOTHORACIC VASCULAR SURGERY)

## 2024-03-19 PROCEDURE — 250N000011 HC RX IP 250 OP 636: Performed by: THORACIC SURGERY (CARDIOTHORACIC VASCULAR SURGERY)

## 2024-03-19 PROCEDURE — 71045 X-RAY EXAM CHEST 1 VIEW: CPT | Mod: 26 | Performed by: RADIOLOGY

## 2024-03-19 PROCEDURE — 258N000003 HC RX IP 258 OP 636

## 2024-03-19 PROCEDURE — 250N000025 HC SEVOFLURANE, PER MIN: Performed by: THORACIC SURGERY (CARDIOTHORACIC VASCULAR SURGERY)

## 2024-03-19 PROCEDURE — 250N000011 HC RX IP 250 OP 636

## 2024-03-19 PROCEDURE — 62230 REPLACE/REVISE BRAIN SHUNT: CPT | Mod: 62 | Performed by: THORACIC SURGERY (CARDIOTHORACIC VASCULAR SURGERY)

## 2024-03-19 PROCEDURE — 370N000017 HC ANESTHESIA TECHNICAL FEE, PER MIN: Performed by: THORACIC SURGERY (CARDIOTHORACIC VASCULAR SURGERY)

## 2024-03-19 PROCEDURE — 250N000009 HC RX 250: Performed by: STUDENT IN AN ORGANIZED HEALTH CARE EDUCATION/TRAINING PROGRAM

## 2024-03-19 PROCEDURE — 62230 REPLACE/REVISE BRAIN SHUNT: CPT | Mod: 62 | Performed by: NEUROLOGICAL SURGERY

## 2024-03-19 PROCEDURE — 360N000078 HC SURGERY LEVEL 5, PER MIN: Performed by: THORACIC SURGERY (CARDIOTHORACIC VASCULAR SURGERY)

## 2024-03-19 PROCEDURE — 250N000013 HC RX MED GY IP 250 OP 250 PS 637: Performed by: THORACIC SURGERY (CARDIOTHORACIC VASCULAR SURGERY)

## 2024-03-19 PROCEDURE — 250N000009 HC RX 250

## 2024-03-19 PROCEDURE — 70250 X-RAY EXAM OF SKULL: CPT | Mod: 26 | Performed by: RADIOLOGY

## 2024-03-19 PROCEDURE — 999N000065 XR CHEST PORT 1 VIEW

## 2024-03-19 PROCEDURE — 360N000077 HC SURGERY LEVEL 4, PER MIN: Performed by: THORACIC SURGERY (CARDIOTHORACIC VASCULAR SURGERY)

## 2024-03-19 PROCEDURE — 70450 CT HEAD/BRAIN W/O DYE: CPT | Mod: 26 | Performed by: STUDENT IN AN ORGANIZED HEALTH CARE EDUCATION/TRAINING PROGRAM

## 2024-03-19 PROCEDURE — 250N000011 HC RX IP 250 OP 636: Mod: JZ | Performed by: THORACIC SURGERY (CARDIOTHORACIC VASCULAR SURGERY)

## 2024-03-19 PROCEDURE — 999N000141 HC STATISTIC PRE-PROCEDURE NURSING ASSESSMENT: Performed by: THORACIC SURGERY (CARDIOTHORACIC VASCULAR SURGERY)

## 2024-03-19 PROCEDURE — 272N000001 HC OR GENERAL SUPPLY STERILE: Performed by: THORACIC SURGERY (CARDIOTHORACIC VASCULAR SURGERY)

## 2024-03-19 PROCEDURE — 999N000065 XR SHUNT MALFUNCTION SURVEY

## 2024-03-19 PROCEDURE — 71250 CT THORAX DX C-: CPT | Mod: 26 | Performed by: RADIOLOGY

## 2024-03-19 PROCEDURE — 74018 RADEX ABDOMEN 1 VIEW: CPT | Mod: 26 | Performed by: RADIOLOGY

## 2024-03-19 PROCEDURE — 710N000010 HC RECOVERY PHASE 1, LEVEL 2, PER MIN: Performed by: THORACIC SURGERY (CARDIOTHORACIC VASCULAR SURGERY)

## 2024-03-19 PROCEDURE — 71250 CT THORAX DX C-: CPT

## 2024-03-19 DEVICE — IMPLANTABLE DEVICE: Type: IMPLANTABLE DEVICE | Site: CHEST | Status: FUNCTIONAL

## 2024-03-19 DEVICE — SHUNT CATH PERITONEAL ANTIBIOTIC-IMPREG ARES 122CM 93092: Type: IMPLANTABLE DEVICE | Site: CHEST | Status: FUNCTIONAL

## 2024-03-19 RX ORDER — BUPIVACAINE HYDROCHLORIDE AND EPINEPHRINE 2.5; 5 MG/ML; UG/ML
INJECTION, SOLUTION INFILTRATION; PERINEURAL PRN
Status: DISCONTINUED | OUTPATIENT
Start: 2024-03-19 | End: 2024-03-19 | Stop reason: HOSPADM

## 2024-03-19 RX ORDER — LIDOCAINE HYDROCHLORIDE 20 MG/ML
INJECTION, SOLUTION INFILTRATION; PERINEURAL PRN
Status: DISCONTINUED | OUTPATIENT
Start: 2024-03-19 | End: 2024-03-19

## 2024-03-19 RX ORDER — ONDANSETRON 2 MG/ML
4 INJECTION INTRAMUSCULAR; INTRAVENOUS EVERY 30 MIN PRN
Status: DISCONTINUED | OUTPATIENT
Start: 2024-03-19 | End: 2024-03-19 | Stop reason: HOSPADM

## 2024-03-19 RX ORDER — ACETAMINOPHEN 325 MG/1
975 TABLET ORAL ONCE
Status: COMPLETED | OUTPATIENT
Start: 2024-03-19 | End: 2024-03-19

## 2024-03-19 RX ORDER — ACETAMINOPHEN 325 MG/1
650 TABLET ORAL EVERY 4 HOURS PRN
COMMUNITY
Start: 2024-03-19 | End: 2024-07-31

## 2024-03-19 RX ORDER — HYDROMORPHONE HCL IN WATER/PF 6 MG/30 ML
0.4 PATIENT CONTROLLED ANALGESIA SYRINGE INTRAVENOUS EVERY 5 MIN PRN
Status: DISCONTINUED | OUTPATIENT
Start: 2024-03-19 | End: 2024-03-19 | Stop reason: HOSPADM

## 2024-03-19 RX ORDER — FENTANYL CITRATE 50 UG/ML
50 INJECTION, SOLUTION INTRAMUSCULAR; INTRAVENOUS EVERY 5 MIN PRN
Status: DISCONTINUED | OUTPATIENT
Start: 2024-03-19 | End: 2024-03-19 | Stop reason: HOSPADM

## 2024-03-19 RX ORDER — CLINDAMYCIN PHOSPHATE 900 MG/50ML
900 INJECTION, SOLUTION INTRAVENOUS
Status: COMPLETED | OUTPATIENT
Start: 2024-03-19 | End: 2024-03-19

## 2024-03-19 RX ORDER — NALOXONE HYDROCHLORIDE 0.4 MG/ML
0.1 INJECTION, SOLUTION INTRAMUSCULAR; INTRAVENOUS; SUBCUTANEOUS
Status: DISCONTINUED | OUTPATIENT
Start: 2024-03-19 | End: 2024-03-19 | Stop reason: HOSPADM

## 2024-03-19 RX ORDER — HYDROMORPHONE HCL IN WATER/PF 6 MG/30 ML
0.2 PATIENT CONTROLLED ANALGESIA SYRINGE INTRAVENOUS EVERY 5 MIN PRN
Status: DISCONTINUED | OUTPATIENT
Start: 2024-03-19 | End: 2024-03-19 | Stop reason: HOSPADM

## 2024-03-19 RX ORDER — DEXAMETHASONE SODIUM PHOSPHATE 4 MG/ML
INJECTION, SOLUTION INTRA-ARTICULAR; INTRALESIONAL; INTRAMUSCULAR; INTRAVENOUS; SOFT TISSUE PRN
Status: DISCONTINUED | OUTPATIENT
Start: 2024-03-19 | End: 2024-03-19

## 2024-03-19 RX ORDER — SODIUM CHLORIDE, SODIUM LACTATE, POTASSIUM CHLORIDE, CALCIUM CHLORIDE 600; 310; 30; 20 MG/100ML; MG/100ML; MG/100ML; MG/100ML
INJECTION, SOLUTION INTRAVENOUS CONTINUOUS
Status: DISCONTINUED | OUTPATIENT
Start: 2024-03-19 | End: 2024-03-19 | Stop reason: HOSPADM

## 2024-03-19 RX ORDER — FENTANYL CITRATE 50 UG/ML
INJECTION, SOLUTION INTRAMUSCULAR; INTRAVENOUS PRN
Status: DISCONTINUED | OUTPATIENT
Start: 2024-03-19 | End: 2024-03-19

## 2024-03-19 RX ORDER — FENTANYL CITRATE 50 UG/ML
25 INJECTION, SOLUTION INTRAMUSCULAR; INTRAVENOUS EVERY 5 MIN PRN
Status: DISCONTINUED | OUTPATIENT
Start: 2024-03-19 | End: 2024-03-19 | Stop reason: HOSPADM

## 2024-03-19 RX ORDER — OXYCODONE HYDROCHLORIDE 5 MG/1
5 TABLET ORAL
Status: DISCONTINUED | OUTPATIENT
Start: 2024-03-19 | End: 2024-03-19 | Stop reason: HOSPADM

## 2024-03-19 RX ORDER — CLINDAMYCIN PHOSPHATE 900 MG/50ML
900 INJECTION, SOLUTION INTRAVENOUS SEE ADMIN INSTRUCTIONS
Status: DISCONTINUED | OUTPATIENT
Start: 2024-03-19 | End: 2024-03-19 | Stop reason: HOSPADM

## 2024-03-19 RX ORDER — SODIUM CHLORIDE, SODIUM LACTATE, POTASSIUM CHLORIDE, CALCIUM CHLORIDE 600; 310; 30; 20 MG/100ML; MG/100ML; MG/100ML; MG/100ML
INJECTION, SOLUTION INTRAVENOUS CONTINUOUS PRN
Status: DISCONTINUED | OUTPATIENT
Start: 2024-03-19 | End: 2024-03-19

## 2024-03-19 RX ORDER — ONDANSETRON 4 MG/1
4 TABLET, ORALLY DISINTEGRATING ORAL EVERY 30 MIN PRN
Status: DISCONTINUED | OUTPATIENT
Start: 2024-03-19 | End: 2024-03-19 | Stop reason: HOSPADM

## 2024-03-19 RX ORDER — ENOXAPARIN SODIUM 100 MG/ML
40 INJECTION SUBCUTANEOUS
Status: COMPLETED | OUTPATIENT
Start: 2024-03-19 | End: 2024-03-19

## 2024-03-19 RX ORDER — ACETAMINOPHEN 325 MG/1
975 TABLET ORAL ONCE
Status: DISCONTINUED | OUTPATIENT
Start: 2024-03-19 | End: 2024-03-19 | Stop reason: HOSPADM

## 2024-03-19 RX ORDER — ONDANSETRON 2 MG/ML
INJECTION INTRAMUSCULAR; INTRAVENOUS PRN
Status: DISCONTINUED | OUTPATIENT
Start: 2024-03-19 | End: 2024-03-19

## 2024-03-19 RX ORDER — OXYCODONE HYDROCHLORIDE 5 MG/1
5 TABLET ORAL EVERY 4 HOURS PRN
Qty: 6 TABLET | Refills: 0 | Status: SHIPPED | OUTPATIENT
Start: 2024-03-19 | End: 2024-04-14

## 2024-03-19 RX ORDER — ACETAMINOPHEN 325 MG/1
650 TABLET ORAL
Status: DISCONTINUED | OUTPATIENT
Start: 2024-03-19 | End: 2024-03-19 | Stop reason: HOSPADM

## 2024-03-19 RX ORDER — CLINDAMYCIN HCL 300 MG
300 CAPSULE ORAL 4 TIMES DAILY
Qty: 28 CAPSULE | Refills: 0 | Status: SHIPPED | OUTPATIENT
Start: 2024-03-19 | End: 2024-03-19

## 2024-03-19 RX ORDER — PROPOFOL 10 MG/ML
INJECTION, EMULSION INTRAVENOUS PRN
Status: DISCONTINUED | OUTPATIENT
Start: 2024-03-19 | End: 2024-03-19

## 2024-03-19 RX ORDER — CEPHALEXIN 500 MG/1
500 CAPSULE ORAL 4 TIMES DAILY
Qty: 28 CAPSULE | Refills: 0 | Status: SHIPPED | OUTPATIENT
Start: 2024-03-19 | End: 2024-03-26

## 2024-03-19 RX ORDER — KETOROLAC TROMETHAMINE 30 MG/ML
INJECTION, SOLUTION INTRAMUSCULAR; INTRAVENOUS PRN
Status: DISCONTINUED | OUTPATIENT
Start: 2024-03-19 | End: 2024-03-19

## 2024-03-19 RX ORDER — METHOCARBAMOL 750 MG/1
750 TABLET, FILM COATED ORAL
Status: DISCONTINUED | OUTPATIENT
Start: 2024-03-19 | End: 2024-03-19 | Stop reason: HOSPADM

## 2024-03-19 RX ADMIN — SODIUM CHLORIDE, POTASSIUM CHLORIDE, SODIUM LACTATE AND CALCIUM CHLORIDE: 600; 310; 30; 20 INJECTION, SOLUTION INTRAVENOUS at 12:19

## 2024-03-19 RX ADMIN — PHENYLEPHRINE HYDROCHLORIDE 100 MCG: 10 INJECTION INTRAVENOUS at 13:34

## 2024-03-19 RX ADMIN — PROPOFOL 200 MG: 10 INJECTION, EMULSION INTRAVENOUS at 12:36

## 2024-03-19 RX ADMIN — CLINDAMYCIN PHOSPHATE 900 MG: 900 INJECTION, SOLUTION INTRAVENOUS at 12:19

## 2024-03-19 RX ADMIN — PHENYLEPHRINE HYDROCHLORIDE 100 MCG: 10 INJECTION INTRAVENOUS at 13:02

## 2024-03-19 RX ADMIN — MIDAZOLAM 2 MG: 1 INJECTION INTRAMUSCULAR; INTRAVENOUS at 12:31

## 2024-03-19 RX ADMIN — DEXAMETHASONE SODIUM PHOSPHATE 8 MG: 4 INJECTION, SOLUTION INTRA-ARTICULAR; INTRALESIONAL; INTRAMUSCULAR; INTRAVENOUS; SOFT TISSUE at 12:57

## 2024-03-19 RX ADMIN — KETOROLAC TROMETHAMINE 30 MG: 30 INJECTION, SOLUTION INTRAMUSCULAR at 13:17

## 2024-03-19 RX ADMIN — ENOXAPARIN SODIUM 40 MG: 40 INJECTION SUBCUTANEOUS at 11:06

## 2024-03-19 RX ADMIN — SUGAMMADEX 400 MG: 100 INJECTION, SOLUTION INTRAVENOUS at 13:55

## 2024-03-19 RX ADMIN — CLINDAMYCIN IN 5 PERCENT DEXTROSE 900 MG: 18 INJECTION, SOLUTION INTRAVENOUS at 11:36

## 2024-03-19 RX ADMIN — DEXMEDETOMIDINE HYDROCHLORIDE 20 MCG: 100 INJECTION, SOLUTION INTRAVENOUS at 12:55

## 2024-03-19 RX ADMIN — FENTANYL CITRATE 100 MCG: 50 INJECTION INTRAMUSCULAR; INTRAVENOUS at 12:36

## 2024-03-19 RX ADMIN — ONDANSETRON 4 MG: 2 INJECTION INTRAMUSCULAR; INTRAVENOUS at 12:57

## 2024-03-19 RX ADMIN — Medication 20 MG: at 13:18

## 2024-03-19 RX ADMIN — HYDROMORPHONE HYDROCHLORIDE 0.5 MG: 1 INJECTION, SOLUTION INTRAMUSCULAR; INTRAVENOUS; SUBCUTANEOUS at 13:17

## 2024-03-19 RX ADMIN — FENTANYL CITRATE 50 MCG: 50 INJECTION INTRAMUSCULAR; INTRAVENOUS at 13:10

## 2024-03-19 RX ADMIN — ACETAMINOPHEN 975 MG: 325 TABLET, FILM COATED ORAL at 10:50

## 2024-03-19 RX ADMIN — Medication 50 MG: at 12:36

## 2024-03-19 RX ADMIN — LIDOCAINE HYDROCHLORIDE 100 MG: 20 INJECTION, SOLUTION INFILTRATION; PERINEURAL at 12:36

## 2024-03-19 ASSESSMENT — ACTIVITIES OF DAILY LIVING (ADL)
ADLS_ACUITY_SCORE: 31
ADLS_ACUITY_SCORE: 29
ADLS_ACUITY_SCORE: 31

## 2024-03-19 ASSESSMENT — ENCOUNTER SYMPTOMS: SEIZURES: 0

## 2024-03-19 NOTE — BRIEF OP NOTE
[facility]    Brief Operative Note    Pre-operative diagnosis: S/P  shunt [Z98.2]  Post-operative diagnosis S/P  shunt [Z98.2]    Procedure: RIGHT THORACOSCOPIC REPOSITIONING OF VENTRICULO-PLEURAL SHUNT CATHETER, Right - Chest    Surgeon: Surgeon(s) and Role:     * Almas Noriega MD - Primary     * Gaurav Parry MD - Assisting     * Rigoberto Rain MD - Fellow - Assisting     * Fabiola Martinez MD     * Luis E Baires MD  Anesthesia: General   Estimated Blood Loss: 10 mL from 3/19/2024 12:29 PM to 3/19/2024  2:17 PM      Drains: None  Specimens: * No specimens in log *  Findings:   Distal shunt catheter in the subcutaneous space. We excised the pseucocyst, the catheter was extended by an L connector and then inserted into the pleural space under thoracoscopic guidance. The catheter was secured to the rib. Flow from the distal catheter was confirmed prior to placement into the pleural space .  Complications: None.  Implants:   Implant Name Type Inv. Item Serial No.  Lot No. LRB No. Used Action   SHUNT CONNECTOR RT ANG PLASTIC [id number] - [id number] Shunt SHUNT CONNECTOR RT ANG PLASTIC 11469  [employer] [id number] Right 1 Implanted   Right Angle Clip, Standard    [employer] [id number] Right 1 Implanted   SHUNT CATH PERITONEAL ANTIBIOTIC-IMPREG URSZULA 122CM [id number] - [id number] Shunt SHUNT CATH PERITONEAL ANTIBIOTIC-IMPREG URSZULA 122CM [id number]  [employer] [id number] Right 1 Implanted     Gaurav Parry MD  Neurosurgery Resident  Pager: [phone]

## 2024-03-19 NOTE — OR NURSING
Pt roommate, Pramod will be the responsible adult and ride home for pt upon discharge. Call him at [phone], he stays about 15 minutes away.

## 2024-03-19 NOTE — BRIEF OP NOTE
"[facility]    Brief Operative Note    Pre-operative diagnosis: S/P  shunt [Z98.2]  Post-operative diagnosis Same as pre-operative diagnosis    Procedure: RIGHT THORACOSCOPIC REPOSITIONING OF VENTRICULO-PLEURAL SHUNT CATHETER, Right - Chest    Surgeon: Surgeon(s) and Role:     * Almas Noriega MD - Primary     * Gaurav Parry MD - Assisting     * Rigoberto Rain MD - Fellow - Assisting     * Fabiola Martinez MD     * Luis E Baires MD  Anesthesia: General   Estimated Blood Loss: Less than 10 ml    Drains:  shunt  Specimens: * No specimens in log *  Findings:   Shunt with pseudocyst in subcutaneous space.  Cyst sac excised.  Catheter extended with \"L\" extender and then secured to rib with plastic right angle connector.  Catheter confirmed in pleural space with thoracoscopy .  Complications: None.  Implants:   Implant Name Type Inv. Item Serial No.  Lot No. LRB No. Used Action   SHUNT CONNECTOR RT ANG PLASTIC [id number] - [id number] Shunt SHUNT CONNECTOR RT ANG PLASTIC 38594  [employer] [id number] Right 1 Implanted   Right Angle Clip, Standard    [employer] [id number] Right 1 Implanted   SHUNT CATH PERITONEAL ANTIBIOTIC-IMPREG URSZULA 122CM [id number] - [id number] Shunt SHUNT CATH PERITONEAL ANTIBIOTIC-IMPREG URSZULA 122CM [id number]  [employer] [id number] Right 1 Implanted       Rigoberto Rain MD  Cardiothoracic Surgery Fellow          "

## 2024-03-19 NOTE — DISCHARGE INSTRUCTIONS
Contacting your Doctor -   To contact a doctor, call Dr. Almas Noriega @ [phone]--Thoracic surgery clinic  or:  [phone] and ask for the resident on call for Neurology, General Surgery, or Thoracic Surgery (answered 24 hours a day)   Emergency Department:  [facility]: [phone] if you are in need of immediate or emergent help

## 2024-03-19 NOTE — ANESTHESIA PREPROCEDURE EVALUATION
Anesthesia Pre-Procedure Evaluation    Patient: Donald Jackson   MRN: [id number] : 1995        Procedure : Procedure(s):  RIGHT THORACOSCOPIC REPOSITIONING OF VENTRICULO-PLEURAL SHUNT CATHETER          Past Medical History:   Diagnosis Date    Hydrocephalus (H)     Lattice degeneration     Morbid obesity (H)     Other forms of retinal detachment(361.89)       Past Surgical History:   Procedure Laterality Date    LAPAROSCOPIC ASSISTED IMPLANT SHUNT VENTRICULOPERITONEAL Right 2024    Procedure: Implant Shunt Ventriculopleural;  Surgeon: Almas Noriega MD;  Location: [facility] OR    OPTICAL TRACKING SYSTEM IMPLANT SHUNT VENTRICULOPERITONEAL Right 2024    Procedure: RIGHT VENTRICULOPLEURAL Insertion;  Surgeon: Fabiola Martinez MD;  Location: [facility] OR    RETINAL REATTACHMENT  10/2010    left eye    RETINOPEXY LASER PROPHYLAXIS BREAK(S) OD (RIGHT EYE)  10/2011    shunt in head      a couple as a child    strabismus surery      age 5    TONSILLECTOMY      wisdom teeth        Allergies   Allergen Reactions    Penicillins Hives and Unknown      Social History     Tobacco Use    Smoking status: Never    Smokeless tobacco: Never   Substance Use Topics    Alcohol use: No      Wt Readings from Last 1 Encounters:   24 129 kg (284 lb 6.4 oz)        Anesthesia Evaluation   Pt has had prior anesthetic. Type: General.    History of anesthetic complications  - .  patient reports he was told after his 23 procedure he woke up aggressive.    ROS/MED HX  ENT/Pulmonary:       Neurologic: Comment: Hydrocephalus s/p  shunt. Recently s/p ventriculopleural shunt 24.    (-) no seizures and no CVA   Cardiovascular:     (+)  - -   -  - -                                 Previous cardiac testing   Echo: Date: Results:    Stress Test:  Date: Results:    ECG Reviewed:  Date: 23 Results:  SR  Cath:  Date: Results:      METS/Exercise Tolerance: >4 METS    Hematologic:  - neg hematologic  ROS    "  Musculoskeletal:  - neg musculoskeletal ROS     GI/Hepatic:     (+)         appendicitis,        (-) GERD   Renal/Genitourinary:  - neg Renal ROS     Endo:     (+)               Obesity,       Psychiatric/Substance Use:  - neg psychiatric ROS     Infectious Disease:     (+)   MRSA,         Malignancy:  - neg malignancy ROS     Other:  - neg other ROS          Physical Exam    Airway        Mallampati: II   TM distance: > 3 FB   Neck ROM: full   Mouth opening: > 3 cm    Respiratory Devices and Support         Dental       (+) Completely normal teeth      Cardiovascular   cardiovascular exam normal       Rhythm and rate: regular and normal     Pulmonary   pulmonary exam normal        breath sounds clear to auscultation           OUTSIDE LABS:  CBC:   Lab Results   Component Value Date    WBC 8.8 03/18/2024    WBC 12.8 (H) 02/06/2024    HGB 14.2 03/18/2024    HGB 13.0 (L) 02/06/2024    HCT 44.9 03/18/2024    HCT 41.2 02/06/2024     03/18/2024     02/06/2024     BMP:   Lab Results   Component Value Date     02/06/2024     02/05/2024    POTASSIUM 4.2 02/06/2024    POTASSIUM 3.8 02/05/2024    CHLORIDE 105 02/06/2024    CHLORIDE 109 (H) 02/05/2024    CO2 23 02/06/2024    CO2 23 02/05/2024    BUN 13.0 02/06/2024    BUN 16.3 02/05/2024    CR 0.90 02/06/2024    CR 1.13 02/05/2024     (H) 02/06/2024     (H) 02/05/2024     COAGS:   Lab Results   Component Value Date    PTT 30 02/05/2024    INR 1.02 02/05/2024     POC: No results found for: \"BGM\", \"HCG\", \"HCGS\"  HEPATIC:   Lab Results   Component Value Date    ALBUMIN 4.2 01/27/2024    PROTTOTAL 8.0 01/27/2024    ALT 53 01/27/2024    AST 25 01/27/2024    ALKPHOS 94 01/27/2024    BILITOTAL 0.6 01/27/2024     OTHER:   Lab Results   Component Value Date    LACT 0.9 01/27/2024    ANTHONY 8.7 02/06/2024    PHOS 3.5 01/28/2024    MAG 1.9 01/28/2024    TSH 1.35 03/17/2010    CRP <2.9 08/16/2020    SED 6 02/04/2024       Anesthesia Plan    ASA " "Status:  3    NPO Status:  NPO Appropriate    Anesthesia Type: General.     - Airway: ETT   Induction: Intravenous.   Maintenance: Balanced.   Techniques and Equipment:     - Lines/Monitors: BIS     Consents            Postoperative Care    Pain management: IV analgesics, Oral pain medications, Multi-modal analgesia.   PONV prophylaxis: Ondansetron (or other 5HT-3), Dexamethasone or Solumedrol     Comments:               Johnson Patricio MD    I have reviewed the pertinent notes and labs in the chart from the past 30 days and (re)examined the patient.  Any updates or changes from those notes are reflected in this note.              # Severe Obesity: Estimated body mass index is 43.24 kg/m  as calculated from the following:    Height as of 3/18/24: 1.727 m (5' 8\").    Weight as of 3/18/24: 129 kg (284 lb 6.4 oz).      "

## 2024-03-19 NOTE — OP NOTE
PATIENT NAME: Donald Jackson  PATIENT MRN: [id number]  PATIENT ACCOUNT: [id number]  PATIENT YOB: 1995    [facility]  DEPARTMENT OF NEUROSURGERY  OPERATIVE REPORT    DATE OF PROCEDURE: 3/19/2024    PREOPERATIVE DIAGNOSIS:    1. Shunt malfunction, distal catheter  2. Thoracic subcutaneous pseudomeningocele     POSTOPERATIVE DIAGNOSIS:    1. Shunt malfunction, distal catheter  2. Thoracic subcutaneous pseudomeningocele     PROCEDURE:    1.  Placement of distal catheter into the pleural cavity  2.  Intraoperative use of thoracoscopy  3. Assistance from thoracic surgery team    SURGEON:  Almas Noriega MD - Primary  Michelle, Fabiola Raza MD     RESIDENT/FELLOW:   Rigoberto Rain MD - Fellow - Assisting   Gaurav Parry MD - Assisting  Luis E Baires MD      ANESTHESIA:   General endotracheal    FINDINGS:  Distal shunt catheter in the subcutaneous space. We excised the pseucocyst, the catheter was extended by an L connector and then inserted into the pleural space under thoracoscopic guidance. The catheter was secured to the rib. Flow from the distal catheter was confirmed prior to placement into the pleural space.    ESTIMATED BLOOD LOSS:   10 ml     IMPLANTS:           Implant Name Type Inv. Item Serial No.  Lot No. LRB No. Used Action   SHUNT CONNECTOR RT ANG PLASTIC 82392 - [id number] Shunt SHUNT CONNECTOR RT ANG PLASTIC 82918   [employer] [id number] Right 1 Implanted   Right Angle Clip, Standard       [employer] [id number] Right 1 Implanted   SHUNT CATH PERITONEAL ANTIBIOTIC-IMPREG URSZULA 122CM [id number] - [id number] Shunt SHUNT CATH PERITONEAL ANTIBIOTIC-IMPREG URSZULA 122CM [id number]   [employer] [id number] Right 1 Implanted      INDICATIONS FOR THE PROCEDURE:  Donald is a 28 y/o male with a history of hydrocephalus who underwent a shunt revision with Dr. Martinez due to perforated appendicitis leading to an abscess close to th distal tip of his previous ventriculoperitoneal  shunt. He underwent a ventriculopleural shunt placement with thoracic surgery on 2/2/2024. He presented to the clinic with a fluctuant collection at the incision on his R chest. CT-CAP demonstrates a 7.3 x 5.3 cm collection in the subcutaneous tissue and was referred to thoracic surgery for evaluation. They determined that his distal catheter was malpositioned, and needed revision to replace the distal catheter into the pleural space. The risks benefits and the complications of the procedure were explained, and the patient was agreeable to a thoracoscopy assisted revision of the distal peritoneal catheter with thoracic surgery. Neurosurgery team agreed to be available to help with the procedure.    PROCEDURE IN DETAIL:  After consent was verified, the patient was brought to the operating theater where general endotracheal anesthesia was then induced.  A Chowdhury catheter was not placed for this procedure. Ancef was given for perioperative prophylaxis. The patient was placed in the left lateral position on the operating table and all the pressure points were adequately padded. Thoracic surgery performed the initial portion of the procedure, please see their separate operative report for additional details. The prior thoracic incision was opened, which revealed a subcutaneous space with CSF. 30 ml of CSF egressed from the cavity. The catheter was malpositioned, and was externalized.     At this point, neurosurgery team scrubbed in, and assisted with the excision of the pseudocapsule. New pair of gloves were used prior to handling the catheter. We then wrapped the externalized distal catheter in clean towel. Flow was confirmed by pumping the proximal catheter. With the assistance of the thoracic surgery team, the pseudocapsule was excised. The distal catheter was extended, and a new catheter system was attached using a right angled connector and the ends were secured with 0 silk tie. Then the extended distal catheter was  placed into the pleural cavity under thoracoscopic guidance. The catheter was secured using a right angled clip to the rib and was secured around the rib via 0 silk ties. The remaining portion of the catheter was looped in the subcutaneous space and secured to the subcutaneous tissue with a 0-silk tie.     Thoracic surgery confirmed the placement of the distal catheter again with the thoracoscope. The incision was closed via 2-0 vicryl subcutaneous interrupted and 3-0 Monocryl running. The incisions were cleaned with wet and dry sponges, and bacitracin was applied. The incision was covered by Primapore.     COUNTS: correct at end of case     DISPOSITION: PACU    Dr Martinez was present and available for all the key portions of the procedure.    Gaurav Parry MD  Neurosurgery Resident  Pager: [phone]  I was present for the critical portions of the operation and immediately available for the remainder.  AMP

## 2024-03-19 NOTE — ANESTHESIA POSTPROCEDURE EVALUATION
Patient: Donald Jackson    Procedure: Procedure(s):  RIGHT THORACOSCOPIC REPOSITIONING OF VENTRICULO-PLEURAL SHUNT CATHETER       Anesthesia Type:  General    Note:  Disposition: Outpatient   Postop Pain Control: Uneventful            Sign Out: Well controlled pain   PONV: No   Neuro/Psych: Uneventful            Sign Out: Acceptable/Baseline neuro status   Airway/Respiratory: Uneventful            Sign Out: Acceptable/Baseline resp. status   CV/Hemodynamics: Uneventful            Sign Out: Acceptable CV status   Other NRE: NONE   DID A NON-ROUTINE EVENT OCCUR? No           Last vitals:  Vitals Value Taken Time   /61 03/19/24 1430   Temp 36.9  C (98.5  F) 03/19/24 1428   Pulse 90 03/19/24 1437   Resp 12 03/19/24 1437   SpO2 94 % 03/19/24 1437   Vitals shown include unfiled device data.    Electronically Signed By: Christopher J. Behrens, MD  March 19, 2024  2:38 PM

## 2024-03-19 NOTE — ANESTHESIA PROCEDURE NOTES
Airway       Patient location during procedure: OR       Procedure Start/Stop Times: 3/19/2024 12:40 PM  Staff -        Anesthesiologist:  Behrens, Christopher J, MD       Resident/Fellow: Johnson Patricio MD       Performed By: resident and with residents       Procedure performed by resident/fellow/CRNA in presence of a teaching physician.    Consent for Airway        Urgency: elective  Indications and Patient Condition       Indications for airway management: yojana-procedural       Induction type:intravenous       Mask difficulty assessment: 1 - vent by mask    Final Airway Details       Final airway type: endotracheal airway       Successful airway: ETT - single  Endotracheal Airway Details        ETT size (mm): 8.0       Cuffed: yes       Successful intubation technique: direct laryngoscopy       DL Blade Type: MAC 3       Grade View of Cords: 1       Adjucts: stylet       Position: Right       Measured from: lips       Secured at (cm): 24       Bite block used: Oral Airway    Post intubation assessment        Placement verified by: capnometry, equal breath sounds and chest rise        Number of attempts at approach: 1       Number of other approaches attempted: 0       Secured with: pink tape       Ease of procedure: easy       Dentition: Intact    Medication(s) Administered   Medication Administration Time: 3/19/2024 12:40 PM    Additional Comments       Routine intubation without complications.

## 2024-03-19 NOTE — ANESTHESIA CARE TRANSFER NOTE
Patient: Donald Jackson    Procedure: Procedure(s):  RIGHT THORACOSCOPIC REPOSITIONING OF VENTRICULO-PLEURAL SHUNT CATHETER       Diagnosis: S/P  shunt [Z98.2]  Diagnosis Additional Information: No value filed.    Anesthesia Type:   General     Note:    Oropharynx: oropharynx clear of all foreign objects and spontaneously breathing  Level of Consciousness: drowsy  Oxygen Supplementation: face mask  Level of Supplemental Oxygen (L/min / FiO2): 6  Independent Airway: airway patency satisfactory and stable  Dentition: dentition unchanged  Vital Signs Stable: post-procedure vital signs reviewed and stable  Report to RN Given: handoff report given  Patient transferred to: PACU    Handoff Report: Identifed the Patient, Identified the Reponsible Provider, Reviewed the pertinent medical history, Discussed the surgical course, Reviewed Intra-OP anesthesia mangement and issues during anesthesia, Set expectations for post-procedure period and Allowed opportunity for questions and acknowledgement of understanding      Vitals:  Vitals Value Taken Time   /66 03/19/24 1428   Temp 36.9  C (98.5  F) 03/19/24 1428   Pulse 90 03/19/24 1428   Resp 21 03/19/24 1428   SpO2 98 % 03/19/24 1428   Vitals shown include unfiled device data.    Electronically Signed By: Johnson Patricio MD  March 19, 2024  2:29 PM

## 2024-03-19 NOTE — OR NURSING
Brinda GONZALEZ Neurosurgery paged that all CT scans and Xrays ordered have been completed, pt anticipated to go home pending clearance from neurosurgery    Brinda GONZALEZ returned called: scans are OK, pt is OK to discharge, resident will come see pt prior to discharge    1553: Brinda GONZALEZ at bedside, OK to discharge, will go home with antibiotics

## 2024-03-20 ENCOUNTER — MYC MEDICAL ADVICE (OUTPATIENT)
Dept: FAMILY MEDICINE | Facility: CLINIC | Age: 29
End: 2024-03-20

## 2024-03-20 NOTE — TELEPHONE ENCOUNTER
Pt sent [facility] message asking for a brief description of his surgery on 3/19/2024. Sending the following to pt:    Procedure:    1. Placement of distal catheter into the pleural cavity  2.  Intraoperative use of thoracoscopy  3. Assistance from thoracic surgery team    Findings:  Distal shunt catheter in the subcutaneous space. We excised the pseucocyst, the catheter was extended by an L connector and then inserted into the pleural space under thoracoscopic guidance. The catheter was secured to the rib. Flow from the distal catheter was confirmed prior to placement into the pleural space.    Pt to notify us if he needs additional information.    TANVIR Urban, RN  03/20/24, 9:15 AM

## 2024-03-21 LAB — INR PPP: 1.17 (ref 0.85–1.15)

## 2024-03-26 ENCOUNTER — OFFICE VISIT (OUTPATIENT)
Dept: NEUROSURGERY | Facility: CLINIC | Age: 29
End: 2024-03-26
Payer: COMMERCIAL

## 2024-03-26 ENCOUNTER — ANCILLARY PROCEDURE (OUTPATIENT)
Dept: CT IMAGING | Facility: CLINIC | Age: 29
End: 2024-03-26
Attending: NURSE PRACTITIONER
Payer: COMMERCIAL

## 2024-03-26 VITALS
RESPIRATION RATE: 16 BRPM | WEIGHT: 275 LBS | OXYGEN SATURATION: 97 % | DIASTOLIC BLOOD PRESSURE: 85 MMHG | SYSTOLIC BLOOD PRESSURE: 124 MMHG | HEART RATE: 91 BPM | HEIGHT: 68 IN | BODY MASS INDEX: 41.68 KG/M2

## 2024-03-26 DIAGNOSIS — T85.618A SHUNT MALFUNCTION, INITIAL ENCOUNTER: ICD-10-CM

## 2024-03-26 DIAGNOSIS — Z98.2 S/P VP SHUNT: Primary | ICD-10-CM

## 2024-03-26 PROCEDURE — 99024 POSTOP FOLLOW-UP VISIT: CPT | Performed by: PHYSICIAN ASSISTANT

## 2024-03-26 PROCEDURE — 70450 CT HEAD/BRAIN W/O DYE: CPT | Mod: GC | Performed by: RADIOLOGY

## 2024-03-26 ASSESSMENT — PAIN SCALES - GENERAL: PAINLEVEL: NO PAIN (0)

## 2024-03-26 NOTE — PATIENT INSTRUCTIONS
Make appointment with ID - [phone].    Follow up in 1-2 weeks to check incision.    Head CT report is pending.  Provider will follow up with you if there are any concerns.     Continue with same restrictions for now.

## 2024-03-26 NOTE — LETTER
3/26/2024       RE: Donald Jackson  [address]  [place] [address]       Dear Colleague,    Thank you for referring your patient, Donald Jackson, to the [facility] NEUROSURGERY [facility]INIC [place] at [facility]. Please see a copy of my visit note below.        Neurosurgery Clinic Note    Chief Complaint: 1 week post-op visit    DATE OF VISIT: 3/26/2024      DATE OF PROCEDURE: 3/19/2024     PREOPERATIVE DIAGNOSIS:    1. Shunt malfunction, distal catheter  2. Thoracic subcutaneous pseudomeningocele     POSTOPERATIVE DIAGNOSIS:    1. Shunt malfunction, distal catheter  2. Thoracic subcutaneous pseudomeningocele     PROCEDURE:    1.  Placement of distal catheter into the pleural cavity  2.  Intraoperative use of thoracoscopy  3. Assistance from thoracic surgery team     SURGEON:  Almas Noriega MD - Primary  MichelleFabiola jaquez MD      RESIDENT/FELLOW:   Rigoberto Rain MD - Fellow - Assisting   Gaurav Parry MD - Assisting  Luis E Baires MD      ANESTHESIA:   General endotracheal     FINDINGS:  Distal shunt catheter in the subcutaneous space. We excised the pseucocyst, the catheter was extended by an L connector and then inserted into the pleural space under thoracoscopic guidance. The catheter was secured to the rib. Flow from the distal catheter was confirmed prior to placement into the pleural space.     ESTIMATED BLOOD LOSS:   10 ml     IMPLANTS:           Implant Name Type Inv. Item Serial No.  Lot No. LRB No. Used Action   SHUNT CONNECTOR RT ANG PLASTIC 87964 - [id number] Shunt SHUNT CONNECTOR RT ANG PLASTIC [id number]   [employer] [id number] Right 1 Implanted   Right Angle Clip, Standard       [employer] [id number] Right 1 Implanted   SHUNT CATH PERITONEAL ANTIBIOTIC-IMPREG URSZULA 122CM 15573 - [id number] Shunt SHUNT CATH PERITONEAL ANTIBIOTIC-IMPREG URSZULA 122CM [id number]   [employer] [id number] Right 1 Implanted       INDICATIONS FOR THE PROCEDURE:  Donald is a 28 y/o male with a history of hydrocephalus who underwent a shunt revision with Dr. Martinez due to perforated appendicitis leading to an abscess close to the distal tip of his previous ventriculoperitoneal shunt. He underwent a ventriculopleural shunt placement with thoracic surgery on 2/2/2024. He presented to the clinic with a fluctuant collection at the incision on his R chest. CT-CAP demonstrates a 7.3 x 5.3 cm collection in the subcutaneous tissue and was referred to thoracic surgery for evaluation. They determined that his distal catheter was malpositioned, and needed revision to replace the distal catheter into the pleural space. The risks benefits and the complications of the procedure were explained, and the patient was agreeable to a thoracoscopy assisted revision of the distal peritoneal catheter with thoracic surgery. Neurosurgery team agreed to be available to help with the procedure.    HPI: Donald Jackson is a pleasant 29 year old male who is s/p revision of distal catheter for  shunt by Dr. Martinez on 3/19/24 because the catheter became displaced and he developed a pseudocyst in the subcutaneous tissue.  He had a recent  shunt replacement on 2/2/24 for a shunt system initially placed when he was 4 y/o for HCP.      Currently, patient is 1 week out from surgery.  Patient is recovering after his thoracic revision of the distal catheter.  He does not see any further accumulation of the pseudocyst and denies any issues with the incision.  He is not having any headaches but does note some feeling of lightheadedness especially when he gets up in the morning.  No bowel or bladder issues.  He has not yet followed up with infectious disease as was previously directed on March 5.  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  He does not feel his overall strength is back to normal.     shunt Codman Certas setting 4.       Physical Exam   /85 (BP Location: Right  "arm, Patient Position: Sitting)   Pulse 91   Resp 16   Ht 1.727 m (5' 8\")   Wt 124.7 kg (275 lb)   SpO2 97%   BMI 41.81 kg/m      Constitutional: Alert, no acute distress.  Face symmetric, gaze is conjugate, speech is clear and fluent, facial sensation is intact, no dysmetria with finger-to-nose, no pronator drift, tongue midline    [place]:  ambulates with steady gait.  Balanced tandem.     Neurological:    Strength (L/R)  5/5 Deltoid  5/5 Bicep  5/5 Tricep  5/5 Handgrip    5/5 Hip Flexion  5/5 Knee Extension  5/5 Ankle Dorsiflexion  5/5 Plantar Flexion    Reflexes (L/R)  2+/2+ Bicep  2+/2+ Brachioradialis  2+/2+ Patellar  2+/2+ Ankle    Sensation: SILT    Incision:  Clean, dry and intact.  Mild erythema around the incision.  No induration or fluctuance.  No drainage noted.  Sutures intact.     IMAGING:  Head CT 3/26/24 - decreased ventricles w/o SDH  FINDINGS:  Right frontal approach ventriculostomy catheter with tip at the  foramen of [place]. There is decreased size of the ventricular system  when compared to CT 3/19/2024. No evidence of subependymal edema. No  acute intracranial hemorrhage, mass effect, or midline shift. No acute  loss of gray-white matter differentiation in the cerebral hemispheres.  White matter loss in the bilateral parietal lobes with distorted  shapes of the ventricles. Clear basal cisterns.  The bony calvaria and the bones of the skull base are otherwise  normal. The visualized portions of the paranasal sinuses and mastoid  air cells are clear. Grossly normal orbits.                                                                    IMPRESSION: Unchanged position of the right frontal approach  ventriculostomy catheter with overall decreased size of the  ventricular system when compared to CT 3/19/2024.    ASSESSMENT:  Donald Jackson is a 29 year old male who is s/p revision of distal catheter for  shunt by Dr. Martinez on 3/19/24 because the catheter became displaced and he developed a " pseudocyst in the subcutaneous tissue.  He had a recent  shunt replacement on 2/2/24 for a shunt system initially placed when he was 4 y/o for HCP.      He is only 1 week out from surgery.  No red flags at this point.    Head CT w/ decreased ventricular size, no SDH    PLAN:    Continue with same restrictions.    Okay to shower and get incision wet.  No submerging/soaking it until it is fully healed (around 4 weeks post-op).   Cover portions of incision with a breathable dressing that may get irritated by clothing rubbing on it.  Continue to increase time ambulating, avoid bending, twisting, strenuous exercise, or heavy lifting (greater than 10 pounds).   No driving while using narcotics or other sedating medications, such as sleep aids, muscle relaxants, etc.    Follow up in 1-2 weeks for wound check.   Contact ID to schedule f/u appointment with them.      Patient is in agreement with this plan and states no further questions.        Again, thank you for allowing me to participate in the care of your patient.      Sincerely,    Vandana Smith PA-C

## 2024-03-26 NOTE — PROGRESS NOTES
Neurosurgery Clinic Note    Chief Complaint: 1 week post-op visit    DATE OF VISIT: 3/26/2024      DATE OF PROCEDURE: 3/19/2024     PREOPERATIVE DIAGNOSIS:    1. Shunt malfunction, distal catheter  2. Thoracic subcutaneous pseudomeningocele     POSTOPERATIVE DIAGNOSIS:    1. Shunt malfunction, distal catheter  2. Thoracic subcutaneous pseudomeningocele     PROCEDURE:    1.  Placement of distal catheter into the pleural cavity  2.  Intraoperative use of thoracoscopy  3. Assistance from thoracic surgery team     SURGEON:  Almas Noriega MD - Primary  Michelle, Fabiola Raza MD      RESIDENT/FELLOW:   Rigoberto Rain MD - Fellow - Assisting   Gaurav Parry MD - Assisting  Luis E Baires MD      ANESTHESIA:   General endotracheal     FINDINGS:  Distal shunt catheter in the subcutaneous space. We excised the pseucocyst, the catheter was extended by an L connector and then inserted into the pleural space under thoracoscopic guidance. The catheter was secured to the rib. Flow from the distal catheter was confirmed prior to placement into the pleural space.     ESTIMATED BLOOD LOSS:   10 ml     IMPLANTS:           Implant Name Type Inv. Item Serial No.  Lot No. LRB No. Used Action   SHUNT CONNECTOR RT ANG PLASTIC [id number] - [id number] Shunt SHUNT CONNECTOR RT ANG PLASTIC [id number]   [employer] [id number] Right 1 Implanted   Right Angle Clip, Standard       [employer] [id number] Right 1 Implanted   SHUNT CATH PERITONEAL ANTIBIOTIC-IMPREG URSZULA 122CM [id number] - [id number] Shunt SHUNT CATH PERITONEAL ANTIBIOTIC-IMPREG URSZULA 122CM [id number]   [employer] [id number] Right 1 Implanted      INDICATIONS FOR THE PROCEDURE:  Donald is a 28 y/o male with a history of hydrocephalus who underwent a shunt revision with Dr. Martinez due to perforated appendicitis leading to an abscess close to the distal tip of his previous ventriculoperitoneal shunt. He underwent a ventriculopleural shunt placement with thoracic surgery on  "2/2/2024. He presented to the clinic with a fluctuant collection at the incision on his R chest. CT-CAP demonstrates a 7.3 x 5.3 cm collection in the subcutaneous tissue and was referred to thoracic surgery for evaluation. They determined that his distal catheter was malpositioned, and needed revision to replace the distal catheter into the pleural space. The risks benefits and the complications of the procedure were explained, and the patient was agreeable to a thoracoscopy assisted revision of the distal peritoneal catheter with thoracic surgery. Neurosurgery team agreed to be available to help with the procedure.    HPI: Donald Jackson is a pleasant 29 year old male who is s/p revision of distal catheter for  shunt by Dr. Martinez on 3/19/24 because the catheter became displaced and he developed a pseudocyst in the subcutaneous tissue.  He had a recent  shunt replacement on 2/2/24 for a shunt system initially placed when he was 4 y/o for HCP.      Currently, patient is 1 week out from surgery.  Patient is recovering after his thoracic revision of the distal catheter.  He does not see any further accumulation of the pseudocyst and denies any issues with the incision.  He is not having any headaches but does note some feeling of lightheadedness especially when he gets up in the morning.  No bowel or bladder issues.  He has not yet followed up with infectious disease as was previously directed on March 5.  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  He does not feel his overall strength is back to normal.     shunt Codman Certas setting 4.       Physical Exam   /85 (BP Location: Right arm, Patient Position: Sitting)   Pulse 91   Resp 16   Ht 1.727 m (5' 8\")   Wt 124.7 kg (275 lb)   SpO2 97%   BMI 41.81 kg/m      Constitutional: Alert, no acute distress.  Face symmetric, gaze is conjugate, speech is clear and fluent, facial sensation is intact, no dysmetria with finger-to-nose, no pronator drift, " tongue midline    [place]:  ambulates with steady gait.  Balanced tandem.     Neurological:    Strength (L/R)  5/5 Deltoid  5/5 Bicep  5/5 Tricep  5/5 Handgrip    5/5 Hip Flexion  5/5 Knee Extension  5/5 Ankle Dorsiflexion  5/5 Plantar Flexion    Reflexes (L/R)  2+/2+ Bicep  2+/2+ Brachioradialis  2+/2+ Patellar  2+/2+ Ankle    Sensation: SILT    Incision:  Clean, dry and intact.  Mild erythema around the incision.  No induration or fluctuance.  No drainage noted.  Sutures intact.     IMAGING:  Head CT 3/26/24 - decreased ventricles w/o SDH  FINDINGS:  Right frontal approach ventriculostomy catheter with tip at the  foramen of Monro. There is decreased size of the ventricular system  when compared to CT 3/19/2024. No evidence of subependymal edema. No  acute intracranial hemorrhage, mass effect, or midline shift. No acute  loss of gray-white matter differentiation in the cerebral hemispheres.  White matter loss in the bilateral parietal lobes with distorted  shapes of the ventricles. Clear basal cisterns.  The bony calvaria and the bones of the skull base are otherwise  normal. The visualized portions of the paranasal sinuses and mastoid  air cells are clear. Grossly normal orbits.                                                                    IMPRESSION: Unchanged position of the right frontal approach  ventriculostomy catheter with overall decreased size of the  ventricular system when compared to CT 3/19/2024.    ASSESSMENT:  Donald Jackson is a 29 year old male who is s/p revision of distal catheter for  shunt by Dr. Martinez on 3/19/24 because the catheter became displaced and he developed a pseudocyst in the subcutaneous tissue.  He had a recent  shunt replacement on 2/2/24 for a shunt system initially placed when he was 2 y/o for HCP.      He is only 1 week out from surgery.  No red flags at this point.    Head CT w/ decreased ventricular size, no SDH    PLAN:    Continue with same restrictions.    Okay to  shower and get incision wet.  No submerging/soaking it until it is fully healed (around 4 weeks post-op).   Cover portions of incision with a breathable dressing that may get irritated by clothing rubbing on it.  Continue to increase time ambulating, avoid bending, twisting, strenuous exercise, or heavy lifting (greater than 10 pounds).   No driving while using narcotics or other sedating medications, such as sleep aids, muscle relaxants, etc.    Follow up in 1-2 weeks for wound check.   Contact ID to schedule f/u appointment with them.      Patient is in agreement with this plan and states no further questions.      Vandana Smith PA-C  Department of Neurosurgery  Office: [phone]

## 2024-03-26 NOTE — OP NOTE
Preoperative diagnosis: Dislodged ventriculo-pleural shunt    Postoperative diagnosis: The same as preoperative diagnosis    Procedure:   RIGHT thoracoscopy with replacement of  shunt    Anesthesia: General    Surgeon: Almas Noriega (present and participated in the entire procedure)    Resident surgeon: Rigoberto Rain    EBL: 10 ml    Complications: None immediate      Procedure:  We positioned Mr. Jackson in the lateral decubitus with the right side up and the area was prepared and draped in the conventional fashion.  We reincised his previous site on the right chest wall identified the subcutaneous catheter and then the neurosurgery team attached and extension to it.  We then placed it through the same intercostal space into the pleural space again and verified with thoracoscopy that it was in good position.  For this we placed a thoracoscope through the same incision.  After this we secured the catheter to the rib with 2 silk sutures using a special adapter where the extension had been placed.    We irrigated copiously and closed with absorbable sutures in the conventional fashion.    Mr. Jackson tolerated the procedure well.

## 2024-04-01 ENCOUNTER — OFFICE VISIT (OUTPATIENT)
Dept: NEUROSURGERY | Facility: CLINIC | Age: 29
End: 2024-04-01
Payer: COMMERCIAL

## 2024-04-01 VITALS
BODY MASS INDEX: 41.68 KG/M2 | WEIGHT: 275 LBS | HEIGHT: 68 IN | OXYGEN SATURATION: 97 % | HEART RATE: 79 BPM | RESPIRATION RATE: 16 BRPM | SYSTOLIC BLOOD PRESSURE: 130 MMHG | DIASTOLIC BLOOD PRESSURE: 86 MMHG

## 2024-04-01 DIAGNOSIS — Z98.2 S/P VP SHUNT: Primary | ICD-10-CM

## 2024-04-01 PROCEDURE — 99024 POSTOP FOLLOW-UP VISIT: CPT | Performed by: PHYSICIAN ASSISTANT

## 2024-04-01 ASSESSMENT — PAIN SCALES - GENERAL: PAINLEVEL: NO PAIN (0)

## 2024-04-01 NOTE — LETTER
4/1/2024       RE: Donald Jackson  [address]  [place] [address]     Dear Colleague,    Thank you for referring your patient, Donald Jackson, to the [facility] [place] [facility]. Please see a copy of my visit note below.        Neurosurgery Clinic Note    Chief Complaint: 2 week post-op visit    DATE OF VISIT: 4/1/24      DATE OF PROCEDURE: 3/19/2024     PREOPERATIVE DIAGNOSIS:    1. Shunt malfunction, distal catheter  2. Thoracic subcutaneous pseudomeningocele     POSTOPERATIVE DIAGNOSIS:    1. Shunt malfunction, distal catheter  2. Thoracic subcutaneous pseudomeningocele     PROCEDURE:    1.  Placement of distal catheter into the pleural cavity  2.  Intraoperative use of thoracoscopy  3. Assistance from thoracic surgery team     SURGEON:  Almas Noriega MD - Primary  Michelle, Fabiola Raza MD      RESIDENT/FELLOW:   Rigoberto Rain MD - Fellow - Assisting   Gaurav Parry MD - Assisting  Luis E Baires MD      ANESTHESIA:   General endotracheal     FINDINGS:  Distal shunt catheter in the subcutaneous space. We excised the pseucocyst, the catheter was extended by an L connector and then inserted into the pleural space under thoracoscopic guidance. The catheter was secured to the rib. Flow from the distal catheter was confirmed prior to placement into the pleural space.     ESTIMATED BLOOD LOSS:   10 ml     IMPLANTS:           Implant Name Type Inv. Item Serial No.  Lot No. LRB No. Used Action   SHUNT CONNECTOR RT ANG PLASTIC [id number] - [id number] Shunt SHUNT CONNECTOR RT ANG PLASTIC [id number]   [employer] [id number] Right 1 Implanted   Right Angle Clip, Standard       [employer] [id number] Right 1 Implanted   SHUNT CATH PERITONEAL ANTIBIOTIC-IMPREG URSZULA 122CM 83936 - [id number] Shunt SHUNT CATH PERITONEAL ANTIBIOTIC-IMPREG URSZULA 122CM [id number]   [employer] [id number] Right 1 Implanted       INDICATIONS FOR THE PROCEDURE:  Donald is a 28 y/o male with a history of hydrocephalus who underwent a shunt revision with Dr. Martinez due to perforated appendicitis leading to an abscess close to the distal tip of his previous ventriculoperitoneal shunt. He underwent a ventriculopleural shunt placement with thoracic surgery on 2/2/2024. He presented to the clinic with a fluctuant collection at the incision on his R chest. CT-CAP demonstrates a 7.3 x 5.3 cm collection in the subcutaneous tissue and was referred to thoracic surgery for evaluation. They determined that his distal catheter was malpositioned, and needed revision to replace the distal catheter into the pleural space. The risks benefits and the complications of the procedure were explained, and the patient was agreeable to a thoracoscopy assisted revision of the distal peritoneal catheter with thoracic surgery. Neurosurgery team agreed to be available to help with the procedure.    HPI: Donald Jackson is a pleasant 29 year old male who is s/p revision of distal catheter for  shunt by Dr. Martinez on 3/19/24 because the catheter became displaced and he developed a pseudocyst in the subcutaneous tissue.  He had a recent  shunt replacement on 2/2/24 for a shunt system initially placed when he was 2 y/o for HCP.   shunt Codman Certas setting 4.    3/26/24 Visit - patient is 1 week out from surgery.  Patient is recovering after his thoracic revision of the distal catheter.  He does not see any further accumulation of the pseudocyst and denies any issues with the incision.  He is not having any headaches but does note some feeling of lightheadedness especially when he gets up in the morning.  No bowel or bladder issues.  He has not yet followed up with infectious disease as was previously directed on March 5.  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  He does not feel his overall strength is back to normal.      4/1/24 Visit - 2 weeks p/o.  Patient denies  "headache.  He is overall feels he is getting better.         Physical Exam   /86 (BP Location: Right arm, Patient Position: Sitting)   Pulse 79   Resp 16   Ht 1.727 m (5' 8\")   Wt 124.7 kg (275 lb)   SpO2 97%   BMI 41.81 kg/m      Constitutional: Alert, no acute distress.  Face symmetric, gaze is conjugate, speech is clear and fluent, facial sensation is intact, no dysmetria with finger-to-nose, no pronator drift, tongue midline    [place]:  ambulates with steady gait.  Balanced tandem.     Neurological:    Strength (L/R)  5/5 Deltoid  5/5 Bicep  5/5 Tricep  5/5 Handgrip    5/5 Hip Flexion  5/5 Knee Extension  5/5 Ankle Dorsiflexion  5/5 Plantar Flexion    Reflexes (L/R)  2+/2+ Bicep  2+/2+ Brachioradialis  2+/2+ Patellar  2+/2+ Ankle    Sensation: SILT    Incision:  Clean, dry and intact.  Mild erythema around the incision.  No induration or fluctuance.  No drainage noted.  Sutures intact.     IMAGING:  Head CT 3/26/24 - decreased ventricles w/o SDH  FINDINGS:  Right frontal approach ventriculostomy catheter with tip at the  foramen of Monro. There is decreased size of the ventricular system  when compared to CT 3/19/2024. No evidence of subependymal edema. No  acute intracranial hemorrhage, mass effect, or midline shift. No acute  loss of gray-white matter differentiation in the cerebral hemispheres.  White matter loss in the bilateral parietal lobes with distorted  shapes of the ventricles. Clear basal cisterns.  The bony calvaria and the bones of the skull base are otherwise  normal. The visualized portions of the paranasal sinuses and mastoid  air cells are clear. Grossly normal orbits.                                                                    IMPRESSION: Unchanged position of the right frontal approach  ventriculostomy catheter with overall decreased size of the  ventricular system when compared to CT 3/19/2024.    ASSESSMENT:  Donald Jackson is a 29 year old male who is s/p revision of " distal catheter for  shunt by Dr. Martinez on 3/19/24 because the catheter became displaced and he developed a pseudocyst in the subcutaneous tissue.  He had a recent  shunt replacement on 2/2/24 for a shunt system initially placed when he was 4 y/o for HCP.      He is only 1 week out from surgery.  No red flags at this point.    Head CT w/ decreased ventricular size, no SDH    PLAN:    Sutures removed.  Reinforced with steri strips.  Keep steri strips on for an additional 7-10 days.  Tolerated procedure well.  Minimal blood loss.      Continue with same restrictions.    Okay to shower and get incision wet.  No submerging/soaking it until it is fully healed (around 4 weeks post-op).   Cover portions of incision with a breathable dressing that may get irritated by clothing rubbing on it.  Continue to increase time ambulating, avoid bending, twisting, strenuous exercise, or heavy lifting (greater than 10 pounds).   No driving while using narcotics or other sedating medications, such as sleep aids, muscle relaxants, etc.    Follow up in 4 weeks with head CT.  ID appointment 5/1/24.       Patient is in agreement with this plan and states no further questions.                  Again, thank you for allowing me to participate in the care of your patient.      Sincerely,    Vandana Smith PA-C

## 2024-04-01 NOTE — PROGRESS NOTES
Neurosurgery Clinic Note    Chief Complaint: 2 week post-op visit    DATE OF VISIT: 4/1/24      DATE OF PROCEDURE: 3/19/2024     PREOPERATIVE DIAGNOSIS:    1. Shunt malfunction, distal catheter  2. Thoracic subcutaneous pseudomeningocele     POSTOPERATIVE DIAGNOSIS:    1. Shunt malfunction, distal catheter  2. Thoracic subcutaneous pseudomeningocele     PROCEDURE:    1.  Placement of distal catheter into the pleural cavity  2.  Intraoperative use of thoracoscopy  3. Assistance from thoracic surgery team     SURGEON:  Almas Noriega MD - Primary  Michelle, Fabiola Raza MD      RESIDENT/FELLOW:   Rigoberto Rain MD - Fellow - Assisting   Gaurav Parry MD - Assisting  Luis E Baires MD      ANESTHESIA:   General endotracheal     FINDINGS:  Distal shunt catheter in the subcutaneous space. We excised the pseucocyst, the catheter was extended by an L connector and then inserted into the pleural space under thoracoscopic guidance. The catheter was secured to the rib. Flow from the distal catheter was confirmed prior to placement into the pleural space.     ESTIMATED BLOOD LOSS:   10 ml     IMPLANTS:           Implant Name Type Inv. Item Serial No.  Lot No. LRB No. Used Action   SHUNT CONNECTOR RT ANG PLASTIC [id number] - [id number] Shunt SHUNT CONNECTOR RT ANG PLASTIC [id number]   [employer] [id number] Right 1 Implanted   Right Angle Clip, Standard       [employer] [id number] Right 1 Implanted   SHUNT CATH PERITONEAL ANTIBIOTIC-IMPREG URSZULA 122CM 63859 - [id number] Shunt SHUNT CATH PERITONEAL ANTIBIOTIC-IMPREG URSZULA 122CM [id number]   [employer] [id number] Right 1 Implanted      INDICATIONS FOR THE PROCEDURE:  Donald is a 30 y/o male with a history of hydrocephalus who underwent a shunt revision with Dr. Martinez due to perforated appendicitis leading to an abscess close to the distal tip of his previous ventriculoperitoneal shunt. He underwent a ventriculopleural shunt placement with thoracic surgery on  "2/2/2024. He presented to the clinic with a fluctuant collection at the incision on his R chest. CT-CAP demonstrates a 7.3 x 5.3 cm collection in the subcutaneous tissue and was referred to thoracic surgery for evaluation. They determined that his distal catheter was malpositioned, and needed revision to replace the distal catheter into the pleural space. The risks benefits and the complications of the procedure were explained, and the patient was agreeable to a thoracoscopy assisted revision of the distal peritoneal catheter with thoracic surgery. Neurosurgery team agreed to be available to help with the procedure.    HPI: Donadl Jackson is a pleasant 29 year old male who is s/p revision of distal catheter for  shunt by Dr. Martinez on 3/19/24 because the catheter became displaced and he developed a pseudocyst in the subcutaneous tissue.  He had a recent  shunt replacement on 2/2/24 for a shunt system initially placed when he was 4 y/o for HCP.   shunt Codman Certas setting 4.    3/26/24 Visit - patient is 1 week out from surgery.  Patient is recovering after his thoracic revision of the distal catheter.  He does not see any further accumulation of the pseudocyst and denies any issues with the incision.  He is not having any headaches but does note some feeling of lightheadedness especially when he gets up in the morning.  No bowel or bladder issues.  He has not yet followed up with infectious disease as was previously directed on March 5.  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  He does not feel his overall strength is back to normal.      4/1/24 Visit - 2 weeks p/o.  Patient denies headache.  He is overall feels he is getting better.         Physical Exam   /86 (BP Location: Right arm, Patient Position: Sitting)   Pulse 79   Resp 16   Ht 1.727 m (5' 8\")   Wt 124.7 kg (275 lb)   SpO2 97%   BMI 41.81 kg/m      Constitutional: Alert, no acute distress.  Face symmetric, gaze is conjugate, speech is " clear and fluent, facial sensation is intact, no dysmetria with finger-to-nose, no pronator drift, tongue midline    [place]:  ambulates with steady gait.  Balanced tandem.     Neurological:    Strength (L/R)  5/5 Deltoid  5/5 Bicep  5/5 Tricep  5/5 Handgrip    5/5 Hip Flexion  5/5 Knee Extension  5/5 Ankle Dorsiflexion  5/5 Plantar Flexion    Reflexes (L/R)  2+/2+ Bicep  2+/2+ Brachioradialis  2+/2+ Patellar  2+/2+ Ankle    Sensation: SILT    Incision:  Clean, dry and intact.  Mild erythema around the incision.  No induration or fluctuance.  No drainage noted.  Sutures intact.     IMAGING:  Head CT 3/26/24 - decreased ventricles w/o SDH  FINDINGS:  Right frontal approach ventriculostomy catheter with tip at the  foramen of Monro. There is decreased size of the ventricular system  when compared to CT 3/19/2024. No evidence of subependymal edema. No  acute intracranial hemorrhage, mass effect, or midline shift. No acute  loss of gray-white matter differentiation in the cerebral hemispheres.  White matter loss in the bilateral parietal lobes with distorted  shapes of the ventricles. Clear basal cisterns.  The bony calvaria and the bones of the skull base are otherwise  normal. The visualized portions of the paranasal sinuses and mastoid  air cells are clear. Grossly normal orbits.                                                                    IMPRESSION: Unchanged position of the right frontal approach  ventriculostomy catheter with overall decreased size of the  ventricular system when compared to CT 3/19/2024.    ASSESSMENT:  Donald Jackson is a 29 year old male who is s/p revision of distal catheter for  shunt by Dr. Martinez on 3/19/24 because the catheter became displaced and he developed a pseudocyst in the subcutaneous tissue.  He had a recent  shunt replacement on 2/2/24 for a shunt system initially placed when he was 2 y/o for HCP.      He is only 1 week out from surgery.  No red flags at this point.    Head  CT w/ decreased ventricular size, no SDH    PLAN:    Sutures removed.  Reinforced with steri strips.  Keep steri strips on for an additional 7-10 days.  Tolerated procedure well.  Minimal blood loss.      Continue with same restrictions.    Okay to shower and get incision wet.  No submerging/soaking it until it is fully healed (around 4 weeks post-op).   Cover portions of incision with a breathable dressing that may get irritated by clothing rubbing on it.  Continue to increase time ambulating, avoid bending, twisting, strenuous exercise, or heavy lifting (greater than 10 pounds).   No driving while using narcotics or other sedating medications, such as sleep aids, muscle relaxants, etc.    Follow up in 4 weeks with head CT.  ID appointment 5/1/24.       Patient is in agreement with this plan and states no further questions.      Vandana Smith PA-C  Department of Neurosurgery  Office: [phone]

## 2024-04-02 LAB
ACID FAST STAIN (ARUP): NORMAL

## 2024-04-13 ENCOUNTER — HOSPITAL ENCOUNTER (INPATIENT)
Facility: CLINIC | Age: 29
LOS: 6 days | Discharge: HOME | DRG: 907 | End: 2024-04-20
Attending: EMERGENCY MEDICINE | Admitting: STUDENT IN AN ORGANIZED HEALTH CARE EDUCATION/TRAINING PROGRAM
Payer: COMMERCIAL

## 2024-04-13 DIAGNOSIS — R10.84 ABDOMINAL PAIN, GENERALIZED: ICD-10-CM

## 2024-04-13 DIAGNOSIS — T85.730A INFECTION OF VENTRICULOPERITONEAL SHUNT, INITIAL ENCOUNTER (H): ICD-10-CM

## 2024-04-13 DIAGNOSIS — Z90.49 S/P APPENDECTOMY: Primary | ICD-10-CM

## 2024-04-13 LAB
ALBUMIN SERPL BCG-MCNC: 4.2 G/DL (ref 3.5–5.2)
ALP SERPL-CCNC: 78 U/L (ref 40–150)
ALT SERPL W P-5'-P-CCNC: 40 U/L (ref 0–70)
ANION GAP SERPL CALCULATED.3IONS-SCNC: 11 MMOL/L (ref 7–15)
AST SERPL W P-5'-P-CCNC: 23 U/L (ref 0–45)
BASOPHILS # BLD AUTO: 0.1 10E3/UL (ref 0–0.2)
BASOPHILS NFR BLD AUTO: 1 %
BILIRUB SERPL-MCNC: 0.3 MG/DL
BUN SERPL-MCNC: 25.5 MG/DL (ref 6–20)
CALCIUM SERPL-MCNC: 8.9 MG/DL (ref 8.6–10)
CHLORIDE SERPL-SCNC: 103 MMOL/L (ref 98–107)
CREAT SERPL-MCNC: 1.06 MG/DL (ref 0.67–1.17)
DEPRECATED HCO3 PLAS-SCNC: 24 MMOL/L (ref 22–29)
EGFRCR SERPLBLD CKD-EPI 2021: >90 ML/MIN/1.73M2
EOSINOPHIL # BLD AUTO: 0.1 10E3/UL (ref 0–0.7)
EOSINOPHIL NFR BLD AUTO: 1 %
ERYTHROCYTE [DISTWIDTH] IN BLOOD BY AUTOMATED COUNT: 16.9 % (ref 10–15)
GLUCOSE SERPL-MCNC: 95 MG/DL (ref 70–99)
HCT VFR BLD AUTO: 44.1 % (ref 40–53)
HGB BLD-MCNC: 13.8 G/DL (ref 13.3–17.7)
IMM GRANULOCYTES # BLD: 0.1 10E3/UL
IMM GRANULOCYTES NFR BLD: 0 %
LIPASE SERPL-CCNC: 16 U/L (ref 13–60)
LYMPHOCYTES # BLD AUTO: 2.4 10E3/UL (ref 0.8–5.3)
LYMPHOCYTES NFR BLD AUTO: 18 %
MCH RBC QN AUTO: 22.7 PG (ref 26.5–33)
MCHC RBC AUTO-ENTMCNC: 31.3 G/DL (ref 31.5–36.5)
MCV RBC AUTO: 73 FL (ref 78–100)
MONOCYTES # BLD AUTO: 0.7 10E3/UL (ref 0–1.3)
MONOCYTES NFR BLD AUTO: 5 %
NEUTROPHILS # BLD AUTO: 10.6 10E3/UL (ref 1.6–8.3)
NEUTROPHILS NFR BLD AUTO: 75 %
NRBC # BLD AUTO: 0 10E3/UL
NRBC BLD AUTO-RTO: 0 /100
PLATELET # BLD AUTO: 262 10E3/UL (ref 150–450)
POTASSIUM SERPL-SCNC: 3.6 MMOL/L (ref 3.4–5.3)
PROT SERPL-MCNC: 7.4 G/DL (ref 6.4–8.3)
RBC # BLD AUTO: 6.07 10E6/UL (ref 4.4–5.9)
SODIUM SERPL-SCNC: 138 MMOL/L (ref 135–145)
WBC # BLD AUTO: 13.8 10E3/UL (ref 4–11)

## 2024-04-13 PROCEDURE — 83690 ASSAY OF LIPASE: CPT | Performed by: EMERGENCY MEDICINE

## 2024-04-13 PROCEDURE — 83550 IRON BINDING TEST: CPT

## 2024-04-13 PROCEDURE — 36415 COLL VENOUS BLD VENIPUNCTURE: CPT | Performed by: EMERGENCY MEDICINE

## 2024-04-13 PROCEDURE — 250N000011 HC RX IP 250 OP 636: Performed by: EMERGENCY MEDICINE

## 2024-04-13 PROCEDURE — 99285 EMERGENCY DEPT VISIT HI MDM: CPT | Mod: 25 | Performed by: EMERGENCY MEDICINE

## 2024-04-13 PROCEDURE — 96361 HYDRATE IV INFUSION ADD-ON: CPT | Performed by: EMERGENCY MEDICINE

## 2024-04-13 PROCEDURE — 85025 COMPLETE CBC W/AUTO DIFF WBC: CPT | Performed by: EMERGENCY MEDICINE

## 2024-04-13 PROCEDURE — 99285 EMERGENCY DEPT VISIT HI MDM: CPT | Performed by: EMERGENCY MEDICINE

## 2024-04-13 PROCEDURE — 82728 ASSAY OF FERRITIN: CPT

## 2024-04-13 PROCEDURE — 96374 THER/PROPH/DIAG INJ IV PUSH: CPT | Performed by: EMERGENCY MEDICINE

## 2024-04-13 PROCEDURE — 80053 COMPREHEN METABOLIC PANEL: CPT | Performed by: EMERGENCY MEDICINE

## 2024-04-13 PROCEDURE — 258N000003 HC RX IP 258 OP 636: Performed by: EMERGENCY MEDICINE

## 2024-04-13 RX ORDER — ONDANSETRON 2 MG/ML
4 INJECTION INTRAMUSCULAR; INTRAVENOUS EVERY 30 MIN PRN
Status: DISCONTINUED | OUTPATIENT
Start: 2024-04-13 | End: 2024-04-20 | Stop reason: HOSPADM

## 2024-04-13 RX ORDER — HYDROMORPHONE HYDROCHLORIDE 1 MG/ML
0.5 INJECTION, SOLUTION INTRAMUSCULAR; INTRAVENOUS; SUBCUTANEOUS EVERY 30 MIN PRN
Status: COMPLETED | OUTPATIENT
Start: 2024-04-13 | End: 2024-04-14

## 2024-04-13 RX ADMIN — SODIUM CHLORIDE 1000 ML: 9 INJECTION, SOLUTION INTRAVENOUS at 23:15

## 2024-04-13 RX ADMIN — HYDROMORPHONE HYDROCHLORIDE 0.5 MG: 1 INJECTION, SOLUTION INTRAMUSCULAR; INTRAVENOUS; SUBCUTANEOUS at 23:15

## 2024-04-13 ASSESSMENT — ACTIVITIES OF DAILY LIVING (ADL): ADLS_ACUITY_SCORE: 37

## 2024-04-13 ASSESSMENT — COLUMBIA-SUICIDE SEVERITY RATING SCALE - C-SSRS
1. IN THE PAST MONTH, HAVE YOU WISHED YOU WERE DEAD OR WISHED YOU COULD GO TO SLEEP AND NOT WAKE UP?: NO
2. HAVE YOU ACTUALLY HAD ANY THOUGHTS OF KILLING YOURSELF IN THE PAST MONTH?: NO
6. HAVE YOU EVER DONE ANYTHING, STARTED TO DO ANYTHING, OR PREPARED TO DO ANYTHING TO END YOUR LIFE?: NO

## 2024-04-14 ENCOUNTER — ANESTHESIA EVENT (OUTPATIENT)
Dept: SURGERY | Facility: CLINIC | Age: 29
DRG: 907 | End: 2024-04-14
Payer: COMMERCIAL

## 2024-04-14 ENCOUNTER — APPOINTMENT (OUTPATIENT)
Dept: CT IMAGING | Facility: CLINIC | Age: 29
DRG: 907 | End: 2024-04-14
Attending: EMERGENCY MEDICINE
Payer: COMMERCIAL

## 2024-04-14 PROBLEM — T85.730A: Status: ACTIVE | Noted: 2024-04-14

## 2024-04-14 PROBLEM — R10.84 ABDOMINAL PAIN, GENERALIZED: Status: ACTIVE | Noted: 2024-04-14

## 2024-04-14 LAB
ALBUMIN SERPL BCG-MCNC: 3.9 G/DL (ref 3.5–5.2)
ALP SERPL-CCNC: 71 U/L (ref 40–150)
ALT SERPL W P-5'-P-CCNC: 37 U/L (ref 0–70)
ANION GAP SERPL CALCULATED.3IONS-SCNC: 10 MMOL/L (ref 7–15)
APTT PPP: 31 SECONDS (ref 22–38)
AST SERPL W P-5'-P-CCNC: 18 U/L (ref 0–45)
BASOPHILS # BLD AUTO: 0.1 10E3/UL (ref 0–0.2)
BASOPHILS NFR BLD AUTO: 0 %
BILIRUB SERPL-MCNC: 0.5 MG/DL
BUN SERPL-MCNC: 20.8 MG/DL (ref 6–20)
CALCIUM SERPL-MCNC: 8.5 MG/DL (ref 8.6–10)
CHLORIDE SERPL-SCNC: 106 MMOL/L (ref 98–107)
CREAT SERPL-MCNC: 0.88 MG/DL (ref 0.67–1.17)
DEPRECATED HCO3 PLAS-SCNC: 21 MMOL/L (ref 22–29)
EGFRCR SERPLBLD CKD-EPI 2021: >90 ML/MIN/1.73M2
EOSINOPHIL # BLD AUTO: 0 10E3/UL (ref 0–0.7)
EOSINOPHIL NFR BLD AUTO: 0 %
ERYTHROCYTE [DISTWIDTH] IN BLOOD BY AUTOMATED COUNT: 17.8 % (ref 10–15)
FERRITIN SERPL-MCNC: 238 NG/ML (ref 31–409)
GLUCOSE SERPL-MCNC: 123 MG/DL (ref 70–99)
HCT VFR BLD AUTO: 44.3 % (ref 40–53)
HGB BLD-MCNC: 13.8 G/DL (ref 13.3–17.7)
IMM GRANULOCYTES # BLD: 0.1 10E3/UL
IMM GRANULOCYTES NFR BLD: 1 %
INR PPP: 1.17 (ref 0.85–1.15)
IRON BINDING CAPACITY (ROCHE): 227 UG/DL (ref 240–430)
IRON SATN MFR SERPL: 8 % (ref 15–46)
IRON SERPL-MCNC: 19 UG/DL (ref 61–157)
LYMPHOCYTES # BLD AUTO: 2.6 10E3/UL (ref 0.8–5.3)
LYMPHOCYTES NFR BLD AUTO: 14 %
MCH RBC QN AUTO: 22.9 PG (ref 26.5–33)
MCHC RBC AUTO-ENTMCNC: 31.2 G/DL (ref 31.5–36.5)
MCV RBC AUTO: 74 FL (ref 78–100)
MONOCYTES # BLD AUTO: 1 10E3/UL (ref 0–1.3)
MONOCYTES NFR BLD AUTO: 5 %
MRSA DNA SPEC QL NAA+PROBE: POSITIVE
NEUTROPHILS # BLD AUTO: 14.9 10E3/UL (ref 1.6–8.3)
NEUTROPHILS NFR BLD AUTO: 80 %
NRBC # BLD AUTO: 0 10E3/UL
NRBC BLD AUTO-RTO: 0 /100
PLATELET # BLD AUTO: 259 10E3/UL (ref 150–450)
POTASSIUM SERPL-SCNC: 4.1 MMOL/L (ref 3.4–5.3)
PROT SERPL-MCNC: 6.9 G/DL (ref 6.4–8.3)
RBC # BLD AUTO: 6.02 10E6/UL (ref 4.4–5.9)
SA TARGET DNA: POSITIVE
SODIUM SERPL-SCNC: 137 MMOL/L (ref 135–145)
WBC # BLD AUTO: 18.7 10E3/UL (ref 4–11)

## 2024-04-14 PROCEDURE — 96376 TX/PRO/DX INJ SAME DRUG ADON: CPT | Performed by: EMERGENCY MEDICINE

## 2024-04-14 PROCEDURE — 36415 COLL VENOUS BLD VENIPUNCTURE: CPT

## 2024-04-14 PROCEDURE — 87040 BLOOD CULTURE FOR BACTERIA: CPT

## 2024-04-14 PROCEDURE — 99255 IP/OBS CONSLTJ NEW/EST HI 80: CPT | Mod: FS | Performed by: STUDENT IN AN ORGANIZED HEALTH CARE EDUCATION/TRAINING PROGRAM

## 2024-04-14 PROCEDURE — 258N000003 HC RX IP 258 OP 636: Performed by: EMERGENCY MEDICINE

## 2024-04-14 PROCEDURE — 250N000011 HC RX IP 250 OP 636: Mod: JZ | Performed by: EMERGENCY MEDICINE

## 2024-04-14 PROCEDURE — 99207 PR SERVICE NOT STAFFED W/SUPERV PROV: CPT | Performed by: NEUROLOGICAL SURGERY

## 2024-04-14 PROCEDURE — 85610 PROTHROMBIN TIME: CPT

## 2024-04-14 PROCEDURE — 120N000002 HC R&B MED SURG/OB UMMC

## 2024-04-14 PROCEDURE — 250N000013 HC RX MED GY IP 250 OP 250 PS 637

## 2024-04-14 PROCEDURE — 87641 MR-STAPH DNA AMP PROBE: CPT | Performed by: STUDENT IN AN ORGANIZED HEALTH CARE EDUCATION/TRAINING PROGRAM

## 2024-04-14 PROCEDURE — 74177 CT ABD & PELVIS W/CONTRAST: CPT | Mod: 26 | Performed by: RADIOLOGY

## 2024-04-14 PROCEDURE — 85730 THROMBOPLASTIN TIME PARTIAL: CPT

## 2024-04-14 PROCEDURE — 80053 COMPREHEN METABOLIC PANEL: CPT

## 2024-04-14 PROCEDURE — 250N000011 HC RX IP 250 OP 636: Performed by: EMERGENCY MEDICINE

## 2024-04-14 PROCEDURE — 250N000013 HC RX MED GY IP 250 OP 250 PS 637: Performed by: EMERGENCY MEDICINE

## 2024-04-14 PROCEDURE — 250N000011 HC RX IP 250 OP 636

## 2024-04-14 PROCEDURE — 96361 HYDRATE IV INFUSION ADD-ON: CPT | Performed by: EMERGENCY MEDICINE

## 2024-04-14 PROCEDURE — 250N000011 HC RX IP 250 OP 636: Performed by: STUDENT IN AN ORGANIZED HEALTH CARE EDUCATION/TRAINING PROGRAM

## 2024-04-14 PROCEDURE — 87186 SC STD MICRODIL/AGAR DIL: CPT | Performed by: STUDENT IN AN ORGANIZED HEALTH CARE EDUCATION/TRAINING PROGRAM

## 2024-04-14 PROCEDURE — 85004 AUTOMATED DIFF WBC COUNT: CPT

## 2024-04-14 PROCEDURE — 99233 SBSQ HOSP IP/OBS HIGH 50: CPT | Mod: GC | Performed by: STUDENT IN AN ORGANIZED HEALTH CARE EDUCATION/TRAINING PROGRAM

## 2024-04-14 PROCEDURE — 74177 CT ABD & PELVIS W/CONTRAST: CPT

## 2024-04-14 RX ORDER — ACETAMINOPHEN 325 MG/1
650 TABLET ORAL EVERY 6 HOURS PRN
Status: DISCONTINUED | OUTPATIENT
Start: 2024-04-14 | End: 2024-04-20 | Stop reason: HOSPADM

## 2024-04-14 RX ORDER — DIPHENHYDRAMINE HCL 25 MG
25 CAPSULE ORAL EVERY 6 HOURS PRN
Status: DISCONTINUED | OUTPATIENT
Start: 2024-04-14 | End: 2024-04-20 | Stop reason: HOSPADM

## 2024-04-14 RX ORDER — CEFAZOLIN SODIUM IN 0.9 % NACL 3 G/100 ML
3 INTRAVENOUS SOLUTION, PIGGYBACK (ML) INTRAVENOUS SEE ADMIN INSTRUCTIONS
OUTPATIENT
Start: 2024-04-14

## 2024-04-14 RX ORDER — NALOXONE HYDROCHLORIDE 0.4 MG/ML
0.1 INJECTION, SOLUTION INTRAMUSCULAR; INTRAVENOUS; SUBCUTANEOUS
Status: CANCELLED | OUTPATIENT
Start: 2024-04-14

## 2024-04-14 RX ORDER — HALOPERIDOL 5 MG/ML
1 INJECTION INTRAMUSCULAR
Status: CANCELLED | OUTPATIENT
Start: 2024-04-14

## 2024-04-14 RX ORDER — OXYCODONE HYDROCHLORIDE 5 MG/1
5 TABLET ORAL EVERY 4 HOURS PRN
Status: DISCONTINUED | OUTPATIENT
Start: 2024-04-14 | End: 2024-04-17

## 2024-04-14 RX ORDER — ONDANSETRON 2 MG/ML
4 INJECTION INTRAMUSCULAR; INTRAVENOUS EVERY 6 HOURS PRN
Status: DISCONTINUED | OUTPATIENT
Start: 2024-04-14 | End: 2024-04-20 | Stop reason: HOSPADM

## 2024-04-14 RX ORDER — PROCHLORPERAZINE MALEATE 5 MG
10 TABLET ORAL EVERY 6 HOURS PRN
Status: DISCONTINUED | OUTPATIENT
Start: 2024-04-14 | End: 2024-04-20 | Stop reason: HOSPADM

## 2024-04-14 RX ORDER — ENOXAPARIN SODIUM 100 MG/ML
40 INJECTION SUBCUTANEOUS
OUTPATIENT
Start: 2024-04-14

## 2024-04-14 RX ORDER — PROCHLORPERAZINE 25 MG
25 SUPPOSITORY, RECTAL RECTAL EVERY 12 HOURS PRN
Status: DISCONTINUED | OUTPATIENT
Start: 2024-04-14 | End: 2024-04-20 | Stop reason: HOSPADM

## 2024-04-14 RX ORDER — OXYCODONE HYDROCHLORIDE 5 MG/1
5 TABLET ORAL ONCE
Status: COMPLETED | OUTPATIENT
Start: 2024-04-14 | End: 2024-04-14

## 2024-04-14 RX ORDER — CEFAZOLIN SODIUM IN 0.9 % NACL 3 G/100 ML
3 INTRAVENOUS SOLUTION, PIGGYBACK (ML) INTRAVENOUS
OUTPATIENT
Start: 2024-04-14

## 2024-04-14 RX ORDER — CEFEPIME HYDROCHLORIDE 2 G/1
2 INJECTION, POWDER, FOR SOLUTION INTRAVENOUS EVERY 8 HOURS
Status: DISCONTINUED | OUTPATIENT
Start: 2024-04-14 | End: 2024-04-20 | Stop reason: HOSPADM

## 2024-04-14 RX ORDER — OXYCODONE HYDROCHLORIDE 5 MG/1
5 TABLET ORAL
Status: CANCELLED | OUTPATIENT
Start: 2024-04-14

## 2024-04-14 RX ORDER — OXYCODONE HYDROCHLORIDE 5 MG/1
5 TABLET ORAL EVERY 4 HOURS PRN
Status: ON HOLD | COMMUNITY
End: 2024-04-19

## 2024-04-14 RX ORDER — METRONIDAZOLE 500 MG/100ML
500 INJECTION, SOLUTION INTRAVENOUS ONCE
Status: DISCONTINUED | OUTPATIENT
Start: 2024-04-14 | End: 2024-04-14

## 2024-04-14 RX ORDER — CEFTRIAXONE 2 G/1
2 INJECTION, POWDER, FOR SOLUTION INTRAMUSCULAR; INTRAVENOUS ONCE
Qty: 20 ML | Refills: 0 | Status: COMPLETED | OUTPATIENT
Start: 2024-04-14 | End: 2024-04-14

## 2024-04-14 RX ORDER — OXYCODONE HYDROCHLORIDE 10 MG/1
10 TABLET ORAL
Status: CANCELLED | OUTPATIENT
Start: 2024-04-14

## 2024-04-14 RX ORDER — IOPAMIDOL 755 MG/ML
136 INJECTION, SOLUTION INTRAVASCULAR ONCE
Status: COMPLETED | OUTPATIENT
Start: 2024-04-14 | End: 2024-04-14

## 2024-04-14 RX ORDER — METRONIDAZOLE 500 MG/100ML
500 INJECTION, SOLUTION INTRAVENOUS EVERY 8 HOURS
Status: DISCONTINUED | OUTPATIENT
Start: 2024-04-14 | End: 2024-04-20 | Stop reason: HOSPADM

## 2024-04-14 RX ORDER — LIDOCAINE 40 MG/G
CREAM TOPICAL
Status: DISCONTINUED | OUTPATIENT
Start: 2024-04-14 | End: 2024-04-20 | Stop reason: HOSPADM

## 2024-04-14 RX ORDER — ONDANSETRON 4 MG/1
4 TABLET, ORALLY DISINTEGRATING ORAL EVERY 6 HOURS PRN
Status: DISCONTINUED | OUTPATIENT
Start: 2024-04-14 | End: 2024-04-20 | Stop reason: HOSPADM

## 2024-04-14 RX ORDER — ONDANSETRON 4 MG/1
4 TABLET, ORALLY DISINTEGRATING ORAL EVERY 30 MIN PRN
Status: CANCELLED | OUTPATIENT
Start: 2024-04-14

## 2024-04-14 RX ORDER — DIPHENHYDRAMINE HYDROCHLORIDE 50 MG/ML
25 INJECTION INTRAMUSCULAR; INTRAVENOUS EVERY 6 HOURS PRN
Status: DISCONTINUED | OUTPATIENT
Start: 2024-04-14 | End: 2024-04-20 | Stop reason: HOSPADM

## 2024-04-14 RX ORDER — AMOXICILLIN 250 MG
2 CAPSULE ORAL 2 TIMES DAILY PRN
Status: DISCONTINUED | OUTPATIENT
Start: 2024-04-14 | End: 2024-04-20 | Stop reason: HOSPADM

## 2024-04-14 RX ORDER — ACETAMINOPHEN 325 MG/1
975 TABLET ORAL ONCE
Status: CANCELLED | OUTPATIENT
Start: 2024-04-14 | End: 2024-04-14

## 2024-04-14 RX ORDER — ONDANSETRON 2 MG/ML
4 INJECTION INTRAMUSCULAR; INTRAVENOUS EVERY 30 MIN PRN
Status: CANCELLED | OUTPATIENT
Start: 2024-04-14

## 2024-04-14 RX ORDER — FENTANYL CITRATE 50 UG/ML
25 INJECTION, SOLUTION INTRAMUSCULAR; INTRAVENOUS
Status: CANCELLED | OUTPATIENT
Start: 2024-04-14

## 2024-04-14 RX ORDER — AMOXICILLIN 250 MG
1 CAPSULE ORAL 2 TIMES DAILY PRN
Status: DISCONTINUED | OUTPATIENT
Start: 2024-04-14 | End: 2024-04-20 | Stop reason: HOSPADM

## 2024-04-14 RX ORDER — HYDROMORPHONE HYDROCHLORIDE 1 MG/ML
.3-.5 INJECTION, SOLUTION INTRAMUSCULAR; INTRAVENOUS; SUBCUTANEOUS EVERY 4 HOURS PRN
Status: COMPLETED | OUTPATIENT
Start: 2024-04-14 | End: 2024-04-14

## 2024-04-14 RX ORDER — CALCIUM CARBONATE 500 MG/1
1000 TABLET, CHEWABLE ORAL 4 TIMES DAILY PRN
Status: DISCONTINUED | OUTPATIENT
Start: 2024-04-14 | End: 2024-04-20 | Stop reason: HOSPADM

## 2024-04-14 RX ADMIN — HYDROMORPHONE HYDROCHLORIDE 0.5 MG: 1 INJECTION, SOLUTION INTRAMUSCULAR; INTRAVENOUS; SUBCUTANEOUS at 03:43

## 2024-04-14 RX ADMIN — OXYCODONE HYDROCHLORIDE 5 MG: 5 TABLET ORAL at 15:17

## 2024-04-14 RX ADMIN — OXYCODONE HYDROCHLORIDE 5 MG: 5 TABLET ORAL at 23:02

## 2024-04-14 RX ADMIN — ACETAMINOPHEN 650 MG: 325 TABLET, FILM COATED ORAL at 23:02

## 2024-04-14 RX ADMIN — METRONIDAZOLE 500 MG: 500 INJECTION, SOLUTION INTRAVENOUS at 10:52

## 2024-04-14 RX ADMIN — OXYCODONE HYDROCHLORIDE 5 MG: 5 TABLET ORAL at 03:43

## 2024-04-14 RX ADMIN — HYDROMORPHONE HYDROCHLORIDE 0.5 MG: 1 INJECTION, SOLUTION INTRAMUSCULAR; INTRAVENOUS; SUBCUTANEOUS at 12:13

## 2024-04-14 RX ADMIN — CEFTRIAXONE SODIUM 2 G: 2 INJECTION, POWDER, FOR SOLUTION INTRAMUSCULAR; INTRAVENOUS at 03:03

## 2024-04-14 RX ADMIN — OXYCODONE HYDROCHLORIDE 5 MG: 5 TABLET ORAL at 19:47

## 2024-04-14 RX ADMIN — ONDANSETRON 4 MG: 4 TABLET, ORALLY DISINTEGRATING ORAL at 05:29

## 2024-04-14 RX ADMIN — HYDROMORPHONE HYDROCHLORIDE 0.5 MG: 1 INJECTION, SOLUTION INTRAMUSCULAR; INTRAVENOUS; SUBCUTANEOUS at 00:31

## 2024-04-14 RX ADMIN — ONDANSETRON 4 MG: 4 TABLET, ORALLY DISINTEGRATING ORAL at 20:47

## 2024-04-14 RX ADMIN — METRONIDAZOLE 500 MG: 500 INJECTION, SOLUTION INTRAVENOUS at 03:02

## 2024-04-14 RX ADMIN — VANCOMYCIN HYDROCHLORIDE 2000 MG: 1 INJECTION, POWDER, LYOPHILIZED, FOR SOLUTION INTRAVENOUS at 04:10

## 2024-04-14 RX ADMIN — IOPAMIDOL 135 ML: 755 INJECTION, SOLUTION INTRAVENOUS at 00:59

## 2024-04-14 RX ADMIN — ACETAMINOPHEN 650 MG: 325 TABLET, FILM COATED ORAL at 15:17

## 2024-04-14 RX ADMIN — VANCOMYCIN HYDROCHLORIDE 1250 MG: 10 INJECTION, POWDER, LYOPHILIZED, FOR SOLUTION INTRAVENOUS at 16:00

## 2024-04-14 RX ADMIN — METRONIDAZOLE 500 MG: 500 INJECTION, SOLUTION INTRAVENOUS at 19:48

## 2024-04-14 RX ADMIN — CEFEPIME HYDROCHLORIDE 2 G: 2 INJECTION, POWDER, FOR SOLUTION INTRAVENOUS at 14:04

## 2024-04-14 RX ADMIN — OXYCODONE HYDROCHLORIDE 5 MG: 5 TABLET ORAL at 06:39

## 2024-04-14 RX ADMIN — CEFEPIME HYDROCHLORIDE 2 G: 2 INJECTION, POWDER, FOR SOLUTION INTRAVENOUS at 22:23

## 2024-04-14 RX ADMIN — OXYCODONE HYDROCHLORIDE 5 MG: 5 TABLET ORAL at 11:06

## 2024-04-14 ASSESSMENT — ACTIVITIES OF DAILY LIVING (ADL)
ADLS_ACUITY_SCORE: 37
ADLS_ACUITY_SCORE: 40
ADLS_ACUITY_SCORE: 37
ADLS_ACUITY_SCORE: 40
ADLS_ACUITY_SCORE: 37
ADLS_ACUITY_SCORE: 40
ADLS_ACUITY_SCORE: 40
ADLS_ACUITY_SCORE: 37
ADLS_ACUITY_SCORE: 40
ADLS_ACUITY_SCORE: 37
ADLS_ACUITY_SCORE: 37
ADLS_ACUITY_SCORE: 40

## 2024-04-14 ASSESSMENT — ENCOUNTER SYMPTOMS: SEIZURES: 0

## 2024-04-14 NOTE — ED PROVIDER NOTES
Sign Out Provider: Dr. Bowen    Sign Out Plan: 29-year-old male history of hydrocephalus with  shunt in place who presents for evaluation abdominal pain.  Patient pending CT scan of the abdomen pelvis at the time of signout.    Reassessment: I personally reviewed and interpreted CT scan abdomen pelvis which demonstrates right lower quadrant inflammatory change with mesenteric edema, peritoneal enhancement, bowel wall thickening in the region of  shunt catheter concerning for infection along the catheter segment.    I discussed the case with neurosurgery who reports they are well familiar with the patient, recently had shunt revision and current shunt goes from brain to lungs.  The shunt catheter in the abdomen is currently not connected.  Plan to start patient on antibiotics and admit to medicine for further evaluation, infectious disease consultation, and possible general surgery evaluation if ID recommends source control/removal of catheter.  Patient was treated with ceftriaxone/Flagyl/vancomycin in the emergency department.  I discussed patient management with internal medicine team who agrees with admission. Patient understands and agrees with the plan     Disposition: Admission       Denae Monroe MD  04/17/24 1594

## 2024-04-14 NOTE — CONSULTS
"    [facility]    Consult Note - General Surgery Service  Date of Admission:  4/13/2024  Consult Requested by: ED/Medicine  Reason for Consult: Intraabdominal infection, infected  shunt.     Assessment & Plan: Surgery   Donald Jackson is a 29 year old male w/ PMH including hydrocephalus s/p  shunt, complicated appendicitis w/ recurrent stump appendicitis in 12/23 and periappendiceal abscess w/ drain placement, c/b malfunctioning  shunt 1/24 s/p placement of ventriculopleural shunt here at [facility] w/ NSGY and Thoracic surgery now presenting acute on chronic RLQ abdominal pain similar to all previous episodes of appendicitis. Most likely associated with retained  shunt tubing seeding the abdomen given previous infection.     Plan on diagnostic laparoscopy w/ removal of intraperitoneal DAR drain given infectious risk. NPO at midnight, IVF, continue Abx per primary team.     - Surgery in AM - diagnostic lap,  shunt removal  - NPO, IVF  - Agree with antibiotics.   - Board and consent to be done.   - Rest of cares per medicine.   - surgery to follow.        Drains: None     Code Status: Full Code      Clinically Significant Risk Factors Present on Admission               # Coagulation Defect: INR = 1.17 (Ref range: 0.85 - 1.15) and/or PTT = 31 Seconds (Ref range: 22 - 38 Seconds), will monitor for bleeding         # Severe Obesity: Estimated body mass index is 41.81 kg/m  as calculated from the following:    Height as of this encounter: 1.727 m (5' 8\").    Weight as of this encounter: 124.7 kg (275 lb).            The patient's care was discussed with the Chief Resident/Fellow.    William Gaitan MD  [facility]  Non-urgent messages: Securely message with [employer] (more info)  Text page via [employer] Paging/Directory     ______________________________________________________________________    Chief Complaint   Infected  " shunt    History is obtained from the patient, electronic health record, and emergency department physician    History of Present Illness   Donald Jackson is a 29 year old male w/ PMH including hydrocephalus s/p  shunt, complicated appendicitis w/ recurrent stump appendicitis in 12/23 and periappendiceal abscess w/ drain placement, c/b malfunctioning  shunt 1/24 s/p placement of ventriculopleural shunt here at [facility] w/ NSGY and Thoracic surgery now presenting acute on chronic RLQ abdominal pain similar to all previous episodes of appendicitis.     ED eval notable for leukocytosis of ~18 and a CT A/P notable for inflammation and fluid surrounding the intraperitoneal  shunt component in the RLQ.     At present has some chills, no fevers, no N/V/D, no changes in bowel or bladder habits. No lightheadedness or dizziness. No other acute questions or concerns at this time.       Past Medical History    Past Medical History:   Diagnosis Date    Hydrocephalus (H)     Lattice degeneration     Morbid obesity (H)     Other forms of retinal detachment(361.89)        Past Surgical History   Past Surgical History:   Procedure Laterality Date    LAPAROSCOPIC ASSISTED IMPLANT SHUNT VENTRICULOPERITONEAL Right 2/5/2024    Procedure: Implant Shunt Ventriculopleural;  Surgeon: Almas Noriega MD;  Location: [facility] OR    OPTICAL TRACKING SYSTEM IMPLANT SHUNT VENTRICULOPERITONEAL Right 2/5/2024    Procedure: RIGHT VENTRICULOPLEURAL Insertion;  Surgeon: Fabiola Martinez MD;  Location: [facility] OR    RETINAL REATTACHMENT  10/2010    left eye    RETINOPEXY LASER PROPHYLAXIS BREAK(S) OD (RIGHT EYE)  10/2011    shunt in head      a couple as a child    strabismus surery      age 5    THORACOSCOPY Right 3/19/2024    Procedure: RIGHT THORACOSCOPIC REPOSITIONING OF VENTRICULO-PLEURAL SHUNT CATHETER;  Surgeon: Almas Noriega MD;  Location: [facility] OR    TONSILLECTOMY      wisdom teeth         Prior to Admission Medications   I have  reviewed this patient's current medications       Review of Systems    The 10 point Review of Systems is negative other than noted in the HPI or here.     Social History   I have reviewed this patient's social history and updated it with pertinent information if needed.  Social History     Tobacco Use    Smoking status: Never    Smokeless tobacco: Never   Vaping Use    Vaping status: Never Used   Substance Use Topics    Alcohol use: No    Drug use: No         Family History   I have reviewed this patient's family history and updated it with pertinent information if needed.  Family History   Problem Relation Age of Onset    Diabetes Father         Type 2     Diabetes Paternal Grandfather         Type 2     Retinal detachment No family hx of     Amblyopia No family hx of     Glaucoma No family hx of     Macular Degeneration No family hx of     Anesthesia Reaction No family hx of     Deep Vein Thrombosis (DVT) No family hx of          Allergies   Allergies   Allergen Reactions    Penicillins Hives and Unknown        Physical Exam   Vital Signs: Temp: 98.2  F (36.8  C) Temp src: Oral BP: 130/76 Pulse: 115   Resp: 16 SpO2: 96 % O2 Device: None (Room air)    Weight: 275 lbs 0 ozNo intake or output data in the 24 hours ending 04/14/24 1757    General: Alert, resting comfortably in NAD/NTA. Cooperative  HEENT: NC/AT, sclerae anicteric. Oral mucosa moist   Neck: Neck supple, Trachea midline  Pulm: NLB on RA, no tachypnea/dyspnea, no wheezing  CV: RRR by RP, non-cyanotic, no significant LE edema. RP 2+ b/l  GI/ABD: Soft, mildly distended, mild-moderately tender to palpation, voluntary guarding with rebound tenderness, focal peritonitis in the RLQ.  Extrem: MA4E, no obvious deformities  Neuro/Psych: A/Ox3, no gross neurologic deficits. Appropriate mood/affect  Skin: Warm and well perfused              Data     I have personally reviewed the following data over the past 24 hrs:    18.7 (H)  \   13.8   / 259     137 106 20.8 (H)  /  123 (H)   4.1 21 (L) 0.88 \     ALT: 37 AST: 18 AP: 71 TBILI: 0.5   ALB: 3.9 TOT PROTEIN: 6.9 LIPASE: 16     INR:  1.17 (H) PTT:  31   D-dimer:  N/A Fibrinogen:  N/A     Ferritin:  238 % Retic:  N/A LDH:  N/A       Imaging results reviewed over the past 24 hrs:   Recent Results (from the past 24 hour(s))   CT Abdomen Pelvis w Contrast    Narrative    EXAM: CT ABDOMEN PELVIS W CONTRAST  LOCATION: [facility]  DATE: 4/14/2024    INDICATION: sbo  COMPARISON: CT from 03/05/2024.  TECHNIQUE: CT scan of the abdomen and pelvis was performed following injection of IV contrast. Multiplanar reformats were obtained. Dose reduction techniques were used.  CONTRAST: iopamidol (ISOVUE 370) solution 136 mL    FINDINGS:   LOWER CHEST: Very small right pleural effusion. Anterior pleural shunt catheter in the right hemithorax.    HEPATOBILIARY: Normal.    PANCREAS: Normal.    SPLEEN: Normal.    ADRENAL GLANDS: Normal.    KIDNEYS/BLADDER: Normal.    BOWEL: Again noted is the presence of 2 intraperitoneal shunt catheters. The smaller, more inferiorly located catheter is noted in the anterior right lower quadrant where there is mesenteric edema, peritoneal enhancement, and bowel wall thickening. The   appendix is absent. There is no bowel obstruction or free air.    LYMPH NODES: Normal.    VASCULATURE: Normal.    PELVIC ORGANS: Normal.    MUSCULOSKELETAL: Normal.      Impression    IMPRESSION:   1.  Redemonstrated right lower quadrant inflammatory change with mesenteric edema, peritoneal enhancement, and bowel wall thickening in the region of a ventriculoperitoneal shunt catheter, highly concerning for infection along the catheter segment in   this region. No drainable abscess.

## 2024-04-14 NOTE — PLAN OF CARE
Goal Outcome Evaluation:      Plan of Care Reviewed With: patient    Overall Patient Progress: no changeOverall Patient Progress: no change    Outcome Evaluation: px. alert and oriented x4, able to make needs known. No c/o pain nor nausea this morning, just resting in bed. Said he is having intolerable pain when he moves. Wanted benadryl prior to vanco administration, provider made aware. Waiting for infectious consult. On NPO at the moment. Wanted to have water, provider made aware. Ambulated to bathroom SBA, using walker. Needs help unplugging cords. C/o pain and PRN meds given, oxy + tylenol was effective for him, slept afterwards. Allowed to sleep more while feeling comfortable. Continue with POC.

## 2024-04-14 NOTE — PHARMACY-VANCOMYCIN DOSING SERVICE
Pharmacy Vancomycin Initial Note  Date of Service 2024  Patient's  1995  29 year old, male    Indication: Intra-abdominal infection    Current estimated CrCl = Estimated Creatinine Clearance: 131 mL/min (based on SCr of 1.06 mg/dL).    Creatinine for last 3 days  2024: 10:56 PM Creatinine 1.06 mg/dL    Recent Vancomycin Level(s) for last 3 days  No results found for requested labs within last 3 days.      Vancomycin IV Administrations (past 72 hours)        No vancomycin orders with administrations in past 72 hours.                    Nephrotoxins and other renal medications (From now, onward)      Start     Dose/Rate Route Frequency Ordered Stop    24 1530  vancomycin (VANCOCIN) 1,250 mg in 0.9% NaCl 250 mL intermittent infusion         1,250 mg  over 90 Minutes Intravenous EVERY 12 HOURS 24 0235      24 0330  vancomycin (VANCOCIN) 2,000 mg in sodium chloride 0.9 % 500 mL intermittent infusion         2,000 mg  over 120 Minutes Intravenous ONCE 24 0235              Contrast Orders - past 72 hours (72h ago, onward)      Start     Dose/Rate Route Frequency Stop    24 0005  iopamidol (ISOVUE-370) solution 136 mL         136 mL Intravenous ONCE 24 0059            [email or web address] Prediction of Planned Initial Vancomycin Regimen  Loading dose: 2000 mg at 00:00 2024.  Regimen: 1250 mg IV every 12 hours.  Start time: 12:00 on 2024  Exposure target: AUC24 (range)400-600 mg/L.hr   AUC24,ss: 512 mg/L.hr  Probability of AUC24 > 400: 68 %  Ctrough,ss: 13.8 mg/L  Probability of Ctrough,ss > 20: 33 %  Probability of nephrotoxicity (Lodise BRADLEY ): 9 %          Plan:  Load vancomycin 2000mg IV once now  Followed by vancomycin  1250 mg IV q12h.   Vancomycin monitoring method: AUC  Vancomycin therapeutic monitoring goal: 400-600 mg*h/L  Pharmacy will check vancomycin levels as appropriate in 1-3 Days.    Serum creatinine levels will be ordered daily for the first week  of therapy and at least twice weekly for subsequent weeks.      Derick Whitehead, [facility]  [id number]

## 2024-04-14 NOTE — ED TRIAGE NOTES
Pt presents with a 6 hour history of severe, right sided abdominal pain.  Pt had normal bowel movement earlier this afternoon but states he has not passed gas since 4pm.       Triage Assessment (Adult)       Row Name 04/13/24 5028          Triage Assessment    Airway WDL WDL        Respiratory WDL    Respiratory WDL WDL        Skin Circulation/Temperature WDL    Skin Circulation/Temperature WDL WDL        Cardiac WDL    Cardiac WDL WDL        Peripheral/Neurovascular WDL    Peripheral Neurovascular WDL WDL        Cognitive/Neuro/Behavioral WDL    Cognitive/Neuro/Behavioral WDL WDL

## 2024-04-14 NOTE — PROGRESS NOTES
[facility]    Medicine Progress Note - Hospitalist Service, GOLD TEAM 7    Date of Admission:  4/13/2024    Assessment & Plan    Donald Jackson is a 29 year old male with hx of hydrocephalus s/p  shunt placement, perforated appendicitis c/b abscess, and retinal detachment who presented with abdominal pain and is admitted for likely infection along  shunt catheter in RLQ.    Today's plan   -Ordered prn benadryl to be given with IV Vancomycin, awaiting on recommendations from Neurosurgery and ID to determine next steps   -Ordered MRSA swab   -Spoke to Neurosurgery and mentioned the  shunt is connected between the intracranial ventricle with intrapleural space   -There are bits of retained intraabdominal stent for which general surgery would need to be consulted      Hx hydrocephalus s/p  shunt  Hx perforated appendicitis c/b abscess  C/f intraperitoneal infection of  shunt  Abdominal pain  Nausea  Abdominal pain, hx abscess near tip of  shunt, and leukocytosis c/f infected  shunt catheter. Findings on CTAP 4/14 highly suggestive of infection along catheter in RLQ. ID consult to determine need for shunt removal/exchange. Doesn't think he's been able to pass flatus sine 4/13 afternoon, but CT showed no bowel obstruction.  - neurosurgery consult  - consider general surgery consult in AM  - ID consult  - blood cultures x2 (had already been given antibiotics in ED)  - antibiotics  -prn benadryl for itching prior to administering iv vancomycin   - IV vancomycin  - IV flagyl 500 mg q8hrs   -cefepine 2gm q8 hours      Leukocytosis  Likely 2/2 infection, as above  - CBC daily     Microcytosis  Hgb wnl at 13.8 but MCV of 73.   - consider peripheral smear  - iron studies             Diet: NPO for Medical/Clinical Reasons Except for: Meds    DVT Prophylaxis: Pneumatic Compression Devices  Chowdhury Catheter: Not present  Lines: None     Cardiac Monitoring: None  Code Status:  "Full Code      Clinically Significant Risk Factors Present on Admission               # Coagulation Defect: INR = 1.17 (Ref range: 0.85 - 1.15) and/or PTT = 31 Seconds (Ref range: 22 - 38 Seconds), will monitor for bleeding         # Severe Obesity: Estimated body mass index is 41.81 kg/m  as calculated from the following:    Height as of this encounter: 1.727 m (5' 8\").    Weight as of this encounter: 124.7 kg (275 lb).              Disposition Plan     Medically Ready for Discharge:              Dago Valerio MD  Hospitalist Service, [facility] TEAM [address] [place] [address]  Securely message with [employer] (more info)  Text page via [facility] Paging/Directory   See signed in provider for up to date coverage information  ______________________________________________________________________    Interval History   Patient is awake and alert, no new abdominal pain, difficulty breathing, fevers or malaise     Physical Exam   Vital Signs: Temp: 98.1  F (36.7  C) Temp src: Oral BP: 110/58 Pulse: 98   Resp: 16 SpO2: 93 % O2 Device: None (Room air)    Weight: 275 lbs 0 oz    Constitutional: awake, alert, cooperative, no apparent distress, and appears stated age  Eyes: Lids and lashes normal, pupils equal, round and reactive to light, extra ocular muscles intact, sclera clear, conjunctiva normal  ENT: Normocephalic, without obvious abnormality, atraumatic, sinuses nontender on palpation, external ears without lesions, oral pharynx with moist mucous membranes, tonsils without erythema or exudates, gums normal and good dentition.  Hematologic / Lymphatic: no cervical lymphadenopathy  Respiratory: No increased work of breathing, good air exchange, clear to auscultation bilaterally, no crackles or wheezing  Cardiovascular: Normal apical impulse, regular rate and rhythm, normal S1 and S2, no S3 or S4, and no murmur noted  GI: No scars, normal bowel sounds, soft, non-distended, non-tender, no masses palpated, " no hepatosplenomegally  Genitounirinary:   Skin: no bruising or bleeding  Musculoskeletal: There is no redness, warmth, or swelling of the joints.  Full range of motion noted.  Motor strength is 5 out of 5 all extremities bilaterally.  Tone is normal.  Neurologic: Awake, alert, oriented to name, place and time.  Cranial nerves II-XII are grossly intact.  Motor is 5 out of 5 bilaterally.  Cerebellar finger to nose, heel to shin intact.  Sensory is intact.  Babinski down going, Romberg negative, and gait is normal.  Neuropsychiatric: General: normal, calm, and normal eye contact    Medical Decision Making       28 MINUTES SPENT BY ME on the date of service doing chart review, history, exam, documentation & further activities per the note.      Data       I have personally reviewed the following data over the past 24 hrs:    18.7 (H)  \   13.8   / 259     137 106 20.8 (H) /  123 (H)   4.1 21 (L) 0.88 \     ALT: 37 AST: 18 AP: 71 TBILI: 0.5   ALB: 3.9 TOT PROTEIN: 6.9 LIPASE: 16     INR:  1.17 (H) PTT:  31   D-dimer:  N/A Fibrinogen:  N/A     Ferritin:  238 % Retic:  N/A LDH:  N/A       Imaging results reviewed over the past 24 hrs:   Recent Results (from the past 24 hour(s))   CT Abdomen Pelvis w Contrast    Narrative    EXAM: CT ABDOMEN PELVIS W CONTRAST  LOCATION: [facility]  DATE: 4/14/2024    INDICATION: sbo  COMPARISON: CT from 03/05/2024.  TECHNIQUE: CT scan of the abdomen and pelvis was performed following injection of IV contrast. Multiplanar reformats were obtained. Dose reduction techniques were used.  CONTRAST: iopamidol (ISOVUE 370) solution 136 mL    FINDINGS:   LOWER CHEST: Very small right pleural effusion. Anterior pleural shunt catheter in the right hemithorax.    HEPATOBILIARY: Normal.    PANCREAS: Normal.    SPLEEN: Normal.    ADRENAL GLANDS: Normal.    KIDNEYS/BLADDER: Normal.    BOWEL: Again noted is the presence of 2 intraperitoneal shunt catheters.  The smaller, more inferiorly located catheter is noted in the anterior right lower quadrant where there is mesenteric edema, peritoneal enhancement, and bowel wall thickening. The   appendix is absent. There is no bowel obstruction or free air.    LYMPH NODES: Normal.    VASCULATURE: Normal.    PELVIC ORGANS: Normal.    MUSCULOSKELETAL: Normal.      Impression    IMPRESSION:   1.  Redemonstrated right lower quadrant inflammatory change with mesenteric edema, peritoneal enhancement, and bowel wall thickening in the region of a ventriculoperitoneal shunt catheter, highly concerning for infection along the catheter segment in   this region. No drainable abscess.

## 2024-04-14 NOTE — ED PROVIDER NOTES
[place] EMERGENCY DEPARTMENT ([facility])    4/13/24       History     Chief Complaint   Patient presents with    Abdominal Pain     HPI  Donald Jackson is a 29 year old male who with PMH with a history of hydrocephalus who underwent a shunt revision with Dr. Martinez due to perforated appendicitis leading to an abscess close to the distal tip of his previous ventriculoperitoneal shunt. He underwent a ventriculopleural shunt placement with thoracic surgery on 2/2/2024 s/p revision of distal catheter for  shunt by Dr. Martinez on 3/19/24 because the catheter became displaced and he developed a pseudocyst in the subcutaneous tissue. He had a recent  shunt replacement on 2/2/24 for a shunt system initially placed when he was 2 y/o for HCP. He presents with sudden onset of abdominal pain at 4PM.    Patient reports that he had a meal at noon and had an energy drink and a bowel movement this morning at 6 AM which was normal in consistency.  He describes his abdominal pain as kicking and it is both constant and comes and goes.  He had a laparoscopic surgery for his appendix this past year which was complicated.  Also has a history of subsequent intraperitoneal infection involving  shunt that require shunt replacement.  He reports that his abdominal feels distended. Denies dysuria.  Denies fever.  Denies nausea or vomiting. Has had difficulty passing flatus since onset of pain. Denies headache or symptoms of a shunt problem. Abdominal pain is worse when walking.    Past Medical History  Past Medical History:   Diagnosis Date    Hydrocephalus (H)     Lattice degeneration     Morbid obesity (H)     Other forms of retinal detachment(361.89)      Past Surgical History:   Procedure Laterality Date    LAPAROSCOPIC ASSISTED IMPLANT SHUNT VENTRICULOPERITONEAL Right 2/5/2024    Procedure: Implant Shunt Ventriculopleural;  Surgeon: Almas Noriega MD;  [facility]: [facility]    TIKI.VN SYSTEM IMPLANT SHUNT  "VENTRICULOPERITONEAL Right 2/5/2024    Procedure: RIGHT VENTRICULOPLEURAL Insertion;  Surgeon: Fabiola Martinez MD;  Location: [facility] OR    RETINAL REATTACHMENT  10/2010    left eye    RETINOPEXY LASER PROPHYLAXIS BREAK(S) OD (RIGHT EYE)  10/2011    shunt in head      a couple as a child    strabismus surery      age 5    THORACOSCOPY Right 3/19/2024    Procedure: RIGHT THORACOSCOPIC REPOSITIONING OF VENTRICULO-PLEURAL SHUNT CATHETER;  Surgeon: Almas Noriega MD;  Location: [facility] OR    TONSILLECTOMY      wisdom teeth       acetaminophen (TYLENOL) 325 MG tablet  ibuprofen (ADVIL/MOTRIN) 600 MG tablet  oxyCODONE (ROXICODONE) 5 MG tablet      Allergies   Allergen Reactions    Penicillins Hives and Unknown     Family History  Family History   Problem Relation Age of Onset    Diabetes Father         Type 2     Diabetes Paternal Grandfather         Type 2     Retinal detachment No family hx of     Amblyopia No family hx of     Glaucoma No family hx of     Macular Degeneration No family hx of     Anesthesia Reaction No family hx of     Deep Vein Thrombosis (DVT) No family hx of      Social History   Social History     Tobacco Use    Smoking status: Never    Smokeless tobacco: Never   Vaping Use    Vaping status: Never Used   Substance Use Topics    Alcohol use: No    Drug use: No      Past medical history, past surgical history, medications, allergies, family history, and social history were reviewed with the patient. No additional pertinent items.      A complete review of systems was performed with pertinent positives and negatives noted in the HPI, and all other systems negative.    Physical Exam   BP: 126/82  Pulse: 101  Temp: 97.2  F (36.2  C)  Resp: 20  Height: 172.7 cm (5' 8\")  Weight: 124.7 kg (275 lb)  SpO2: 96 %  Physical Exam  Vitals and nursing note reviewed.   Constitutional:       General: He is not in acute distress.     Appearance: He is well-developed. He is obese. He is not ill-appearing or " diaphoretic.   HENT:      Head: Normocephalic and atraumatic.      Nose: Nose normal.      Mouth/Throat:      Mouth: Mucous membranes are moist.   Eyes:      General: No scleral icterus.     Conjunctiva/sclera: Conjunctivae normal.   Cardiovascular:      Rate and Rhythm: Normal rate.   Pulmonary:      Effort: Pulmonary effort is normal. No respiratory distress.      Breath sounds: No stridor.   Abdominal:      General: Abdomen is protuberant. There is no distension.      Tenderness: There is generalized abdominal tenderness and tenderness in the right lower quadrant. There is guarding. There is no rebound. Positive signs include McBurney's sign. Negative signs include Etienne's sign.   Musculoskeletal:         General: No deformity or signs of injury. Normal range of motion.      Cervical back: Normal range of motion and neck supple.   Skin:     General: Skin is warm and dry.      Coloration: Skin is not jaundiced or pale.      Findings: No rash.   Neurological:      General: No focal deficit present.      Mental Status: He is alert and oriented to person, place, and time.   Psychiatric:         Mood and Affect: Mood normal.         Behavior: Behavior normal.         Thought Content: Thought content normal.           ED Course, Procedures, & Data      Procedures               Results for orders placed or performed during the hospital encounter of 04/13/24   CBC with platelets and differential     Status: Abnormal   Result Value Ref Range    WBC Count 13.8 (H) 4.0 - 11.0 10e3/uL    RBC Count 6.07 (H) 4.40 - 5.90 10e6/uL    Hemoglobin 13.8 13.3 - 17.7 g/dL    Hematocrit 44.1 40.0 - 53.0 %    MCV 73 (L) 78 - 100 fL    MCH 22.7 (L) 26.5 - 33.0 pg    MCHC 31.3 (L) 31.5 - 36.5 g/dL    RDW 16.9 (H) 10.0 - 15.0 %    Platelet Count 262 150 - 450 10e3/uL    % Neutrophils 75 %    % Lymphocytes 18 %    % Monocytes 5 %    % Eosinophils 1 %    % Basophils 1 %    % Immature Granulocytes 0 %    NRBCs per 100 WBC 0 <1 /100     Absolute Neutrophils 10.6 (H) 1.6 - 8.3 10e3/uL    Absolute Lymphocytes 2.4 0.8 - 5.3 10e3/uL    Absolute Monocytes 0.7 0.0 - 1.3 10e3/uL    Absolute Eosinophils 0.1 0.0 - 0.7 10e3/uL    Absolute Basophils 0.1 0.0 - 0.2 10e3/uL    Absolute Immature Granulocytes 0.1 <=0.4 10e3/uL    Absolute NRBCs 0.0 10e3/uL   CBC with platelets differential     Status: Abnormal    Narrative    The following orders were created for panel order CBC with platelets differential.  Procedure                               Abnormality         Status                     ---------                               -----------         ------                     CBC with platelets and d...[[id number]]  Abnormal            Final result                 Please view results for these tests on the individual orders.     Medications   ondansetron (ZOFRAN) injection 4 mg (has no administration in time range)   sodium chloride 0.9% BOLUS 1,000 mL (1,000 mLs Intravenous $New Bag 4/13/24 2311)   HYDROmorphone (PF) (DILAUDID) injection 0.5 mg (0.5 mg Intravenous $Given 4/13/24 2315)     Labs Ordered and Resulted from Time of ED Arrival to Time of ED Departure   CBC WITH PLATELETS AND DIFFERENTIAL - Abnormal       Result Value    WBC Count 13.8 (*)     RBC Count 6.07 (*)     Hemoglobin 13.8      Hematocrit 44.1      MCV 73 (*)     MCH 22.7 (*)     MCHC 31.3 (*)     RDW 16.9 (*)     Platelet Count 262      % Neutrophils 75      % Lymphocytes 18      % Monocytes 5      % Eosinophils 1      % Basophils 1      % Immature Granulocytes 0      NRBCs per 100 WBC 0      Absolute Neutrophils 10.6 (*)     Absolute Lymphocytes 2.4      Absolute Monocytes 0.7      Absolute Eosinophils 0.1      Absolute Basophils 0.1      Absolute Immature Granulocytes 0.1      Absolute NRBCs 0.0     COMPREHENSIVE METABOLIC PANEL   LIPASE     CT Abdomen Pelvis w Contrast    (Results Pending)          Critical care was not performed.     Medical Decision Making  The patient's presentation  was of high complexity (an acute health issue posing potential threat to life or bodily function).    The patient's evaluation involved:  review of external note(s) from 2 sources (see separate area of note for details)  review of 3+ test result(s) ordered prior to this encounter (see separate area of note for details)  ordering and/or review of 3+ test(s) in this encounter (see separate area of note for details)  discussion of management or test interpretation with another health professional (see separate area of note for details)    The patient's management necessitated moderate risk (prescription drug management including medications given in the ED), high risk (a parenteral controlled substance), high risk (a decision regarding hospitalization), and further care after sign-out to Dr. Monroe (see their note for further management).    Assessment & Plan    Donald Jackson is a 29 year old male who with PMH hydrocephalus s/p  shunt who p/w acute onset of abdominal pain.    Ddx: partial/complete SBO, intraperitoneal abscess    Abdomen firm with guarding in lower quadrants. H/o of multiple abdominal surgeries predispose patient to sbo complication. Given IV fluids, dilaudid. CT ordered. Labs with WBC 13.8.     Patient signed out to Dr. Monroe at shift change. Dispo pending labs and CT. Please see addendum for results of work up and remaining management.            I have reviewed the nursing notes. I have reviewed the findings, diagnosis, plan and need for follow up with the patient.    New Prescriptions    No medications on file       Final diagnoses:   Abdominal pain, generalized   I, BRYANNA PEREZ, am serving as a trained medical scribe to document services personally performed by Bharati Bowen MD, based on the provider's statements to me.     Bharati DEY MD, was physically present and have reviewed and verified the accuracy of this note documented by BRYANNA PEREZ.     Bharati Bowen MD.     [facility] EMERGENCY DEPARTMENT  4/13/2024     Bharati Bowen MD  04/13/24 [id number]

## 2024-04-14 NOTE — CONSULTS
[facility]EAS[facility] CONSULTATION     Patient:  Donald Jackson   Date of birth 1995, Medical record number [id number]  Date of Visit:  04/14/2024  Date of Admission: 4/13/2024  Consult Requester:Allen Guzman MD          Assessment and Recommendations:   ID Problem List  Acute right-sided abdominal pain with CT A/P 4/14 notable for bowel wall thickening, edema, and peritoneal enhancement c/f infection of retained intraabdominal  shunt tip  Leukocytosis   shunt with tip near abdominal abscess (from #4) s/p revision to ventriculopleural shunt (2/2/24) c/b malposition with subcutaneous pseudocyst s/p revision (3/19/24).   New shunt remains in R pleural space. Still has retained tip in abdomen.  Recent appendicitis with perforation s/p appendectomy (12/5/23 at [facility]) c/b peritonitis and intraabdominal abscess  Abscess s/p IR drain (12/12/23 = Cx pan-susceptible Pseudomonas). S/p PO levo + flagyl with EOT 12/29/23  CT A/P 2/2/24 with 2.2 cm collection c/f abscess in R hemipelvis, decreased from prior. CT A/P 3/5/24 with inflammatory changes in R pelvis around retained distal  shunt tip c/f infected catheter without drain able collection.  Penicillin allergy - childhood, unknown reaction (listed hives)  History of MRSA (arm swab, 2008)    RECOMMENDATION:  Stop ceftriaxone  Start IV cefepime 2g q8hrs  Continue PO flagyl 500 mg q 8hrs (IV if unable to tolerate PO)  Check MRSA nares. If negative, can stop IV vancomycin.  Recommend NSGY/general surgery consult for retained catheter removal. Send tip for aerobic culture upon removal.  Follow up pending blood cultures  Repeat blood cultures x2 if fever to 100.4F or greater occurs    ASSESSMENT:  Donald Jackson is a 29 year old male with PMHx significant for hydrocephalus s/p  shunt, with recent perforated appendicitis c/b intraabdominal abscess (12/2023, s/p IR drain with Cx = PsdA and PO abx course), with  shunt changed to ventriculopleural  due to residual RLQ abscess vs pseudocyst on 2/2/24 (old tip still in abd) further c/b new  shunt malposition with subcutaneous pseudocyst s/p revision 3/19/24 who presented to ED on 4/13/24 with acute R-sided abdominal pain and CT imaging concerning for infection along retained abdominal catheter for which ID was consulted.     Acute onset of abdominal pain with leukocytosis and inflammatory changes on CT are concerning for infection along retained catheter in RLQ. No current drainable collection noted, though prior Pseudomonal abscess at this site in December 2023 following perforated appendix. Concerned that this retained hardware is infected and should be removed, please discuss with NSGY and consult general surgery. No BM since yesterday with recent extensive abdominal surgery and inflammation, would be at risk for bowel obstruction though denies emesis and no obstruction noted on CT. Low suspicion for current  shunt (in pleural space) dysfunction or infection given lack of fever, headaches. Blood cultures x2 collected (after Abx started) are pending with NGTD. He was started on IV vancomycin, ceftriaxone, and metronidazole in ED. Recommend check MRSA nares. If negative, please stop IV vancomycin. Change ceftriaxone to IV cefepime given prior isolation of Pseudomonas. Continue metronidazole - oral if able.     Thank you for this consult. ID will continue to follow.   Recommendations discussed with primary team.    Ellen Bain PA-C  Infectious Diseases  Pager #[phone] or Vocera    80 MINUTES SPENT BY ME on the date of service doing chart review, history, exam, documentation & further activities per the note.      I have seen, personally evaluated, and discussed the patient's care with Ellen Bain, Infectious Diseases YANETH.  I personally provided a substantive portion of the care for this visit, agree with the subjective notations, objective findings and agree with the plan of care as documented in this  note with edits made by me as necessary.  Findings concerning for infection around the prior shunt catheter site in the abdomen.  Agree with antibiotics, using cefepime given prior Pseudomonas.  Agree with surgical consult to evaluate for removal    Jairo Blas MD    Division of Infectious Diseases and International Medicine    Dr. Crockett will round on Monday.  For any questions over the weekend please page me             History of Present Illness:     Donald Jackson is a 29 year old male with past medical history significant for hydrocephalus s/p  shunt, perforated appendicitis c/b abscess (12/2023) requiring  shunt revision who presented to ED on 4/13/24 with acute abdominal pain. ID consulted for antibiotic recommendations.    Had appendectomy on 12/5/23 for acute appendicitis with perforation at [facility] which was complicated by peritonitis and intraabdominal abscess near tip of  shunt. Had IR drain placement 12/12/23 and Cx with pseudomonas. Treated with levofloxacin and flagyl through 12/30/23. No sign of CNS infection at that time, shunt remained in place. Shunt was later externalized during admission 1/28/24 at [facility] for headaches c/f malfunctioning shunt. CSF analysis noninfectious at that time, however concerned for proximity to RLQ abscess vs pseudocyst. Thoracic surgery did revision by placing distal catheter tip into pleural cavity on 2/2/24. Distal abd tip remained in place. No antibiotics given as he was stable off antibiotics, CSF from OR 2/2 and again 2/5 without concern for infection. Required revision again on 3/19/24 as  shunt was malpositioned and had developed a subcutaneous pseudocyst/meningocele in R chest wall. He was feeling well following these procedures.    Presented to ED 4/13 with acute onset abdominal pain yesterday evening around 4 PM with associated nausea. No emesis. No gas or BM since yesterday. He denies any recent diarrhea. Reports RLQ > RUQ pain is  greatest, both constant and waxing/waning and some midline pain. He denies fever, chills, neck pain/stiffness, headache, vision changes, URI symptoms, dyspnea, cough, chest pain. He denies recent sick contacts or antibiotic use.    Afebrile since admission, WBC elevated 13.8 --> 18.7. Lipase wnl. Started on IV vancomycin, ceftriaxone, and metronidazole. Blood cultures x2 collected (after Abx started) are pending with NGTD. CT A/P notable for re-demonstrated right lower quadrant inflammatory change with mesenteric edema, peritoneal enhancement, and bowel wall thickening in the region of a ventriculoperitoneal shunt catheter, highly concerning for infection along the catheter segment in this region. No drainable abscess. He has additional shunt in R pleural space. Shunt in abdomen is not connected.     Allergy to PCN - listed as hives. He reports reaction when he was about 2 years old but cannot recall what happened.         Review of Systems:   CONSTITUTIONAL:  No fevers, chills or night sweats.   EYES: negative for icterus, redness, or purulent drainage.   ENT:  negative for nasal congestion, rhinorrhea, or sore throat.  RESPIRATORY:  negative for cough with sputum and dyspnea.  CARDIOVASCULAR:  negative for chest pain or palpitations.  GASTROINTESTINAL:  +nausea, no vomiting, diarrhea  GENITOURINARY:  negative for dysuria, frequency, or urgency.   HEME:  No easy bruising or bleeding.  INTEGUMENT:  negative for rash and pruritus.  NEURO:  Negative for headache, vision changes, or numbness/tingling in extremities.         Past Medical History:     Past Medical History:   Diagnosis Date    Hydrocephalus (H)     Lattice degeneration     Morbid obesity (H)     Other forms of retinal detachment(361.89)             Past Surgical History:     Past Surgical History:   Procedure Laterality Date    LAPAROSCOPIC ASSISTED IMPLANT SHUNT VENTRICULOPERITONEAL Right 2/5/2024    Procedure: Implant Shunt Ventriculopleural;  Surgeon:  Almas Noriega MD;  Location: [facility] OR    OPTICAL TRACKING SYSTEM IMPLANT SHUNT VENTRICULOPERITONEAL Right 2/5/2024    Procedure: RIGHT VENTRICULOPLEURAL Insertion;  Surgeon: Fabiola Martinez MD;  Location: [facility] OR    RETINAL REATTACHMENT  10/2010    left eye    RETINOPEXY LASER PROPHYLAXIS BREAK(S) OD (RIGHT EYE)  10/2011    shunt in head      a couple as a child    strabismus surery      age 5    THORACOSCOPY Right 3/19/2024    Procedure: RIGHT THORACOSCOPIC REPOSITIONING OF VENTRICULO-PLEURAL SHUNT CATHETER;  Surgeon: Almas Noriega MD;  Location: [facility] OR    TONSILLECTOMY      wisdom teeth              Family History:     Family History   Problem Relation Age of Onset    Diabetes Father         Type 2     Diabetes Paternal Grandfather         Type 2     Retinal detachment No family hx of     Amblyopia No family hx of     Glaucoma No family hx of     Macular Degeneration No family hx of     Anesthesia Reaction No family hx of     Deep Vein Thrombosis (DVT) No family hx of             Social History:     Social History     Tobacco Use    Smoking status: Never    Smokeless tobacco: Never   Substance Use Topics    Alcohol use: No     History   Sexual Activity    Sexual activity: Not Currently    Partners: Female            Current Medications:     Current Facility-Administered Medications   Medication Dose Route Frequency Provider Last Rate Last Admin    metroNIDAZOLE (FLAGYL) infusion 500 mg  500 mg Intravenous Q8H Denae Monroe MD   Stopped at 04/14/24 0633    sodium chloride (PF) 0.9% PF flush 3 mL  3 mL Intracatheter Q8H Yunier Monroe MD        vancomycin (VANCOCIN) 1,250 mg in 0.9% NaCl 250 mL intermittent infusion  1,250 mg Intravenous Q12H Bharati Bowen MD                Allergies:     Allergies   Allergen Reactions    Penicillins Hives and Unknown            Physical Exam:   Vitals were reviewed  Patient Vitals for the past 24 hrs:   BP Temp Temp src Pulse Resp SpO2 Height  "Weight   04/14/24 0600 -- -- -- 94 -- 98 % -- --   04/14/24 0345 -- -- -- 90 -- 98 % -- --   04/14/24 0030 -- -- -- 97 20 96 % -- --   04/13/24 2323 122/77 -- -- 90 15 100 % -- --   04/13/24 2232 126/82 97.2  F (36.2  C) Oral 101 20 96 % 1.727 m (5' 8\") 124.7 kg (275 lb)       Physical Examination:  Constitutional: Pleasant adult male seen sitting up in bed, in NAD. Alert and interactive.   HEENT: NCAT, anicteric sclerae, conjunctiva clear. Moist mucous membranes without lesions or thrush. Dentition intact/well cared for.  Respiratory: Non-labored breathing, good air exchange. Lungs are clear to auscultation bilaterally, without wheezing, crackles or rhonchi. No cough noted.   Cardiovascular: Regular rate and rhythm with no murmur, rub or gallop.  GI: Abdomen is soft, obese, tender to palpation in RUQ and RLQ and central abdomen. No significant L sided pain on palpation. No rebound tenderness.  Skin: Warm and dry. No rashes or lesions on exposed surfaces. Well healed abdominal incisions and lateral R chest incision.  Musculoskeletal: Extremities grossly normal. No tenderness or edema present. +tattoo  Neurologic: A &O x3, speech normal, answering questions appropriately. Moves all extremities spontaneously. Grossly non-focal.  Neuropsychiatric: Mentation and affect normal/appropriate.  VAD: PIV is c/d/i with no erythema, drainage, or tenderness.         Laboratory Data:     Inflammatory Markers    Recent Labs   Lab Test 02/04/24  0653 02/02/24  0435 02/01/24  0621 01/31/24  0806 08/16/20  1319   SED 6 30* 7 9 1   CRP  --   --   --   --  <2.9       Hematology Studies    Recent Labs   Lab Test 04/14/24  0646 04/13/24  2256 03/18/24  0748 02/06/24  0722 02/05/24  0346 02/04/24  0653 03/01/22  1038 08/16/20  1319   WBC 18.7* 13.8* 8.8 12.8* 9.4 8.3   < > 8.0   ANEU  --   --   --   --   --   --   --  5.3   AEOS  --   --   --   --   --   --   --  0.0   HGB 13.8 13.8 14.2 13.0* 13.8 13.8   < > 15.2   MCV 74* 73* 75* 77* " 76* 77*   < > 79    262 232 260 285 260   < > 188    < > = values in this interval not displayed.       Metabolic Studies     Recent Labs   Lab Test 04/14/24  0646 04/13/24  2256 02/06/24  0722 02/05/24  0346 02/04/24  0653    138 137 138 137   POTASSIUM 4.1 3.6 4.2 3.8 3.9   CHLORIDE 106 103 105 109* 103   CO2 21* 24 23 23 25   BUN 20.8* 25.5* 13.0 16.3 14.5   CR 0.88 1.06 0.90 1.13 1.06   GFRESTIMATED >90 >90 >90 90 >90       Hepatic Studies    Recent Labs   Lab Test 04/14/24  0646 04/13/24  2256 01/27/24  1405 01/05/24  1450   BILITOTAL 0.5 0.3 0.6 0.3   ALKPHOS 71 78 94 80   ALBUMIN 3.9 4.2 4.2 3.9   AST 18 23 25 26   ALT 37 40 53 59       Microbiology:  Culture   Date Value Ref Range Status   02/05/2024 No anaerobic organisms isolated  Final   02/05/2024 No Growth  Final   02/05/2024 No Growth  Final   02/02/2024 No Growth  Final   02/02/2024 No anaerobic organisms isolated  Final   01/31/2024 No Growth  Final   01/31/2024 No anaerobic organisms isolated  Final   01/27/2024 No Growth  Final   01/27/2024 No anaerobic organisms isolated  Final   01/27/2024 No Growth  Final   01/27/2024 No Growth  Final     OSH IR drainage of RLQ abscess 12/12/23 at [facility] =   Susceptibility    Organism Antibiotic Susceptibility   Pseudomonas aeruginosa Piperacillin/Tazobactam 16 mcg/mL: Susceptible   Pseudomonas aeruginosa Cefepime 4 mcg/mL: Susceptible   Pseudomonas aeruginosa Ciprofloxacin <=0.25 mcg/mL: Susceptible   Pseudomonas aeruginosa Levofloxacin <=0.5 mcg/mL: Susceptible   Pseudomonas aeruginosa Meropenem <=0.5 mcg/mL: Susceptible   Pseudomonas aeruginosa Tobramycin <=2 mcg/mL: Susceptible   Pseudomonas aeruginosa Ceftazidime 4 mcg/mL: Susceptible   Pseudomonas aeruginosa Aztreonam 16 mcg/mL: Intermediate   Pseudomonas aeruginosa Cefuroxime    Pseudomonas aeruginosa Minocycline        Urine Studies    Recent Labs   Lab Test 01/28/24 2033 03/01/22  1038   LEUKEST Negative Negative   WBCU <1 0-5  "      Vancomycin Levels    Recent Labs   Lab Test 01/30/24  1746   VANCOMYCIN 24.7       Hepatitis B Testing No lab results found.  Hepatitis C Testing   No results found for: \"HCVAB\", \"HQTG\", \"HCGENO\", \"HCPCR\", \"HQTRNA\", \"HEPRNA\"  Respiratory Virus Testing    No results found for: \"RS\", \"FLUAG\"    IMAGING  CT Abdomen Pelvis w Contrast  Result Date: 4/14/2024  IMPRESSION: 1.  Redemonstrated right lower quadrant inflammatory change with mesenteric edema, peritoneal enhancement, and bowel wall thickening in the region of a ventriculoperitoneal shunt catheter, highly concerning for infection along the catheter segment in this region. No drainable abscess.  "

## 2024-04-14 NOTE — H&P
"    [facility]    History and Physical - Medicine Service, GO TEAM        Date of Admission:  4/13/2024    Assessment & Plan      Donald Jackson is a 29 year old male with hx of hydrocephalus s/p  shunt placement, perforated appendicitis c/b abscess, and retinal detachment who presented with abdominal pain and is admitted for likely infection along  shunt catheter in RLQ.      Hx hydrocephalus s/p  shunt  Hx perforated appendicitis c/b abscess  C/f intraperitoneal infection of  shunt  Abdominal pain  Nausea  Abdominal pain, hx abscess near tip of  shunt, and leukocytosis c/f infected  shunt catheter. Findings on CTAP 4/14 highly suggestive of infection along catheter in RLQ. ID consult to determine need for shunt removal/exchange. Doesn't think he's been able to pass flatus sine 4/13 afternoon, but CT showed no bowel obstruction.  - neurosurgery consult  - consider general surgery consult in AM  - ID consult  - blood cultures x2 (had already been given antibiotics in ED)  - antibiotics  - received one dose of IV ceftriaxone 2 g in ED (additional doses not ordered)  - IV vancomycin  - IV flagyl 500 mg q8hrs     Leukocytosis  Likely 2/2 infection, as above  - CBC daily    Microcytosis  Hgb wnl at 13.8 but MCV of 73.   - consider peripheral smear  - iron studies        Diet:  NPO  DVT Prophylaxis: Pneumatic Compression Devices  Chowdhury Catheter: Not present  Fluids: none  Lines: None     Cardiac Monitoring: None  Code Status:  full code    Clinically Significant Risk Factors Present on Admission                       # Severe Obesity: Estimated body mass index is 41.81 kg/m  as calculated from the following:    Height as of this encounter: 1.727 m (5' 8\").    Weight as of this encounter: 124.7 kg (275 lb).              Disposition Plan      Expected Discharge Date: 04/16/2024                To be staffed in AM      Yunier Monroe MD  Medicine Service, " [facility]   [facility]  Securely message with [employer] (more info)  Text page via [employer] Paging/Directory   See signed in provider for up to date coverage information  ______________________________________________________________________    Chief Complaint   Abdominal pain    History is obtained from the patient and chart review    History of Present Illness   Donald Jackson is a 29 year old male with hx of hydrocephalus s/p  shunt placement, perforated appendicitis c/b abscess, and retinal detachment who presents with sudden onset of abdominal pain. Originally underwent  shunt placement at 3 years old. Had acute appendicitis w/appendectomy on 12/5/23 c/b perforation and abdominal infection near tip of previous  shunt. Had  shunt placement w/thoracic surgery 2/2/24 c/b thoracic subcutaneous pseudomeningocele. The catheter was felt to be malpositioned, and thoracic surgery performed revision in which the distal catheter tip was placed into the pleural cavity and the pseudocyst was excised. Was doing well when seen in neurosurgery clinic 3/26. Today, he reports having onset of severe abdominal pain around 4 PM. He had eaten something a few hours before and had a normal BM after. Hasn't been able to pass gas since. The pain is worst in the RLQ. Endorses a headache and some nausea as well. No fevers, chills, neck stiffness, sensitivity to light, or other concerns.    ED course  CTAP: Redemonstrated RLQ inflammatory change w/mesenteric edema, peritoneal enhancement, and bowel wall thickening in region of a  shunt catheter, no drainable abscess  Labs: lipase 16, WBC 13.8      Past Medical History    Past Medical History:   Diagnosis Date    Hydrocephalus (H)     Lattice degeneration     Morbid obesity (H)     Other forms of retinal detachment(361.89)        Past Surgical History   Past Surgical History:   Procedure Laterality Date    LAPAROSCOPIC ASSISTED IMPLANT  SHUNT VENTRICULOPERITONEAL Right 2/5/2024    Procedure: Implant Shunt Ventriculopleural;  Surgeon: Almas Noriega MD;  Location: [facility] OR    OPTICAL TRACKING SYSTEM IMPLANT SHUNT VENTRICULOPERITONEAL Right 2/5/2024    Procedure: RIGHT VENTRICULOPLEURAL Insertion;  Surgeon: Fabiola Martinez MD;  Location: [facility] OR    RETINAL REATTACHMENT  10/2010    left eye    RETINOPEXY LASER PROPHYLAXIS BREAK(S) OD (RIGHT EYE)  10/2011    shunt in head      a couple as a child    strabismus surery      age 5    THORACOSCOPY Right 3/19/2024    Procedure: RIGHT THORACOSCOPIC REPOSITIONING OF VENTRICULO-PLEURAL SHUNT CATHETER;  Surgeon: Almas Noriega MD;  Location: [facility] OR    TONSILLECTOMY      wisdom teeth         Prior to Admission Medications   Prior to Admission Medications   Prescriptions Last Dose Informant Patient Reported? Taking?   acetaminophen (TYLENOL) 325 MG tablet   No No   Sig: Take 2 tablets (650 mg) by mouth every 4 hours as needed for mild pain   ibuprofen (ADVIL/MOTRIN) 600 MG tablet   Yes No   Sig: Take 600 mg by mouth every 6 hours as needed for moderate pain   oxyCODONE (ROXICODONE) 5 MG tablet   No No   Sig: Take 1 tablet (5 mg) by mouth every 4 hours as needed for moderate to severe pain      Facility-Administered Medications: None         Physical Exam   Vital Signs: Temp: 97.2  F (36.2  C) Temp src: Oral BP: 122/77 Pulse: 90   Resp: 20 SpO2: 98 % O2 Device: None (Room air)    Weight: 275 lbs 0 oz    Constitutional: awake, alert, mild distress  Respiratory: No increased work of breathing, good air exchange, clear to auscultation bilaterally, no crackles or wheezing  Cardiovascular: RRR, no murmurs  GI: reduced bowel sounds, moderate diffuse tenderness worst in RLQ  Skin: normal skin color, texture, turgor  Musculoskeletal: moving all extremities  Neurologic: awake, alert, and fully oriented, no meningismus, no light sensitivity    Medical Decision Making         Data     I have personally  reviewed the following data over the past 24 hrs:    13.8 (H)  \   13.8   / 262     138 103 25.5 (H) /  95   3.6 24 1.06 \     ALT: 40 AST: 23 AP: 78 TBILI: 0.3   ALB: 4.2 TOT PROTEIN: 7.4 LIPASE: 16       Imaging results reviewed over the past 24 hrs:   Recent Results (from the past 24 hour(s))   CT Abdomen Pelvis w Contrast    Narrative    EXAM: CT ABDOMEN PELVIS W CONTRAST  LOCATION: [facility]  DATE: 4/14/2024    INDICATION: sbo  COMPARISON: CT from 03/05/2024.  TECHNIQUE: CT scan of the abdomen and pelvis was performed following injection of IV contrast. Multiplanar reformats were obtained. Dose reduction techniques were used.  CONTRAST: iopamidol (ISOVUE 370) solution 136 mL    FINDINGS:   LOWER CHEST: Very small right pleural effusion. Anterior pleural shunt catheter in the right hemithorax.    HEPATOBILIARY: Normal.    PANCREAS: Normal.    SPLEEN: Normal.    ADRENAL GLANDS: Normal.    KIDNEYS/BLADDER: Normal.    BOWEL: Again noted is the presence of 2 intraperitoneal shunt catheters. The smaller, more inferiorly located catheter is noted in the anterior right lower quadrant where there is mesenteric edema, peritoneal enhancement, and bowel wall thickening. The   appendix is absent. There is no bowel obstruction or free air.    LYMPH NODES: Normal.    VASCULATURE: Normal.    PELVIC ORGANS: Normal.    MUSCULOSKELETAL: Normal.      Impression    IMPRESSION:   1.  Redemonstrated right lower quadrant inflammatory change with mesenteric edema, peritoneal enhancement, and bowel wall thickening in the region of a ventriculoperitoneal shunt catheter, highly concerning for infection along the catheter segment in   this region. No drainable abscess.

## 2024-04-14 NOTE — MEDICATION SCRIBE - ADMISSION MEDICATION HISTORY
Medication Scribe Admission Medication History    Admission medication history is complete. The information provided in this note is only as accurate as the sources available at the time of the update.    Information Source(s): Patient and CareEverywhere/SureScripts via in-person    Pertinent Information: None    Changes made to PTA medication list:  Added: Fish Oil, Magnesium Citrate  Deleted: None  Changed: None    Allergies reviewed with patient and updates made in EHR: yes    Medication History Completed By: Juliana Murphy 4/14/2024 4:20 PM    PTA Med List   Medication Sig Last Dose    acetaminophen (TYLENOL) 325 MG tablet Take 2 tablets (650 mg) by mouth every 4 hours as needed for mild pain Unknown at unknown    ibuprofen (ADVIL/MOTRIN) 600 MG tablet Take 600 mg by mouth every 6 hours as needed for moderate pain Unknown at unknown    MAGNESIUM CITRATE PO Take 1-2 tablets by mouth daily 4/13/2024 at 1600    Omega-3 Fatty Acids (FISH OIL PO) Take 2 capsules by mouth daily Past Week at unknown    oxyCODONE (ROXICODONE) 5 MG tablet Take 5 mg by mouth every 4 hours as needed for severe pain Unknown at unknown

## 2024-04-14 NOTE — CONSULTS
"[facility]       NEUROSURGERY CONSULTATION NOTE    This consultation was requested by Dr. Valerio from the Medicine service.      Reason for Consultation \"hx hydrocephalus with  shunt, c/f infected shunt in abdomen\"    HPI:    Donald Jackson is a 29 year old male with PMH of prematurity, congenital hydrocephalus (s/p  shunt at ~age 3) recent acute appendicitis (admitted 12/03/2023) with laparoscopic appendectomy complicated by intraoperative perforation (12/5/2023) who recently underwent shunt revision with Dr. Martinez due to ascess formation near the distal tip of his prior  shunt (3/19/2024), was implanted with a ventriculopleural system (Codman Certas at 3). At the last Neurosurgery clinic visit on 4/1 he was doing well.    Unfortunately, he developed acute onset RIGHT abdominal pain in the interim for which he presented to the ED. Imaging demonstrated CTAP 4/14 was concerning for infection along one of the retained catheters in his abdomen in the RLQ. Patient denies headaches, weakness, LOC, numbness/weakness/paresthesias in extremities, changes in sensation, taste, smell, nor trouble speaking or other neurologic symptoms.    PAST MEDICAL HISTORY:   Past Medical History:   Diagnosis Date    Hydrocephalus (H)     Lattice degeneration     Morbid obesity (H)     Other forms of retinal detachment(361.89)        PAST SURGICAL HISTORY:   Past Surgical History:   Procedure Laterality Date    LAPAROSCOPIC ASSISTED IMPLANT SHUNT VENTRICULOPERITONEAL Right 2/5/2024    Procedure: Implant Shunt Ventriculopleural;  Surgeon: Almas Noriega MD;  Location: [facility] OR    OPTICAL TRACKING SYSTEM IMPLANT SHUNT VENTRICULOPERITONEAL Right 2/5/2024    Procedure: RIGHT VENTRICULOPLEURAL Insertion;  Surgeon: Fabiola Martinez MD;  Location: [facility] OR    RETINAL REATTACHMENT  10/2010    left eye    RETINOPEXY LASER PROPHYLAXIS BREAK(S) OD (RIGHT EYE)  10/2011    shunt in head      a " "couple as a child    strabismus surery      age 5    THORACOSCOPY Right 3/19/2024    Procedure: RIGHT THORACOSCOPIC REPOSITIONING OF VENTRICULO-PLEURAL SHUNT CATHETER;  Surgeon: Almas Noriega MD;  Location: [facility] OR    TONSILLECTOMY      wisdom teeth         FAMILY HISTORY:   Family History   Problem Relation Age of Onset    Diabetes Father         Type 2     Diabetes Paternal Grandfather         Type 2     Retinal detachment No family hx of     Amblyopia No family hx of     Glaucoma No family hx of     Macular Degeneration No family hx of     Anesthesia Reaction No family hx of     Deep Vein Thrombosis (DVT) No family hx of        SOCIAL HISTORY:   Social History     Tobacco Use    Smoking status: Never    Smokeless tobacco: Never   Substance Use Topics    Alcohol use: No       MEDICATIONS:  Current Outpatient Medications   Medication Sig Dispense Refill    acetaminophen (TYLENOL) 325 MG tablet Take 2 tablets (650 mg) by mouth every 4 hours as needed for mild pain      ibuprofen (ADVIL/MOTRIN) 600 MG tablet Take 600 mg by mouth every 6 hours as needed for moderate pain      oxyCODONE (ROXICODONE) 5 MG tablet Take 1 tablet (5 mg) by mouth every 4 hours as needed for moderate to severe pain 6 tablet 0       Allergies:  Allergies   Allergen Reactions    Penicillins Hives and Unknown       ROS: 10 point ROS of systems including Constitutional, Eyes, Respiratory, Cardiovascular, Gastroenterology, Genitourinary, Integumentary, Muscularskeletal, Psychiatric were all negative except for pertinent positives noted in my HPI.    Physical exam:   Blood pressure 110/58, pulse 98, temperature 98.1  F (36.7  C), temperature source Oral, resp. rate 16, height 1.727 m (5' 8\"), weight 124.7 kg (275 lb), SpO2 93%.  General: awake and alert  HEENT: Normocephalic, with well healing cranial incision on RIGHT frontal head; abdomen tender to palpation on RLQ  PULM: breathing comfortably on room air  MSK: No gross deformities  : " rectal tone deferred  NEUROLOGIC:  -- Awake; Alert; oriented x 4  -- Follows commands briskly  -- Speech fluent, spontaneous. No aphasia or dysarthria.  -- no gaze preference. No apparent hemineglect.  Cranial Nerves:  -- visual fields full to confrontation, PERRL 3-2mm bilat and brisk, extraocular movements intact  -- face symmetrical, tongue midline  -- sensory V1-V3 intact bilaterally  -- palate elevates symmetrically, uvula midline  -- hearing grossly intact bilat  -- Trapezii 5/5 strength bilat symmetric  -- Cerebellar: Finger nose finger without dysmetria, intact rapid alternating motions bilaterally    Motor:  Normal bulk / tone; no tremor, rigidity, or bradykinesia.  No muscle wasting or fasciculations  No Pronator Drift     Delt Bi Tri Hand Flexion/  Extension Iliopsoas Quadriceps Hamstrings Tibialis Anterior Gastroc    C5 C6 C7 C8/T1 L2 L3 L4-S1 L4 S1   R 5 5 5 5 5 5 5 5 5   L 5 5 5 5 5 5 5 5 5     Sensory:  intact to LT x 4 extremities       Reflexes: no clonus       BR Elena Pat Bab     C6 UMN L2-4 UMN   R 2+ Norm 2+ Norm   L 2+ Norm 2+ Norm      Gait: deferred.       IMAGING:  Narrative & Impression   EXAM: CT ABDOMEN PELVIS W CONTRAST  LOCATION: [facility]  DATE: 4/14/2024     INDICATION: sbo  COMPARISON: CT from 03/05/2024.  TECHNIQUE: CT scan of the abdomen and pelvis was performed following injection of IV contrast. Multiplanar reformats were obtained. Dose reduction techniques were used.  CONTRAST: iopamidol (ISOVUE 370) solution 136 mL     FINDINGS:   LOWER CHEST: Very small right pleural effusion. Anterior pleural shunt catheter in the right hemithorax.     HEPATOBILIARY: Normal.     PANCREAS: Normal.     SPLEEN: Normal.     ADRENAL GLANDS: Normal.     KIDNEYS/BLADDER: Normal.     BOWEL: Again noted is the presence of 2 intraperitoneal shunt catheters. The smaller, more inferiorly located catheter is noted in the anterior right lower quadrant where  there is mesenteric edema, peritoneal enhancement, and bowel wall thickening. The   appendix is absent. There is no bowel obstruction or free air.     LYMPH NODES: Normal.     VASCULATURE: Normal.     PELVIC ORGANS: Normal.     MUSCULOSKELETAL: Normal.                                                                      IMPRESSION:   1.  Redemonstrated right lower quadrant inflammatory change with mesenteric edema, peritoneal enhancement, and bowel wall thickening in the region of a ventriculoperitoneal shunt catheter, highly concerning for infection along the catheter segment in   this region. No drainable abscess.         LABS:   Last Comprehensive Metabolic Panel:  Lab Results   Component Value Date     04/14/2024    POTASSIUM 4.1 04/14/2024    CHLORIDE 106 04/14/2024    CO2 21 (L) 04/14/2024    ANIONGAP 10 04/14/2024     (H) 04/14/2024    BUN 20.8 (H) 04/14/2024    CR 0.88 04/14/2024    GFRESTIMATED >90 04/14/2024    ANTHONY 8.5 (L) 04/14/2024         Lab Results   Component Value Date    WBC 18.7 04/14/2024    WBC 8.0 08/16/2020     Lab Results   Component Value Date    RBC 6.02 04/14/2024    RBC 5.97 08/16/2020     Lab Results   Component Value Date    HGB 13.8 04/14/2024    HGB 15.2 08/16/2020     Lab Results   Component Value Date    HCT 44.3 04/14/2024    HCT 47.1 08/16/2020     Lab Results   Component Value Date    MCV 74 04/14/2024    MCV 79 08/16/2020     Lab Results   Component Value Date    MCH 22.9 04/14/2024    MCH 25.5 08/16/2020     Lab Results   Component Value Date    MCHC 31.2 04/14/2024    MCHC 32.3 08/16/2020     Lab Results   Component Value Date    RDW 17.8 04/14/2024    RDW 14.9 08/16/2020     Lab Results   Component Value Date     04/14/2024     08/16/2020     INR   Date Value Ref Range Status   04/14/2024 1.17 (H) 0.85 - 1.15 Final      aPTT   Date Value Ref Range Status   04/14/2024 31 22 - 38 Seconds Final          ASSESSMENT:  Donald Jackson is a 29 year old male  with PMH of prematurity, congenital hydrocephalus (s/p VentricloPeritoneal shunt at ~age 3) recent acute appendicitis (admitted 12/03/2023) with laparoscopic appendectomy complicated by intraoperative perforation (12/5/2023) who recently underwent shunt revision with Dr. Martinez due to ascess formation near the distal tip of his prior VentricloPeritoneal shunt (3/19/2024), was implanted with a ventriculo-pleural system (Codman Certas at 3). His current infection appears related to one of the distal abdominal shunt catheters left in his abdomen, which is not contiguous with his current ventriculo-pleural system. He is currently neurologically intact without any symptoms of shunt failure.        RECOMMENDATIONS:  No neurosurgical intervention indicated at this time   Consult to General Surgery for discussions about removal of retained abdominal catheters  Consult to Infectious Disease for antibiotic guidance  Neurosurgery will follow peripherally at this time. Please reach out to the Neurosurgery On-call for any questions.      The patient was discussed with Dr. Khalil, neurosurgery chief resident, and Dr. Hernandez, neurosurgery staff.    ANN MARSHALL MD on 4/14/2024 at 8:37 PM  PGY1 Neurosurgery Intern

## 2024-04-15 ENCOUNTER — ANESTHESIA (OUTPATIENT)
Dept: SURGERY | Facility: CLINIC | Age: 29
DRG: 907 | End: 2024-04-15
Payer: COMMERCIAL

## 2024-04-15 LAB
ABO/RH(D): NORMAL
ACANTHOCYTES BLD QL SMEAR: ABNORMAL
ALBUMIN SERPL BCG-MCNC: 3.6 G/DL (ref 3.5–5.2)
ALP SERPL-CCNC: 81 U/L (ref 40–150)
ALT SERPL W P-5'-P-CCNC: 20 U/L (ref 0–70)
ANION GAP SERPL CALCULATED.3IONS-SCNC: 11 MMOL/L (ref 7–15)
ANTIBODY SCREEN: NEGATIVE
AST SERPL W P-5'-P-CCNC: 13 U/L (ref 0–45)
AUER BODIES BLD QL SMEAR: ABNORMAL
BASO STIPL BLD QL SMEAR: ABNORMAL
BASOPHILS # BLD AUTO: 0.1 10E3/UL (ref 0–0.2)
BASOPHILS NFR BLD AUTO: 0 %
BILIRUB SERPL-MCNC: 1 MG/DL
BITE CELLS BLD QL SMEAR: ABNORMAL
BLISTER CELLS BLD QL SMEAR: ABNORMAL
BUN SERPL-MCNC: 13.4 MG/DL (ref 6–20)
BURR CELLS BLD QL SMEAR: ABNORMAL
CALCIUM SERPL-MCNC: 8.5 MG/DL (ref 8.6–10)
CHLORIDE SERPL-SCNC: 105 MMOL/L (ref 98–107)
CREAT SERPL-MCNC: 0.91 MG/DL (ref 0.67–1.17)
DACRYOCYTES BLD QL SMEAR: ABNORMAL
DEPRECATED HCO3 PLAS-SCNC: 22 MMOL/L (ref 22–29)
EGFRCR SERPLBLD CKD-EPI 2021: >90 ML/MIN/1.73M2
ELLIPTOCYTES BLD QL SMEAR: ABNORMAL
EOSINOPHIL # BLD AUTO: 0 10E3/UL (ref 0–0.7)
EOSINOPHIL NFR BLD AUTO: 0 %
ERYTHROCYTE [DISTWIDTH] IN BLOOD BY AUTOMATED COUNT: 16.9 % (ref 10–15)
FRAGMENTS BLD QL SMEAR: ABNORMAL
GLUCOSE BLDC GLUCOMTR-MCNC: 98 MG/DL (ref 70–99)
GLUCOSE SERPL-MCNC: 97 MG/DL (ref 70–99)
HCT VFR BLD AUTO: 43.7 % (ref 40–53)
HGB BLD-MCNC: 13.8 G/DL (ref 13.3–17.7)
HGB C CRYSTALS: ABNORMAL
HOWELL-JOLLY BOD BLD QL SMEAR: ABNORMAL
IMM GRANULOCYTES # BLD: 0.1 10E3/UL
IMM GRANULOCYTES NFR BLD: 1 %
LYMPHOCYTES # BLD AUTO: 1.5 10E3/UL (ref 0.8–5.3)
LYMPHOCYTES NFR BLD AUTO: 8 %
MCH RBC QN AUTO: 23.7 PG (ref 26.5–33)
MCHC RBC AUTO-ENTMCNC: 31.6 G/DL (ref 31.5–36.5)
MCV RBC AUTO: 75 FL (ref 78–100)
MONOCYTES # BLD AUTO: 1.2 10E3/UL (ref 0–1.3)
MONOCYTES NFR BLD AUTO: 7 %
NEUTROPHILS # BLD AUTO: 14.8 10E3/UL (ref 1.6–8.3)
NEUTROPHILS NFR BLD AUTO: 84 %
NEUTS HYPERSEG BLD QL SMEAR: ABNORMAL
NRBC # BLD AUTO: 0 10E3/UL
NRBC BLD AUTO-RTO: 0 /100
PLAT MORPH BLD: ABNORMAL
PLATELET # BLD AUTO: 198 10E3/UL (ref 150–450)
POLYCHROMASIA BLD QL SMEAR: SLIGHT
POTASSIUM SERPL-SCNC: 3.7 MMOL/L (ref 3.4–5.3)
PROT SERPL-MCNC: 6.6 G/DL (ref 6.4–8.3)
RBC # BLD AUTO: 5.83 10E6/UL (ref 4.4–5.9)
RBC AGGLUT BLD QL: ABNORMAL
RBC MORPH BLD: ABNORMAL
ROULEAUX BLD QL SMEAR: ABNORMAL
SICKLE CELLS BLD QL SMEAR: ABNORMAL
SMUDGE CELLS BLD QL SMEAR: ABNORMAL
SODIUM SERPL-SCNC: 138 MMOL/L (ref 135–145)
SPECIMEN EXPIRATION DATE: NORMAL
SPHEROCYTES BLD QL SMEAR: ABNORMAL
STOMATOCYTES BLD QL SMEAR: ABNORMAL
TARGETS BLD QL SMEAR: SLIGHT
TOXIC GRANULES BLD QL SMEAR: ABNORMAL
VANCOMYCIN SERPL-MCNC: <4 UG/ML
VARIANT LYMPHS BLD QL SMEAR: ABNORMAL
WBC # BLD AUTO: 17.6 10E3/UL (ref 4–11)

## 2024-04-15 PROCEDURE — 80053 COMPREHEN METABOLIC PANEL: CPT | Performed by: STUDENT IN AN ORGANIZED HEALTH CARE EDUCATION/TRAINING PROGRAM

## 2024-04-15 PROCEDURE — 250N000011 HC RX IP 250 OP 636

## 2024-04-15 PROCEDURE — 36415 COLL VENOUS BLD VENIPUNCTURE: CPT | Performed by: INTERNAL MEDICINE

## 2024-04-15 PROCEDURE — 88300 SURGICAL PATH GROSS: CPT | Mod: 26 | Performed by: PATHOLOGY

## 2024-04-15 PROCEDURE — 250N000025 HC SEVOFLURANE, PER MIN: Performed by: SURGERY

## 2024-04-15 PROCEDURE — 36415 COLL VENOUS BLD VENIPUNCTURE: CPT

## 2024-04-15 PROCEDURE — 120N000002 HC R&B MED SURG/OB UMMC

## 2024-04-15 PROCEDURE — 250N000009 HC RX 250: Performed by: ANESTHESIOLOGY

## 2024-04-15 PROCEDURE — 36415 COLL VENOUS BLD VENIPUNCTURE: CPT | Performed by: STUDENT IN AN ORGANIZED HEALTH CARE EDUCATION/TRAINING PROGRAM

## 2024-04-15 PROCEDURE — 250N000013 HC RX MED GY IP 250 OP 250 PS 637

## 2024-04-15 PROCEDURE — 370N000017 HC ANESTHESIA TECHNICAL FEE, PER MIN: Performed by: SURGERY

## 2024-04-15 PROCEDURE — 49320 DIAG LAPARO SEPARATE PROC: CPT | Performed by: NURSE ANESTHETIST, CERTIFIED REGISTERED

## 2024-04-15 PROCEDURE — 86900 BLOOD TYPING SEROLOGIC ABO: CPT

## 2024-04-15 PROCEDURE — 258N000003 HC RX IP 258 OP 636: Performed by: EMERGENCY MEDICINE

## 2024-04-15 PROCEDURE — 999N000141 HC STATISTIC PRE-PROCEDURE NURSING ASSESSMENT: Performed by: SURGERY

## 2024-04-15 PROCEDURE — 250N000009 HC RX 250: Performed by: NURSE ANESTHETIST, CERTIFIED REGISTERED

## 2024-04-15 PROCEDURE — 250N000011 HC RX IP 250 OP 636: Mod: JZ | Performed by: EMERGENCY MEDICINE

## 2024-04-15 PROCEDURE — 250N000011 HC RX IP 250 OP 636: Performed by: ANESTHESIOLOGY

## 2024-04-15 PROCEDURE — 99232 SBSQ HOSP IP/OBS MODERATE 35: CPT | Performed by: STUDENT IN AN ORGANIZED HEALTH CARE EDUCATION/TRAINING PROGRAM

## 2024-04-15 PROCEDURE — 99233 SBSQ HOSP IP/OBS HIGH 50: CPT | Performed by: INTERNAL MEDICINE

## 2024-04-15 PROCEDURE — 272N000001 HC OR GENERAL SUPPLY STERILE: Performed by: SURGERY

## 2024-04-15 PROCEDURE — 85025 COMPLETE CBC W/AUTO DIFF WBC: CPT | Performed by: STUDENT IN AN ORGANIZED HEALTH CARE EDUCATION/TRAINING PROGRAM

## 2024-04-15 PROCEDURE — 258N000003 HC RX IP 258 OP 636: Performed by: ANESTHESIOLOGY

## 2024-04-15 PROCEDURE — 250N000011 HC RX IP 250 OP 636: Performed by: EMERGENCY MEDICINE

## 2024-04-15 PROCEDURE — 80202 ASSAY OF VANCOMYCIN: CPT | Performed by: INTERNAL MEDICINE

## 2024-04-15 PROCEDURE — 49320 DIAG LAPARO SEPARATE PROC: CPT | Performed by: ANESTHESIOLOGY

## 2024-04-15 PROCEDURE — 250N000011 HC RX IP 250 OP 636: Performed by: STUDENT IN AN ORGANIZED HEALTH CARE EDUCATION/TRAINING PROGRAM

## 2024-04-15 PROCEDURE — 360N000076 HC SURGERY LEVEL 3, PER MIN: Performed by: SURGERY

## 2024-04-15 PROCEDURE — 710N000010 HC RECOVERY PHASE 1, LEVEL 2, PER MIN: Performed by: SURGERY

## 2024-04-15 PROCEDURE — 88300 SURGICAL PATH GROSS: CPT | Mod: TC | Performed by: SURGERY

## 2024-04-15 PROCEDURE — 258N000003 HC RX IP 258 OP 636: Performed by: INTERNAL MEDICINE

## 2024-04-15 PROCEDURE — 0WCG4ZZ EXTIRPATION OF MATTER FROM PERITONEAL CAVITY, PERCUTANEOUS ENDOSCOPIC APPROACH: ICD-10-PCS | Performed by: STUDENT IN AN ORGANIZED HEALTH CARE EDUCATION/TRAINING PROGRAM

## 2024-04-15 PROCEDURE — 250N000011 HC RX IP 250 OP 636: Performed by: SURGERY

## 2024-04-15 PROCEDURE — 250N000011 HC RX IP 250 OP 636: Performed by: INTERNAL MEDICINE

## 2024-04-15 RX ORDER — HYDROMORPHONE HCL IN WATER/PF 6 MG/30 ML
0.2 PATIENT CONTROLLED ANALGESIA SYRINGE INTRAVENOUS EVERY 5 MIN PRN
Status: DISCONTINUED | OUTPATIENT
Start: 2024-04-15 | End: 2024-04-15 | Stop reason: HOSPADM

## 2024-04-15 RX ORDER — ONDANSETRON 2 MG/ML
4 INJECTION INTRAMUSCULAR; INTRAVENOUS EVERY 30 MIN PRN
Status: DISCONTINUED | OUTPATIENT
Start: 2024-04-15 | End: 2024-04-15 | Stop reason: HOSPADM

## 2024-04-15 RX ORDER — SODIUM CHLORIDE, SODIUM LACTATE, POTASSIUM CHLORIDE, CALCIUM CHLORIDE 600; 310; 30; 20 MG/100ML; MG/100ML; MG/100ML; MG/100ML
INJECTION, SOLUTION INTRAVENOUS CONTINUOUS
Status: DISCONTINUED | OUTPATIENT
Start: 2024-04-15 | End: 2024-04-15 | Stop reason: HOSPADM

## 2024-04-15 RX ORDER — FENTANYL CITRATE 50 UG/ML
50 INJECTION, SOLUTION INTRAMUSCULAR; INTRAVENOUS EVERY 5 MIN PRN
Status: DISCONTINUED | OUTPATIENT
Start: 2024-04-15 | End: 2024-04-15 | Stop reason: HOSPADM

## 2024-04-15 RX ORDER — HYDROMORPHONE HCL IN WATER/PF 6 MG/30 ML
0.4 PATIENT CONTROLLED ANALGESIA SYRINGE INTRAVENOUS EVERY 5 MIN PRN
Status: DISCONTINUED | OUTPATIENT
Start: 2024-04-15 | End: 2024-04-15 | Stop reason: HOSPADM

## 2024-04-15 RX ORDER — PROPOFOL 10 MG/ML
INJECTION, EMULSION INTRAVENOUS PRN
Status: DISCONTINUED | OUTPATIENT
Start: 2024-04-15 | End: 2024-04-15

## 2024-04-15 RX ORDER — NALOXONE HYDROCHLORIDE 0.4 MG/ML
0.4 INJECTION, SOLUTION INTRAMUSCULAR; INTRAVENOUS; SUBCUTANEOUS
Status: DISCONTINUED | OUTPATIENT
Start: 2024-04-15 | End: 2024-04-20 | Stop reason: HOSPADM

## 2024-04-15 RX ORDER — LIDOCAINE 40 MG/G
CREAM TOPICAL
Status: DISCONTINUED | OUTPATIENT
Start: 2024-04-15 | End: 2024-04-15 | Stop reason: HOSPADM

## 2024-04-15 RX ORDER — ONDANSETRON 4 MG/1
4 TABLET, ORALLY DISINTEGRATING ORAL EVERY 30 MIN PRN
Status: DISCONTINUED | OUTPATIENT
Start: 2024-04-15 | End: 2024-04-15 | Stop reason: HOSPADM

## 2024-04-15 RX ORDER — BUPIVACAINE HYDROCHLORIDE 2.5 MG/ML
INJECTION, SOLUTION INFILTRATION; PERINEURAL PRN
Status: DISCONTINUED | OUTPATIENT
Start: 2024-04-15 | End: 2024-04-15 | Stop reason: HOSPADM

## 2024-04-15 RX ORDER — NALOXONE HYDROCHLORIDE 0.4 MG/ML
0.2 INJECTION, SOLUTION INTRAMUSCULAR; INTRAVENOUS; SUBCUTANEOUS
Status: DISCONTINUED | OUTPATIENT
Start: 2024-04-15 | End: 2024-04-20 | Stop reason: HOSPADM

## 2024-04-15 RX ORDER — ACETAMINOPHEN 325 MG/1
975 TABLET ORAL ONCE
Status: DISCONTINUED | OUTPATIENT
Start: 2024-04-15 | End: 2024-04-15 | Stop reason: HOSPADM

## 2024-04-15 RX ORDER — NALOXONE HYDROCHLORIDE 0.4 MG/ML
0.1 INJECTION, SOLUTION INTRAMUSCULAR; INTRAVENOUS; SUBCUTANEOUS
Status: DISCONTINUED | OUTPATIENT
Start: 2024-04-15 | End: 2024-04-15 | Stop reason: HOSPADM

## 2024-04-15 RX ORDER — SODIUM CHLORIDE, SODIUM LACTATE, POTASSIUM CHLORIDE, CALCIUM CHLORIDE 600; 310; 30; 20 MG/100ML; MG/100ML; MG/100ML; MG/100ML
INJECTION, SOLUTION INTRAVENOUS CONTINUOUS PRN
Status: DISCONTINUED | OUTPATIENT
Start: 2024-04-15 | End: 2024-04-15

## 2024-04-15 RX ORDER — ACETAMINOPHEN 325 MG/1
975 TABLET ORAL ONCE
Status: COMPLETED | OUTPATIENT
Start: 2024-04-15 | End: 2024-04-15

## 2024-04-15 RX ORDER — HYDROMORPHONE HCL IN WATER/PF 6 MG/30 ML
0.2 PATIENT CONTROLLED ANALGESIA SYRINGE INTRAVENOUS EVERY 4 HOURS PRN
Status: DISCONTINUED | OUTPATIENT
Start: 2024-04-15 | End: 2024-04-17

## 2024-04-15 RX ORDER — FENTANYL CITRATE 50 UG/ML
25 INJECTION, SOLUTION INTRAMUSCULAR; INTRAVENOUS EVERY 5 MIN PRN
Status: DISCONTINUED | OUTPATIENT
Start: 2024-04-15 | End: 2024-04-15 | Stop reason: HOSPADM

## 2024-04-15 RX ORDER — HYDROXYZINE HYDROCHLORIDE 25 MG/1
25 TABLET, FILM COATED ORAL EVERY 6 HOURS PRN
Status: DISCONTINUED | OUTPATIENT
Start: 2024-04-15 | End: 2024-04-15 | Stop reason: HOSPADM

## 2024-04-15 RX ORDER — LIDOCAINE HYDROCHLORIDE 20 MG/ML
INJECTION, SOLUTION INFILTRATION; PERINEURAL PRN
Status: DISCONTINUED | OUTPATIENT
Start: 2024-04-15 | End: 2024-04-15

## 2024-04-15 RX ORDER — DEXAMETHASONE SODIUM PHOSPHATE 4 MG/ML
INJECTION, SOLUTION INTRA-ARTICULAR; INTRALESIONAL; INTRAMUSCULAR; INTRAVENOUS; SOFT TISSUE PRN
Status: DISCONTINUED | OUTPATIENT
Start: 2024-04-15 | End: 2024-04-15

## 2024-04-15 RX ORDER — FENTANYL CITRATE 50 UG/ML
INJECTION, SOLUTION INTRAMUSCULAR; INTRAVENOUS PRN
Status: DISCONTINUED | OUTPATIENT
Start: 2024-04-15 | End: 2024-04-15

## 2024-04-15 RX ORDER — HALOPERIDOL 5 MG/ML
1 INJECTION INTRAMUSCULAR
Status: DISCONTINUED | OUTPATIENT
Start: 2024-04-15 | End: 2024-04-15 | Stop reason: HOSPADM

## 2024-04-15 RX ORDER — ONDANSETRON 2 MG/ML
INJECTION INTRAMUSCULAR; INTRAVENOUS PRN
Status: DISCONTINUED | OUTPATIENT
Start: 2024-04-15 | End: 2024-04-15

## 2024-04-15 RX ADMIN — HYDROMORPHONE HYDROCHLORIDE 0.5 MG: 1 INJECTION, SOLUTION INTRAMUSCULAR; INTRAVENOUS; SUBCUTANEOUS at 11:23

## 2024-04-15 RX ADMIN — PROPOFOL 200 MG: 10 INJECTION, EMULSION INTRAVENOUS at 10:36

## 2024-04-15 RX ADMIN — CEFEPIME HYDROCHLORIDE 2 G: 2 INJECTION, POWDER, FOR SOLUTION INTRAVENOUS at 21:45

## 2024-04-15 RX ADMIN — FENTANYL CITRATE 50 MCG: 50 INJECTION INTRAMUSCULAR; INTRAVENOUS at 11:09

## 2024-04-15 RX ADMIN — HYDROMORPHONE HYDROCHLORIDE 0.2 MG: 0.2 INJECTION, SOLUTION INTRAMUSCULAR; INTRAVENOUS; SUBCUTANEOUS at 21:43

## 2024-04-15 RX ADMIN — PROCHLORPERAZINE EDISYLATE 10 MG: 5 INJECTION INTRAMUSCULAR; INTRAVENOUS at 19:58

## 2024-04-15 RX ADMIN — OXYCODONE HYDROCHLORIDE 5 MG: 5 TABLET ORAL at 03:43

## 2024-04-15 RX ADMIN — Medication 50 MG: at 10:49

## 2024-04-15 RX ADMIN — Medication 20 MG: at 12:08

## 2024-04-15 RX ADMIN — ONDANSETRON 4 MG: 2 INJECTION INTRAMUSCULAR; INTRAVENOUS at 12:16

## 2024-04-15 RX ADMIN — METRONIDAZOLE 500 MG: 500 INJECTION, SOLUTION INTRAVENOUS at 19:15

## 2024-04-15 RX ADMIN — DEXAMETHASONE SODIUM PHOSPHATE 6 MG: 4 INJECTION, SOLUTION INTRA-ARTICULAR; INTRALESIONAL; INTRAMUSCULAR; INTRAVENOUS; SOFT TISSUE at 10:45

## 2024-04-15 RX ADMIN — VANCOMYCIN HYDROCHLORIDE 1250 MG: 10 INJECTION, POWDER, LYOPHILIZED, FOR SOLUTION INTRAVENOUS at 03:33

## 2024-04-15 RX ADMIN — METRONIDAZOLE 500 MG: 500 INJECTION, SOLUTION INTRAVENOUS at 03:31

## 2024-04-15 RX ADMIN — OXYCODONE HYDROCHLORIDE 5 MG: 5 TABLET ORAL at 19:58

## 2024-04-15 RX ADMIN — SODIUM CHLORIDE, POTASSIUM CHLORIDE, SODIUM LACTATE AND CALCIUM CHLORIDE: 600; 310; 30; 20 INJECTION, SOLUTION INTRAVENOUS at 10:31

## 2024-04-15 RX ADMIN — LIDOCAINE HYDROCHLORIDE 100 MG: 20 INJECTION, SOLUTION INFILTRATION; PERINEURAL at 10:35

## 2024-04-15 RX ADMIN — OXYCODONE HYDROCHLORIDE 5 MG: 5 TABLET ORAL at 14:21

## 2024-04-15 RX ADMIN — CEFEPIME HYDROCHLORIDE 2 G: 2 INJECTION, POWDER, FOR SOLUTION INTRAVENOUS at 15:15

## 2024-04-15 RX ADMIN — FENTANYL CITRATE 50 MCG: 50 INJECTION INTRAMUSCULAR; INTRAVENOUS at 11:01

## 2024-04-15 RX ADMIN — FENTANYL CITRATE 100 MCG: 50 INJECTION INTRAMUSCULAR; INTRAVENOUS at 10:35

## 2024-04-15 RX ADMIN — VANCOMYCIN HYDROCHLORIDE 1500 MG: 10 INJECTION, POWDER, LYOPHILIZED, FOR SOLUTION INTRAVENOUS at 17:27

## 2024-04-15 RX ADMIN — DEXMEDETOMIDINE HYDROCHLORIDE 8 MCG: 100 INJECTION, SOLUTION INTRAVENOUS at 12:16

## 2024-04-15 RX ADMIN — OXYCODONE HYDROCHLORIDE 5 MG: 5 TABLET ORAL at 08:57

## 2024-04-15 RX ADMIN — CALCIUM CARBONATE (ANTACID) CHEW TAB 500 MG 1000 MG: 500 CHEW TAB at 19:14

## 2024-04-15 RX ADMIN — SUGAMMADEX 200 MG: 100 INJECTION, SOLUTION INTRAVENOUS at 12:24

## 2024-04-15 RX ADMIN — ACETAMINOPHEN 975 MG: 325 TABLET, FILM COATED ORAL at 09:32

## 2024-04-15 RX ADMIN — SUCCINYLCHOLINE CHLORIDE 160 MG: 20 INJECTION, SOLUTION INTRAMUSCULAR; INTRAVENOUS; PARENTERAL at 10:36

## 2024-04-15 RX ADMIN — HYDROMORPHONE HYDROCHLORIDE 0.2 MG: 0.2 INJECTION, SOLUTION INTRAMUSCULAR; INTRAVENOUS; SUBCUTANEOUS at 17:12

## 2024-04-15 RX ADMIN — MIDAZOLAM 2 MG: 1 INJECTION INTRAMUSCULAR; INTRAVENOUS at 10:31

## 2024-04-15 RX ADMIN — ACETAMINOPHEN 650 MG: 325 TABLET, FILM COATED ORAL at 14:21

## 2024-04-15 RX ADMIN — ONDANSETRON 4 MG: 2 INJECTION INTRAMUSCULAR; INTRAVENOUS at 19:13

## 2024-04-15 RX ADMIN — CEFEPIME HYDROCHLORIDE 2 G: 2 INJECTION, POWDER, FOR SOLUTION INTRAVENOUS at 06:07

## 2024-04-15 RX ADMIN — DEXMEDETOMIDINE HYDROCHLORIDE 12 MCG: 100 INJECTION, SOLUTION INTRAVENOUS at 11:12

## 2024-04-15 RX ADMIN — ACETAMINOPHEN 650 MG: 325 TABLET, FILM COATED ORAL at 19:58

## 2024-04-15 RX ADMIN — ONDANSETRON 4 MG: 4 TABLET, ORALLY DISINTEGRATING ORAL at 16:39

## 2024-04-15 RX ADMIN — Medication 30 MG: at 11:33

## 2024-04-15 ASSESSMENT — ACTIVITIES OF DAILY LIVING (ADL)
ADLS_ACUITY_SCORE: 39
ADLS_ACUITY_SCORE: 39
ADLS_ACUITY_SCORE: 40
ADLS_ACUITY_SCORE: 39
ADLS_ACUITY_SCORE: 39
ADLS_ACUITY_SCORE: 40
ADLS_ACUITY_SCORE: 40
ADLS_ACUITY_SCORE: 39
ADLS_ACUITY_SCORE: 40
ADLS_ACUITY_SCORE: 39
ADLS_ACUITY_SCORE: 40
ADLS_ACUITY_SCORE: 39
ADLS_ACUITY_SCORE: 39
ADLS_ACUITY_SCORE: 40
ADLS_ACUITY_SCORE: 39
ADLS_ACUITY_SCORE: 40
ADLS_ACUITY_SCORE: 39

## 2024-04-15 NOTE — ANESTHESIA PREPROCEDURE EVALUATION
Anesthesia Pre-Procedure Evaluation    Patient: Donald Jackson   MRN: [id number] : 1995        Procedure : Procedure(s):  Laparoscopy diagnostic (general)          Past Medical History:   Diagnosis Date     Hydrocephalus (H)      Lattice degeneration      Morbid obesity (H)      Other forms of retinal detachment(361.89)       Past Surgical History:   Procedure Laterality Date     LAPAROSCOPIC ASSISTED IMPLANT SHUNT VENTRICULOPERITONEAL Right 2024    Procedure: Implant Shunt Ventriculopleural;  Surgeon: Almas Noriega MD;  Location: [facility] OR     OPTICAL TRACKING SYSTEM IMPLANT SHUNT VENTRICULOPERITONEAL Right 2024    Procedure: RIGHT VENTRICULOPLEURAL Insertion;  Surgeon: Fabiola Martinez MD;  Location: [facility] OR     RETINAL REATTACHMENT  10/2010    left eye     RETINOPEXY LASER PROPHYLAXIS BREAK(S) OD (RIGHT EYE)  10/2011     shunt in head      a couple as a child     strabismus surery      age 5     THORACOSCOPY Right 3/19/2024    Procedure: RIGHT THORACOSCOPIC REPOSITIONING OF VENTRICULO-PLEURAL SHUNT CATHETER;  Surgeon: Almas Noriega MD;  Location: [facility] OR     TONSILLECTOMY       wisdom teeth        Allergies   Allergen Reactions     Penicillins Hives and Unknown      Social History     Tobacco Use     Smoking status: Never     Smokeless tobacco: Never   Substance Use Topics     Alcohol use: No      Wt Readings from Last 1 Encounters:   24 124.7 kg (275 lb)        Anesthesia Evaluation   Pt has had prior anesthetic. Type: General.    History of anesthetic complications  - .  patient reports he was told after his 23 procedure he woke up aggressive.    ROS/MED HX  ENT/Pulmonary:       Neurologic: Comment: Hydrocephalus s/p  shunt. Recently s/p ventriculopleural shunt 24.    (-) no seizures and no CVA   Cardiovascular:     (+)  - -   -  - -                                 Previous cardiac testing   Echo: Date: Results:    Stress Test:  Date: Results:    ECG Reviewed:   "Date: 12/26/23 Results:  SR  Cath:  Date: Results:      METS/Exercise Tolerance: >4 METS    Hematologic:  - neg hematologic  ROS     Musculoskeletal:  - neg musculoskeletal ROS     GI/Hepatic:     (+)         appendicitis,        (-) GERD   Renal/Genitourinary:  - neg Renal ROS     Endo:     (+)               Obesity,       Psychiatric/Substance Use:  - neg psychiatric ROS     Infectious Disease:     (+)   MRSA,         Malignancy:  - neg malignancy ROS     Other:  - neg other ROS          Physical Exam    Airway        Mallampati: III   TM distance: > 3 FB   Neck ROM: full   Mouth opening: > 3 cm    Respiratory Devices and Support         Dental           Cardiovascular          Rhythm and rate: regular and normal     Pulmonary           breath sounds clear to auscultation         OUTSIDE LABS:  CBC:   Lab Results   Component Value Date    WBC 18.7 (H) 04/14/2024    WBC 13.8 (H) 04/13/2024    HGB 13.8 04/14/2024    HGB 13.8 04/13/2024    HCT 44.3 04/14/2024    HCT 44.1 04/13/2024     04/14/2024     04/13/2024     BMP:   Lab Results   Component Value Date     04/14/2024     04/13/2024    POTASSIUM 4.1 04/14/2024    POTASSIUM 3.6 04/13/2024    CHLORIDE 106 04/14/2024    CHLORIDE 103 04/13/2024    CO2 21 (L) 04/14/2024    CO2 24 04/13/2024    BUN 20.8 (H) 04/14/2024    BUN 25.5 (H) 04/13/2024    CR 0.88 04/14/2024    CR 1.06 04/13/2024     (H) 04/14/2024    GLC 95 04/13/2024     COAGS:   Lab Results   Component Value Date    PTT 31 04/14/2024    INR 1.17 (H) 04/14/2024     POC: No results found for: \"BGM\", \"HCG\", \"HCGS\"  HEPATIC:   Lab Results   Component Value Date    ALBUMIN 3.9 04/14/2024    PROTTOTAL 6.9 04/14/2024    ALT 37 04/14/2024    AST 18 04/14/2024    ALKPHOS 71 04/14/2024    BILITOTAL 0.5 04/14/2024     OTHER:   Lab Results   Component Value Date    LACT 0.9 01/27/2024    ANTHONY 8.5 (L) 04/14/2024    PHOS 3.5 01/28/2024    MAG 1.9 01/28/2024    LIPASE 16 04/13/2024    TSH " "1.35 03/17/2010    CRP <2.9 08/16/2020    SED 6 02/04/2024       Anesthesia Plan    ASA Status:  3       Anesthesia Type: General.     - Airway: ETT   Induction: Intravenous.   Maintenance: Inhalation.   Techniques and Equipment:     - Airway: Video-Laryngoscope       Consents    Anesthesia Plan(s) and associated risks, benefits, and realistic alternatives discussed. Questions answered and patient/representative(s) expressed understanding.     - Discussed:     - Discussed with:  Patient      - Extended Intubation/Ventilatory Support Discussed: No.      - Patient is DNR/DNI Status: No     Use of blood products discussed: No .     Postoperative Care    Pain management: IV analgesics, Oral pain medications, Multi-modal analgesia.   PONV prophylaxis: Ondansetron (or other 5HT-3), Dexamethasone or Solumedrol     Comments:               Yolie Sinha MD    I have reviewed the pertinent notes and labs in the chart from the past 30 days and (re)examined the patient.  Any updates or changes from those notes are reflected in this note.            # Coagulation Defect: INR = 1.17 (Ref range: 0.85 - 1.15) and/or PTT = 31 Seconds (Ref range: 22 - 38 Seconds), will monitor for bleeding   # Severe Obesity: Estimated body mass index is 41.81 kg/m  as calculated from the following:    Height as of this encounter: 1.727 m (5' 8\").    Weight as of this encounter: 124.7 kg (275 lb).      "

## 2024-04-15 NOTE — PROGRESS NOTES
[facility]    Medicine Progress Note - Hospitalist Service, GOLD TEAM 7    Date of Admission:  4/13/2024    Assessment & Plan   Donald Jackson is a 29 year old male with hx of hydrocephalus s/p  shunt placement, perforated appendicitis c/b abscess, and retinal detachment who presented with abdominal pain and is admitted for likely infection along  shunt catheter in RLQ.    Today's plan   -Patient Is going to OR for removal of retained pieces of peritoneal tip of catheter   -Added milk of magnesia for constipation concerns   -Made patient npo given surgery concerned that there is higher chance of ileus      Hx hydrocephalus s/p  shunt  Hx perforated appendicitis c/b abscess  C/f intraperitoneal infection of  shunt  Abdominal pain  Nausea  Abdominal pain, hx abscess near tip of  shunt, and leukocytosis c/f infected  shunt catheter. Findings on CTAP 4/14 highly suggestive of infection along catheter in RLQ. ID consult to determine need for shunt removal/exchange. Doesn't think he's been able to pass flatus sine 4/13 afternoon, but CT showed no bowel obstruction.  - neurosurgery consult  - consider general surgery consult in AM  - ID consult  - blood cultures x2 (had already been given antibiotics in ED)  - antibiotics  -prn benadryl for itching prior to administering iv vancomycin   - IV vancomycin  - IV flagyl 500 mg q8hrs   -cefepine 2gm q8 hours      Leukocytosis now improving   Likely 2/2 infection, as above  - CBC daily     Microcytosis  Hgb wnl at 13.8 but MCV of 73.   - consider peripheral smear  - iron studies             Diet: Advance Diet as Tolerated: Clear Liquid Diet    DVT Prophylaxis: Pneumatic Compression Devices  Chowdhury Catheter: Not present  Lines: None     Cardiac Monitoring: None  Code Status: Full Code      Clinically Significant Risk Factors               # Coagulation Defect: INR = 1.17 (Ref range: 0.85 - 1.15) and/or PTT = 31 Seconds (Ref  "range: 22 - 38 Seconds), will monitor for bleeding           # Severe Obesity: Estimated body mass index is 41.81 kg/m  as calculated from the following:    Height as of this encounter: 1.727 m (5' 8\").    Weight as of this encounter: 124.7 kg (275 lb)., PRESENT ON ADMISSION            Disposition Plan     Medically Ready for Discharge:              Dago Valerio MD  Hospitalist Service, [facility] TEAM 7  [facility]  Securely message with [employer] (more info)  Text page via [employer] Paging/Directory   See signed in provider for up to date coverage information  ______________________________________________________________________    Interval History   Patient is awake and alert, no new abdominal pain, difficulty breathing, fevers or malaise     Physical Exam   Vital Signs: Temp: 97.7  F (36.5  C) Temp src: Oral BP: (!) 156/87 Pulse: 116   Resp: 20 SpO2: 92 % O2 Device: None (Room air) Oxygen Delivery: 2 LPM  Weight: 275 lbs 0 oz    Constitutional: awake, alert, cooperative, no apparent distress, and appears stated age  Eyes: Lids and lashes normal, pupils equal, round and reactive to light, extra ocular muscles intact, sclera clear, conjunctiva normal  ENT: Normocephalic, without obvious abnormality, atraumatic, sinuses nontender on palpation, external ears without lesions, oral pharynx with moist mucous membranes, tonsils without erythema or exudates, gums normal and good dentition.  Hematologic / Lymphatic: no cervical lymphadenopathy  Respiratory: No increased work of breathing, good air exchange, clear to auscultation bilaterally, no crackles or wheezing  Cardiovascular: Normal apical impulse, regular rate and rhythm, normal S1 and S2, no S3 or S4, and no murmur noted  GI: No scars, normal bowel sounds, soft, non-distended, non-tender, no masses palpated, no hepatosplenomegally  Genitounirinary:   Skin: no bruising or bleeding  Musculoskeletal: There is no redness, warmth, or " swelling of the joints.  Full range of motion noted.  Motor strength is 5 out of 5 all extremities bilaterally.  Tone is normal.  Neurologic: Awake, alert, oriented to name, place and time.  Cranial nerves II-XII are grossly intact.  Motor is 5 out of 5 bilaterally.  Cerebellar finger to nose, heel to shin intact.  Sensory is intact.  Babinski down going, Romberg negative, and gait is normal.  Neuropsychiatric: General: normal, calm, and normal eye contact    Medical Decision Making       42 MINUTES SPENT BY ME on the date of service doing chart review, history, exam, documentation & further activities per the note.      Data     I have personally reviewed the following data over the past 24 hrs:    17.6 (H)  \   13.8   / 198     138 105 13.4 /  98   3.7 22 0.91 \     ALT: 20 AST: 13 AP: 81 TBILI: 1.0   ALB: 3.6 TOT PROTEIN: 6.6 LIPASE: N/A       Imaging results reviewed over the past 24 hrs:   No results found for this or any previous visit (from the past 24 hour(s)).

## 2024-04-15 NOTE — ANESTHESIA PROCEDURE NOTES
Airway       Patient location during procedure: OR       Procedure Start/Stop Times: 4/15/2024 10:39 AM  Staff -        CRNA: Raeann Taylor APRN CRNA       Performed By: CRNA  Consent for Airway        Urgency: elective  Indications and Patient Condition       Indications for airway management: yojana-procedural       Induction type:RSI       Mask difficulty assessment: 0 - not attempted    Final Airway Details       Final airway type: endotracheal airway       Successful airway: ETT - single and Oral  Endotracheal Airway Details        ETT size (mm): 7.5       Cuffed: yes       Successful intubation technique: direct laryngoscopy       DL Blade Type: Corral 2       Grade View of Cords: 1       Adjucts: stylet       Position: Right       Measured from: gums/teeth       Secured at (cm): 23    Post intubation assessment        Placement verified by: capnometry, equal breath sounds and chest rise        Number of attempts at approach: 1       Secured with: tape       Ease of procedure: easy       Dentition: Intact and Unchanged    Medication(s) Administered   Medication Administration Time: 4/15/2024 10:39 AM

## 2024-04-15 NOTE — BRIEF OP NOTE
[facility]    Brief Operative Note     Pre-operative diagnosis: * No pre-op diagnosis entered *  Post-operative diagnosis Abdominal phlegmon, abdominal foreign body ( shunt catheter)    Procedure: Laparoscopy diagnostic, abdominal washout, lysis of adhesion, removal of intra abdominal foreign body, N/A - Abdomen    Surgeon: Surgeons and Role:     * Ender Wright MD - Primary     * Estrada Kamara MD - Resident - Assisting     * Anita Nielson MD - Resident - Assisting     * John Mariee MD - Resident - Assisting  Anesthesia: General   Estimated Blood Loss: Minimal    Drains: No drains left. One short  shunt remains from prior  Specimens:   ID Type Source Tests Collected by Time Destination   1 : peritoneal catheter Implant Explant SURGICAL PATHOLOGY EXAM Ender Wright MD 4/15/2024 12:15 PM      Findings: Diffuse but thin adnesions to abdominal wall. Small abscess in LLQ adjacent to tip of  shunt catheter. Significant inflammatory changes in LLQ. RLQ with some rind but minimal inflammation and no phlegmon or abscess in vicinity of cecum. Staple line was not visualized. Longer  shunt cather was removed from the abdomen.  .  Complications: None.  Implants: * No implants in log *

## 2024-04-15 NOTE — PROVIDER NOTIFICATION
Renetta, Donald. [id number] - Pt reporting pain of 8 asking for additional medication. All PRNs given. He says IV Dilaudid has worked in the past. Jere Warner -[phone]    Above sent to Dago Warner RN

## 2024-04-15 NOTE — PROGRESS NOTES
"  Emergency General Surgery Progress Note  Surgery Cross-Cover  Post Op Check    04/15/2024    Donald Jackson is a 29 year old male POD#0 s/p Procedure(s):  Laparoscopy diagnostic, abdominal washout, lysis of adhesion, removal of intra abdominal foreign body for Pre-Op Diagnosis Codes:     * Infection of ventriculoperitoneal shunt, initial encounter (H24) [T85.730A]    Pt reports their pain is moderately controlled with current regimen. Denies nausea, SOB, chest pain, or dizziness.     /79 (BP Location: Right arm)   Pulse 116   Temp 97.7  F (36.5  C) (Oral)   Resp 20   Ht 1.727 m (5' 8\")   Wt 124.7 kg (275 lb)   SpO2 92%   BMI 41.81 kg/m      Gen: A&O x4, NAD   Chest: breathing non-labored on room air    Abdomen: soft, non-peritonitic, appropriately tender, non-distended  Incision: clean, dry, intact closed with dermabond  Extremities: warm and well perfused  Devices:  none    A/P: Donald Jackson is a 29 year old male with hx of hydrocephalus s/p  shunt placement, perforated appendicitis c/b abscess, and retinal detachment who presented with abdominal pain and is admitted for infection adjacent to  shunt catheter in [facility] now s/p diagnostic laparoscopy, abdominal washout and removal of catheter. No acute post-op issues. Continue plan of care per primary team. Please call with any questions.    Pain: per primary   Diet: NPO, sips okay. Expect an ileus. Okay for no NG tube if not having emesis  mIVF: recommend fluids while NPO. Rate and fluid type per primary team  Bowel Regimen: await return of bowel function  DVT ppx: chemical and mechanical  Tubes: no new drains left  Special Considerations: continue IV antibiotics. We would consider today source control      Dom Brown MD   "

## 2024-04-15 NOTE — PROGRESS NOTES
Brief surgery note    Patient presented with pain mainly in the right lower abdomen resembling previous episodes of appendicitis but worse, he has complex history of appendectomy in December 2023 that was complicated with intra-abdominal abscesses requiring drainage and replacement of the left  shunt to ventriculopleural shunt earlier this year.    On examination, tachycardia, hypertension, and distress of..  Tenderness in the right lower quadrant with guarding.    WBC 17,6     CT scan personally reviewed and showed inflammation in the right lower quadrant along the course of the  shunt which crosses over the midline over the dome of the bladder no free air, no drainable collections.    Impression: Recurrent stump appendicitis versus inflammation related to the remnant of the  shunt.      Plan is for diagnostic lap and proceed as indicated with removal of the remnant peritoneal catheter completion appendectomy indicated procedures.  Patient was counseled about the procedure in details with all risks and benefits explained, including but not limited to inability to remove the peritoneal catheter if it is densely adherent to the bowel loops limiting the ability to safely dissected without causing significant injury and inability to locate the appendix given the recurrent inflammation as well as bleeding, intra-abdominal abscesses, colon injury, incisional complications including infection or hernia, conversion to open.  Patient demonstrates understanding and agrees to proceed with the procedure as indicated.    Discussed with Dr. Kyle Nielson, MBBS, FEBS ([facility])  General Surgery PGY-5  *[phone]

## 2024-04-15 NOTE — PROGRESS NOTES
"/66   Pulse 115   Temp 98.7  F (37.1  C) (Oral)   Resp 17   Ht 1.727 m (5' 8\")   Wt 124.7 kg (275 lb)   SpO2 95%   BMI 41.81 kg/m      Pt is A&OX4, on RA, denies SOB, pt pain treated PRN Oxy and tylenol. Pt voiding without difficulty, no BM this shift. Pt Q2 neuro check, and he seem to be intact neurologically. Pt slept all night and had no new health concerns at this time. CPC   "

## 2024-04-15 NOTE — ANESTHESIA POSTPROCEDURE EVALUATION
Patient: Donald Jackson    Procedure: Procedure(s):  Laparoscopy diagnostic, abdominal washout, lysis of adhesion, removal of intra abdominal foreign body       Anesthesia Type:  General    Note:  Disposition: Inpatient   Postop Pain Control: Uneventful            Sign Out: Well controlled pain   PONV: No   Neuro/Psych: Uneventful            Sign Out: Acceptable/Baseline neuro status   Airway/Respiratory: Uneventful            Sign Out: Acceptable/Baseline resp. status   CV/Hemodynamics: Uneventful            Sign Out: Acceptable CV status; No obvious hypovolemia; No obvious fluid overload   Other NRE: NONE   DID A NON-ROUTINE EVENT OCCUR? No           Last vitals:  Vitals Value Taken Time   /78 04/15/24 1245   Temp 36.7  C (98  F) 04/15/24 1245   Pulse 115 04/15/24 1254   Resp 26 04/15/24 1254   SpO2 94 % 04/15/24 1254   Vitals shown include unfiled device data.    Electronically Signed By: Buzz Neves MD  April 15, 2024  12:54 PM

## 2024-04-15 NOTE — PROGRESS NOTES
General Infectious Disease Service - Green Team    Patient:  Donald Jackson, Date of birth 1995, Medical record number [id number]  Date of Admission: 4/13/2024  Date of Visit:  4/15/2024         Assessment and Recommendations:   Problem List:    1. Acute right-sided abdominal pain with CT A/P 4/14 notable for bowel wall thickening, edema, and peritoneal enhancement c/f infection of retained intraabdominal  shunt tip  2. Leukocytosis  3.  shunt with tip near abdominal abscess (from #4) s/p revision to ventriculopleural shunt (2/2/24) c/b malposition with subcutaneous pseudocyst s/p revision (3/19/24).   a. New shunt remains in R pleural space. Still has retained tip in abdomen.  4. Recent appendicitis with perforation s/p appendectomy (12/5/23 at [facility]) c/b peritonitis and intraabdominal abscess  a. Abscess s/p IR drain (12/12/23 = Cx pan-susceptible Pseudomonas). S/p PO levo + flagyl with EOT 12/29/23  b. CT A/P 2/2/24 with 2.2 cm collection c/f abscess in R hemipelvis, decreased from prior. CT A/P 3/5/24 with inflammatory changes in R pelvis around retained distal  shunt tip c/f infected catheter without drain able collection.  5. Penicillin allergy - childhood, unknown reaction (listed hives)  History of MRSA (arm swab, 2008)    Discussion:    Donald Jackson is a 29 year old male with PMHx significant for hydrocephalus s/p  shunt, with recent perforated appendicitis c/b intraabdominal abscess (12/2023, s/p IR drain with Cx = PsdA and PO abx course), with  shunt changed to ventriculopleural due to residual RLQ abscess vs pseudocyst on 2/2/24 (old tip still in abd) further c/b new  shunt malposition with subcutaneous pseudocyst s/p revision 3/19/24 who presented to ED on 4/13/24 with acute R-sided abdominal pain and CT imaging concerning for infection along retained abdominal catheter for which ID was consulted.      Acute onset of abdominal pain with leukocytosis and inflammatory changes on  "CT are concerning for infection along retained catheter in RLQ. No current drainable collection noted, though prior Pseudomonal abscess at this site in December 2023 following perforated appendix. Surgery performed OR abscess drainage and removal of retained catheter on 4/15. It does not seem that intra-op cultures were sent. Blood cultures x2 collected (after Abx started) are pending with NGTD. He was started on IV vancomycin, ceftriaxone, and metronidazole.  MRSA nares is positive.       Recommendations:    1. Please continue cefepime, flagyl and vancomycin    - MRSA swab positive  2. Surgery performed OR abscess drainage and removal of retained catheter today. It does not seem that intra-op cultures were sent  2. Follow up pending blood cultures  3. Repeat blood cultures x2 if fever to 100.4F or greater occurs      The General ID team will continue to follow this patient. Please feel free to call with any questions.     MALISSA HARO MD  Date of Service: 04/15/24  Pager: [phone]           Physical Exam:   /79 (BP Location: Right arm)   Pulse 116   Temp 98.6  F (37  C) (Oral)   Resp 20   Ht 1.727 m (5' 8\")   Wt 124.7 kg (275 lb)   SpO2 92%   BMI 41.81 kg/m         Exam:  GENERAL:  Well-developed, well-nourished, not in acute distress.   HEAD: Normocephalic and atraumatic  ENT:  No hearing impairment,  oral mucous membranes moist  EYES:  Eyes grossly normal to inspection  NECK:  Supple  LUNGS:  Clear to auscultation - no rales, rhonchi or wheezes  CARDIOVASCULAR:  Regular rate and rhythm, normal S1 S2  ABDOMEN:  Moderately distended (had surgery today). Site of the laparoscopic procedure does not look infected  EXT: Extremities warm and without edema.  MS: No gross musculoskeletal defects noted, no edema  NEUROLOGIC:  Grossly nonfocal. Mentation intact and speech normal         Laboratory Data:     Creatinine   Date Value Ref Range Status   04/15/2024 0.91 0.67 - 1.17 mg/dL Final " "  04/14/2024 0.88 0.67 - 1.17 mg/dL Final   04/13/2024 1.06 0.67 - 1.17 mg/dL Final   02/06/2024 0.90 0.67 - 1.17 mg/dL Final   02/05/2024 1.13 0.67 - 1.17 mg/dL Final   10/17/2014 1.3 0.8 - 1.5 mg/dL Final   06/26/2012 1.1 0.8 - 1.5 mg/dL Final   03/17/2010 1.01 (H) 0.50 - 1.00 mg/dL Final     Comment:     New IDMS-traceable calibration  beginning 5/1/08     WBC   Date Value Ref Range Status   08/16/2020 8.0 4.0 - 11.0 10e9/L Final   12/26/2014 12.9 (H) 4.0 - 11.0 10e9/L Final   10/17/2014 6.6 4.0 - 11.0 10e9/L Final   06/26/2012 6.5 4.0 - 11.0 10e9/L Final   03/17/2010 9.2 4.0 - 11.0 10e9/L Final     WBC Count   Date Value Ref Range Status   04/15/2024 17.6 (H) 4.0 - 11.0 10e3/uL Final   04/14/2024 18.7 (H) 4.0 - 11.0 10e3/uL Final   04/13/2024 13.8 (H) 4.0 - 11.0 10e3/uL Final   03/18/2024 8.8 4.0 - 11.0 10e3/uL Final   02/06/2024 12.8 (H) 4.0 - 11.0 10e3/uL Final     Hemoglobin   Date Value Ref Range Status   04/15/2024 13.8 13.3 - 17.7 g/dL Final   08/16/2020 15.2 13.3 - 17.7 g/dL Final     Platelet Count   Date Value Ref Range Status   04/15/2024 198 150 - 450 10e3/uL Final   08/16/2020 188 150 - 450 10e9/L Final     Lab Results   Component Value Date     04/15/2024    BUN 13.4 04/15/2024    CO2 22 04/15/2024     CRP Inflammation   Date Value Ref Range Status   08/16/2020 <2.9 0.0 - 8.0 mg/L Final   02/19/2009 <3.0 0.0 - 8.0 mg/L Final   01/28/2009 <3.0 0.0 - 8.0 mg/L Final           Pertinent Recent Microbiology Data:   No results for input(s): \"CULT\", \"SDES\" in the last 168 hours.         Imaging:     Recent Results (from the past 48 hour(s))   CT Abdomen Pelvis w Contrast    Narrative    EXAM: CT ABDOMEN PELVIS W CONTRAST  LOCATION: [facility]  DATE: 4/14/2024    INDICATION: sbo  COMPARISON: CT from 03/05/2024.  TECHNIQUE: CT scan of the abdomen and pelvis was performed following injection of IV contrast. Multiplanar reformats were obtained. Dose reduction " techniques were used.  CONTRAST: iopamidol (ISOVUE 370) solution 136 mL    FINDINGS:   LOWER CHEST: Very small right pleural effusion. Anterior pleural shunt catheter in the right hemithorax.    HEPATOBILIARY: Normal.    PANCREAS: Normal.    SPLEEN: Normal.    ADRENAL GLANDS: Normal.    KIDNEYS/BLADDER: Normal.    BOWEL: Again noted is the presence of 2 intraperitoneal shunt catheters. The smaller, more inferiorly located catheter is noted in the anterior right lower quadrant where there is mesenteric edema, peritoneal enhancement, and bowel wall thickening. The   appendix is absent. There is no bowel obstruction or free air.    LYMPH NODES: Normal.    VASCULATURE: Normal.    PELVIC ORGANS: Normal.    MUSCULOSKELETAL: Normal.      Impression    IMPRESSION:   1.  Redemonstrated right lower quadrant inflammatory change with mesenteric edema, peritoneal enhancement, and bowel wall thickening in the region of a ventriculoperitoneal shunt catheter, highly concerning for infection along the catheter segment in   this region. No drainable abscess.

## 2024-04-15 NOTE — ANESTHESIA CARE TRANSFER NOTE
Patient: Donald Jackson    Procedure: Procedure(s):  Laparoscopy diagnostic, abdominal washout, lysis of adhesion, removal of intra abdominal foreign body       Diagnosis: * No pre-op diagnosis entered *  Diagnosis Additional Information: No value filed.    Anesthesia Type:   General     Note:    Oropharynx: oropharynx clear of all foreign objects  Level of Consciousness: awake  Oxygen Supplementation: face mask    Independent Airway: airway patency satisfactory and stable  Dentition: dentition unchanged  Vital Signs Stable: post-procedure vital signs reviewed and stable  Report to RN Given: handoff report given  Patient transferred to: PACU    Handoff Report: Identifed the Patient, Identified the Reponsible Provider, Reviewed the pertinent medical history, Discussed the surgical course, Reviewed Intra-OP anesthesia mangement and issues during anesthesia, Set expectations for post-procedure period and Allowed opportunity for questions and acknowledgement of understanding  Vitals:  Vitals Value Taken Time   /79 04/15/24 1239   Temp     Pulse 118 04/15/24 1240   Resp 23 04/15/24 1240   SpO2 93 % 04/15/24 1240   Vitals shown include unfiled device data.    Electronically Signed By: MERRICK Pedroza CRNA  April 15, 2024  12:40 PM

## 2024-04-15 NOTE — PLAN OF CARE
Goal Outcome Evaluation:Admitted/transferred from:   2 RN full   skin assessment completed by Isabell Laughlin RN and Ella Ramirez  Skin assessment finding: skin intact, no problems   Interventions/actions: other    Reinforced moving self ,avoid pressure areas.    Bedside Emergency Equipment Present:  Suction Regulator: Yes  Suction Canister: Yes  Tubing between Regulator and Canister: Yes  O2 Regulator with Tree: Yes  Ambu Bag: Yes

## 2024-04-15 NOTE — PHARMACY-VANCOMYCIN DOSING SERVICE
Pharmacy Vancomycin Note  Date of Service April 15, 2024  Patient's  1995   29 year old, male    Indication: Intra-abdominal infection  Day of Therapy: 2  Current vancomycin regimen: 1250 mg IV q12h  Current vancomycin monitoring method: AUC  Current vancomycin therapeutic monitoring goal: 400-600 mg*h/L      [employer] Prediction of Current Vancomycin Regimen    Regimen: 1250 mg IV every 12 hours  Exposure target: AUC24 (range)400-600 mg/L.hr   AUC24,ss: 316 mg/L.hr  Probability of AUC24 > 400: 18 %  Ctrough,ss: 3.5 mg/L  Probability of Ctrough,ss > 20: 0 %  Probability of nephrotoxicity (Lodise BRADLEY ): 3 %      Current estimated CrCl = Estimated Creatinine Clearance: 152.6 mL/min (based on SCr of 0.91 mg/dL).    Creatinine for last 3 days  2024: 10:56 PM Creatinine 1.06 mg/dL  2024:  6:46 AM Creatinine 0.88 mg/dL  4/15/2024:  6:28 AM Creatinine 0.91 mg/dL    Recent Vancomycin Levels (past 3 days)  4/15/2024:  3:59 PM Vancomycin <4.0 ug/mL    Vancomycin IV Administrations (past 72 hours)                     vancomycin (VANCOCIN) 1,250 mg in 0.9% NaCl 250 mL intermittent infusion (mg) 1,250 mg New Bag 04/15/24 0333     1,250 mg New Bag 24 1600    vancomycin (VANCOCIN) 2,000 mg in sodium chloride 0.9 % 500 mL intermittent infusion (mg) 2,000 mg New Bag 24 0410                    Nephrotoxins and other renal medications (From now, onward)      Start     Dose/Rate Route Frequency Ordered Stop    04/15/24 1730  vancomycin (VANCOCIN) 1,500 mg in 0.9% NaCl 250 mL intermittent infusion         1,500 mg  over 90 Minutes Intravenous EVERY 8 HOURS 04/15/24 1706                 Contrast Orders - past 72 hours (72h ago, onward)      Start     Dose/Rate Route Frequency Stop    24 0005  iopamidol (ISOVUE-370) solution 136 mL         136 mL Intravenous ONCE 24 0059            Interpretation of levels and current regimen:  Vancomycin level is reflective of AUC less than 400.    Has serum  creatinine changed greater than 50% in last 72 hours: No    Urine output: unable to determine    Renal Function: Stable      [employer] Prediction of Planned New Vancomycin Regimen    Regimen: 1500 mg IV every 8 hours  Exposure target: AUC24 (range)400-600 mg/L.hr   AUC24,ss: 569 mg/L.hr  Probability of AUC24 > 400: 92 %  Ctrough,ss: 10.7 mg/L  Probability of Ctrough,ss > 20: 0 %  Probability of nephrotoxicity (Lodise BRADLEY 2009): 6 %      Plan:  Increase Dose to Vancomycin 1500 mg IV every 8 hours.  Vancomycin monitoring method: AUC  Vancomycin therapeutic monitoring goal: 400-600 mg*h/L  Pharmacy will check vancomycin levels as appropriate in 1-3 Days.  Serum creatinine levels will be ordered daily for the first week of therapy and at least twice weekly for subsequent weeks.      Francis Tsang, PharmD, BCPS  April 15, 2024

## 2024-04-15 NOTE — PLAN OF CARE
Goal Outcome Evaluation:Vonda arrived to floor approx 1400,awake/O abdominal pain,left quad location.Oxycodone/Tylenol given.Tolerated sips water without nausea.Continue to monitor

## 2024-04-16 LAB
ALBUMIN SERPL BCG-MCNC: 3.3 G/DL (ref 3.5–5.2)
ALP SERPL-CCNC: 73 U/L (ref 40–150)
ALT SERPL W P-5'-P-CCNC: 18 U/L (ref 0–70)
ANION GAP SERPL CALCULATED.3IONS-SCNC: 16 MMOL/L (ref 7–15)
AST SERPL W P-5'-P-CCNC: 11 U/L (ref 0–45)
BACTERIA SPEC CULT: ABNORMAL
BILIRUB SERPL-MCNC: 0.7 MG/DL
BUN SERPL-MCNC: 13.1 MG/DL (ref 6–20)
CALCIUM SERPL-MCNC: 8.9 MG/DL (ref 8.6–10)
CHLORIDE SERPL-SCNC: 102 MMOL/L (ref 98–107)
CREAT SERPL-MCNC: 0.91 MG/DL (ref 0.67–1.17)
DEPRECATED HCO3 PLAS-SCNC: 18 MMOL/L (ref 22–29)
EGFRCR SERPLBLD CKD-EPI 2021: >90 ML/MIN/1.73M2
GLUCOSE SERPL-MCNC: 129 MG/DL (ref 70–99)
POTASSIUM SERPL-SCNC: 4.1 MMOL/L (ref 3.4–5.3)
PROT SERPL-MCNC: 6.7 G/DL (ref 6.4–8.3)
SODIUM SERPL-SCNC: 136 MMOL/L (ref 135–145)

## 2024-04-16 PROCEDURE — 36415 COLL VENOUS BLD VENIPUNCTURE: CPT | Performed by: STUDENT IN AN ORGANIZED HEALTH CARE EDUCATION/TRAINING PROGRAM

## 2024-04-16 PROCEDURE — 258N000003 HC RX IP 258 OP 636: Performed by: STUDENT IN AN ORGANIZED HEALTH CARE EDUCATION/TRAINING PROGRAM

## 2024-04-16 PROCEDURE — 250N000011 HC RX IP 250 OP 636: Mod: JZ | Performed by: EMERGENCY MEDICINE

## 2024-04-16 PROCEDURE — 120N000002 HC R&B MED SURG/OB UMMC

## 2024-04-16 PROCEDURE — 250N000013 HC RX MED GY IP 250 OP 250 PS 637: Performed by: STUDENT IN AN ORGANIZED HEALTH CARE EDUCATION/TRAINING PROGRAM

## 2024-04-16 PROCEDURE — 250N000011 HC RX IP 250 OP 636: Performed by: STUDENT IN AN ORGANIZED HEALTH CARE EDUCATION/TRAINING PROGRAM

## 2024-04-16 PROCEDURE — 80053 COMPREHEN METABOLIC PANEL: CPT | Performed by: STUDENT IN AN ORGANIZED HEALTH CARE EDUCATION/TRAINING PROGRAM

## 2024-04-16 PROCEDURE — 250N000013 HC RX MED GY IP 250 OP 250 PS 637

## 2024-04-16 PROCEDURE — 250N000011 HC RX IP 250 OP 636

## 2024-04-16 PROCEDURE — 99232 SBSQ HOSP IP/OBS MODERATE 35: CPT | Performed by: STUDENT IN AN ORGANIZED HEALTH CARE EDUCATION/TRAINING PROGRAM

## 2024-04-16 PROCEDURE — 250N000011 HC RX IP 250 OP 636: Performed by: INTERNAL MEDICINE

## 2024-04-16 PROCEDURE — 99231 SBSQ HOSP IP/OBS SF/LOW 25: CPT | Performed by: STUDENT IN AN ORGANIZED HEALTH CARE EDUCATION/TRAINING PROGRAM

## 2024-04-16 PROCEDURE — 258N000003 HC RX IP 258 OP 636: Performed by: INTERNAL MEDICINE

## 2024-04-16 RX ORDER — SODIUM CHLORIDE 9 MG/ML
INJECTION, SOLUTION INTRAVENOUS CONTINUOUS
Status: ACTIVE | OUTPATIENT
Start: 2024-04-16 | End: 2024-04-17

## 2024-04-16 RX ADMIN — DOCUSATE SODIUM 50 MG AND SENNOSIDES 8.6 MG 2 TABLET: 8.6; 5 TABLET, FILM COATED ORAL at 16:23

## 2024-04-16 RX ADMIN — VANCOMYCIN HYDROCHLORIDE 1500 MG: 10 INJECTION, POWDER, LYOPHILIZED, FOR SOLUTION INTRAVENOUS at 01:20

## 2024-04-16 RX ADMIN — CEFEPIME HYDROCHLORIDE 2 G: 2 INJECTION, POWDER, FOR SOLUTION INTRAVENOUS at 14:32

## 2024-04-16 RX ADMIN — CEFEPIME HYDROCHLORIDE 2 G: 2 INJECTION, POWDER, FOR SOLUTION INTRAVENOUS at 05:24

## 2024-04-16 RX ADMIN — ACETAMINOPHEN 650 MG: 325 TABLET, FILM COATED ORAL at 19:51

## 2024-04-16 RX ADMIN — MAGNESIUM HYDROXIDE 30 ML: 400 SUSPENSION ORAL at 16:23

## 2024-04-16 RX ADMIN — ACETAMINOPHEN 650 MG: 325 TABLET, FILM COATED ORAL at 13:00

## 2024-04-16 RX ADMIN — CEFEPIME HYDROCHLORIDE 2 G: 2 INJECTION, POWDER, FOR SOLUTION INTRAVENOUS at 21:22

## 2024-04-16 RX ADMIN — OXYCODONE HYDROCHLORIDE 5 MG: 5 TABLET ORAL at 17:07

## 2024-04-16 RX ADMIN — HYDROMORPHONE HYDROCHLORIDE 0.2 MG: 0.2 INJECTION, SOLUTION INTRAMUSCULAR; INTRAVENOUS; SUBCUTANEOUS at 18:44

## 2024-04-16 RX ADMIN — ONDANSETRON 4 MG: 4 TABLET, ORALLY DISINTEGRATING ORAL at 16:23

## 2024-04-16 RX ADMIN — ACETAMINOPHEN 650 MG: 325 TABLET, FILM COATED ORAL at 08:05

## 2024-04-16 RX ADMIN — HYDROMORPHONE HYDROCHLORIDE 0.2 MG: 0.2 INJECTION, SOLUTION INTRAMUSCULAR; INTRAVENOUS; SUBCUTANEOUS at 02:15

## 2024-04-16 RX ADMIN — SODIUM CHLORIDE: 9 INJECTION, SOLUTION INTRAVENOUS at 09:44

## 2024-04-16 RX ADMIN — OXYCODONE HYDROCHLORIDE 5 MG: 5 TABLET ORAL at 12:54

## 2024-04-16 RX ADMIN — CALCIUM CARBONATE (ANTACID) CHEW TAB 500 MG 1000 MG: 500 CHEW TAB at 17:07

## 2024-04-16 RX ADMIN — OXYCODONE HYDROCHLORIDE 5 MG: 5 TABLET ORAL at 08:04

## 2024-04-16 RX ADMIN — VANCOMYCIN HYDROCHLORIDE 1500 MG: 10 INJECTION, POWDER, LYOPHILIZED, FOR SOLUTION INTRAVENOUS at 18:00

## 2024-04-16 RX ADMIN — METRONIDAZOLE 500 MG: 500 INJECTION, SOLUTION INTRAVENOUS at 19:49

## 2024-04-16 RX ADMIN — OXYCODONE HYDROCHLORIDE 5 MG: 5 TABLET ORAL at 00:28

## 2024-04-16 RX ADMIN — PROCHLORPERAZINE EDISYLATE 10 MG: 5 INJECTION INTRAMUSCULAR; INTRAVENOUS at 05:22

## 2024-04-16 RX ADMIN — OXYCODONE HYDROCHLORIDE 5 MG: 5 TABLET ORAL at 21:36

## 2024-04-16 RX ADMIN — METRONIDAZOLE 500 MG: 500 INJECTION, SOLUTION INTRAVENOUS at 11:39

## 2024-04-16 RX ADMIN — VANCOMYCIN HYDROCHLORIDE 1500 MG: 10 INJECTION, POWDER, LYOPHILIZED, FOR SOLUTION INTRAVENOUS at 09:45

## 2024-04-16 RX ADMIN — METRONIDAZOLE 500 MG: 500 INJECTION, SOLUTION INTRAVENOUS at 03:28

## 2024-04-16 ASSESSMENT — ACTIVITIES OF DAILY LIVING (ADL)
ADLS_ACUITY_SCORE: 40
ADLS_ACUITY_SCORE: 40
ADLS_ACUITY_SCORE: 39
ADLS_ACUITY_SCORE: 39
ADLS_ACUITY_SCORE: 40
ADLS_ACUITY_SCORE: 39
ADLS_ACUITY_SCORE: 22
ADLS_ACUITY_SCORE: 40
ADLS_ACUITY_SCORE: 40
ADLS_ACUITY_SCORE: 39
ADLS_ACUITY_SCORE: 22
ADLS_ACUITY_SCORE: 40
ADLS_ACUITY_SCORE: 40
ADLS_ACUITY_SCORE: 39
ADLS_ACUITY_SCORE: 39
ADLS_ACUITY_SCORE: 40
ADLS_ACUITY_SCORE: 40
ADLS_ACUITY_SCORE: 22
ADLS_ACUITY_SCORE: 39
ADLS_ACUITY_SCORE: 39

## 2024-04-16 NOTE — PROGRESS NOTES
[facility]EAS[facility] PROGRESS NOTE     Patient:  Donald Jackson   Date of birth 1995, Medical record number [id number]  Date of Visit:  04/16/2024  Date of Admission: 4/13/2024  Consult Requester:Allen Guzman MD          Assessment and Plan:   ID Problem List  Infection of retained intraabdominal  shunt tip s/p OR removal 4/15/24 with abscess noted at catheter tip (no cultures sent)   shunt with tip near abdominal abscess (from #4) s/p revision to ventriculopleural shunt (2/2/24) c/b malposition with subcutaneous pseudocyst s/p revision (3/19/24).   New shunt remains in R pleural space. Retained tip in abdomen removal 4/15/24  Recent appendicitis with perforation s/p appendectomy (12/5/23 at [facility]) c/b peritonitis and intraabdominal abscess  Abscess s/p IR drain (12/12/23 = Cx pan-susceptible Pseudomonas). S/p PO levo + flagyl with EOT 12/29/23  CT A/P 2/2/24 with 2.2 cm collection c/f abscess in R hemipelvis, decreased from prior. CT A/P 3/5/24 with inflammatory changes in R pelvis around retained distal  shunt tip c/f infected catheter without drainable collection.  Penicillin allergy - childhood, unknown reaction (listed hives)  History of MRSA (arm swab, 2008; +nares 2024)     RECOMMENDATION:  Continue empiric antibiotic regimen while inpatient  IV cefepime 2g q8hrs  PO flagyl 500 mg q 8hrs (IV if unable to tolerate PO)  IV vancomycin (pharmacy to dose), +MRSA nares  Recommend he complete 5 days of antibiotics post-operatively (4/15 - 4/20)  Change to PO ciprofloxacin 500 mg BID and metronidazole 500 mg q8hrs if discharging prior to EOT (4/20)  Check EKG to assess Qtc prior  Follow up pending blood cultures - NGTD. Repeat blood cultures x2 if fever to 100.4F or greater occurs     ASSESSMENT:  Donald Jackson is a 29 year old male with PMHx significant for hydrocephalus s/p  shunt, with recent perforated appendicitis c/b intraabdominal abscess (12/2023, s/p IR drain with Cx =  PsdA and PO abx course), with  shunt changed to ventriculopleural due to residual RLQ abscess vs pseudocyst on 2/2/24 (old tip still in abd) further c/b new  shunt malposition with subcutaneous pseudocyst s/p revision 3/19/24 who presented to ED on 4/13/24 with acute R-sided abdominal pain and CT imaging concerning for infection along retained abdominal catheter for which ID was consulted.      Acute onset of abdominal pain with leukocytosis and inflammatory changes on CT are concerning for infection along retained catheter in RLQ. No current drainable collection noted, though prior Pseudomonal abscess at this site in December 2023 following perforated appendix. Concerned that this retained hardware is infected and should be removed. No BM since prior to admission with recent extensive abdominal surgery and inflammation, would be at risk for bowel obstruction though denies emesis and no obstruction noted on initial CT. Low suspicion for current  shunt (in pleural space) dysfunction or infection given lack of fever, headaches. Blood cultures x2 collected (after Abx started) remain with NGTD. He continues on IV vancomycin (+MRSA nares), cefepime (prior PsdA), and metronidazole empirically. Appreciate surgical intervention for old  (abdomen) shunt removal on 4/15 - noted purulent fluid (abscess) around distal tip of catheter. No cultures were sent from fluid or catheter tip however, which makes antibiotic selection difficult. Likely  here is Pseudomonas given his prior abscess and IR drain at this site, though other GI musa colonization is also possible. Less likely MRSA in intraabdominal infection though will continue IV vancomycin for now. We recommend 5 days of antibiotics post-operatively (4/15 - 4/20) as source control achieved in OR. Please continue empiric regimen above while admitted, can changed to oral cipro + flagyl on discharge to complete course.     ID will continue to follow with you.  Recommendations discussed with primary team.    Ellen Bain PA-C  Infectious Diseases  Pager #[phone] or Tien    45 MINUTES SPENT BY ME on the date of service doing chart review, history, exam, documentation & further activities per the note.           Interim History and Events:     OR 4/15 for old  shunt tubing removal. Noted phlegmon, small bowel inflammation and adhesions with  shunt tip through phlegmon. Purulent fluid was suctioned out, though no cultures were sent from OR. No appendiceal leak was noted.     He remains afebrile, vitals stable. WBC 17.6 yesterday, no CBC lab today. Remains on IV vancomycin, cefepime, flagyl empirically. No BM since prior to admission, though now passing gas. Abdominal pain same, now more incisional and less R sided.         HPI:     See ID consult note dated 4/14/24         ROS:   -Focused 5 point ROS completed, pertinent positives and negatives listed above.      Physical Examination:  Temp: 98.3  F (36.8  C) Temp src: Oral BP: 139/86 Pulse: 107   Resp: 18 SpO2: 94 % O2 Device: None (Room air)      Vitals:    04/13/24 2232   Weight: 124.7 kg (275 lb)       Constitutional: Pleasant adult male seen sitting up in chair, in NAD. Alert and interactive.   HEENT: NCAT, anicteric sclerae, conjunctiva clear. Moist mucous membranes  Respiratory: Non-labored breathing, good air exchange on room air. No cough noted.   Cardiovascular: Regular rate and rhythm   GI: Abdomen is soft, obese, mildly tender to palpation periumbilical, central abdomen and RUQ. No significant L sided pain on palpation. No rebound tenderness.  Skin: Warm and dry. No new rashes or lesions on exposed surfaces. Abdominal incisions well appearing.  Musculoskeletal: Extremities grossly normal. No tenderness or edema present. +tattoo  Neurologic: A &O x3, speech normal, answering questions appropriately. Moves all extremities spontaneously. Grossly non-focal.  Neuropsychiatric: Mentation and affect  "normal/appropriate.  VAD: PIV is c/d/i with no erythema, drainage, or tenderness.      Medications:  Current Facility-Administered Medications   Medication Dose Route Frequency Provider Last Rate Last Admin    ceFEPIme (MAXIPIME) 2 g vial to attach to  mL bag for ADULTS or 50 mL bag for PEDS  2 g Intravenous Q8H Dago Valerio MD 0 mL/hr at 04/14/24 1530 2 g at 04/16/24 1432    metroNIDAZOLE (FLAGYL) infusion 500 mg  500 mg Intravenous Q8H Denae Monroe MD 0 mL/hr at 04/15/24 0717 500 mg at 04/16/24 1139    sodium chloride (PF) 0.9% PF flush 3 mL  3 mL Intracatheter Q8H Yunier Monroe MD   3 mL at 04/16/24 1300    vancomycin (VANCOCIN) 1,500 mg in 0.9% NaCl 250 mL intermittent infusion  1,500 mg Intravenous Q8H Allen Guzman MD   1,500 mg at 04/16/24 0945       Infusions/Drips:  Current Facility-Administered Medications   Medication Dose Route Frequency Provider Last Rate Last Admin    sodium chloride 0.9 % infusion   Intravenous Continuous Kurt German  mL/hr at 04/16/24 0944 New Bag at 04/16/24 0944       Laboratory Data:   No results found for: \"ACD4\"    Inflammatory Markers    Recent Labs   Lab Test 02/04/24  0653 02/02/24  0435 02/01/24  0621 01/31/24  0806 08/16/20  1319   SED 6 30* 7 9 1   CRP  --   --   --   --  <2.9       Metabolic Studies       Recent Labs   Lab Test 04/16/24  0654 04/15/24  0941 04/15/24  0628 04/14/24  0646 04/13/24  2256 02/06/24  0722 02/05/24  0346 01/29/24  0737 01/28/24  0605 01/27/24  2101 01/27/24  1405 01/27/24  1404     --  138 137 138 137 138   < > 136  --  138  --    POTASSIUM 4.1  --  3.7 4.1 3.6 4.2 3.8   < > 4.3  --  3.9  --    CHLORIDE 102  --  105 106 103 105 109*   < > 104  --  103  --    CO2 18*  --  22 21* 24 23 23   < > 21*  --  23  --    ANIONGAP 16*  --  11 10 11 9 6*   < > 11  --  12  --    BUN 13.1  --  13.4 20.8* 25.5* 13.0 16.3   < > 12.9  --  14.5  --    CR 0.91  --  0.91 0.88 1.06 0.90 1.13   < > 0.93  --  0.93  --  "   GFRESTIMATED >90  --  >90 >90 >90 >90 90   < > >90  --  >90  --    * 98 97 123* 95 165* 120*   < > 94   < > 95  --    ANTHONY 8.9  --  8.5* 8.5* 8.9 8.7 9.1   < > 9.2  --  9.5  --    PHOS  --   --   --   --   --   --   --   --  3.5  --   --   --    MAG  --   --   --   --   --   --   --   --  1.9  --  1.8  --    LACT  --   --   --   --   --   --   --   --   --   --   --  0.9    < > = values in this interval not displayed.       Hepatic Studies    Recent Labs   Lab Test 04/16/24  0654 04/15/24  0628 04/14/24  0646 04/13/24 2256 01/27/24  1405 01/05/24  1450   BILITOTAL 0.7 1.0 0.5 0.3 0.6 0.3   ALKPHOS 73 81 71 78 94 80   ALBUMIN 3.3* 3.6 3.9 4.2 4.2 3.9   AST 11 13 18 23 25 26   ALT 18 20 37 40 53 59       Pancreatitis testing    Recent Labs   Lab Test 04/13/24 2256 06/26/19  1004   LIPASE 16  --    TRIG  --  49.0       Hematology Studies      Recent Labs   Lab Test 04/15/24  0628 04/14/24  0646 04/13/24 2256 03/18/24  0748 02/06/24  0722 02/05/24  0346 03/01/22  1038 08/16/20  1319 06/26/19  1004   WBC 17.6* 18.7* 13.8* 8.8 12.8* 9.4   < > 8.0  --    ANEU  --   --   --   --   --   --   --  5.3  --    ALYM  --   --   --   --   --   --   --  2.1 2.5   JORGE  --   --   --   --   --   --   --  0.6  --    AEOS  --   --   --   --   --   --   --  0.0  --    HGB 13.8 13.8 13.8 14.2 13.0* 13.8   < > 15.2 14.5   HCT 43.7 44.3 44.1 44.9 41.2 43.8   < > 47.1 46.2    259 262 232 260 285   < > 188  --     < > = values in this interval not displayed.       Arterial Blood Gas Testing  No lab results found.     Urine Studies     Recent Labs   Lab Test 01/28/24  2033 03/01/22  1038   URINEPH 6.0 7.0   NITRITE Negative Negative   LEUKEST Negative Negative   WBCU <1 0-5       Vancomycin Levels     Recent Labs   Lab Test 04/15/24  1559 01/30/24  1746   VANCOMYCIN <4.0 24.7       Tobramycin levels     No lab results found.    Gentamicin levels    No lab results found.    CSF testing     Recent Labs   Lab Test 02/02/24  0834  "01/31/24  0943 01/27/24  2301   CWBC 1 3  --    CRBC 2 42*  --    CGLU 64 70 59   CTP 6.8* 7.3* 8.3*         Microbiology:  Culture   Date Value Ref Range Status   04/14/2024 Staphylococcus aureus MRSA (A)  Preliminary     Comment:     Preliminary result, confirmation pending.   04/14/2024 No growth after 2 days  Preliminary   04/14/2024 No growth after 2 days  Preliminary   02/05/2024 No anaerobic organisms isolated  Final   02/05/2024 No Growth  Final   02/05/2024 No Growth  Final   02/02/2024 No Growth  Final   02/02/2024 No anaerobic organisms isolated  Final   01/31/2024 No Growth  Final   01/31/2024 No anaerobic organisms isolated  Final   01/27/2024 No Growth  Final   01/27/2024 No anaerobic organisms isolated  Final   01/27/2024 No Growth  Final   01/27/2024 No Growth  Final       Last check of C difficile  No results found for: \"CDBPCT\"    Imaging:  CT Abdomen Pelvis w Contrast  Result Date: 4/14/2024  IMPRESSION: 1.  Redemonstrated right lower quadrant inflammatory change with mesenteric edema, peritoneal enhancement, and bowel wall thickening in the region of a ventriculoperitoneal shunt catheter, highly concerning for infection along the catheter segment in this region. No drainable abscess.  "

## 2024-04-16 NOTE — PLAN OF CARE
Goal Outcome Evaluation:Patient reports fair relief to left quad abdominal pain with Oxycodone/Tylenol. Tolerated sips clear liquid intake without nausea.Voiding,positive flatus,no stool episode.Ambulated halls,gait steady.Continue to monitor

## 2024-04-16 NOTE — PROGRESS NOTES
[facility]    Medicine Progress Note - Hospitalist Service, GOLD TEAM 7    Date of Admission:  4/13/2024    Assessment & Plan   28 Y/O M with PMHx significant for hydrocephalus s/p  shunt, with recent perforated appendicitis c/b intraabdominal abscess (12/2023, s/p IR drain with Cx = PsdA and PO abx course), with  shunt changed to ventriculopleural due to residual RLQ abscess vs pseudocyst on 2/2/24 (old tip still in abd) further c/b new  shunt malposition with subcutaneous pseudocyst s/p revision 3/19/24 who presented to ED on 4/13/24 with acute R-sided abdominal pain and CT imaging concerning for infection along retained abdominal catheter (His current infection appears related to one of the distal abdominal shunt catheters left in his abdomen, which is not contiguous with his current ventriculo-pleural system. He is currently neurologically intact without any symptoms of shunt failure). ID/NSGY and GS consulted. Currently on Abx. Now s/p diagnostic laparoscopy, abdominal washout and removal of catheter 04/15.        Today's plan   - Passing gas. Will start CLD and monitor for stool Pt said likely he doesn't want to eat. Just wants chips and sips for now   - Continue Fluids for 16 hours and stop. Stop time entered    - Await BM to return   - Will touch base with GS.   - ID following and currently on Abx. Pending final plan        # Hx hydrocephalus s/p  shunt  # Hx perforated appendicitis c/b abscess  # C/f intraperitoneal infection of  shunt retained catheter   # Abdominal pain  # Nausea  # Leukocytosis   - Per above for presentation    Plan:   - NSGY follows up peripherally   - GS consulted   - ID consulted   - IV vancomycin  - IV flagyl 500 mg q8hrs   - cefepine 2gm q8 hours                Diet: NPO per Anesthesia Guidelines for Procedure/Surgery Except for: Meds    DVT Prophylaxis: Pneumatic Compression Devices  Chowdhury Catheter: Not present  Lines: None    "  Cardiac Monitoring: None  Code Status: Full Code      Clinically Significant Risk Factors               # Coagulation Defect: INR = 1.17 (Ref range: 0.85 - 1.15) and/or PTT = 31 Seconds (Ref range: 22 - 38 Seconds), will monitor for bleeding           # Severe Obesity: Estimated body mass index is 41.81 kg/m  as calculated from the following:    Height as of this encounter: 1.727 m (5' 8\").    Weight as of this encounter: 124.7 kg (275 lb)., PRESENT ON ADMISSION            Disposition Plan     Medically Ready for Discharge: Anticipated in 2-4 Days             NICOLE HELMS MD  Hospitalist Service, GOLD TEAM [address] [place] [address]  Securely message with [facility] (more info)  Text page via [employer] Paging/Directory   See signed in provider for up to date coverage information  ______________________________________________________________________    Interval History   Pt is passing gas. Some abdominal pain. No BM. Ok with NGT if needed. Would like to drink. Advanced to CLD and started fluids per above.     Physical Exam   Vital Signs: Temp: 97.4  F (36.3  C) Temp src: Oral BP: (!) 142/96 Pulse: 113   Resp: 16 SpO2: 92 % O2 Device: None (Room air) Oxygen Delivery: 2 LPM  Weight: 275 lbs 0 oz    Constitutional: Lying in bed with no acute distress   GI: Mildly TTP RLQ. Distended     Medical Decision Making       25 MINUTES SPENT BY ME on the date of service doing chart review, history, exam, documentation & further activities per the note.      Data   ------------------------- PAST 24 HR DATA REVIEWED -----------------------------------------------  "

## 2024-04-16 NOTE — OP NOTE
Preoperative diagnosis: Intra-abdominal abscess related to retained foreign body (remnant of old  shunt tubing) vs. Stump appendicitis (s/p appendectomy in December 2023)    Procedure: Diagnostic laparoscopy, removal of retained  shunt tubing, washout of purulent RLQ interloop loculations/phlegmon    Postoperative diagnosis: Retained foreign body (remnant of  shunt tubing) w/phlegmon    Indications: This is a 28 yo M who presented with RLQ abdominal pain in the setting of prior appendectomy in December 2023, who had subsequently developed intra-abdominal abscesses which required percutaneous drainage and conversion of a  shunt to a ventriculopleural shunt. He presented with tachycardia, guarding and tenderness in the RLQ, and leukocytosis. Diagnostic laparoscopy was recommended. See Dr. Nielson's separate note for complete discussion of consent conversation.    Description of procedure: The patient was placed on the operating table in the supine position, left arm tucked. SCDs were placed. Perioperative antibiotics were continued. General anesthesia was induced without incident. The abdomen was prepped and draped in the usual sterile fashion.  A time-out was completed verifying correct patient, procedure, site, positioning, and equipment prior to beginning this procedure.     The fascia was elevated and the Veress needle inserted at Palmar's point in the LUQ. Proper position was confirmed by aspiration and saline meniscus test. The abdomen was insufflated with carbon dioxide to a pressure of 15 mmHg. This was later increased to 18 mmHg due to limited visibility, which the patient's cardiopulmonary response was closely monitored with this additional insufflation and the patient tolerated insufflation well. A 5 mm trocar was placed in the LUQ using Optiview trocar. The abdomen was then inspected. No injuries from initial trocar placement were noted. Two additional 5 mm trocars were placed in the left  dominguez-abdomen under direct visualization. There were diffuse interloop and anterior abdominal wall adhesions of the small bowel related to the ongoing inflammation and phlegmon; these were easily taken down with gentle blunt dissection. Once the small bowel was mobilized, we made the following observations:    The cecum was located superolateral in the right gutter and the appendiceal stump was intact and was not primarily inflamed, and there were no signs of leak or abscess formation here. Of note, a fourth 5 mm trocar was placed in the right dominguez-abdomen here in order to confirm this visualization.  The main focus of phlegmon, small bowel inflammation, and interloop adhesions was located inferomedial to the old appendectomy bed, closer to the pelvic brim. Here the distal tip of the old  shunt tubing was seen protruding anteriorly through the underlying phlegmon. The involved small bowel, though inflamed, was otherwise all viable. We examined the bowel closely and did not observe any serosal injuries form our lysis of adhesions.     At this point, we felt our most confident diagnosis was persistent phlegmon related to this retained foreign body (the old  shunt tubing) and not due to a leak from or appendicitis of the old appendiceal stump. The proximal end of the  shunt tubing was retracted into the abdominal cavity under stretch, and then divided which allowed the retained proximal tubing to retract into the abdominal wall, well external to the peritoneum; the remaining distal tubing was gently extracted through the LUQ port. Purulent fluid was suctioned from dependent areas above the liver, in the right gutter, and in the pelvis.     Trocars were removed under direct visualization. No bleeding was noted. The laparoscope was withdrawn and abdomen was allowed to collapse and the LUQ trocar was removed. The skin incisions were closed with 4-0 Monocryl and Dermabond.     At the end of the case, all needle,  sponge, and instrument counts were correct. EBL < 5 mL. Dr. Wright was present through the entirety of the case. Dr. Estrada Kamara (PGY-2) assisted. The patient tolerated the procedure well and was taken to the postanesthesia care unit in satisfactory condition.     John Mariee MD  Fellow, General Surgery

## 2024-04-16 NOTE — PLAN OF CARE
Goal Outcome Evaluation:      Plan of Care Reviewed With: patient    Overall Patient Progress: no changeOverall Patient Progress: no change    Vitals: Stable on RA  Neuro: A&Ox4  Mobility: Pt moves independently.  Pain/Nausea: Pain & Nausea controlled with meds.   Diet & GI: NPO ex meds. No BM this shift.  Labs: Monitored.  LDAs: R PIVx2, L PIV  Respiratory: No acute changes this shift.  Cardiac: No acute changes this shift.  : Voiding appropriately and spontaneously.     NEW CHANGES: Pt reported repeated bouts of nausea. Antiemetics administered. Pt requested additional pain meds, IV dilaudid ordered & administered. No acute changes.     Continue to implement Care Plan.    Jere Warner RN

## 2024-04-16 NOTE — PROGRESS NOTES
[facility]    Progress Note - Emergency General Surgery Service     Assessment & Plan: Surgery      [place] Jackson is a 29 year old male with hx of hydrocephalus s/p  shunt placement, perforated appendicitis c/b abscess, and retinal detachment who presented with abdominal pain and was admitted for infection adjacent to  shunt catheter in [facility] now s/p diagnostic laparoscopy, abdominal washout and removal of catheter (4/15/240). Nasal swab MRSA+.     Pt progressing appropriately. Remains afebrile, hemodynamically stable since  shunt removal and washout.     -Okay for sips and chips today   -Continue IV antibiotics per primary team. Agree with cefepime, flagyl, vancomycin given +MRSA nares swab   -F/u blood cultures   -Trend daily white blood cell count   -DVT prophylaxis per primary team. Recommend chemical and mechanical prophylaxis.   - Encouraged ambulation   - EGS will continue to follow        The patient's care was discussed with the Attending Physician, Dr. Wright and Chief Resident/Fellow.    Neema Stewart MD  [facility]  All communications related to this note should be expressed to resident/YANETH on call for this team at the time of your communication.  ______________________________________________________________________    Interval History   Events:NAEON  Reports persistent abdominal pain. Passing gas. No N/V. NPO overnight.     Physical Exam   Vital Signs: Temp: 98.7  F (37.1  C) Temp src: Oral BP: 131/72 Pulse: 104   Resp: 18 SpO2: 94 % O2 Device: None (Room air) Oxygen Delivery: 2 LPM  Weight: 275 lbs 0 oz      Gen: A&O x4, NAD   Chest: Breathing non-labored on room air    Abdomen: soft, non-peritonitic, non tender, non-distended  Incision: clean, dry, intact closed with dermabond  Extremities: warm and well perfused       Data     I have personally reviewed the following data over the past 24  hrs:    N/A  \   N/A   / N/A     136 102 13.1 /  129 (H)   4.1 18 (L) 0.91 \     ALT: 18 AST: 11 AP: 73 TBILI: 0.7   ALB: 3.3 (L) TOT PROTEIN: 6.7 LIPASE: N/A

## 2024-04-16 NOTE — PLAN OF CARE
Goal Outcome Evaluation:       Temp: 97.6  F (36.4  C) Temp src: Oral BP: 139/80 Pulse: 112   Resp: 18 SpO2: 98 % O2 Device: None (Room air) Oxygen Delivery: 2 LPM    Neuro: Alert and Oriented  x4, let needs known  Cardiac:WNL, denies cardiac chest pain, lowers non edematous  Respiratory:LS clear and dim on room air   GI/:  WNL, Voiding adequately, No BM since prior to procedure; passing flatus  Diet/Appetite: NPO; gave IV compazine x1  Skin: 4 lap sites closed with dermabond  LDA: R PIV SL; L PIV running TKO between abx  Activity: Up IND  Pain: remained around 6/10; given IV dilaudid x1 and PO oxy x1  Plan: continue plan of care

## 2024-04-17 LAB
ALBUMIN SERPL BCG-MCNC: 3.4 G/DL (ref 3.5–5.2)
ALP SERPL-CCNC: 78 U/L (ref 40–150)
ALT SERPL W P-5'-P-CCNC: 13 U/L (ref 0–70)
ANION GAP SERPL CALCULATED.3IONS-SCNC: 14 MMOL/L (ref 7–15)
AST SERPL W P-5'-P-CCNC: 10 U/L (ref 0–45)
ATRIAL RATE - MUSE: 114 BPM
BASOPHILS # BLD AUTO: 0 10E3/UL (ref 0–0.2)
BASOPHILS NFR BLD AUTO: 0 %
BILIRUB SERPL-MCNC: 0.6 MG/DL
BUN SERPL-MCNC: 13.2 MG/DL (ref 6–20)
CALCIUM SERPL-MCNC: 8.5 MG/DL (ref 8.6–10)
CHLORIDE SERPL-SCNC: 103 MMOL/L (ref 98–107)
CREAT SERPL-MCNC: 0.82 MG/DL (ref 0.67–1.17)
DEPRECATED HCO3 PLAS-SCNC: 21 MMOL/L (ref 22–29)
DIASTOLIC BLOOD PRESSURE - MUSE: NORMAL MMHG
EGFRCR SERPLBLD CKD-EPI 2021: >90 ML/MIN/1.73M2
EOSINOPHIL # BLD AUTO: 0 10E3/UL (ref 0–0.7)
EOSINOPHIL NFR BLD AUTO: 0 %
ERYTHROCYTE [DISTWIDTH] IN BLOOD BY AUTOMATED COUNT: 16.4 % (ref 10–15)
GLUCOSE BLDC GLUCOMTR-MCNC: 97 MG/DL (ref 70–99)
GLUCOSE SERPL-MCNC: 118 MG/DL (ref 70–99)
HCT VFR BLD AUTO: 39.4 % (ref 40–53)
HGB BLD-MCNC: 12.4 G/DL (ref 13.3–17.7)
IMM GRANULOCYTES # BLD: 0.1 10E3/UL
IMM GRANULOCYTES NFR BLD: 1 %
INTERPRETATION ECG - MUSE: NORMAL
LYMPHOCYTES # BLD AUTO: 1.3 10E3/UL (ref 0.8–5.3)
LYMPHOCYTES NFR BLD AUTO: 8 %
MCH RBC QN AUTO: 23.4 PG (ref 26.5–33)
MCHC RBC AUTO-ENTMCNC: 31.5 G/DL (ref 31.5–36.5)
MCV RBC AUTO: 74 FL (ref 78–100)
MONOCYTES # BLD AUTO: 1.4 10E3/UL (ref 0–1.3)
MONOCYTES NFR BLD AUTO: 9 %
NEUTROPHILS # BLD AUTO: 13.2 10E3/UL (ref 1.6–8.3)
NEUTROPHILS NFR BLD AUTO: 82 %
NRBC # BLD AUTO: 0 10E3/UL
NRBC BLD AUTO-RTO: 0 /100
P AXIS - MUSE: 29 DEGREES
PLATELET # BLD AUTO: 259 10E3/UL (ref 150–450)
POTASSIUM SERPL-SCNC: 3.6 MMOL/L (ref 3.4–5.3)
PR INTERVAL - MUSE: 144 MS
PROT SERPL-MCNC: 6.7 G/DL (ref 6.4–8.3)
QRS DURATION - MUSE: 96 MS
QT - MUSE: 316 MS
QTC - MUSE: 435 MS
R AXIS - MUSE: 25 DEGREES
RBC # BLD AUTO: 5.31 10E6/UL (ref 4.4–5.9)
SODIUM SERPL-SCNC: 138 MMOL/L (ref 135–145)
SYSTOLIC BLOOD PRESSURE - MUSE: NORMAL MMHG
T AXIS - MUSE: 3 DEGREES
VANCOMYCIN SERPL-MCNC: 14.5 UG/ML
VENTRICULAR RATE- MUSE: 114 BPM
WBC # BLD AUTO: 16.1 10E3/UL (ref 4–11)

## 2024-04-17 PROCEDURE — 93010 ELECTROCARDIOGRAM REPORT: CPT | Performed by: INTERNAL MEDICINE

## 2024-04-17 PROCEDURE — 85025 COMPLETE CBC W/AUTO DIFF WBC: CPT | Performed by: STUDENT IN AN ORGANIZED HEALTH CARE EDUCATION/TRAINING PROGRAM

## 2024-04-17 PROCEDURE — 250N000013 HC RX MED GY IP 250 OP 250 PS 637: Performed by: STUDENT IN AN ORGANIZED HEALTH CARE EDUCATION/TRAINING PROGRAM

## 2024-04-17 PROCEDURE — 250N000011 HC RX IP 250 OP 636

## 2024-04-17 PROCEDURE — 999N000248 HC STATISTIC IV INSERT WITH US BY RN

## 2024-04-17 PROCEDURE — 250N000011 HC RX IP 250 OP 636: Performed by: INTERNAL MEDICINE

## 2024-04-17 PROCEDURE — 250N000011 HC RX IP 250 OP 636: Performed by: HOSPITALIST

## 2024-04-17 PROCEDURE — 250N000011 HC RX IP 250 OP 636: Mod: JZ | Performed by: EMERGENCY MEDICINE

## 2024-04-17 PROCEDURE — 99232 SBSQ HOSP IP/OBS MODERATE 35: CPT | Performed by: STUDENT IN AN ORGANIZED HEALTH CARE EDUCATION/TRAINING PROGRAM

## 2024-04-17 PROCEDURE — 93005 ELECTROCARDIOGRAM TRACING: CPT

## 2024-04-17 PROCEDURE — 120N000002 HC R&B MED SURG/OB UMMC

## 2024-04-17 PROCEDURE — 250N000013 HC RX MED GY IP 250 OP 250 PS 637

## 2024-04-17 PROCEDURE — 250N000011 HC RX IP 250 OP 636: Performed by: STUDENT IN AN ORGANIZED HEALTH CARE EDUCATION/TRAINING PROGRAM

## 2024-04-17 PROCEDURE — 36415 COLL VENOUS BLD VENIPUNCTURE: CPT | Performed by: STUDENT IN AN ORGANIZED HEALTH CARE EDUCATION/TRAINING PROGRAM

## 2024-04-17 PROCEDURE — 250N000013 HC RX MED GY IP 250 OP 250 PS 637: Performed by: HOSPITALIST

## 2024-04-17 PROCEDURE — 82040 ASSAY OF SERUM ALBUMIN: CPT | Performed by: STUDENT IN AN ORGANIZED HEALTH CARE EDUCATION/TRAINING PROGRAM

## 2024-04-17 PROCEDURE — 258N000003 HC RX IP 258 OP 636: Performed by: INTERNAL MEDICINE

## 2024-04-17 PROCEDURE — 258N000003 HC RX IP 258 OP 636: Performed by: STUDENT IN AN ORGANIZED HEALTH CARE EDUCATION/TRAINING PROGRAM

## 2024-04-17 PROCEDURE — 80202 ASSAY OF VANCOMYCIN: CPT | Performed by: INTERNAL MEDICINE

## 2024-04-17 RX ORDER — ENOXAPARIN SODIUM 100 MG/ML
40 INJECTION SUBCUTANEOUS EVERY 12 HOURS
Status: DISCONTINUED | OUTPATIENT
Start: 2024-04-17 | End: 2024-04-20 | Stop reason: HOSPADM

## 2024-04-17 RX ORDER — POLYETHYLENE GLYCOL 3350 17 G/17G
17 POWDER, FOR SOLUTION ORAL DAILY
Status: DISCONTINUED | OUTPATIENT
Start: 2024-04-17 | End: 2024-04-20 | Stop reason: HOSPADM

## 2024-04-17 RX ORDER — ENOXAPARIN SODIUM 100 MG/ML
40 INJECTION SUBCUTANEOUS EVERY 24 HOURS
Status: DISCONTINUED | OUTPATIENT
Start: 2024-04-17 | End: 2024-04-17

## 2024-04-17 RX ORDER — HYDROMORPHONE HYDROCHLORIDE 1 MG/ML
0.3 INJECTION, SOLUTION INTRAMUSCULAR; INTRAVENOUS; SUBCUTANEOUS
Status: DISCONTINUED | OUTPATIENT
Start: 2024-04-17 | End: 2024-04-19

## 2024-04-17 RX ORDER — OXYCODONE HYDROCHLORIDE 5 MG/1
5-10 TABLET ORAL EVERY 4 HOURS PRN
Status: DISCONTINUED | OUTPATIENT
Start: 2024-04-17 | End: 2024-04-20 | Stop reason: HOSPADM

## 2024-04-17 RX ADMIN — OXYCODONE HYDROCHLORIDE 10 MG: 5 TABLET ORAL at 17:25

## 2024-04-17 RX ADMIN — METRONIDAZOLE 500 MG: 500 INJECTION, SOLUTION INTRAVENOUS at 10:23

## 2024-04-17 RX ADMIN — OXYCODONE HYDROCHLORIDE 10 MG: 5 TABLET ORAL at 22:10

## 2024-04-17 RX ADMIN — VANCOMYCIN HYDROCHLORIDE 2000 MG: 1 INJECTION, POWDER, LYOPHILIZED, FOR SOLUTION INTRAVENOUS at 11:49

## 2024-04-17 RX ADMIN — CEFEPIME HYDROCHLORIDE 2 G: 2 INJECTION, POWDER, FOR SOLUTION INTRAVENOUS at 15:01

## 2024-04-17 RX ADMIN — ACETAMINOPHEN 650 MG: 325 TABLET, FILM COATED ORAL at 03:14

## 2024-04-17 RX ADMIN — OXYCODONE HYDROCHLORIDE 10 MG: 5 TABLET ORAL at 05:21

## 2024-04-17 RX ADMIN — ONDANSETRON 4 MG: 4 TABLET, ORALLY DISINTEGRATING ORAL at 22:14

## 2024-04-17 RX ADMIN — VANCOMYCIN HYDROCHLORIDE 2000 MG: 1 INJECTION, POWDER, LYOPHILIZED, FOR SOLUTION INTRAVENOUS at 17:01

## 2024-04-17 RX ADMIN — VANCOMYCIN HYDROCHLORIDE 1500 MG: 10 INJECTION, POWDER, LYOPHILIZED, FOR SOLUTION INTRAVENOUS at 02:05

## 2024-04-17 RX ADMIN — OXYCODONE HYDROCHLORIDE 10 MG: 5 TABLET ORAL at 13:20

## 2024-04-17 RX ADMIN — CEFEPIME HYDROCHLORIDE 2 G: 2 INJECTION, POWDER, FOR SOLUTION INTRAVENOUS at 06:20

## 2024-04-17 RX ADMIN — METRONIDAZOLE 500 MG: 500 INJECTION, SOLUTION INTRAVENOUS at 19:07

## 2024-04-17 RX ADMIN — POLYETHYLENE GLYCOL 3350 17 G: 17 POWDER, FOR SOLUTION ORAL at 17:01

## 2024-04-17 RX ADMIN — OXYCODONE HYDROCHLORIDE 5 MG: 5 TABLET ORAL at 02:11

## 2024-04-17 RX ADMIN — CEFEPIME HYDROCHLORIDE 2 G: 2 INJECTION, POWDER, FOR SOLUTION INTRAVENOUS at 22:10

## 2024-04-17 RX ADMIN — HYDROMORPHONE HYDROCHLORIDE 0.3 MG: 1 INJECTION, SOLUTION INTRAMUSCULAR; INTRAVENOUS; SUBCUTANEOUS at 15:11

## 2024-04-17 RX ADMIN — ENOXAPARIN SODIUM 40 MG: 40 INJECTION SUBCUTANEOUS at 17:25

## 2024-04-17 RX ADMIN — DOCUSATE SODIUM 50 MG AND SENNOSIDES 8.6 MG 2 TABLET: 8.6; 5 TABLET, FILM COATED ORAL at 11:48

## 2024-04-17 RX ADMIN — HYDROMORPHONE HYDROCHLORIDE 0.3 MG: 1 INJECTION, SOLUTION INTRAMUSCULAR; INTRAVENOUS; SUBCUTANEOUS at 09:46

## 2024-04-17 RX ADMIN — MAGNESIUM HYDROXIDE 30 ML: 400 SUSPENSION ORAL at 11:48

## 2024-04-17 RX ADMIN — ACETAMINOPHEN 650 MG: 325 TABLET, FILM COATED ORAL at 11:48

## 2024-04-17 RX ADMIN — HYDROMORPHONE HYDROCHLORIDE 0.3 MG: 1 INJECTION, SOLUTION INTRAMUSCULAR; INTRAVENOUS; SUBCUTANEOUS at 06:18

## 2024-04-17 RX ADMIN — METRONIDAZOLE 500 MG: 500 INJECTION, SOLUTION INTRAVENOUS at 03:15

## 2024-04-17 RX ADMIN — HYDROMORPHONE HYDROCHLORIDE 0.2 MG: 0.2 INJECTION, SOLUTION INTRAMUSCULAR; INTRAVENOUS; SUBCUTANEOUS at 00:34

## 2024-04-17 ASSESSMENT — ACTIVITIES OF DAILY LIVING (ADL)
ADLS_ACUITY_SCORE: 20
ADLS_ACUITY_SCORE: 22
ADLS_ACUITY_SCORE: 20
ADLS_ACUITY_SCORE: 22
ADLS_ACUITY_SCORE: 20
ADLS_ACUITY_SCORE: 22
ADLS_ACUITY_SCORE: 20
ADLS_ACUITY_SCORE: 22
ADLS_ACUITY_SCORE: 22
ADLS_ACUITY_SCORE: 20
ADLS_ACUITY_SCORE: 22

## 2024-04-17 NOTE — PLAN OF CARE
Goal Outcome Evaluation:   Plan of Care Reviewed With: patient  Overall Patient Progress: no changeOverall Patient Progress: no change  AVSS.  Alert and oriented x4.   C/o abdominal pain. Used oxy and iv hydromorphone.  No c/o nausea. LBM Saturday. Passing gas.  Gave senna and MOM. Up walking on unit x2.   On clears with minimal intake. MIV at 125cc/hr.   IV antibiotics continue.

## 2024-04-17 NOTE — PLAN OF CARE
"Goal Outcome Evaluation:      Plan of Care Reviewed With: patient    Overall Patient Progress: improvingOverall Patient Progress: improving    Outcome Evaluation: A&Ox4. Able to make needs known. Ambulates in the room and in the hallways independently. Use PRN pain medication per MAR, verbilized adequate pain control. ABD soft, rounded, reports some flatus. RN notes frequent belching. Remains on Clear Liquid Diet. Denies N/V.  BP (!) 143/91 (BP Location: Right arm)   Pulse 105   Temp 97.9  F (36.6  C) (Oral)   Resp 18   Ht 1.727 m (5' 8\")   Wt 124.7 kg (275 lb)   SpO2 95%   BMI 41.81 kg/m          "

## 2024-04-17 NOTE — PROGRESS NOTES
[facility]    Progress Note - Emergency General Surgery Service       Date of Admission:  4/13/2024  Interval History   Events:NAEON  Pt c/o persistent abdominal pain requiring IV Dilaudid. Passing gas, no BM. Has been minimally out of bed.     Physical Exam   Vital Signs: Temp: 98.4  F (36.9  C) Temp src: Oral BP: (!) 146/99 Pulse: 113   Resp: 18 SpO2: 93 % O2 Device: None (Room air)    Weight: 275 lbs 0 oz      Gen: A&Ox4, NAD   Chest: Breathing non-labored on room air    Abdomen: soft, non-peritonitic, non-tender, distended   Incision: clean, dry, intact closed with dermabond  Extremities: warm and well perfused     Assessment & Plan: Surgery   Donald Jackson is a 29 year old male with hx of hydrocephalus s/p  shunt placement, perforated appendicitis c/b abscess, and retinal detachment who presented with abdominal pain and was admitted for infection adjacent to  shunt catheter in [facility] now s/p diagnostic laparoscopy, abdominal washout and removal of catheter (4/15/240). Nasal swab MRSA+.     Pt with slow return of bowel function, otherwise progressing appropriately.     -Continue with CLD today until greater ROBF   -Encouraged ambulation and out of bed   -Continue IV antibiotics per primary team. Agree with cefepime, flagyl, vancomycin given +MRSA nares swab   -F/u blood cultures   -DVT prophylaxis per primary team. Recommend chemical and mechanical prophylaxis.   - EGS will continue to follow      Data     I have personally reviewed the following data over the past 24 hrs:    16.1 (H)  \   12.4 (L)   / 259     138 103 13.2 /  118 (H)   3.6 21 (L) 0.82 \     ALT: 13 AST: 10 AP: 78 TBILI: 0.6   ALB: 3.4 (L) TOT PROTEIN: 6.7 LIPASE: N/A       The patient's care was discussed with the Attending Physician, Dr. Wright and Chief Resident/Fellow.       Neema Stewart MD  [facility]  All communications related to this  note should be expressed to resident/YANETH on call for this team at the time of your communication.

## 2024-04-17 NOTE — PROVIDER NOTIFICATION
Tien messaged Gold 7 regarding some swelling to the R cranium that was not noted the previous night. Neuro checks did not indicate any abnormalities and the patient denied pain to the area. Team aware and nursing is instructed to monitor.

## 2024-04-17 NOTE — PROVIDER NOTIFICATION
Tien messaged Gold 7 regarding poorly controlled pain. PRN IV dilaudid 0.2 mg and PO oxy 5 mg have been given and patient is again reporting 10/10 pain. Gave tylenol PO. New orders for 0.3 IV dilaudid Q3 and 5-10 mg PO oxy Q4.

## 2024-04-17 NOTE — PLAN OF CARE
Goal Outcome Evaluation:    Temp: 98.5  F (36.9  C) Temp src: Oral BP: 126/84 Pulse: 109   Resp: 16 SpO2: 96 % O2 Device: None (Room air)            Neuro: Alert and Oriented  x4, let needs known; some swelling to R cranium; team made aware with no further orders other than to monitor at this time  Cardiac: WNL, denies cardiac chest pain, lowers non edematous  Respiratory:LS clear and dim on room air   GI/: no urine overnight; BS audible; passing flatus; No BM since 4-13; received stool softener in evening  Diet/Appetite: clears, nausea intermittently  Skin: 4 lap sites closed with dermabond  LDA: L PIV SL and L PIV infusing TKO between abx  Activity: Up SBA  Pain: poorly controlled with 0.2 IV dilaudid Q4, 5 mg PO oxy Q4, and tylenol PO; patient consistently reporting 10/10 pain; New orders for 0.3 IV dilaudid Q3 and 5-10 mg PO oxy Q4; gave PO oxy x2 and IV dilaudid x2  Plan: continue plan of care

## 2024-04-17 NOTE — PROGRESS NOTES
"[facility]    Medicine Progress Note - Hospitalist Service, GOLD TEAM 7    Date of Admission:  4/13/2024    Assessment & Plan       30 Y/O M with PMHx significant for hydrocephalus s/p  shunt, with recent perforated appendicitis c/b intraabdominal abscess (12/2023, s/p IR drain with Cx = PsdA and PO abx course), with  shunt changed to ventriculopleural due to residual RLQ abscess vs pseudocyst on 2/2/24 (old tip still in abd) further c/b new  shunt malposition with subcutaneous pseudocyst s/p revision 3/19/24 who presented to ED on 4/13/24 with acute R-sided abdominal pain and CT imaging concerning for infection along retained abdominal catheter - which is discontinuous from his present shunt, now s/p diagnostic laparoscopy, abdominal washout and removal of catheter 04/15, doing well and awaiting ROBF      Today's changes:     -await ROBF  - continue current antibiotics while inpatient        # Hx hydrocephalus s/p  shunt  # Hx perforated appendicitis c/b abscess  # C/f intraperitoneal infection of  shunt retained catheter   # Abdominal pain  # Nausea  # Leukocytosis     - Continue vancomycin, cefepime and metronidazole for now  - await ROBF, encourage ambulation  - start DVT prophylaxis  - plan ciprofloxacin and metronidazole at discharge if prior to 4/20  -continue oxycodone and dilaudid, priortiize PO  - add miralax.             Diet: Clear Liquid Diet    DVT Prophylaxis: Enoxaparin (Lovenox) SQ  Chowdhury Catheter: Not present  Lines: None     Cardiac Monitoring: None  Code Status: Full Code      Clinically Significant Risk Factors              # Hypoalbuminemia: Lowest albumin = 3.3 g/dL at 4/16/2024  6:54 AM, will monitor as appropriate            # Severe Obesity: Estimated body mass index is 41.81 kg/m  as calculated from the following:    Height as of this encounter: 1.727 m (5' 8\").    Weight as of this encounter: 124.7 kg (275 lb)., PRESENT ON ADMISSION  "           Disposition Plan     Medically Ready for Discharge: Anticipated Tomorrow             Sohail Hardy MD  Hospitalist Service, GOLD TEAM 7  [facility]  Securely message with [employer] (more info)  Text page via [employer] Paging/Directory   See signed in provider for up to date coverage information  ______________________________________________________________________    Interval History   No major events, passing flatus, no stool.  No significant appetite. Pain controlled.     Physical Exam   Vital Signs: Temp: 98.4  F (36.9  C) Temp src: Oral BP: (!) 146/99 Pulse: 113   Resp: 18 SpO2: 93 % O2 Device: None (Room air)    Weight: 275 lbs 0 oz    General Appearance: Lying in  bed, conversational   Respiratory: clear to auscultation bilaterally with good air entry   Cardiovascular: tachy, regular. No murmurs, rubs, or gallops   GI: soft, well healing incisional scars.  Mildly tender, no peritonitis.   Skin: no rash  Other: No lower extremity edema.      Medical Decision Making       MANAGEMENT DISCUSSED with the following over the past 24 hours: surgery and ID       Data     I have personally reviewed the following data over the past 24 hrs:    16.1 (H)  \   12.4 (L)   / 259     138 103 13.2 /  118 (H)   3.6 21 (L) 0.82 \     ALT: 13 AST: 10 AP: 78 TBILI: 0.6   ALB: 3.4 (L) TOT PROTEIN: 6.7 LIPASE: N/A

## 2024-04-17 NOTE — PHARMACY-VANCOMYCIN DOSING SERVICE
Pharmacy Vancomycin Note  Date of Service 2024  Patient's  1995   29 year old, male    Indication: Intra-abdominal infection  Day of Therapy: 4  Current vancomycin regimen: 1500 mg IV q8h  Current vancomycin monitoring method: AUC  Current vancomycin therapeutic monitoring goal: 400-600 mg*h/L      [employer] Prediction of Current Vancomycin Regimen    Regimen: 1500 mg IV every 8 hours  Exposure target: AUC24 (range)400-600 mg/L.hr   AUC24,ss: 405 mg/L.hr  Probability of AUC24 > 400: 55 %  Ctrough,ss: 7.6 mg/L  Probability of Ctrough,ss > 20: 2 %  Probability of nephrotoxicity (Lodise BRADLEY ): 4 %      Current estimated CrCl = Estimated Creatinine Clearance: 169.4 mL/min (based on SCr of 0.82 mg/dL).    Creatinine for last 3 days  4/15/2024:  6:28 AM Creatinine 0.91 mg/dL  2024:  6:54 AM Creatinine 0.91 mg/dL  2024:  6:37 AM Creatinine 0.82 mg/dL    Recent Vancomycin Levels (past 3 days)  4/15/2024:  3:59 PM Vancomycin <4.0 ug/mL  2024:  6:37 AM Vancomycin 14.5 ug/mL    Vancomycin IV Administrations (past 72 hours)                     vancomycin (VANCOCIN) 1,500 mg in 0.9% NaCl 250 mL intermittent infusion (mg) 1,500 mg New Bag 24 0205     1,500 mg New Bag 24 1800     1,500 mg New Bag  0945     1,500 mg New Bag  0120     1,500 mg New Bag 04/15/24 1727    vancomycin (VANCOCIN) 1,250 mg in 0.9% NaCl 250 mL intermittent infusion (mg) 1,250 mg New Bag 04/15/24 0333     1,250 mg New Bag 24 1600                    Nephrotoxins and other renal medications (From now, onward)      Start     Dose/Rate Route Frequency Ordered Stop    24 1000  vancomycin (VANCOCIN) 2,000 mg in sodium chloride 0.9 % 500 mL intermittent infusion         2,000 mg  over 120 Minutes Intravenous EVERY 8 HOURS 24 0821                 Contrast Orders - past 72 hours (72h ago, onward)      None            Interpretation of levels and current regimen:  Vancomycin level is reflective of  therapeutic level, however it is on the lower end of the goal range. Pt will benefit from a dose increase.    Has serum creatinine changed greater than 50% in last 72 hours: No    Urine output:  unable to determine    Renal Function: Stable      [employer] Prediction of Planned New Vancomycin Regimen    Regimen: 2000 mg IV every 8 hours  Exposure target: AUC24 (range)400-600 mg/L.hr   AUC24,ss: 541 mg/L.hr  Probability of AUC24 > 400: 100 %  Ctrough,ss: 10.6 mg/L  Probability of Ctrough,ss > 20: 8 %  Probability of nephrotoxicity (Lodise BRADLEY 2009): 6 %      Plan:  Increase Dose to vancomycin 2000 mg IV every 8 hours  Vancomycin monitoring method: AUC  Vancomycin therapeutic monitoring goal: 400-600 mg*h/L  Pharmacy will check vancomycin levels as appropriate in 1-3 Days.  Serum creatinine levels will be ordered daily for the first week of therapy and at least twice weekly for subsequent weeks.      Francis Tsang, PharmD, BCPS  April 17, 2024

## 2024-04-17 NOTE — PROGRESS NOTES
[facility]EAS[facility] PROGRESS NOTE     Patient:  Donald Jackson   Date of birth 1995, Medical record number [id number]  Date of Visit:  04/17/2024  Date of Admission: 4/13/2024  Consult Requester:Allen Guzman MD          Assessment and Plan:   ID Problem List  Infection of retained intraabdominal  shunt tip s/p OR removal 4/15/24 with abscess noted at catheter tip (no cultures sent)   shunt with tip near abdominal abscess (from #4) s/p revision to ventriculopleural shunt (2/2/24) c/b malposition with subcutaneous pseudocyst s/p revision (3/19/24).   New shunt remains in R pleural space. Retained tip in abdomen removal 4/15/24  Recent appendicitis with perforation s/p appendectomy (12/5/23 at [facility]) c/b peritonitis and intraabdominal abscess  Abscess s/p IR drain (12/12/23 = Cx pan-susceptible Pseudomonas). S/p PO levo + flagyl with EOT 12/29/23  CT A/P 2/2/24 with 2.2 cm collection c/f abscess in R hemipelvis, decreased from prior. CT A/P 3/5/24 with inflammatory changes in R pelvis around retained distal  shunt tip c/f infected catheter without drainable collection.  Penicillin allergy - childhood, unknown reaction (listed hives)  History of MRSA (arm swab, 2008; +nares 2024)     RECOMMENDATION:  Continue empiric antibiotic regimen while inpatient  IV cefepime 2g q8hrs  PO flagyl 500 mg q 8hrs (IV if unable to tolerate PO)  IV vancomycin (pharmacy to dose), +MRSA nares  Recommend he complete 5 days of antibiotics post-operatively (4/15 - 4/20)  Change to PO ciprofloxacin 500 mg BID and metronidazole 500 mg q8hrs if discharging prior to EOT (4/20)  Follow up final blood cultures - NGTD.      ASSESSMENT:  Donald Jackson is a 29 year old male with PMHx significant for hydrocephalus s/p  shunt, with recent perforated appendicitis c/b intraabdominal abscess (12/2023, s/p IR drain with Cx = PsdA and PO abx course), with  shunt changed to ventriculopleural due to residual RLQ abscess  vs pseudocyst on 2/2/24 (old tip still in abd) further c/b new  shunt malposition with subcutaneous pseudocyst s/p revision 3/19/24 who presented to ED on 4/13/24 with acute R-sided abdominal pain and CT imaging concerning for infection along retained abdominal catheter for which ID was consulted.      Acute onset of abdominal pain with leukocytosis and inflammatory changes on CT are concerning for infection along retained catheter in RLQ. No current drainable collection noted, though prior Pseudomonal abscess at this site in December 2023 following perforated appendix. Concerned that this retained hardware is infected and should be removed. No BM since prior to admission with recent extensive abdominal surgery and inflammation, would be at risk for bowel obstruction though denies emesis and no obstruction noted on initial CT. Low suspicion for current  shunt (in pleural space) dysfunction or infection given lack of fever, headaches. Blood cultures x2 collected (after Abx started) remain with NGTD.     He continues on IV vancomycin (+MRSA nares), cefepime (prior PsdA), and metronidazole empirically. Appreciate surgical intervention for old  (abdomen) shunt removal on 4/15 - noted purulent fluid (abscess) around distal tip of catheter. No cultures were sent from fluid or catheter tip however, which makes antibiotic selection difficult. Likely  here is Pseudomonas given his prior abscess and IR drain at this site, though other GI musa colonization is also possible. Less likely MRSA in intraabdominal infection though will continue IV vancomycin for now. We recommend 5 days of antibiotics post-operatively (4/15 - 4/20) as source control achieved in OR. Please continue empiric regimen above while admitted, can changed to oral cipro + flagyl on discharge to complete course. No ID follow up needed.    ID will sign off. Recommendations discussed with primary team.    Ellen Bain PA-C  Infectious  Diseases  Pager #[phone] or Tien    45 MINUTES SPENT BY ME on the date of service doing chart review, history, exam, documentation & further activities per the note.           Interim History and Events:     He remains afebrile, vitals stable. WBC downtrending. Abdominal pain better controlled this morning, worse overnight. No BM since prior to admission, though still passing gas and belching. No emesis.          HPI:     See ID consult note dated 4/14/24         ROS:   -Focused 5 point ROS completed, pertinent positives and negatives listed above.      Physical Examination:  Temp: 97.9  F (36.6  C) Temp src: Oral BP: (!) 143/91 Pulse: 105   Resp: 18 SpO2: 95 % O2 Device: None (Room air)      Vitals:    04/13/24 2232   Weight: 124.7 kg (275 lb)       Constitutional: Pleasant adult male seen sitting up in chair, in NAD. Alert and interactive.   HEENT: NCAT, anicteric sclerae, conjunctiva clear. Moist mucous membranes  Respiratory: Non-labored breathing, good air exchange on room air. No cough noted.   Cardiovascular: Regular rate and rhythm   GI: Abdomen is soft, obese, mildly tender to palpation periumbilical, central abdomen and RUQ. No significant L sided pain on palpation. No rebound tenderness.  Skin: Warm and dry. No new rashes or lesions on exposed surfaces. Abdominal incisions well appearing.  Musculoskeletal: Extremities grossly normal. No tenderness or edema present. +tattoo  Neurologic: A &O x3, speech normal, answering questions appropriately. Moves all extremities spontaneously. Grossly non-focal.  Neuropsychiatric: Mentation and affect normal/appropriate.  VAD: PIV is c/d/i with no erythema, drainage, or tenderness.      Medications:  Current Facility-Administered Medications   Medication Dose Route Frequency Provider Last Rate Last Admin    ceFEPIme (MAXIPIME) 2 g vial to attach to  mL bag for ADULTS or 50 mL bag for PEDS  2 g Intravenous Q8H Dago Valerio MD 0 mL/hr at 04/14/24 1530 2 g at  "04/17/24 0620    metroNIDAZOLE (FLAGYL) infusion 500 mg  500 mg Intravenous Q8H Denae Monroe  mL/hr at 04/17/24 1023 500 mg at 04/17/24 1023    sodium chloride (PF) 0.9% PF flush 3 mL  3 mL Intracatheter Q8H Yunier Monroe MD   3 mL at 04/17/24 1321    vancomycin (VANCOCIN) 2,000 mg in sodium chloride 0.9 % 500 mL intermittent infusion  2,000 mg Intravenous Q8H Sohail Hardy  mL/hr at 04/17/24 1149 2,000 mg at 04/17/24 1149       Infusions/Drips:  Current Facility-Administered Medications   Medication Dose Route Frequency Provider Last Rate Last Admin       Laboratory Data:   No results found for: \"ACD4\"    Inflammatory Markers    Recent Labs   Lab Test 02/04/24  0653 02/02/24  0435 02/01/24  0621 01/31/24  0806 08/16/20  1319   SED 6 30* 7 9 1   CRP  --   --   --   --  <2.9       Metabolic Studies       Recent Labs   Lab Test 04/17/24  0637 04/16/24  0654 04/15/24  0941 04/15/24  0628 04/14/24  0646 04/13/24  2256 02/06/24  0722 01/29/24  0737 01/28/24  0605 01/27/24  2101 01/27/24  1405 01/27/24  1404    136  --  138 137 138 137   < > 136  --  138  --    POTASSIUM 3.6 4.1  --  3.7 4.1 3.6 4.2   < > 4.3  --  3.9  --    CHLORIDE 103 102  --  105 106 103 105   < > 104  --  103  --    CO2 21* 18*  --  22 21* 24 23   < > 21*  --  23  --    ANIONGAP 14 16*  --  11 10 11 9   < > 11  --  12  --    BUN 13.2 13.1  --  13.4 20.8* 25.5* 13.0   < > 12.9  --  14.5  --    CR 0.82 0.91  --  0.91 0.88 1.06 0.90   < > 0.93  --  0.93  --    GFRESTIMATED >90 >90  --  >90 >90 >90 >90   < > >90  --  >90  --    * 129* 98 97 123* 95 165*   < > 94   < > 95  --    ANTHONY 8.5* 8.9  --  8.5* 8.5* 8.9 8.7   < > 9.2  --  9.5  --    PHOS  --   --   --   --   --   --   --   --  3.5  --   --   --    MAG  --   --   --   --   --   --   --   --  1.9  --  1.8  --    LACT  --   --   --   --   --   --   --   --   --   --   --  0.9    < > = values in this interval not displayed.       Hepatic Studies  "   Recent Labs   Lab Test 04/17/24 0637 04/16/24  0654 04/15/24  0628 04/14/24  0646 04/13/24 2256 01/27/24  1405   BILITOTAL 0.6 0.7 1.0 0.5 0.3 0.6   ALKPHOS 78 73 81 71 78 94   ALBUMIN 3.4* 3.3* 3.6 3.9 4.2 4.2   AST 10 11 13 18 23 25   ALT 13 18 20 37 40 53       Pancreatitis testing    Recent Labs   Lab Test 04/13/24 2256 06/26/19  1004   LIPASE 16  --    TRIG  --  49.0       Hematology Studies      Recent Labs   Lab Test 04/17/24 0638 04/15/24  0628 04/14/24 0646 04/13/24 2256 03/18/24  0748 02/06/24  0722 03/01/22  1038 08/16/20  1319 06/26/19  1004   WBC 16.1* 17.6* 18.7* 13.8* 8.8 12.8*   < > 8.0  --    ANEU  --   --   --   --   --   --   --  5.3  --    ALYM  --   --   --   --   --   --   --  2.1 2.5   JORGE  --   --   --   --   --   --   --  0.6  --    AEOS  --   --   --   --   --   --   --  0.0  --    HGB 12.4* 13.8 13.8 13.8 14.2 13.0*   < > 15.2 14.5   HCT 39.4* 43.7 44.3 44.1 44.9 41.2   < > 47.1 46.2    198 259 262 232 260   < > 188  --     < > = values in this interval not displayed.       Arterial Blood Gas Testing  No lab results found.     Urine Studies     Recent Labs   Lab Test 01/28/24 2033 03/01/22  1038   URINEPH 6.0 7.0   NITRITE Negative Negative   LEUKEST Negative Negative   WBCU <1 0-5       Vancomycin Levels     Recent Labs   Lab Test 04/17/24 0637 04/15/24  1559 01/30/24  1746   VANCOMYCIN 14.5 <4.0 24.7       Tobramycin levels     No lab results found.    Gentamicin levels    No lab results found.    CSF testing     Recent Labs   Lab Test 02/02/24  0834 01/31/24  0943 01/27/24  2301   CWBC 1 3  --    CRBC 2 42*  --    CGLU 64 70 59   CTP 6.8* 7.3* 8.3*         Microbiology:  Culture   Date Value Ref Range Status   04/14/2024 Staphylococcus aureus MRSA (A)  Final   04/14/2024 No growth after 3 days  Preliminary   04/14/2024 No growth after 3 days  Preliminary   02/05/2024 No anaerobic organisms isolated  Final   02/05/2024 No Growth  Final   02/05/2024 No Growth  Final  "  02/02/2024 No Growth  Final   02/02/2024 No anaerobic organisms isolated  Final   01/31/2024 No Growth  Final   01/31/2024 No anaerobic organisms isolated  Final   01/27/2024 No Growth  Final   01/27/2024 No anaerobic organisms isolated  Final   01/27/2024 No Growth  Final   01/27/2024 No Growth  Final       Last check of C difficile  No results found for: \"CDBPCT\"    Imaging:  CT Abdomen Pelvis w Contrast  Result Date: 4/14/2024  IMPRESSION: 1.  Redemonstrated right lower quadrant inflammatory change with mesenteric edema, peritoneal enhancement, and bowel wall thickening in the region of a ventriculoperitoneal shunt catheter, highly concerning for infection along the catheter segment in this region. No drainable abscess.  "

## 2024-04-18 LAB
ALBUMIN SERPL BCG-MCNC: 3.1 G/DL (ref 3.5–5.2)
ALP SERPL-CCNC: 65 U/L (ref 40–150)
ALT SERPL W P-5'-P-CCNC: 8 U/L (ref 0–70)
ANION GAP SERPL CALCULATED.3IONS-SCNC: 12 MMOL/L (ref 7–15)
AST SERPL W P-5'-P-CCNC: 14 U/L (ref 0–45)
BASOPHILS # BLD AUTO: 0.1 10E3/UL (ref 0–0.2)
BASOPHILS NFR BLD AUTO: 0 %
BILIRUB SERPL-MCNC: 0.5 MG/DL
BUN SERPL-MCNC: 12.1 MG/DL (ref 6–20)
CALCIUM SERPL-MCNC: 8.4 MG/DL (ref 8.6–10)
CHLORIDE SERPL-SCNC: 103 MMOL/L (ref 98–107)
CREAT SERPL-MCNC: 0.81 MG/DL (ref 0.67–1.17)
DEPRECATED HCO3 PLAS-SCNC: 24 MMOL/L (ref 22–29)
EGFRCR SERPLBLD CKD-EPI 2021: >90 ML/MIN/1.73M2
EOSINOPHIL # BLD AUTO: 0.2 10E3/UL (ref 0–0.7)
EOSINOPHIL NFR BLD AUTO: 1 %
ERYTHROCYTE [DISTWIDTH] IN BLOOD BY AUTOMATED COUNT: 16.3 % (ref 10–15)
GLUCOSE SERPL-MCNC: 97 MG/DL (ref 70–99)
HCT VFR BLD AUTO: 37.8 % (ref 40–53)
HGB BLD-MCNC: 12.1 G/DL (ref 13.3–17.7)
IMM GRANULOCYTES # BLD: 0.1 10E3/UL
IMM GRANULOCYTES NFR BLD: 1 %
LYMPHOCYTES # BLD AUTO: 2.4 10E3/UL (ref 0.8–5.3)
LYMPHOCYTES NFR BLD AUTO: 20 %
MAGNESIUM SERPL-MCNC: 2 MG/DL (ref 1.7–2.3)
MCH RBC QN AUTO: 23.7 PG (ref 26.5–33)
MCHC RBC AUTO-ENTMCNC: 32 G/DL (ref 31.5–36.5)
MCV RBC AUTO: 74 FL (ref 78–100)
MONOCYTES # BLD AUTO: 1.1 10E3/UL (ref 0–1.3)
MONOCYTES NFR BLD AUTO: 9 %
NEUTROPHILS # BLD AUTO: 8.1 10E3/UL (ref 1.6–8.3)
NEUTROPHILS NFR BLD AUTO: 69 %
NRBC # BLD AUTO: 0 10E3/UL
NRBC BLD AUTO-RTO: 0 /100
PHOSPHATE SERPL-MCNC: 2.3 MG/DL (ref 2.5–4.5)
PLATELET # BLD AUTO: 282 10E3/UL (ref 150–450)
POTASSIUM SERPL-SCNC: 3.7 MMOL/L (ref 3.4–5.3)
PROT SERPL-MCNC: 6.4 G/DL (ref 6.4–8.3)
RBC # BLD AUTO: 5.1 10E6/UL (ref 4.4–5.9)
SODIUM SERPL-SCNC: 139 MMOL/L (ref 135–145)
VANCOMYCIN SERPL-MCNC: 13.9 UG/ML
WBC # BLD AUTO: 11.9 10E3/UL (ref 4–11)

## 2024-04-18 PROCEDURE — 250N000011 HC RX IP 250 OP 636: Performed by: STUDENT IN AN ORGANIZED HEALTH CARE EDUCATION/TRAINING PROGRAM

## 2024-04-18 PROCEDURE — 36415 COLL VENOUS BLD VENIPUNCTURE: CPT | Performed by: STUDENT IN AN ORGANIZED HEALTH CARE EDUCATION/TRAINING PROGRAM

## 2024-04-18 PROCEDURE — 250N000013 HC RX MED GY IP 250 OP 250 PS 637: Performed by: STUDENT IN AN ORGANIZED HEALTH CARE EDUCATION/TRAINING PROGRAM

## 2024-04-18 PROCEDURE — 250N000011 HC RX IP 250 OP 636

## 2024-04-18 PROCEDURE — 250N000013 HC RX MED GY IP 250 OP 250 PS 637

## 2024-04-18 PROCEDURE — 80202 ASSAY OF VANCOMYCIN: CPT | Performed by: STUDENT IN AN ORGANIZED HEALTH CARE EDUCATION/TRAINING PROGRAM

## 2024-04-18 PROCEDURE — 250N000011 HC RX IP 250 OP 636: Performed by: HOSPITALIST

## 2024-04-18 PROCEDURE — 120N000002 HC R&B MED SURG/OB UMMC

## 2024-04-18 PROCEDURE — 99232 SBSQ HOSP IP/OBS MODERATE 35: CPT | Performed by: STUDENT IN AN ORGANIZED HEALTH CARE EDUCATION/TRAINING PROGRAM

## 2024-04-18 PROCEDURE — 85025 COMPLETE CBC W/AUTO DIFF WBC: CPT | Performed by: STUDENT IN AN ORGANIZED HEALTH CARE EDUCATION/TRAINING PROGRAM

## 2024-04-18 PROCEDURE — 250N000013 HC RX MED GY IP 250 OP 250 PS 637: Performed by: HOSPITALIST

## 2024-04-18 PROCEDURE — 258N000003 HC RX IP 258 OP 636: Performed by: STUDENT IN AN ORGANIZED HEALTH CARE EDUCATION/TRAINING PROGRAM

## 2024-04-18 PROCEDURE — 80053 COMPREHEN METABOLIC PANEL: CPT | Performed by: STUDENT IN AN ORGANIZED HEALTH CARE EDUCATION/TRAINING PROGRAM

## 2024-04-18 PROCEDURE — 84100 ASSAY OF PHOSPHORUS: CPT | Performed by: STUDENT IN AN ORGANIZED HEALTH CARE EDUCATION/TRAINING PROGRAM

## 2024-04-18 PROCEDURE — 250N000011 HC RX IP 250 OP 636: Mod: JZ | Performed by: EMERGENCY MEDICINE

## 2024-04-18 PROCEDURE — 83735 ASSAY OF MAGNESIUM: CPT | Performed by: STUDENT IN AN ORGANIZED HEALTH CARE EDUCATION/TRAINING PROGRAM

## 2024-04-18 RX ADMIN — CEFEPIME HYDROCHLORIDE 2 G: 2 INJECTION, POWDER, FOR SOLUTION INTRAVENOUS at 07:03

## 2024-04-18 RX ADMIN — OXYCODONE HYDROCHLORIDE 10 MG: 5 TABLET ORAL at 15:55

## 2024-04-18 RX ADMIN — VANCOMYCIN HYDROCHLORIDE 2000 MG: 1 INJECTION, POWDER, LYOPHILIZED, FOR SOLUTION INTRAVENOUS at 02:06

## 2024-04-18 RX ADMIN — CEFEPIME HYDROCHLORIDE 2 G: 2 INJECTION, POWDER, FOR SOLUTION INTRAVENOUS at 14:18

## 2024-04-18 RX ADMIN — OXYCODONE HYDROCHLORIDE 10 MG: 5 TABLET ORAL at 12:01

## 2024-04-18 RX ADMIN — METRONIDAZOLE 500 MG: 500 INJECTION, SOLUTION INTRAVENOUS at 03:43

## 2024-04-18 RX ADMIN — ENOXAPARIN SODIUM 40 MG: 40 INJECTION SUBCUTANEOUS at 07:03

## 2024-04-18 RX ADMIN — ACETAMINOPHEN 650 MG: 325 TABLET, FILM COATED ORAL at 23:40

## 2024-04-18 RX ADMIN — OXYCODONE HYDROCHLORIDE 10 MG: 5 TABLET ORAL at 03:40

## 2024-04-18 RX ADMIN — POLYETHYLENE GLYCOL 3350 17 G: 17 POWDER, FOR SOLUTION ORAL at 09:05

## 2024-04-18 RX ADMIN — ONDANSETRON 4 MG: 4 TABLET, ORALLY DISINTEGRATING ORAL at 19:21

## 2024-04-18 RX ADMIN — HYDROMORPHONE HYDROCHLORIDE 0.3 MG: 1 INJECTION, SOLUTION INTRAMUSCULAR; INTRAVENOUS; SUBCUTANEOUS at 19:21

## 2024-04-18 RX ADMIN — ENOXAPARIN SODIUM 40 MG: 40 INJECTION SUBCUTANEOUS at 19:42

## 2024-04-18 RX ADMIN — VANCOMYCIN HYDROCHLORIDE 2000 MG: 1 INJECTION, POWDER, LYOPHILIZED, FOR SOLUTION INTRAVENOUS at 10:36

## 2024-04-18 RX ADMIN — OXYCODONE HYDROCHLORIDE 10 MG: 5 TABLET ORAL at 23:39

## 2024-04-18 RX ADMIN — METRONIDAZOLE 500 MG: 500 INJECTION, SOLUTION INTRAVENOUS at 10:49

## 2024-04-18 RX ADMIN — DOCUSATE SODIUM 50 MG AND SENNOSIDES 8.6 MG 2 TABLET: 8.6; 5 TABLET, FILM COATED ORAL at 10:36

## 2024-04-18 RX ADMIN — VANCOMYCIN HYDROCHLORIDE 2000 MG: 1 INJECTION, POWDER, LYOPHILIZED, FOR SOLUTION INTRAVENOUS at 19:42

## 2024-04-18 RX ADMIN — HYDROMORPHONE HYDROCHLORIDE 0.3 MG: 1 INJECTION, SOLUTION INTRAMUSCULAR; INTRAVENOUS; SUBCUTANEOUS at 00:36

## 2024-04-18 RX ADMIN — CEFEPIME HYDROCHLORIDE 2 G: 2 INJECTION, POWDER, FOR SOLUTION INTRAVENOUS at 22:10

## 2024-04-18 RX ADMIN — METRONIDAZOLE 500 MG: 500 INJECTION, SOLUTION INTRAVENOUS at 19:42

## 2024-04-18 RX ADMIN — ACETAMINOPHEN 650 MG: 325 TABLET, FILM COATED ORAL at 03:39

## 2024-04-18 ASSESSMENT — ACTIVITIES OF DAILY LIVING (ADL)
ADLS_ACUITY_SCORE: 20
ADLS_ACUITY_SCORE: 26
ADLS_ACUITY_SCORE: 20
ADLS_ACUITY_SCORE: 26
ADLS_ACUITY_SCORE: 20
ADLS_ACUITY_SCORE: 26
ADLS_ACUITY_SCORE: 20
ADLS_ACUITY_SCORE: 20
ADLS_ACUITY_SCORE: 26
ADLS_ACUITY_SCORE: 20
ADLS_ACUITY_SCORE: 26
ADLS_ACUITY_SCORE: 20
ADLS_ACUITY_SCORE: 26
ADLS_ACUITY_SCORE: 20

## 2024-04-18 NOTE — PROGRESS NOTES
[facility]    Progress Note - Emergency General Surgery Service     Assessment & Plan: Surgery   Donaldgisele Jackson is a 29 year old male with hx of hydrocephalus s/p  shunt placement, perforated appendicitis c/b abscess, and retinal detachment who presented with abdominal pain and was admitted for infection adjacent to  shunt catheter in LLQ now s/p diagnostic laparoscopy, abdominal washout and removal of catheter (4/15/240). Nasal swab MRSA+.     Pt with slow return of bowel function, otherwise progressing appropriately.     -Continue with CLD today until greater ROBF   -Encouraged ambulation and out of bed   - Recommend scheduling bowel regimen  - Replete K>4, Mg>2, Ph >3  -Continue IV antibiotics per primary team. Agree with cefepime, flagyl, vancomycin given +MRSA nares swab   -F/u blood cultures   -DVT prophylaxis per primary team. Recommend chemical and mechanical prophylaxis (on lovenox)  - EGS will continue to follow      The patient's care was discussed with the Chief Resident/Fellow.    Estrada Kamara MD  [facility]  All communications related to this note should be expressed to resident/YANETH on call for this team at the time of your communication.  ______________________________________________________________________    Interval History   NAEON. Patient reports his pain and distention are improving. Still denies Bms. He is tolerating drinking water but has no appetitie for anything more    Physical Exam   Vital Signs: Temp: 98.1  F (36.7  C) Temp src: Oral BP: (!) 142/98 Pulse: 108   Resp: 18 SpO2: 95 % O2 Device: None (Room air) Oxygen Delivery: 1 LPM  Weight: 287 lbs 6.4 oz      Gen: A&Ox4, NAD   Chest: Breathing non-labored on room air    Abdomen: soft, non-peritonitic, non-tender, distended   Incision: clean, dry, intact closed with dermabond, mild ecchymosis to LLQ incision  Extremities: warm and well  perfused     Data     I have personally reviewed the following data over the past 24 hrs:    11.9 (H)  \   12.1 (L)   / 282     139 103 12.1 /  97   3.7 24 0.81 \     ALT: 8 AST: 14 AP: 65 TBILI: 0.5   ALB: 3.1 (L) TOT PROTEIN: 6.4 LIPASE: N/A       Imaging results reviewed over the past 24 hrs:   No results found for this or any previous visit (from the past 24 hour(s)).

## 2024-04-18 NOTE — PLAN OF CARE
Goal Outcome Evaluation:      Plan of Care Reviewed With: patient    Overall Patient Progress: no changeOverall Patient Progress: no change    [id number]    A&O x4, CMS intact. VSS on RA. Denies shortness of breath or chest pain. Voiding spontaneously in bathroom. Abdominal distention noted. Faint bowel sounds. Belching; passing minimal flatus. LBM 4/12. No nausea reported. C/o of 6-8/10 abdominal pain, managed w/ oral oxy + tylenol + IV dilaudid.  L PIV infusing MIVF + intermittent abx per MAR. Abdominal lap sites SONNY;CDI. Continue POC

## 2024-04-18 NOTE — PLAN OF CARE
"Goal Outcome Evaluation:    Plan of Care Reviewed With: patient  Overall Patient Progress: no changeOverall Patient Progress: no change  BP (!) 148/95 (BP Location: Left arm)   Pulse 117   Temp 97.6  F (36.4  C) (Oral)   Resp 18   Ht 1.727 m (5' 8\")   Wt 130.4 kg (287 lb 6.4 oz)   SpO2 90%   BMI 43.70 kg/m     AVSS.  Alert and oriented x4.  Up walking hallways x3. Up in chair rest of shift.   Burping a lot. Passing gas. No BM. Miralax given. Senna and MOM - day shift. Abdomen distended/obese. Soft to touch. Still with lower abdomen pain.   On clears but with poor appetite. Low uop - zack. PIV at TKO with abx.   Oxycodone given x2 and Zofran x 1 at HS.   Continues on iv antibiotics.   Monitor FROBF. Update team as needed.      "

## 2024-04-18 NOTE — PROGRESS NOTES
"[facility]    Medicine Progress Note - Hospitalist Service, GOLD TEAM 7    Date of Admission:  4/13/2024    Assessment & Plan       28 Y/O M with PMHx significant for hydrocephalus s/p  shunt, with recent perforated appendicitis c/b intraabdominal abscess (12/2023, s/p IR drain with Cx = PsdA and PO abx course), with  shunt changed to ventriculopleural due to residual RLQ abscess vs pseudocyst on 2/2/24 (old tip still in abd) further c/b new  shunt malposition with subcutaneous pseudocyst s/p revision 3/19/24 who presented to ED on 4/13/24 with acute R-sided abdominal pain and CT imaging concerning for infection along retained abdominal catheter - which is discontinuous from his present shunt, now s/p diagnostic laparoscopy, abdominal washout and removal of catheter 04/15, doing well and awaiting ROBF.      Today's changes:  - continue bowel regimen, encourage ambulatoin       # Hx hydrocephalus s/p  shunt  # Hx perforated appendicitis c/b abscess  # C/f intraperitoneal infection of  shunt retained catheter   # Abdominal pain  # Nausea  # Leukocytosis     - Continue vancomycin, cefepime and metronidazole for now  - await ROBF, encourage ambulation  - start DVT prophylaxis  - plan ciprofloxacin and metronidazole at discharge if prior to 4/20  -continue oxycodone and dilaudid, encourage PO first  - miralax and senna .        # Elevated blood pressure    Follow, consider outpatient follow up        Diet: Clear Liquid Diet    DVT Prophylaxis: Enoxaparin (Lovenox) SQ  Chowdhury Catheter: Not present  Lines: None     Cardiac Monitoring: None  Code Status: Full Code      Clinically Significant Risk Factors              # Hypoalbuminemia: Lowest albumin = 3.1 g/dL at 4/18/2024  7:15 AM, will monitor as appropriate            # Severe Obesity: Estimated body mass index is 43.7 kg/m  as calculated from the following:    Height as of this encounter: 1.727 m (5' 8\").    Weight " as of this encounter: 130.4 kg (287 lb 6.4 oz).             Disposition Plan     Medically Ready for Discharge: Anticipated Tomorrow             Sohail Hardy MD  Hospitalist Service, [place] TEAM 7  [facility]  Securely message with [employer] (more info)  Text page via [employer] Paging/Directory   See signed in provider for up to date coverage information  ______________________________________________________________________    Interval History   No major events, passing flatus but no bowel movement.  Very poor appetite. Pain controlled.     Physical Exam   Vital Signs: Temp: 98.1  F (36.7  C) Temp src: Oral BP: (!) 142/98 Pulse: 108   Resp: 18 SpO2: 95 % O2 Device: None (Room air) Oxygen Delivery: 1 LPM  Weight: 287 lbs 6.4 oz    General Appearance: Sitting in a chair  Respiratory: clear to auscultation bilaterally with good air entry   Cardiovascular: normal rate, regular rhythm. No murmurs, rubs, or gallops   GI: soft, mildly tender, non-distended.    Skin: no rash  Other: Trace lower extremity edema.      Medical Decision Making             Data     I have personally reviewed the following data over the past 24 hrs:    11.9 (H)  \   12.1 (L)   / 282     139 103 12.1 /  97   3.7 24 0.81 \     ALT: 8 AST: 14 AP: 65 TBILI: 0.5   ALB: 3.1 (L) TOT PROTEIN: 6.4 LIPASE: N/A        Yes

## 2024-04-18 NOTE — PLAN OF CARE
Goal Outcome Evaluation:      Plan of Care Reviewed With: patient    Overall Patient Progress: improvingOverall Patient Progress: improving       Neuro: A/Ox 4; responsive to commands; able to make needs known  Cardiac: VSS; HR regular; cap refill <3s; non-pitting edema in BUE and BLE  Respiratory: Clear; no c/o cough or SOB  GI/: BS hypoactive x4 quad; abd round and taut. No BM since 04/13/24; Miralax and Senna given. Urine red-orange per resident  Diet: Full liquid diet; advance as tolerated  Activity: Independent; Ad janene; ambulated to bathroom.  Skin: Abdominal lap sites CDI  LDA: 2 PIV on LA; Infusing maintenance IV and intermittent abx  Pain: c/o 6/10 abd pain; oxy 10 mg PO given @ 1201; pain currently subsided  Lab: Reviewed  New changes this shift: Advance diet as tolerated  Plan: Continue POC

## 2024-04-19 LAB
ALBUMIN SERPL BCG-MCNC: 2.8 G/DL (ref 3.5–5.2)
ALP SERPL-CCNC: 61 U/L (ref 40–150)
ALT SERPL W P-5'-P-CCNC: 8 U/L (ref 0–70)
ANION GAP SERPL CALCULATED.3IONS-SCNC: 12 MMOL/L (ref 7–15)
AST SERPL W P-5'-P-CCNC: 12 U/L (ref 0–45)
BACTERIA BLD CULT: NO GROWTH
BACTERIA BLD CULT: NO GROWTH
BASOPHILS # BLD AUTO: ABNORMAL 10*3/UL
BASOPHILS # BLD MANUAL: 0.2 10E3/UL (ref 0–0.2)
BASOPHILS NFR BLD AUTO: ABNORMAL %
BASOPHILS NFR BLD MANUAL: 2 %
BILIRUB SERPL-MCNC: 0.5 MG/DL
BUN SERPL-MCNC: 8.6 MG/DL (ref 6–20)
CALCIUM SERPL-MCNC: 8.1 MG/DL (ref 8.6–10)
CHLORIDE SERPL-SCNC: 101 MMOL/L (ref 98–107)
CREAT SERPL-MCNC: 0.66 MG/DL (ref 0.67–1.17)
DEPRECATED HCO3 PLAS-SCNC: 22 MMOL/L (ref 22–29)
EGFRCR SERPLBLD CKD-EPI 2021: >90 ML/MIN/1.73M2
EOSINOPHIL # BLD AUTO: ABNORMAL 10*3/UL
EOSINOPHIL # BLD MANUAL: 0.3 10E3/UL (ref 0–0.7)
EOSINOPHIL NFR BLD AUTO: ABNORMAL %
EOSINOPHIL NFR BLD MANUAL: 3 %
ERYTHROCYTE [DISTWIDTH] IN BLOOD BY AUTOMATED COUNT: 16.2 % (ref 10–15)
GLUCOSE SERPL-MCNC: 93 MG/DL (ref 70–99)
HCT VFR BLD AUTO: 35.5 % (ref 40–53)
HGB BLD-MCNC: 11.4 G/DL (ref 13.3–17.7)
IMM GRANULOCYTES # BLD: ABNORMAL 10*3/UL
IMM GRANULOCYTES NFR BLD: ABNORMAL %
LYMPHOCYTES # BLD AUTO: ABNORMAL 10*3/UL
LYMPHOCYTES # BLD MANUAL: 0.6 10E3/UL (ref 0.8–5.3)
LYMPHOCYTES NFR BLD AUTO: ABNORMAL %
LYMPHOCYTES NFR BLD MANUAL: 6 %
MCH RBC QN AUTO: 23.7 PG (ref 26.5–33)
MCHC RBC AUTO-ENTMCNC: 32.1 G/DL (ref 31.5–36.5)
MCV RBC AUTO: 74 FL (ref 78–100)
MONOCYTES # BLD AUTO: ABNORMAL 10*3/UL
MONOCYTES # BLD MANUAL: 0.9 10E3/UL (ref 0–1.3)
MONOCYTES NFR BLD AUTO: ABNORMAL %
MONOCYTES NFR BLD MANUAL: 10 %
NEUTROPHILS # BLD AUTO: ABNORMAL 10*3/UL
NEUTROPHILS # BLD MANUAL: 7.4 10E3/UL (ref 1.6–8.3)
NEUTROPHILS NFR BLD AUTO: ABNORMAL %
NEUTROPHILS NFR BLD MANUAL: 79 %
NRBC # BLD AUTO: 0 10E3/UL
NRBC BLD AUTO-RTO: 0 /100
PLAT MORPH BLD: ABNORMAL
PLATELET # BLD AUTO: 267 10E3/UL (ref 150–450)
POLYCHROMASIA BLD QL SMEAR: SLIGHT
POTASSIUM SERPL-SCNC: 3.3 MMOL/L (ref 3.4–5.3)
PROT SERPL-MCNC: 6 G/DL (ref 6.4–8.3)
RBC # BLD AUTO: 4.81 10E6/UL (ref 4.4–5.9)
RBC MORPH BLD: ABNORMAL
SODIUM SERPL-SCNC: 135 MMOL/L (ref 135–145)
VARIANT LYMPHS BLD QL SMEAR: PRESENT
WBC # BLD AUTO: 9.4 10E3/UL (ref 4–11)

## 2024-04-19 PROCEDURE — 250N000011 HC RX IP 250 OP 636: Performed by: STUDENT IN AN ORGANIZED HEALTH CARE EDUCATION/TRAINING PROGRAM

## 2024-04-19 PROCEDURE — 36415 COLL VENOUS BLD VENIPUNCTURE: CPT | Performed by: STUDENT IN AN ORGANIZED HEALTH CARE EDUCATION/TRAINING PROGRAM

## 2024-04-19 PROCEDURE — 99239 HOSP IP/OBS DSCHRG MGMT >30: CPT | Performed by: STUDENT IN AN ORGANIZED HEALTH CARE EDUCATION/TRAINING PROGRAM

## 2024-04-19 PROCEDURE — 85027 COMPLETE CBC AUTOMATED: CPT | Performed by: STUDENT IN AN ORGANIZED HEALTH CARE EDUCATION/TRAINING PROGRAM

## 2024-04-19 PROCEDURE — 120N000002 HC R&B MED SURG/OB UMMC

## 2024-04-19 PROCEDURE — 80053 COMPREHEN METABOLIC PANEL: CPT | Performed by: STUDENT IN AN ORGANIZED HEALTH CARE EDUCATION/TRAINING PROGRAM

## 2024-04-19 PROCEDURE — 250N000013 HC RX MED GY IP 250 OP 250 PS 637: Performed by: HOSPITALIST

## 2024-04-19 PROCEDURE — 999N000248 HC STATISTIC IV INSERT WITH US BY RN

## 2024-04-19 PROCEDURE — 250N000011 HC RX IP 250 OP 636

## 2024-04-19 PROCEDURE — 85007 BL SMEAR W/DIFF WBC COUNT: CPT | Performed by: STUDENT IN AN ORGANIZED HEALTH CARE EDUCATION/TRAINING PROGRAM

## 2024-04-19 PROCEDURE — 250N000011 HC RX IP 250 OP 636: Mod: JZ | Performed by: EMERGENCY MEDICINE

## 2024-04-19 PROCEDURE — 258N000003 HC RX IP 258 OP 636: Performed by: STUDENT IN AN ORGANIZED HEALTH CARE EDUCATION/TRAINING PROGRAM

## 2024-04-19 PROCEDURE — 250N000013 HC RX MED GY IP 250 OP 250 PS 637

## 2024-04-19 PROCEDURE — 99233 SBSQ HOSP IP/OBS HIGH 50: CPT | Performed by: STUDENT IN AN ORGANIZED HEALTH CARE EDUCATION/TRAINING PROGRAM

## 2024-04-19 RX ORDER — AMOXICILLIN 250 MG
2 CAPSULE ORAL 2 TIMES DAILY PRN
Qty: 60 TABLET | Refills: 0 | Status: SHIPPED | OUTPATIENT
Start: 2024-04-19 | End: 2024-05-01

## 2024-04-19 RX ORDER — OXYCODONE HYDROCHLORIDE 5 MG/1
5-10 TABLET ORAL EVERY 4 HOURS PRN
Qty: 15 TABLET | Refills: 0 | Status: SHIPPED | OUTPATIENT
Start: 2024-04-19 | End: 2024-05-01

## 2024-04-19 RX ORDER — CIPROFLOXACIN 500 MG/1
500 TABLET, FILM COATED ORAL 2 TIMES DAILY
Qty: 3 TABLET | Refills: 0 | Status: SHIPPED | OUTPATIENT
Start: 2024-04-19 | End: 2024-04-20

## 2024-04-19 RX ORDER — POLYETHYLENE GLYCOL 3350 17 G/17G
17 POWDER, FOR SOLUTION ORAL DAILY
Qty: 510 G | Refills: 0 | Status: SHIPPED | OUTPATIENT
Start: 2024-04-19 | End: 2024-05-01

## 2024-04-19 RX ORDER — METRONIDAZOLE 500 MG/1
500 TABLET ORAL 3 TIMES DAILY
Qty: 5 TABLET | Refills: 0 | Status: SHIPPED | OUTPATIENT
Start: 2024-04-19 | End: 2024-04-20

## 2024-04-19 RX ADMIN — VANCOMYCIN HYDROCHLORIDE 2000 MG: 1 INJECTION, POWDER, LYOPHILIZED, FOR SOLUTION INTRAVENOUS at 09:55

## 2024-04-19 RX ADMIN — METRONIDAZOLE 500 MG: 500 INJECTION, SOLUTION INTRAVENOUS at 14:06

## 2024-04-19 RX ADMIN — OXYCODONE HYDROCHLORIDE 10 MG: 5 TABLET ORAL at 05:06

## 2024-04-19 RX ADMIN — ACETAMINOPHEN 650 MG: 325 TABLET, FILM COATED ORAL at 09:52

## 2024-04-19 RX ADMIN — OXYCODONE HYDROCHLORIDE 10 MG: 5 TABLET ORAL at 23:04

## 2024-04-19 RX ADMIN — CEFEPIME HYDROCHLORIDE 2 G: 2 INJECTION, POWDER, FOR SOLUTION INTRAVENOUS at 23:04

## 2024-04-19 RX ADMIN — ONDANSETRON 4 MG: 4 TABLET, ORALLY DISINTEGRATING ORAL at 10:11

## 2024-04-19 RX ADMIN — OXYCODONE HYDROCHLORIDE 10 MG: 5 TABLET ORAL at 09:52

## 2024-04-19 RX ADMIN — ENOXAPARIN SODIUM 40 MG: 40 INJECTION SUBCUTANEOUS at 05:07

## 2024-04-19 RX ADMIN — ENOXAPARIN SODIUM 40 MG: 40 INJECTION SUBCUTANEOUS at 17:11

## 2024-04-19 RX ADMIN — CEFEPIME HYDROCHLORIDE 2 G: 2 INJECTION, POWDER, FOR SOLUTION INTRAVENOUS at 15:29

## 2024-04-19 RX ADMIN — VANCOMYCIN HYDROCHLORIDE 2000 MG: 1 INJECTION, POWDER, LYOPHILIZED, FOR SOLUTION INTRAVENOUS at 17:05

## 2024-04-19 RX ADMIN — METRONIDAZOLE 500 MG: 500 INJECTION, SOLUTION INTRAVENOUS at 21:09

## 2024-04-19 RX ADMIN — VANCOMYCIN HYDROCHLORIDE 2000 MG: 1 INJECTION, POWDER, LYOPHILIZED, FOR SOLUTION INTRAVENOUS at 02:10

## 2024-04-19 RX ADMIN — METRONIDAZOLE 500 MG: 500 INJECTION, SOLUTION INTRAVENOUS at 02:11

## 2024-04-19 RX ADMIN — CEFEPIME HYDROCHLORIDE 2 G: 2 INJECTION, POWDER, FOR SOLUTION INTRAVENOUS at 05:07

## 2024-04-19 RX ADMIN — ACETAMINOPHEN 650 MG: 325 TABLET, FILM COATED ORAL at 17:23

## 2024-04-19 RX ADMIN — PROCHLORPERAZINE EDISYLATE 10 MG: 5 INJECTION INTRAMUSCULAR; INTRAVENOUS at 13:15

## 2024-04-19 RX ADMIN — OXYCODONE HYDROCHLORIDE 10 MG: 5 TABLET ORAL at 17:23

## 2024-04-19 ASSESSMENT — ACTIVITIES OF DAILY LIVING (ADL)
ADLS_ACUITY_SCORE: 22
ADLS_ACUITY_SCORE: 22
ADLS_ACUITY_SCORE: 26
ADLS_ACUITY_SCORE: 22
ADLS_ACUITY_SCORE: 23
ADLS_ACUITY_SCORE: 22
ADLS_ACUITY_SCORE: 26
ADLS_ACUITY_SCORE: 22

## 2024-04-19 NOTE — DISCHARGE SUMMARY
"[facility]  Hospitalist Discharge Summary      Date of Admission:  4/13/2024  Date of Discharge:  4/19/2024  Discharging Provider: Sohail Hardy MD  Discharge Service: Hospitalist Service, GOLD TEAM 7    Discharge Diagnoses     Intentionally retained intrabdominal catheter  Recent history of perforated appendicitis and abscess  Hydrocephalus s/p  shunt  S/p diagnostic laparaoscopy, catheter removal, and washout  Prolonged nausea and vomiting.     Clinically Significant Risk Factors     # Severe Obesity: Estimated body mass index is 43.7 kg/m  as calculated from the following:    Height as of this encounter: 1.727 m (5' 8\").    Weight as of this encounter: 130.4 kg (287 lb 6.4 oz).       Follow-ups Needed After Discharge   Discharge Disposition   Discharged to home  Condition at discharge: Stable    Hospital Course     30 Y/O M with PMHx significant for hydrocephalus s/p  shunt, with recent perforated appendicitis c/b intraabdominal abscess (12/2023, s/p IR drain with Cx = PsdA and PO abx course), with  shunt changed to ventriculopleural due to residual RLQ abscess vs pseudocyst on 2/2/24 (old tip still in abd) further c/b new  shunt malposition with subcutaneous pseudocyst s/p revision 3/19/24 who presented to ED on 4/13/24 with acute R-sided abdominal pain and CT imaging concerning for infection along retained abdominal catheter - which is discontinuous from his present shunt.  He underwent diagnostic laparoscopy, abdominal washout and removal of catheter. He was treated with a course of vancomycin, cefepime, and metronidazole (no bacterial organisms were identified) and pain medications weaned slowly. He slowly had return of bowel function and oral intake improved.   Consultations This Hospital Stay   PHARMACY TO DOSE VANCO  NEUROSURGERY ADULT IP CONSULT  INFECTIOUS DISEASE GENERAL ADULT IP CONSULT  PHARMACY TO DOSE VANCO  SURGERY GENERAL ADULT IP " CONSULT  NURSING TO CONSULT FOR VASCULAR ACCESS CARE IP CONSULT  NURSING TO CONSULT FOR VASCULAR ACCESS CARE IP CONSULT    Code Status   Prior    Time Spent on this Encounter   I, Sohail Hardy MD, personally saw the patient today and spent greater than 30 minutes discharging this patient.       Sohail Hardy MD  [facility] [address] [address]  Phone: [phone]  ______________________________________________________________________    Physical Exam   Vital Signs:                    Weight: 287 lbs 6.4 oz  General Appearance: Sitting up in a chair  Respiratory: clear to auscultation bilaterally with good air entry   Cardiovascular: normal rate, regular rhythm. No murmurs, rubs, or gallops   GI: obese, soft, non-distended, mild diffuse tenderness to palpation, no peritoneal signs  Skin: no rash  Other: No lower extremity edema.         Primary Care Physician   Roberto Gomez    Discharge Orders      Reason for your hospital stay    You were admitted to the hospital for abdominal pain and were found to have an infection associated with a retained part of a previous  shunt.  You had an operation and were given antibiotics to treat this infection.    Thank you for the opportunity to participate in your care.     Activity    Your activity upon discharge: activity as tolerated     When to contact your care team    Call your primary doctor or come to the [facility] if you have any of the following: worsening pain, vomiting, fevers, or other new symptoms.     Follow Up ([facility])    Follow up with primary care provider, Roberto Gomez, within 7 days for hospital follow- up.      Follow up with General surgery in one month    Appointments on [place] [facility]/or [facility] (with [facility] or [facility] provider or service). Call [phone] if you haven't heard regarding these appointments within 7 days of discharge.     Diet    Follow this diet upon discharge:  Orders Placed This Encounter      Regular Diet Adult       Significant Results and Procedures   Results for orders placed or performed during the hospital encounter of 04/13/24   CT Abdomen Pelvis w Contrast    Narrative    EXAM: CT ABDOMEN PELVIS W CONTRAST  LOCATION: [facility]  DATE: 4/14/2024    INDICATION: sbo  COMPARISON: CT from 03/05/2024.  TECHNIQUE: CT scan of the abdomen and pelvis was performed following injection of IV contrast. Multiplanar reformats were obtained. Dose reduction techniques were used.  CONTRAST: iopamidol (ISOVUE 370) solution 136 mL    FINDINGS:   LOWER CHEST: Very small right pleural effusion. Anterior pleural shunt catheter in the right hemithorax.    HEPATOBILIARY: Normal.    PANCREAS: Normal.    SPLEEN: Normal.    ADRENAL GLANDS: Normal.    KIDNEYS/BLADDER: Normal.    BOWEL: Again noted is the presence of 2 intraperitoneal shunt catheters. The smaller, more inferiorly located catheter is noted in the anterior right lower quadrant where there is mesenteric edema, peritoneal enhancement, and bowel wall thickening. The   appendix is absent. There is no bowel obstruction or free air.    LYMPH NODES: Normal.    VASCULATURE: Normal.    PELVIC ORGANS: Normal.    MUSCULOSKELETAL: Normal.      Impression    IMPRESSION:   1.  Redemonstrated right lower quadrant inflammatory change with mesenteric edema, peritoneal enhancement, and bowel wall thickening in the region of a ventriculoperitoneal shunt catheter, highly concerning for infection along the catheter segment in   this region. No drainable abscess.       Discharge Medications   Discharge Medication List as of 4/20/2024 12:32 PM        START taking these medications    Details   polyethylene glycol (MIRALAX) 17 GM/Dose powder Take 17 g by mouth daily, Disp-510 g, R-0, E-Prescribe      senna-docusate (SENOKOT-S/PERICOLACE) 8.6-50 MG tablet Take 2 tablets by mouth 2 times daily as needed for  constipation, Disp-60 tablet, R-0, E-Prescribe           CONTINUE these medications which have CHANGED    Details   oxyCODONE (ROXICODONE) 5 MG tablet Take 1-2 tablets (5-10 mg) by mouth every 4 hours as needed for severe pain, Disp-15 tablet, R-0, E-Prescribe           CONTINUE these medications which have NOT CHANGED    Details   acetaminophen (TYLENOL) 325 MG tablet Take 2 tablets (650 mg) by mouth every 4 hours as needed for mild pain, OTC      ibuprofen (ADVIL/MOTRIN) 600 MG tablet Take 600 mg by mouth every 6 hours as needed for moderate pain, Historical      MAGNESIUM CITRATE PO Take 1-2 tablets by mouth daily, Historical      Omega-3 Fatty Acids (FISH OIL PO) Take 2 capsules by mouth daily, Historical           Allergies   Allergies   Allergen Reactions    Penicillins Hives and Unknown

## 2024-04-19 NOTE — PHARMACY-VANCOMYCIN DOSING SERVICE
Pharmacy Vancomycin Note  Date of Service 2024  Patient's  1995   29 year old, male    Indication: Intra-abdominal infection  Day of Therapy: 5  Current vancomycin regimen:  2000 mg IV q8h  Current vancomycin monitoring method: AUC  Current vancomycin therapeutic monitoring goal: 400-600 mg*h/L    Insight[employer] Prediction of Current Vancomycin Regimen  Loading dose: N/A  Regimen: 2000 mg IV every 8 hours.  Start time: 19:06 on 2024  Exposure target: AUC24 (range)400-600 mg/L.hr   AUC24,ss: 543 mg/L.hr  Probability of AUC24 > 400: 100 %  Ctrough,ss: 12.8 mg/L  Probability of Ctrough,ss > 20: 0 %  Probability of nephrotoxicity (Lodise BRADLEY ): 8 %    Current estimated CrCl = Estimated Creatinine Clearance: 175.8 mL/min (based on SCr of 0.81 mg/dL).    Creatinine for last 3 days  2024:  6:54 AM Creatinine 0.91 mg/dL  2024:  6:37 AM Creatinine 0.82 mg/dL  2024:  7:15 AM Creatinine 0.81 mg/dL    Recent Vancomycin Levels (past 3 days)  2024:  6:37 AM Vancomycin 14.5 ug/mL  2024:  6:07 PM Vancomycin 13.9 ug/mL    Vancomycin IV Administrations (past 72 hours)                     vancomycin (VANCOCIN) 2,000 mg in sodium chloride 0.9 % 500 mL intermittent infusion (mg) 2,000 mg New Bag 24 1036     2,000 mg New Bag  0206     2,000 mg New Bag 24 1701     2,000 mg New Bag  1149    vancomycin (VANCOCIN) 1,500 mg in 0.9% NaCl 250 mL intermittent infusion (mg) 1,500 mg New Bag 24 0205     1,500 mg New Bag 24 1800     1,500 mg New Bag  0945     1,500 mg New Bag  0120                    Nephrotoxins and other renal medications (From now, onward)      Start     Dose/Rate Route Frequency Ordered Stop    24 1000  vancomycin (VANCOCIN) 2,000 mg in sodium chloride 0.9 % 500 mL intermittent infusion         2,000 mg  over 120 Minutes Intravenous EVERY 8 HOURS 24 0821                 Contrast Orders - past 72 hours (72h ago, onward)      None             Interpretation of levels and current regimen:  Vancomycin level is reflective of -600    Has serum creatinine changed greater than 50% in last 72 hours: No    Urine output:  some unmeasured output    Renal Function: Stable    Insight[employer] Prediction of Planned New Vancomycin Regimen  Loading dose: N/A  Regimen: 2000 mg IV every 8 hours.  Start time: 19:06 on 04/18/2024  Exposure target: AUC24 (range)400-600 mg/L.hr   AUC24,ss: 543 mg/L.hr  Probability of AUC24 > 400: 100 %  Ctrough,ss: 12.8 mg/L  Probability of Ctrough,ss > 20: 0 %  Probability of nephrotoxicity (Lodise BRALDEY 2009): 8 %      Plan:  Continue Current Dose  Vancomycin monitoring method: AUC  Vancomycin therapeutic monitoring goal: 400-600 mg*h/L  Pharmacy will check vancomycin levels as appropriate in 3-5 Days.  Serum creatinine levels will be ordered daily for the first week of therapy and at least twice weekly for subsequent weeks.    Cooper Gonzalez RPH

## 2024-04-19 NOTE — PROGRESS NOTES
[facility]    Progress Note - Emergency General Surgery Service     Assessment & Plan: Surgery   Donald Jackson is a 29 year old male with hx of hydrocephalus s/p  shunt placement, perforated appendicitis c/b abscess, and retinal detachment who presented with abdominal pain and was admitted for infection adjacent to  shunt catheter in [facility] now s/p diagnostic laparoscopy, abdominal washout and removal of catheter (4/15/240). Nasal swab MRSA+.     Pt with slow return of bowel function, now on regular diet and tolerating well.  Patient has progress appropriately. Abdominal pain improving. He is passing gas and had 2x bowel movements.     - Continue regular diet as tolerated   - Encouraged ambulation and out of bed   - Recommend continuing bowel regimen  - Replete K>4, Mg>2, Ph >3  -Continue IV antibiotics per primary team. Agree with cefepime, flagyl, vancomycin given +MRSA nares swab   -F/u blood cultures   -DVT prophylaxis per primary team. Recommend chemical and mechanical prophylaxis (on lovenox)  - Follow up with Dr. Wright in 2 weeks  - Discharge instructions provided  - EGS will be signing off      The patient's care was discussed with the Chief Resident/Fellow.    Bong Diez MD  [facility]  All communications related to this note should be expressed to resident/YANETH on call for this team at the time of your communication.  ______________________________________________________________________    Interval History   NAEON. Abdominal pain improving. Tolerating regular diet. Denies nausea or vomiting. 2x BM.       Physical Exam   Vital Signs: Temp: 98.7  F (37.1  C) Temp src: Oral BP: (!) 141/97 Pulse: 99   Resp: 18 SpO2: 94 % O2 Device: None (Room air)    Weight: 287 lbs 6.4 oz      Gen: A&Ox4, NAD   Chest: Breathing non-labored on room air    Abdomen: soft, non-peritonitic, non-tender, distended   Incision:  clean, dry, intact closed with dermabond, mild ecchymosis to LLQ incision  Extremities: warm and well perfused     Data     I have personally reviewed the following data over the past 24 hrs:    9.4  \   11.4 (L)   / 267     135 101 8.6 /  93   3.3 (L) 22 0.66 (L) \     ALT: 8 AST: 12 AP: 61 TBILI: 0.5   ALB: 2.8 (L) TOT PROTEIN: 6.0 (L) LIPASE: N/A       Imaging results reviewed over the past 24 hrs:   No results found for this or any previous visit (from the past 24 hour(s)).

## 2024-04-19 NOTE — PLAN OF CARE
Goal Outcome Evaluation:      Plan of Care Reviewed With: patient    Overall Patient Progress: no changeOverall Patient Progress: no change  Neuro: AOx4, flat affect, pleasant  Cardiac: WDL, denies chest pain  Respiratory: on RA, denies SOB  GI/: abd pain, voiding small amounts, passing flatus, no BM  Diet/Appetite: regular, denies N/V  Skin: old lap sites x4, no new deficits  LDA: PIV infusing TKO with intermittent abx  Activity: up ad janene  Pain: abd pain managed with PRN Oxycodone & Tylenol  Plan: pain management, IV abx, continue POC

## 2024-04-19 NOTE — PLAN OF CARE
"Goal Outcome Evaluation:   Plan of Care Reviewed With: patient  Overall Patient Progress: improvingOverall Patient Progress: improving  BP (!) 146/96 (BP Location: Left arm)   Pulse 105   Temp 97.8  F (36.6  C) (Oral)   Resp 18   Ht 1.727 m (5' 8\")   Wt 130.4 kg (287 lb 6.4 oz)   SpO2 94%   BMI 43.70 kg/m     AVSS.  C/o abdominal pain. Gave po oxy x 1 and iv x1.  Abdomen soft. Passing gas. Had x 1 BM. No c/o nausea.   Diet advanced to regular but pt taking minimal full liquid with plan to start regular diet in am.   Voiding small amounts - zack in color.   Up walking on unit independently.   IV antibiotics continue.          "

## 2024-04-19 NOTE — PLAN OF CARE
Goal Outcome Evaluation:Patient reported nausea,little appetite,small improved with Zofran,Compazine followed.No emesis.Tolerated sips CLD/FLD intake.Voiding,reports stool episode during previous night hours.Ambulated,gait steady.Requested Oxycodone with relief .Conitnue to monitor

## 2024-04-19 NOTE — PROGRESS NOTES
[facility]    Medicine Progress Note - Hospitalist Service, GOLD TEAM 7    Date of Admission:  4/13/2024    Assessment & Plan       28 Y/O M with PMHx significant for hydrocephalus s/p  shunt, with recent perforated appendicitis c/b intraabdominal abscess (12/2023, s/p IR drain with Cx = PsdA and PO abx course), with  shunt changed to ventriculopleural due to residual RLQ abscess vs pseudocyst on 2/2/24 (old tip still in abd) further c/b new  shunt malposition with subcutaneous pseudocyst s/p revision 3/19/24 who presented to ED on 4/13/24 with acute R-sided abdominal pain and CT imaging concerning for infection along retained abdominal catheter - which is discontinuous from his present shunt, now s/p diagnostic laparoscopy, abdominal washout and removal of catheter 04/15 with ongoing nausea      Today's changes:  - encourage ambulation; if nausea does not improve will consider shunt evaluation       # Hx hydrocephalus s/p  shunt  # Hx perforated appendicitis c/b abscess  # C/f intraperitoneal infection of  shunt retained catheter   # Abdominal pain  # Nausea  # Leukocytosis     - Continue vancomycin, cefepime and metronidazole for now  - await ROBF, encourage ambulation  - consider shunt evaluation if nausea does not improve  -continue oxycodone, stop IV hydromorphone  - miralax and senna .   - continue antiemetics.           Diet: Regular Diet Adult  Diet    DVT Prophylaxis: Enoxaparin (Lovenox) SQ  Chowdhury Catheter: Not present  Lines: None     Cardiac Monitoring: None  Code Status: Full Code      Clinically Significant Risk Factors        # Hypokalemia: Lowest K = 3.3 mmol/L in last 2 days, will replace as needed       # Hypoalbuminemia: Lowest albumin = 2.8 g/dL at 4/19/2024  6:31 AM, will monitor as appropriate            # Severe Obesity: Estimated body mass index is 43.7 kg/m  as calculated from the following:    Height as of this encounter: 1.727 m (5'  "8\").    Weight as of this encounter: 130.4 kg (287 lb 6.4 oz).             Disposition Plan     Medically Ready for Discharge: Anticipated Tomorrow             Sohail Hardy MD  Hospitalist Service, GOLD TEAM [address] [place] [address]  Securely message with [employer] (more info)  Text page via [facility] Paging/Directory   See signed in provider for up to date coverage information  ______________________________________________________________________    Interval History     Continues to have significant nausea limiting oral intake,  ambulating in the halls.  Having bowel movements.   Very poor appetite, but pain controlled.     Physical Exam   Vital Signs: Temp: 98.7  F (37.1  C) Temp src: Oral BP: (!) 141/97 Pulse: 99   Resp: 18 SpO2: 94 % O2 Device: None (Room air)    Weight: 287 lbs 6.4 oz    General Appearance: Sitting in a chair, comfortable  Respiratory: clear to auscultation bilaterally with good air entry   Cardiovascular: normal rate, regular rhythm. No murmurs, rubs, or gallops   GI: soft, mildly tender, no peritoneal signs, non-distended.   Skin: no rash  Other: 1+ lower extremity edema.     Medical Decision Making             Data     I have personally reviewed the following data over the past 24 hrs:    9.4  \   11.4 (L)   / 267     135 101 8.6 /  93   3.3 (L) 22 0.66 (L) \     ALT: 8 AST: 12 AP: 61 TBILI: 0.5   ALB: 2.8 (L) TOT PROTEIN: 6.0 (L) LIPASE: N/A       "

## 2024-04-19 NOTE — DISCHARGE INSTRUCTIONS
Activity  - No strenuous exercise for 6 weeks.  - No lifting, pushing, pulling more than 10 pounds for 6 weeks.   - Do not strain with bowel movements.  - Do not drive until you can press the brake pedal quickly and fully without pain.   - Do not operate a motor vehicle while taking narcotic pain medications.     Incisions  - You may shower and get incisions wet starting 48 hrs after surgery.  - Do not scrub incisions or submerge wounds for 2 weeks or until seen in follow-up.   - Remove wound dressing 48 hours after surgery.   - If purple dermabond glue was used, avoid applying any lotions or ointments.   - If steri-strips were used, they will fall off on their own.   - Leave incision open to air. Cover with gauze only if needed for comfort or to protect clothing from drainage.   - The stitches do not need to be removed, they will dissolve on their own.    Medications  - You can continue to take Tylenol (acetaminophen) as needed for pain.   - Some pain medications contain both tylenol (acetaminophen) and a narcotic (Norco, vicodin, percocet), do not take more than 4,000mg of Tylenol (acetaminophen) from all sources in any 24 hour period.  - If you are sent home with narcotic pain medication, transition from narcotic pain medications to tylenol (acetaminophen) as you are able.   - Narcotics can make you constipated.  Take over the counter fiber (metamucil or benefiber) or stool softeners (miralax, docusate or senna) while taking narcotic pain medications, but stop if you develop diarrhea.  - No driving or operating machinery while taking narcotic pain medications     Follow-Up:  - Call your primary care provider to touch base regarding your recent admission.   - Call or return sooner than your regularly scheduled visit if you develop any of the following: fever (greater than 101.5), uncontrolled pain, uncontrolled nausea or vomiting, as well as increased redness, swelling, or drainage from your wound.      Follow-Up:  - A nurse from the General Surgery Clinic will contact you 24 hours, or next business day, after your discharge from the hospital. They will help you arrange your follow-up appointment in 4 weeks.    - If you have questions or to schedule a follow up appointment, please call the General Surgery Clinic at [phone].   - Call or return sooner to the ED than your regularly scheduled visit if you develop any of the following: fever >101.5, uncontrolled pain, uncontrolled nausea or vomiting, as well as increased redness, swelling, or drainage from your wound.   -  If you are receiving home care please inform your home care nurse of our contact number.

## 2024-04-20 VITALS
DIASTOLIC BLOOD PRESSURE: 87 MMHG | OXYGEN SATURATION: 98 % | WEIGHT: 287.4 LBS | HEIGHT: 68 IN | TEMPERATURE: 98.7 F | SYSTOLIC BLOOD PRESSURE: 147 MMHG | HEART RATE: 95 BPM | BODY MASS INDEX: 43.56 KG/M2 | RESPIRATION RATE: 18 BRPM

## 2024-04-20 LAB
ANION GAP SERPL CALCULATED.3IONS-SCNC: 12 MMOL/L (ref 7–15)
BUN SERPL-MCNC: 7.1 MG/DL (ref 6–20)
CALCIUM SERPL-MCNC: 8.2 MG/DL (ref 8.6–10)
CHLORIDE SERPL-SCNC: 100 MMOL/L (ref 98–107)
CREAT SERPL-MCNC: 0.7 MG/DL (ref 0.67–1.17)
DEPRECATED HCO3 PLAS-SCNC: 25 MMOL/L (ref 22–29)
EGFRCR SERPLBLD CKD-EPI 2021: >90 ML/MIN/1.73M2
GLUCOSE SERPL-MCNC: 94 MG/DL (ref 70–99)
HOLD SPECIMEN: NORMAL
POTASSIUM SERPL-SCNC: 3.2 MMOL/L (ref 3.4–5.3)
SODIUM SERPL-SCNC: 137 MMOL/L (ref 135–145)

## 2024-04-20 PROCEDURE — 250N000013 HC RX MED GY IP 250 OP 250 PS 637

## 2024-04-20 PROCEDURE — 36415 COLL VENOUS BLD VENIPUNCTURE: CPT | Performed by: STUDENT IN AN ORGANIZED HEALTH CARE EDUCATION/TRAINING PROGRAM

## 2024-04-20 PROCEDURE — 80048 BASIC METABOLIC PNL TOTAL CA: CPT | Performed by: STUDENT IN AN ORGANIZED HEALTH CARE EDUCATION/TRAINING PROGRAM

## 2024-04-20 PROCEDURE — 250N000011 HC RX IP 250 OP 636

## 2024-04-20 PROCEDURE — 250N000011 HC RX IP 250 OP 636: Mod: JZ | Performed by: EMERGENCY MEDICINE

## 2024-04-20 PROCEDURE — 258N000003 HC RX IP 258 OP 636: Performed by: STUDENT IN AN ORGANIZED HEALTH CARE EDUCATION/TRAINING PROGRAM

## 2024-04-20 PROCEDURE — 250N000013 HC RX MED GY IP 250 OP 250 PS 637: Performed by: STUDENT IN AN ORGANIZED HEALTH CARE EDUCATION/TRAINING PROGRAM

## 2024-04-20 PROCEDURE — 250N000011 HC RX IP 250 OP 636: Performed by: STUDENT IN AN ORGANIZED HEALTH CARE EDUCATION/TRAINING PROGRAM

## 2024-04-20 PROCEDURE — 250N000013 HC RX MED GY IP 250 OP 250 PS 637: Performed by: HOSPITALIST

## 2024-04-20 RX ORDER — CIPROFLOXACIN 500 MG/1
500 TABLET, FILM COATED ORAL ONCE
Status: COMPLETED | OUTPATIENT
Start: 2024-04-20 | End: 2024-04-20

## 2024-04-20 RX ADMIN — ENOXAPARIN SODIUM 40 MG: 40 INJECTION SUBCUTANEOUS at 05:25

## 2024-04-20 RX ADMIN — ONDANSETRON 4 MG: 4 TABLET, ORALLY DISINTEGRATING ORAL at 11:30

## 2024-04-20 RX ADMIN — CIPROFLOXACIN HYDROCHLORIDE 500 MG: 500 TABLET, FILM COATED ORAL at 11:30

## 2024-04-20 RX ADMIN — VANCOMYCIN HYDROCHLORIDE 2000 MG: 1 INJECTION, POWDER, LYOPHILIZED, FOR SOLUTION INTRAVENOUS at 01:14

## 2024-04-20 RX ADMIN — METRONIDAZOLE 500 MG: 500 INJECTION, SOLUTION INTRAVENOUS at 05:25

## 2024-04-20 RX ADMIN — VANCOMYCIN HYDROCHLORIDE 2000 MG: 1 INJECTION, POWDER, LYOPHILIZED, FOR SOLUTION INTRAVENOUS at 10:03

## 2024-04-20 RX ADMIN — CEFEPIME HYDROCHLORIDE 2 G: 2 INJECTION, POWDER, FOR SOLUTION INTRAVENOUS at 06:32

## 2024-04-20 RX ADMIN — ACETAMINOPHEN 650 MG: 325 TABLET, FILM COATED ORAL at 03:10

## 2024-04-20 RX ADMIN — POLYETHYLENE GLYCOL 3350 17 G: 17 POWDER, FOR SOLUTION ORAL at 08:09

## 2024-04-20 RX ADMIN — OXYCODONE HYDROCHLORIDE 10 MG: 5 TABLET ORAL at 03:10

## 2024-04-20 ASSESSMENT — ACTIVITIES OF DAILY LIVING (ADL)
ADLS_ACUITY_SCORE: 22

## 2024-04-20 NOTE — PLAN OF CARE
Goal Outcome Evaluation:      Plan of Care Reviewed With: patient    Overall Patient Progress: no changeOverall Patient Progress: no change  Neuro: AOx4, flat affect, pleasant  Cardiac: WDL, denies chest pain  Respiratory: on RA, denies SOB  GI/: abd pain, voiding adequate urine output. passing flatus, no BM  Diet/Appetite: regular, denies N/V  Skin: old lap sites x4, no new deficits  LDA: PIV infusing TKO with intermittent abx  Activity: up ad janene  Pain: abd pain managed with PRN Oxycodone & Tylenol  Plan: pain management, IV abx, continue POC

## 2024-04-20 NOTE — PROGRESS NOTES
"Blood pressure (!) 148/91, pulse 119, temperature 98.2  F (36.8  C), temperature source Oral, resp. rate 20, height 1.727 m (5' 8\"), weight 130.4 kg (287 lb 6.4 oz), SpO2 95%.    Activity: Up ad Faustina  Neuros:  AOX4, makes needs known  Cardiac: HTN/Tachy, denies chest pain  Respiratory: WDL  GI/: AUOP,+BS, no BM  Diet: Regular, denies nausea  Skin/Incisions/Drains: Old lap sites CDI  Lines: Rt PIV TKO w/abx  Pain: Oxy, Tylenol  Plan: Possibly discharge tomorrow    "

## 2024-04-20 NOTE — PLAN OF CARE
Goal Outcome Evaluation:      Plan of Care Reviewed With: patient    Overall Patient Progress: improving    Pt hypertensive but within parameters all other VSS, on RA. Neuros intact. Denied nausea. C/o mild pain but did not want any intervention. Has lap sites on abdomen, closed with liquid bandage, SONNY. Pt medically ready to discharge.     Discharge orders were placed, PIV was removed, AVS was discussed, and patient discharged home with family.

## 2024-04-22 ENCOUNTER — PATIENT OUTREACH (OUTPATIENT)
Dept: SURGERY | Facility: CLINIC | Age: 29
End: 2024-04-22
Payer: COMMERCIAL

## 2024-04-22 ENCOUNTER — PATIENT OUTREACH (OUTPATIENT)
Dept: CARE COORDINATION | Facility: CLINIC | Age: 29
End: 2024-04-22
Payer: COMMERCIAL

## 2024-04-22 NOTE — PROGRESS NOTES
[facility] Resource Center: [facility]: Post-Discharge Note  SITUATION                                                      Admission:    Admission Date: 04/13/24   Reason for Admission: 29 year old male with hx of hydrocephalus s/p  shunt placement, perforated appendicitis c/b abscess, and retinal detachment who presented with abdominal pain and was admitted for infection adjacent to  shunt catheter in LLQ now s/p diagnostic laparoscopy, abdominal washout and removal of catheter (4/15/240). Nasal swab MRSA+.  Discharge:   Discharge Date: 04/19/24  Discharge Diagnosis: Abdominal pain, generalized    Infection of ventriculoperitoneal shunt, initial encounter (H24)    BACKGROUND                                                      Per hospital discharge summary and inpatient provider notes:  Donald Jackson is a 29 year old male with hx of hydrocephalus s/p  shunt placement, perforated appendicitis c/b abscess, and retinal detachment who presents with sudden onset of abdominal pain. Originally underwent  shunt placement at 3 years old. Had acute appendicitis w/appendectomy on 12/5/23 c/b perforation and abdominal infection near tip of previous  shunt. Had  shunt placement w/thoracic surgery 2/2/24 c/b thoracic subcutaneous pseudomeningocele. The catheter was felt to be malpositioned, and thoracic surgery performed revision in which the distal catheter tip was placed into the pleural cavity and the pseudocyst was excised. Was doing well when seen in neurosurgery clinic 3/26. Today, he reports having onset of severe abdominal pain around 4 PM. He had eaten something a few hours before and had a normal BM after. Hasn't been able to pass gas since. The pain is worst in the RLQ. Endorses a headache and some nausea as well. No fevers, chills, neck stiffness, sensitivity to light, or other concerns.     ASSESSMENT           Discharge Assessment  How are you doing now that you are home?: Today CLAUDY spoke  to patient who reports that he continues to have some pain in his abdomen, but it is managed by Tylenol. He has no questions or concerns and will be following up with his provider on 4/29/2024.  How are your symptoms? (Red Flag symptoms escalate to triage hotline per guidelines): Improved  Do you feel your condition is stable enough to be safe at home until your provider visit?: Yes  Does the patient have their discharge instructions? : Yes  Does the patient have questions regarding their discharge instructions? : No  Were you started on any new medications or were there changes to any of your previous medications? : Yes  Does the patient have all of their medications?: Yes  Do you have questions regarding any of your medications? : No  Do you have all of your needed medical supplies or equipment (DME)?  (i.e. oxygen tank, CPAP, cane, etc.): Yes  Discharge follow-up appointment scheduled within 14 calendar days? : Yes  Discharge Follow Up Appointment Date: 04/29/24    PLAN                                                      Outpatient Plan:    Follow up with primary care provider, Roberto Gomez, within 7 days for  hospital follow- up.  Follow up with General surgery in one month  Appointments on [facility] and/or [facility] (with [facility] or  [facility] provider or service). Call [phone] if you haven't heard  regarding these appointments within 7 days of discharge.    Future Appointments   Date Time Provider Department Center   4/29/2024  9:30 AM John Mariee MD [facility]   4/30/2024  4:30 PM [id number]   4/30/2024  5:00 PM [id number]   5/1/2024  9:00 AM Anita Braswell MD [facility]   5/1/2024 11:00 AM Vandana Smith PA-C [facility]   5/2/2024 11:20 AM Roberto Gomez MD [facility]         For any urgent concerns, please contact our 24 hour nurse triage line: [phone] ([phone])         MOSES Correia

## 2024-04-22 NOTE — PROGRESS NOTES
RN Post-Op/Post-Discharge Care Coordination Note    Mr. Donald Jackson is a 29 year old male who underwent  abdominal surgery  on 4/15 with  Dr. Ender Wright.  He was recently discharged from the hospital.  Spoke with Patient.    Support  Patient able to care for self independently     Health Status  Nausea/Vomiting: Patient denies nausea/vomiting.  Eating/drinking: Patient is able to eat and drink without any complaints. Reports decreased appetite.  Recommended small, frequent meals.  Bowel habits: Patient reports having loose stool. He has been taking both Miralax and Senna. Advised to medicate with Miralax only when constipated.  He can continue with Senna.  Drains (DAR): N/A  Fevers/chills: Patient denies any fever or chills.  Incisions: Patient denies any signs and symptoms of infection..  Wound closure:  Skin Sealant  Pain: Slowly improving. He is not taking any narcotics and has transitioned to OTC Tylenol.  New Medications:  Senna, Miralax, Oxycodone. Patient states that his team elected not to discharge him with antibiotics.    Activity/Restrictions  No lifting in excess of 15-20 pounds for 3-4 weeks    Equipment  None    Pathology reviewed with patient:  N/A    Forms/Letters  Yes- FMLA paperwork to be emailed back to the patient.    All of his questions were answered.  He will call this office if he has any further questions and/or concerns.      PO appointment confirmed 4/29 at  0930 with Dr. Mariee.     A copy of this note was routed to the primary surgeon.      Whom and When to Call  Patient acknowledges understanding of how to manage any medication changes and   when to seek medical care.     Patient advised that if after hour medical concerns arise to please call [phone] and choose option 4 to speak to the physician on call.

## 2024-04-23 LAB
PATH REPORT.COMMENTS IMP SPEC: NORMAL
PATH REPORT.COMMENTS IMP SPEC: NORMAL
PATH REPORT.FINAL DX SPEC: NORMAL
PATH REPORT.GROSS SPEC: NORMAL
PATH REPORT.MICROSCOPIC SPEC OTHER STN: NORMAL
PATH REPORT.RELEVANT HX SPEC: NORMAL
PHOTO IMAGE: NORMAL

## 2024-04-29 ENCOUNTER — OFFICE VISIT (OUTPATIENT)
Dept: SURGERY | Facility: CLINIC | Age: 29
End: 2024-04-29
Payer: COMMERCIAL

## 2024-04-29 VITALS
WEIGHT: 275.8 LBS | DIASTOLIC BLOOD PRESSURE: 92 MMHG | OXYGEN SATURATION: 96 % | BODY MASS INDEX: 41.8 KG/M2 | SYSTOLIC BLOOD PRESSURE: 126 MMHG | HEIGHT: 68 IN | HEART RATE: 87 BPM

## 2024-04-29 DIAGNOSIS — T85.730S: Primary | ICD-10-CM

## 2024-04-29 PROCEDURE — 99024 POSTOP FOLLOW-UP VISIT: CPT | Performed by: STUDENT IN AN ORGANIZED HEALTH CARE EDUCATION/TRAINING PROGRAM

## 2024-04-29 ASSESSMENT — PAIN SCALES - GENERAL: PAINLEVEL: NO PAIN (0)

## 2024-04-29 NOTE — PROGRESS NOTES
"HPI  The patient is a 28 yo M who presents for post-operative evaluation following laparoscopic abdominal exploration and foreign body removal.    Briefly, he underwent laparoscopic appendectomy in December 2023 for perforated appendicitis which was complicated by abscess. He also required conversion of ventriculoperitoneal shunt to ventriculopleural shunt. He subsequently developed persistent abdominal pain and leukocytosis and was taken to the OR on 4/15 for laparoscopic exploration. He was found to have an unremarkable appendiceal stump and a RLQ small bowel interloop phlegmon which appeared to be associated with his retained ventriculoperitoneal shunt catheter. Washout was performed and this foreign body was removed.    Since surgery, he has been gradually improving. His leukocytosis resolved and he was discharged. His abdominal pain has not completely gone away but is much better than it was prior to when he presented back to the hospital. It is intermittent and nonspecific discomfort. He is having regular bowel function and appetite is returning, he denies nausea, vomiting, bloating, constipation, diarrhea, melena. He denies any systemic symptoms of infection such as fevers, chills, sweats.    He does report some intermittent pain/discomfort in his right chest, additional imaging is pending to eval his current ventriculopleural shunt and he has follow up with his neurosurgeon upcoming. No focal neuro symptoms.    Exam  Vitals:     04/29/24 0911   BP: (!) 126/92   BP Location: Left arm   Patient Position: Sitting   Cuff Size: Adult Large   Pulse: 87   SpO2: 96%   Weight: 125.1 kg (275 lb 12.8 oz)   Height: 1.727 m (5' 8\")            Body mass index is 41.94 kg/m .    Abdomen is soft, nondistended, grossly nontender. Incisions are c/d/I with dermabond starting to peel, healing appropriately. Very minimal focal discomfort on deep palpation in the RLQ without rebound, guarding, or peritonitis.    A&P  This is a 29 " jamil DAVALOS s/p laparoscopic washout of RLQ phlegmon and foreign body removal two weeks ago; this coming four months following after laparoscopic appendectomy for perforated appendicitis with abscess. Overall, he is not back to baseline but is much improved. I encouraged him it may take an additional couple of weeks to continue to heal given the diffuse inflammation and phlegmon, but that I remain optimistic the shunt tubing was driving the persistent phlegmon and that with it now removed he should continue to improve. He may continue diet and activity as tolerated, he will keep his upcoming neurosurgery appointments for ongoing assessment of his ventriculopleural shunt. He may return to general surgery clinic on an as-needed basis.    John Mariee MD  General Surgery

## 2024-04-29 NOTE — NURSING NOTE
"Chief Complaint   Patient presents with    Post-Op - General Surgery     Post-op       Vitals:    04/29/24 0911   BP: (!) 126/92   BP Location: Left arm   Patient Position: Sitting   Cuff Size: Adult Large   Pulse: 87   SpO2: 96%   Weight: 125.1 kg (275 lb 12.8 oz)   Height: 1.727 m (5' 8\")       Body mass index is 41.94 kg/m .                          Marquis Collins, EMT    "

## 2024-04-29 NOTE — PATIENT INSTRUCTIONS
You met with Dr. John Mariee.      Today's visit instructions:    Return to the Surgery Clinic on an as needed basis.        If you have questions please contact Zuleika RN or Yolie RN during regular clinic hours, Monday through Friday 7:30 AM - 4:00 PM, or you can contact us via [employer] at anytime.       If you have urgent needs after-hours, weekends, or holidays please call the hospital at [phone] and ask to speak with our on-call General Surgery Team.    Appointment [phone]  Nurse Advice (Zuleika or Yolie): [phone]   Surgery Scheduler (Mignon): [phone]  Fax: [phone]

## 2024-04-29 NOTE — LETTER
4/29/2024       RE: Donald Jackson  [address]  [place] [address]       Dear Colleague,    Thank you for referring your patient, Donald Jackson, to the [facility] GENERAL SURGERY [facility]IC [place] [facility]. Please see a copy of my visit note below.    HPI  The patient is a 30 yo M who presents for post-operative evaluation following laparoscopic abdominal exploration and foreign body removal.    Briefly, he underwent laparoscopic appendectomy in December 2023 for perforated appendicitis which was complicated by abscess. He also required conversion of ventriculoperitoneal shunt to ventriculopleural shunt. He subsequently developed persistent abdominal pain and leukocytosis and was taken to the OR on 4/15 for laparoscopic exploration. He was found to have an unremarkable appendiceal stump and a RLQ small bowel interloop phlegmon which appeared to be associated with his retained ventriculoperitoneal shunt catheter. Washout was performed and this foreign body was removed.    Since surgery, he has been gradually improving. His leukocytosis resolved and he was discharged. His abdominal pain has not completely gone away but is much better than it was prior to when he presented back to the hospital. It is intermittent and nonspecific discomfort. He is having regular bowel function and appetite is returning, he denies nausea, vomiting, bloating, constipation, diarrhea, melena. He denies any systemic symptoms of infection such as fevers, chills, sweats.    He does report some intermittent pain/discomfort in his right chest, additional imaging is pending to eval his current ventriculopleural shunt and he has follow up with his neurosurgeon upcoming. No focal neuro symptoms.    Exam  Vitals:     04/29/24 0911   BP: (!) 126/92   BP Location: Left arm   Patient Position: Sitting   Cuff Size: Adult Large   Pulse: 87   SpO2: 96%   Weight: 125.1 kg (275 lb  "12.8 oz)   Height: 1.727 m (5' 8\")            Body mass index is 41.94 kg/m .    Abdomen is soft, nondistended, grossly nontender. Incisions are c/d/I with dermabond starting to peel, healing appropriately. Very minimal focal discomfort on deep palpation in the RLQ without rebound, guarding, or peritonitis.    A&P  This is a 28 yo M s/p laparoscopic washout of RLQ phlegmon and foreign body removal two weeks ago; this coming four months following after laparoscopic appendectomy for perforated appendicitis with abscess. Overall, he is not back to baseline but is much improved. I encouraged him it may take an additional couple of weeks to continue to heal given the diffuse inflammation and phlegmon, but that I remain optimistic the shunt tubing was driving the persistent phlegmon and that with it now removed he should continue to improve. He may continue diet and activity as tolerated, he will keep his upcoming neurosurgery appointments for ongoing assessment of his ventriculopleural shunt. He may return to general surgery clinic on an as-needed basis.        Again, thank you for allowing me to participate in the care of your patient.      Sincerely,    John Mariee MD    "

## 2024-04-30 ENCOUNTER — ANCILLARY PROCEDURE (OUTPATIENT)
Dept: GENERAL RADIOLOGY | Facility: CLINIC | Age: 29
End: 2024-04-30
Attending: CLINICAL NURSE SPECIALIST
Payer: COMMERCIAL

## 2024-04-30 ENCOUNTER — ANCILLARY PROCEDURE (OUTPATIENT)
Dept: CT IMAGING | Facility: CLINIC | Age: 29
End: 2024-04-30
Attending: PHYSICIAN ASSISTANT
Payer: COMMERCIAL

## 2024-04-30 DIAGNOSIS — Z98.2 S/P VP SHUNT: ICD-10-CM

## 2024-04-30 PROCEDURE — 71046 X-RAY EXAM CHEST 2 VIEWS: CPT | Mod: GC | Performed by: RADIOLOGY

## 2024-04-30 PROCEDURE — 70450 CT HEAD/BRAIN W/O DYE: CPT | Performed by: RADIOLOGY

## 2024-05-01 ENCOUNTER — OFFICE VISIT (OUTPATIENT)
Dept: NEUROSURGERY | Facility: CLINIC | Age: 29
End: 2024-05-01
Attending: PHYSICIAN ASSISTANT
Payer: COMMERCIAL

## 2024-05-01 ENCOUNTER — OFFICE VISIT (OUTPATIENT)
Dept: CT IMAGING | Facility: CLINIC | Age: 29
End: 2024-05-01
Attending: INTERNAL MEDICINE
Payer: COMMERCIAL

## 2024-05-01 VITALS
WEIGHT: 278 LBS | HEART RATE: 85 BPM | BODY MASS INDEX: 42.27 KG/M2 | SYSTOLIC BLOOD PRESSURE: 135 MMHG | OXYGEN SATURATION: 98 % | DIASTOLIC BLOOD PRESSURE: 92 MMHG | TEMPERATURE: 98 F

## 2024-05-01 VITALS
OXYGEN SATURATION: 97 % | DIASTOLIC BLOOD PRESSURE: 79 MMHG | SYSTOLIC BLOOD PRESSURE: 119 MMHG | BODY MASS INDEX: 41.68 KG/M2 | WEIGHT: 275 LBS | HEIGHT: 68 IN | HEART RATE: 87 BPM | RESPIRATION RATE: 16 BRPM

## 2024-05-01 DIAGNOSIS — T85.730A INFECTION OF VENTRICULOPERITONEAL SHUNT, INITIAL ENCOUNTER (H): Primary | ICD-10-CM

## 2024-05-01 DIAGNOSIS — Z98.2 S/P VP SHUNT: Primary | ICD-10-CM

## 2024-05-01 PROCEDURE — 99024 POSTOP FOLLOW-UP VISIT: CPT | Performed by: PHYSICIAN ASSISTANT

## 2024-05-01 PROCEDURE — 99495 TRANSJ CARE MGMT MOD F2F 14D: CPT | Performed by: INTERNAL MEDICINE

## 2024-05-01 ASSESSMENT — PAIN SCALES - GENERAL
PAINLEVEL: NO PAIN (0)
PAINLEVEL: NO PAIN (1)

## 2024-05-01 NOTE — PROGRESS NOTES
Neurosurgery Clinic Note    Chief Complaint: 6 week post-op visit    DATE OF VISIT: 5/1/24      DATE OF PROCEDURE: 3/19/2024     PREOPERATIVE DIAGNOSIS:    1. Shunt malfunction, distal catheter  2. Thoracic subcutaneous pseudomeningocele     POSTOPERATIVE DIAGNOSIS:    1. Shunt malfunction, distal catheter  2. Thoracic subcutaneous pseudomeningocele     PROCEDURE:    1.  Placement of distal catheter into the pleural cavity  2.  Intraoperative use of thoracoscopy  3. Assistance from thoracic surgery team     SURGEON:  Almas Noriega MD - Primary  Michelle, Fabiola Raza MD      RESIDENT/FELLOW:   Rigoberto Rain MD - Fellow - Assisting   Gaurav Parry MD - Assisting  Luis E Baires MD      ANESTHESIA:   General endotracheal     FINDINGS:  Distal shunt catheter in the subcutaneous space. We excised the pseucocyst, the catheter was extended by an L connector and then inserted into the pleural space under thoracoscopic guidance. The catheter was secured to the rib. Flow from the distal catheter was confirmed prior to placement into the pleural space.     ESTIMATED BLOOD LOSS:   10 ml     IMPLANTS:           Implant Name Type Inv. Item Serial No.  Lot No. LRB No. Used Action   SHUNT CONNECTOR RT ANG PLASTIC [id number] - [id number] Shunt SHUNT CONNECTOR RT ANG PLASTIC [id number]   [employer] [id number] Right 1 Implanted   Right Angle Clip, Standard       [employer] [id number] Right 1 Implanted   SHUNT CATH PERITONEAL ANTIBIOTIC-IMPREG URSZULA 122CM [id number] - [id number] Shunt SHUNT CATH PERITONEAL ANTIBIOTIC-IMPREG URSZULA 122CM [id number]   [employer] [id number] Right 1 Implanted      INDICATIONS FOR THE PROCEDURE:  Donald is a 28 y/o male with a history of hydrocephalus who underwent a shunt revision with Dr. Martinez due to perforated appendicitis leading to an abscess close to the distal tip of his previous ventriculoperitoneal shunt. He underwent a ventriculopleural shunt placement with thoracic surgery on  2/2/2024. He presented to the clinic with a fluctuant collection at the incision on his R chest. CT-CAP demonstrates a 7.3 x 5.3 cm collection in the subcutaneous tissue and was referred to thoracic surgery for evaluation. They determined that his distal catheter was malpositioned, and needed revision to replace the distal catheter into the pleural space. The risks benefits and the complications of the procedure were explained, and the patient was agreeable to a thoracoscopy assisted revision of the distal peritoneal catheter with thoracic surgery. Neurosurgery team agreed to be available to help with the procedure.    HPI: Donald Jackson is a pleasant 29 year old male who is s/p revision of distal catheter for  shunt by Dr. Martinez on 3/19/24 because the catheter became displaced and he developed a pseudocyst in the subcutaneous tissue.  He had a recent  shunt replacement on 2/2/24 for a shunt system initially placed when he was 2 y/o for HCP.   shunt Codman Certas setting 4.    3/26/24 Visit - patient is 1 week out from surgery.  Patient is recovering after his thoracic revision of the distal catheter.  He does not see any further accumulation of the pseudocyst and denies any issues with the incision.  He is not having any headaches but does note some feeling of lightheadedness especially when he gets up in the morning.  No bowel or bladder issues.  He has not yet followed up with infectious disease as was previously directed on March 5.  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  He does not feel his overall strength is back to normal.      4/1/24 Visit - 2 weeks p/o.  Patient denies headache.  He is overall feels he is getting better.      5/1/24 visit - 6 week p/o.  In the interim, patient had abdominal infection involving a  retained ventriculoperitoneal shunt catheter which required removal and washout on 4/15/24 by Dr. Mariee.  Patient denies current headache, visual disturbances, imbalance, BUE/BLE  "paresthesias, n/v, b/b issues.  He has noted being very tired over the last couple of days, but today feels fine.         Physical Exam   /79 (BP Location: Right arm, Patient Position: Sitting)   Pulse 87   Resp 16   Ht 1.727 m (5' 8\")   Wt 124.7 kg (275 lb)   SpO2 97%   BMI 41.81 kg/m      Constitutional: Alert, no acute distress.  Face symmetric, PERRL, EOMI, gaze is conjugate, speech is clear and fluent, no dysmetria with finger-to-nose, no pronator drift, tongue midline    [place]:  ambulates with steady gait.  Slightly imbalanced tandem.     Neurological:    Strength (L/R)  5/5 Deltoid  5/5 Bicep  5/5 Tricep  5/5 Handgrip    5/5 Hip Flexion  5/5 Knee Extension  5/5 Ankle Dorsiflexion  5/5 Plantar Flexion    Reflexes (L/R)  2+/2+ Bicep  2+/2+ Brachioradialis  2+/2+ Patellar  2+/2+ Ankle    Sensation: SILT    Incision:  head - well-healed.  Thoracic - healing well w/o TTP or fluctuance.  Abdominal incisions healing.       IMAGING:    Head CT 4/30/24 -   Findings: Postsurgical changes of right frontal approach  ventriculostomy. Ventricular system is slightly decompressed compared  to prior. There is no intracranial hemorrhage, mass effect, or midline  shift. Gray/white matter differentiation in both cerebral hemispheres  is preserved. There is white matter loss and bilateral parietal lobes  with distorted shape of the ventricles. The basal cisterns are clear.  The bony calvaria and the bones of the skull base are normal. The  visualized portions of the paranasal sinuses and mastoid air cells are  clear.  Impression:  Postsurgical changes of right frontal approach ventriculostomy with  overall decreased size of the ventricular system compared to  3/26/2024.       Head CT 3/26/24 - decreased ventricles w/o SDH  FINDINGS:  Right frontal approach ventriculostomy catheter with tip at the  foramen of Monro. There is decreased size of the ventricular system  when compared to CT 3/19/2024. No evidence of " subependymal edema. No  acute intracranial hemorrhage, mass effect, or midline shift. No acute  loss of gray-white matter differentiation in the cerebral hemispheres.  White matter loss in the bilateral parietal lobes with distorted  shapes of the ventricles. Clear basal cisterns.  The bony calvaria and the bones of the skull base are otherwise  normal. The visualized portions of the paranasal sinuses and mastoid  air cells are clear. Grossly normal orbits.   IMPRESSION: Unchanged position of the right frontal approach  ventriculostomy catheter with overall decreased size of the  ventricular system when compared to CT 3/19/2024.    ASSESSMENT & PLAN:  Donald Jackson is a 29 year old male who is s/p revision of distal catheter for  shunt by Dr. Martinez on 3/19/24 because the catheter became displaced and he developed a pseudocyst in the subcutaneous tissue.  He had a recent  shunt replacement on 2/2/24 for a shunt system initially placed when he was 4 y/o for HCP.  4/15/24 abdominal washout and removal of retained  catheter by Dr. Mariee.      Patient is overall doing well in his recovery from all of the surgeries he has had.  The head CT 4/30/24 again shows decreased size of ventricles, no ICH.  He does not appear to have any concerning symptoms at this time.  Current setting of Certas valve was confirmed to be at 4 today.  We did discuss that his valve may need to be changed to allow less CSF fluid--5.  Since he is not symptomatic we will continue to monitor for now.      Okay to increase activity and do activity as tolerated.  I did caution patient to start introducing things gradually and not to overdo it.  Recommending walking on treadmill before running, etc.      Follow up with Dr. Martinez in 6 weeks with repeat head CT.  I have advised patient to monitor his symptoms -- headache, n/v, imbalance, increased drowsiness could indicate a change for which a new head CT would be warranted more urgently.      Patient  is in agreement with this plan and states no further questions.      Vandana Smith PA-C  Department of Neurosurgery  Office: [phone]

## 2024-05-01 NOTE — PROGRESS NOTES
Assessment & Plan     Infection of retained intraabdominal portion of  shunt s/p OR removal 4/15/24. Cultures negative. Treated with 5 days of systemic antibiotic therapy.  Ventriculoperitoneal shunt since childhood for hydrocephalus, recently re-sited (in 3/2024) to ventriculopleural location due to intra-abdominal process  Appendicitis s/p lap appendectomy further complicated by leak in 12/2023, managed at [facility] with percutaneous drain placement and culture recovery of Pseudomonas, treated with ~3 weeks of levofloxacin + metronidazole.    Discussion: Donald has certainly had a complicated course over the last 5 months following an episode of appendicitis. The surgical opinion during his most recent 4/15 procedure, with which I agree, is that the abscess at the site of his retained ventriculoperitoneal shunt catheter was residual from prior peritonitis and not representative of an ongoing GI leak. I discussed this along with the pathophysiology of peritonitis with [place] at length. I have a very low suspicion that the remnant of his ventriculoperitoneal catheter will become infected based on the op note remark that the remnant essentially retracted out of the peritoneal space. We discussed the s/s of abdominal wall, intra-abdominal, or even much less likely CNS or pleural infection in the very unlikely case that these would arise.    I appreciate the NSGY service re-siting his shunt in 3/2024 because had this not occurred, the intra-abdominal abscess at his ventriculoperitoneal shunt may have gained access to the CNS.    Plan:  - no further treatment of diagnostic tests necessary at this time for resolved intra-abdominal infection  - defer management of Ventriculopleural catheter to NSGY.     It is a pleasure to participate in [place]'s care. Total visit length=55 min with >50% clinical counseling/care coordination.    Subjective       HPI   Donald is following up for intra-abdominal infection. Please see  above.    He has done really well since discharge on 4/19. Appetite is good. He feels a bit more tired that typical. Seeing NSGY today after having had CT head yesterday. No focal neuro symptoms.        4/22/2024     3:11 PM   Post Discharge Outreach   How are your symptoms? (Red Flag symptoms escalate to triage hotline per guidelines) Improved   Do you feel your condition is stable enough to be safe at home until your provider visit? Yes   Does the patient have their discharge instructions?  Yes   Does the patient have questions regarding their discharge instructions?  No   Were you started on any new medications or were there changes to any of your previous medications?  Yes   Does the patient have all of their medications? Yes   Do you have questions regarding any of your medications?  No   Do you have all of your needed medical supplies or equipment (DME)?  (i.e. oxygen tank, CPAP, cane, etc.) Yes   Discharge follow-up appointment scheduled within 14 calendar days?  Yes   Discharge Follow Up Appointment Date 4/29/2024   Discharge Follow Up Appointment Scheduled with? Specialty Care Provider     Hospital Follow-up Visit:    Hospital/Nursing Home/IP Rehab Facility: [facility]  Date of Admission: 4/13/24  Date of Discharge: 4/19/24  Reason(s) for Admission: peritonitis  Was the patient in the ICU or did the patient experience delirium during hospitalization?  No          Objective    BP (!) 135/92 (BP Location: Right arm, Patient Position: Sitting, Cuff Size: Adult Large)   Pulse 85   Temp 98  F (36.7  C) (Oral)   Wt 126.1 kg (278 lb)   SpO2 98%   BMI 42.27 kg/m    Body mass index is 42.27 kg/m .  Physical Exam   GENERAL: alert and no distress  NECK: no adenopathy, no asymmetry, masses, or scars  RESP: lungs clear to auscultation - no rales, rhonchi or wheezes  CV: regular rate and rhythm, normal S1 S2, no S3 or S4, no murmur, click or rub, no peripheral  edema  ABDOMEN: soft, nontender, no hepatosplenomegaly, no masses and bowel sounds normal  MS: no gross musculoskeletal defects noted, no edema    I personally reviewed CT images from 4/13 showing intraabdominal abscess and head CT showing smaller ventricles than prior.        Signed Electronically by: Anita Braswell MD

## 2024-05-01 NOTE — NURSING NOTE
Chief Complaint   Patient presents with    Consult       BP (!) 135/92 (BP Location: Right arm, Patient Position: Sitting, Cuff Size: Adult Large)   Pulse 85   Temp 98  F (36.7  C) (Oral)   Wt 126.1 kg (278 lb)   SpO2 98%   BMI 42.27 kg/m

## 2024-05-01 NOTE — LETTER
5/1/2024       RE: Donald Jackson  [address]  [place] [address]       Dear Colleague,    Thank you for referring your patient, Donald Jackson, to the [facility] [place] [facility]. Please see a copy of my visit note below.        Neurosurgery Clinic Note    Chief Complaint: 6 week post-op visit    DATE OF VISIT: 5/1/24      DATE OF PROCEDURE: 3/19/2024     PREOPERATIVE DIAGNOSIS:    1. Shunt malfunction, distal catheter  2. Thoracic subcutaneous pseudomeningocele     POSTOPERATIVE DIAGNOSIS:    1. Shunt malfunction, distal catheter  2. Thoracic subcutaneous pseudomeningocele     PROCEDURE:    1.  Placement of distal catheter into the pleural cavity  2.  Intraoperative use of thoracoscopy  3. Assistance from thoracic surgery team     SURGEON:  Almas Noriega MD - Primary  Michelle, Fabiola Raza MD      RESIDENT/FELLOW:   Rigoberto Rain MD - Fellow - Assisting   Gaurav Parry MD - Assisting  Luis E Baires MD      ANESTHESIA:   General endotracheal     FINDINGS:  Distal shunt catheter in the subcutaneous space. We excised the pseucocyst, the catheter was extended by an L connector and then inserted into the pleural space under thoracoscopic guidance. The catheter was secured to the rib. Flow from the distal catheter was confirmed prior to placement into the pleural space.     ESTIMATED BLOOD LOSS:   10 ml     IMPLANTS:           Implant Name Type Inv. Item Serial No.  Lot No. LRB No. Used Action   SHUNT CONNECTOR RT ANG PLASTIC [id number] - [id number] Shunt SHUNT CONNECTOR RT ANG PLASTIC [id number]   [employer] [id number] Right 1 Implanted   Right Angle Clip, Standard       [employer] [id number] Right 1 Implanted   SHUNT CATH PERITONEAL ANTIBIOTIC-IMPREG URSZULA 122CM [id number] - [id number] Shunt SHUNT CATH PERITONEAL ANTIBIOTIC-IMPREG URSZULA 122CM [id number]   [employer] [id number] Right 1 Implanted       INDICATIONS FOR THE PROCEDURE:  Donald is a 30 y/o male with a history of hydrocephalus who underwent a shunt revision with Dr. Martinez due to perforated appendicitis leading to an abscess close to the distal tip of his previous ventriculoperitoneal shunt. He underwent a ventriculopleural shunt placement with thoracic surgery on 2/2/2024. He presented to the clinic with a fluctuant collection at the incision on his R chest. CT-CAP demonstrates a 7.3 x 5.3 cm collection in the subcutaneous tissue and was referred to thoracic surgery for evaluation. They determined that his distal catheter was malpositioned, and needed revision to replace the distal catheter into the pleural space. The risks benefits and the complications of the procedure were explained, and the patient was agreeable to a thoracoscopy assisted revision of the distal peritoneal catheter with thoracic surgery. Neurosurgery team agreed to be available to help with the procedure.    HPI: Donald Jackson is a pleasant 29 year old male who is s/p revision of distal catheter for  shunt by Dr. Martinez on 3/19/24 because the catheter became displaced and he developed a pseudocyst in the subcutaneous tissue.  He had a recent  shunt replacement on 2/2/24 for a shunt system initially placed when he was 4 y/o for HCP.   shunt Codman Certas setting 4.    3/26/24 Visit - patient is 1 week out from surgery.  Patient is recovering after his thoracic revision of the distal catheter.  He does not see any further accumulation of the pseudocyst and denies any issues with the incision.  He is not having any headaches but does note some feeling of lightheadedness especially when he gets up in the morning.  No bowel or bladder issues.  He has not yet followed up with infectious disease as was previously directed on March 5.  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  He does not feel his overall strength is back to normal.      4/1/24 Visit - 2 weeks p/o.  Patient denies  "headache.  He is overall feels he is getting better.      5/1/24 visit - 6 week p/o.  In the interim, patient had abdominal infection involving a  retained ventriculoperitoneal shunt catheter which required removal and washout on 4/15/24 by Dr. Mariee.  Patient denies current headache, visual disturbances, imbalance, BUE/BLE paresthesias, n/v, b/b issues.  He has noted being very tired over the last couple of days, but today feels fine.         Physical Exam   /79 (BP Location: Right arm, Patient Position: Sitting)   Pulse 87   Resp 16   Ht 1.727 m (5' 8\")   Wt 124.7 kg (275 lb)   SpO2 97%   BMI 41.81 kg/m      Constitutional: Alert, no acute distress.  Face symmetric, PERRL, EOMI, gaze is conjugate, speech is clear and fluent, no dysmetria with finger-to-nose, no pronator drift, tongue midline    [place]:  ambulates with steady gait.  Slightly imbalanced tandem.     Neurological:    Strength (L/R)  5/5 Deltoid  5/5 Bicep  5/5 Tricep  5/5 Handgrip    5/5 Hip Flexion  5/5 Knee Extension  5/5 Ankle Dorsiflexion  5/5 Plantar Flexion    Reflexes (L/R)  2+/2+ Bicep  2+/2+ Brachioradialis  2+/2+ Patellar  2+/2+ Ankle    Sensation: SILT    Incision:  head - well-healed.  Thoracic - healing well w/o TTP or fluctuance.  Abdominal incisions healing.       IMAGING:    Head CT 4/30/24 -   Findings: Postsurgical changes of right frontal approach  ventriculostomy. Ventricular system is slightly decompressed compared  to prior. There is no intracranial hemorrhage, mass effect, or midline  shift. Gray/white matter differentiation in both cerebral hemispheres  is preserved. There is white matter loss and bilateral parietal lobes  with distorted shape of the ventricles. The basal cisterns are clear.  The bony calvaria and the bones of the skull base are normal. The  visualized portions of the paranasal sinuses and mastoid air cells are  clear.  Impression:  Postsurgical changes of right frontal approach ventriculostomy " with  overall decreased size of the ventricular system compared to  3/26/2024.       Head CT 3/26/24 - decreased ventricles w/o SDH  FINDINGS:  Right frontal approach ventriculostomy catheter with tip at the  foramen of [place]ro. There is decreased size of the ventricular system  when compared to CT 3/19/2024. No evidence of subependymal edema. No  acute intracranial hemorrhage, mass effect, or midline shift. No acute  loss of gray-white matter differentiation in the cerebral hemispheres.  White matter loss in the bilateral parietal lobes with distorted  shapes of the ventricles. Clear basal cisterns.  The bony calvaria and the bones of the skull base are otherwise  normal. The visualized portions of the paranasal sinuses and mastoid  air cells are clear. Grossly normal orbits.   IMPRESSION: Unchanged position of the right frontal approach  ventriculostomy catheter with overall decreased size of the  ventricular system when compared to CT 3/19/2024.    ASSESSMENT & PLAN:  Donald Jackson is a 29 year old male who is s/p revision of distal catheter for  shunt by Dr. Martinez on 3/19/24 because the catheter became displaced and he developed a pseudocyst in the subcutaneous tissue.  He had a recent  shunt replacement on 2/2/24 for a shunt system initially placed when he was 4 y/o for HCP.  4/15/24 abdominal washout and removal of retained  catheter by Dr. Mariee.      Patient is overall doing well in his recovery from all of the surgeries he has had.  The head CT 4/30/24 again shows decreased size of ventricles, no ICH.  He does not appear to have any concerning symptoms at this time.  Current setting of Certas valve was confirmed to be at 4 today.  We did discuss that his valve may need to be changed to allow less CSF fluid--5.  Since he is not symptomatic we will continue to monitor for now.      Okay to increase activity and do activity as tolerated.  I did caution patient to start introducing things gradually and not  to overdo it.  Recommending walking on treadmill before running, etc.      Follow up with Dr. Martinez in 6 weeks with repeat head CT.  I have advised patient to monitor his symptoms -- headache, n/v, imbalance, increased drowsiness could indicate a change for which a new head CT would be warranted more urgently.      Patient is in agreement with this plan and states no further questions.          Again, thank you for allowing me to participate in the care of your patient.      Sincerely,    Vandana Smith PA-C

## 2024-05-02 ENCOUNTER — OFFICE VISIT (OUTPATIENT)
Dept: FAMILY MEDICINE | Facility: CLINIC | Age: 29
End: 2024-05-02

## 2024-05-02 VITALS
HEART RATE: 93 BPM | SYSTOLIC BLOOD PRESSURE: 127 MMHG | WEIGHT: 276 LBS | RESPIRATION RATE: 16 BRPM | OXYGEN SATURATION: 95 % | BODY MASS INDEX: 41.97 KG/M2 | DIASTOLIC BLOOD PRESSURE: 90 MMHG | TEMPERATURE: 97.7 F

## 2024-05-02 DIAGNOSIS — R10.84 ABDOMINAL PAIN, GENERALIZED: ICD-10-CM

## 2024-05-02 DIAGNOSIS — T85.730A INFECTION OF VENTRICULOPERITONEAL SHUNT, INITIAL ENCOUNTER (H): Primary | ICD-10-CM

## 2024-05-02 DIAGNOSIS — Z09 HOSPITAL DISCHARGE FOLLOW-UP: ICD-10-CM

## 2024-05-02 NOTE — NURSING NOTE
29 year old  Chief Complaint   Patient presents with    Hospital F/U     Return to work form.       Blood pressure (!) 127/90, pulse 93, temperature 97.7  F (36.5  C), temperature source Temporal, resp. rate 16, weight 125.2 kg (276 lb), SpO2 95%. Body mass index is 41.97 kg/m .  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    Cognitive disorder    Pain in joint, shoulder region    Premature ejaculation    Adjustment disorder with mixed anxiety and depressed mood    Shunt malfunction, initial encounter    S/P appendectomy    Abdominal pain, generalized    Infection of ventriculoperitoneal shunt, initial encounter (H24)       Wt Readings from Last 2 Encounters:   05/02/24 125.2 kg (276 lb)   05/01/24 124.7 kg (275 lb)     BP Readings from Last 3 Encounters:   05/02/24 (!) 127/90   05/01/24 119/79   05/01/24 (!) 135/92         Current Outpatient Medications   Medication Sig Dispense Refill    acetaminophen (TYLENOL) 325 MG tablet Take 2 tablets (650 mg) by mouth every 4 hours as needed for mild pain      ibuprofen (ADVIL/MOTRIN) 600 MG tablet Take 600 mg by mouth every 6 hours as needed for moderate pain      MAGNESIUM CITRATE PO Take 1-2 tablets by mouth daily      Omega-3 Fatty Acids (FISH OIL PO) Take 2 capsules by mouth daily       No current facility-administered medications for this visit.       Social History     Tobacco Use    Smoking status: Never    Smokeless tobacco: Never   Vaping Use    Vaping status: Never Used   Substance Use Topics    Alcohol use: No    Drug use: No       Health Maintenance Due   Topic Date Due    ADVANCE CARE PLANNING  Never done    HIV SCREENING  Never done    Pneumococcal Vaccine: Pediatrics (0 to 5 Years) and At-Risk Patients (6 to 64 Years) (2 of 2 - PCV) 03/16/2011    HEPATITIS C SCREENING  Never done    YEARLY PREVENTIVE VISIT  06/26/2020     "COVID-19 Vaccine (3 - Pfizer risk series) 06/30/2021    DTAP/TDAP/TD IMMUNIZATION (9 - Td or Tdap) 06/26/2022       No results found for: \"PAP\"      May 2, 2024 11:10 AM    "

## 2024-05-02 NOTE — LETTER
May 2, 2024      RE: Donald Jackson  [address]  [place] [address]       To whom it may concern:    Donald Jackson was seen in our clinic today. He  may return to work with the following: No working or lifting restrictions on or about 5/2/2024.    Sincerely,      Roberto Gomez MD

## 2024-05-02 NOTE — PROGRESS NOTES
"  Assessment & Plan   Problem List Items Addressed This Visit          Nervous and Auditory    Abdominal pain, generalized       Infectious/Inflammatory    Infection of ventriculoperitoneal shunt, initial encounter (H24)     Other Visit Diagnoses       Hospital discharge follow-up    -  Primary          Donald appears to be improving well. Letter written for return to work. Will continue to monitor for any concerning symptoms.          MED REC REQUIRED  Post Medication Reconciliation Status: discharge medications reconciled, continue medications without change  BMI  Estimated body mass index is 41.97 kg/m  as calculated from the following:    Height as of 5/1/24: 1.727 m (5' 8\").    Weight as of this encounter: 125.2 kg (276 lb).   Weight management plan: Discussed healthy diet and exercise guidelines      28 minutes spent on the date of the encounter doing chart review, history and exam, documentation and further activities as noted.    Roberto Gomez MD  12:56 PM, May 2, 2024        Subjective   Cresson is a 29 year old, presenting for the following health issues:  Hospital F/U (Return to work form.)    HPI   Post Hospital Follow Up  - 4/15 surgery to correct infected  shunt  - continues to follow with surgery, last appointment 5/1, see note for details  - feels good today, continues to improve  - would like a letter at this point saying he is OK to return to work without restrictions        4/22/2024     3:11 PM   Post Discharge Outreach   How are your symptoms? (Red Flag symptoms escalate to triage hotline per guidelines) Improved   Do you feel your condition is stable enough to be safe at home until your provider visit? Yes   Does the patient have their discharge instructions?  Yes   Does the patient have questions regarding their discharge instructions?  No   Were you started on any new medications or were there changes to any of your previous medications?  Yes   Does the patient have all of their medications? Yes "   Do you have questions regarding any of your medications?  No   Do you have all of your needed medical supplies or equipment (DME)?  (i.e. oxygen tank, CPAP, cane, etc.) Yes   Discharge follow-up appointment scheduled within 14 calendar days?  Yes   Discharge Follow Up Appointment Date 4/29/2024   Discharge Follow Up Appointment Scheduled with? Specialty Care Provider     Hospital Follow-up Visit:    Hospital/Nursing Home/IP Rehab Facility: [facility]  Date of Admission: 4/13/24  Date of Discharge: 4/20/24  Reason(s) for Admission: Abdominal Pain, Infection of  shunt.  Was the patient in the ICU or did the patient experience delirium during hospitalization?  No  Do you have any other stressors you would like to discuss with your provider? No    Problems taking medications regularly:  None  Medication changes since discharge: None  Problems adhering to non-medication therapy:  None    Summary of hospitalization:  Discharge summary unavailable  Diagnostic Tests/Treatments reviewed.  Follow up needed: none  Other Healthcare Providers Involved in Patient s Care:         Specialist appointment - Neurosurgery, Radiology, General Surgery  Update since discharge: improved.       Plan of care communicated with patient        Review of Systems  Constitutional, HEENT, cardiovascular, pulmonary, gi and gu systems are negative, except as otherwise noted.      Objective    BP (!) 127/90 (BP Location: Left arm, Patient Position: Sitting, Cuff Size: Adult Large)   Pulse 93   Temp 97.7  F (36.5  C) (Temporal)   Resp 16   Wt 125.2 kg (276 lb)   SpO2 95%   BMI 41.97 kg/m    Body mass index is 41.97 kg/m .  Physical Exam   GENERAL: alert and no distress  NECK: no adenopathy, no asymmetry, masses, or scars  RESP: lungs clear to auscultation - no rales, rhonchi or wheezes  CV: regular rate and rhythm, normal S1 S2, no S3 or S4, no murmur, click or rub, no peripheral edema  ABDOMEN:  soft, nontender, no hepatosplenomegaly, no masses and bowel sounds normal  MS: no gross musculoskeletal defects noted, no edema            Signed Electronically by: Roberto Gomez MD

## 2024-05-12 ENCOUNTER — APPOINTMENT (OUTPATIENT)
Dept: ULTRASOUND IMAGING | Facility: CLINIC | Age: 29
End: 2024-05-12
Attending: EMERGENCY MEDICINE

## 2024-05-12 ENCOUNTER — APPOINTMENT (OUTPATIENT)
Dept: GENERAL RADIOLOGY | Facility: CLINIC | Age: 29
End: 2024-05-12
Payer: COMMERCIAL

## 2024-05-12 ENCOUNTER — APPOINTMENT (OUTPATIENT)
Dept: GENERAL RADIOLOGY | Facility: CLINIC | Age: 29
End: 2024-05-12

## 2024-05-12 ENCOUNTER — HOSPITAL ENCOUNTER (EMERGENCY)
Facility: CLINIC | Age: 29
Discharge: HOME | End: 2024-05-12
Attending: EMERGENCY MEDICINE | Admitting: EMERGENCY MEDICINE
Payer: COMMERCIAL

## 2024-05-12 VITALS
OXYGEN SATURATION: 96 % | SYSTOLIC BLOOD PRESSURE: 127 MMHG | TEMPERATURE: 97.9 F | RESPIRATION RATE: 16 BRPM | HEART RATE: 96 BPM | BODY MASS INDEX: 41.97 KG/M2 | DIASTOLIC BLOOD PRESSURE: 83 MMHG | HEIGHT: 68 IN

## 2024-05-12 DIAGNOSIS — T88.8XXA FLUID COLLECTION AT SURGICAL SITE, INITIAL ENCOUNTER: ICD-10-CM

## 2024-05-12 DIAGNOSIS — R07.89 CHEST DISCOMFORT: ICD-10-CM

## 2024-05-12 LAB
ALBUMIN SERPL BCG-MCNC: 4.3 G/DL (ref 3.5–5.2)
ALP SERPL-CCNC: 84 U/L (ref 40–150)
ALT SERPL W P-5'-P-CCNC: 52 U/L (ref 0–70)
ANION GAP SERPL CALCULATED.3IONS-SCNC: 12 MMOL/L (ref 7–15)
AST SERPL W P-5'-P-CCNC: 21 U/L (ref 0–45)
BASOPHILS # BLD AUTO: 0 10E3/UL (ref 0–0.2)
BASOPHILS NFR BLD AUTO: 0 %
BILIRUB SERPL-MCNC: 0.7 MG/DL
BUN SERPL-MCNC: 12.7 MG/DL (ref 6–20)
CALCIUM SERPL-MCNC: 9.2 MG/DL (ref 8.6–10)
CHLORIDE SERPL-SCNC: 103 MMOL/L (ref 98–107)
CREAT SERPL-MCNC: 0.95 MG/DL (ref 0.67–1.17)
CRP SERPL-MCNC: 21.8 MG/L
DEPRECATED HCO3 PLAS-SCNC: 23 MMOL/L (ref 22–29)
EGFRCR SERPLBLD CKD-EPI 2021: >90 ML/MIN/1.73M2
EOSINOPHIL # BLD AUTO: 0.1 10E3/UL (ref 0–0.7)
EOSINOPHIL NFR BLD AUTO: 1 %
ERYTHROCYTE [DISTWIDTH] IN BLOOD BY AUTOMATED COUNT: 17.8 % (ref 10–15)
GLUCOSE SERPL-MCNC: 81 MG/DL (ref 70–99)
HCT VFR BLD AUTO: 44.5 % (ref 40–53)
HGB BLD-MCNC: 14.4 G/DL (ref 13.3–17.7)
IMM GRANULOCYTES # BLD: 0 10E3/UL
IMM GRANULOCYTES NFR BLD: 0 %
LIPASE SERPL-CCNC: 16 U/L (ref 13–60)
LYMPHOCYTES # BLD AUTO: 2.5 10E3/UL (ref 0.8–5.3)
LYMPHOCYTES NFR BLD AUTO: 24 %
MAGNESIUM SERPL-MCNC: 2.2 MG/DL (ref 1.7–2.3)
MCH RBC QN AUTO: 23.6 PG (ref 26.5–33)
MCHC RBC AUTO-ENTMCNC: 32.4 G/DL (ref 31.5–36.5)
MCV RBC AUTO: 73 FL (ref 78–100)
MONOCYTES # BLD AUTO: 0.8 10E3/UL (ref 0–1.3)
MONOCYTES NFR BLD AUTO: 7 %
NEUTROPHILS # BLD AUTO: 7.1 10E3/UL (ref 1.6–8.3)
NEUTROPHILS NFR BLD AUTO: 68 %
NRBC # BLD AUTO: 0 10E3/UL
NRBC BLD AUTO-RTO: 0 /100
PLATELET # BLD AUTO: 239 10E3/UL (ref 150–450)
POTASSIUM SERPL-SCNC: 3.8 MMOL/L (ref 3.4–5.3)
PROT SERPL-MCNC: 8 G/DL (ref 6.4–8.3)
RBC # BLD AUTO: 6.1 10E6/UL (ref 4.4–5.9)
SODIUM SERPL-SCNC: 138 MMOL/L (ref 135–145)
TROPONIN T SERPL HS-MCNC: <6 NG/L
WBC # BLD AUTO: 10.6 10E3/UL (ref 4–11)

## 2024-05-12 PROCEDURE — 76604 US EXAM CHEST: CPT | Mod: 26 | Performed by: RADIOLOGY

## 2024-05-12 PROCEDURE — 71045 X-RAY EXAM CHEST 1 VIEW: CPT | Mod: 26 | Performed by: RADIOLOGY

## 2024-05-12 PROCEDURE — 93010 ELECTROCARDIOGRAM REPORT: CPT

## 2024-05-12 PROCEDURE — 36415 COLL VENOUS BLD VENIPUNCTURE: CPT

## 2024-05-12 PROCEDURE — 76604 US EXAM CHEST: CPT | Mod: 52

## 2024-05-12 PROCEDURE — 93005 ELECTROCARDIOGRAM TRACING: CPT | Performed by: EMERGENCY MEDICINE

## 2024-05-12 PROCEDURE — 250N000013 HC RX MED GY IP 250 OP 250 PS 637

## 2024-05-12 PROCEDURE — 83735 ASSAY OF MAGNESIUM: CPT

## 2024-05-12 PROCEDURE — 74019 RADEX ABDOMEN 2 VIEWS: CPT | Mod: 26 | Performed by: RADIOLOGY

## 2024-05-12 PROCEDURE — 71046 X-RAY EXAM CHEST 2 VIEWS: CPT | Mod: 26 | Performed by: RADIOLOGY

## 2024-05-12 PROCEDURE — 71045 X-RAY EXAM CHEST 1 VIEW: CPT | Mod: XU

## 2024-05-12 PROCEDURE — 86140 C-REACTIVE PROTEIN: CPT

## 2024-05-12 PROCEDURE — 70250 X-RAY EXAM OF SKULL: CPT | Mod: 26 | Performed by: RADIOLOGY

## 2024-05-12 PROCEDURE — 71046 X-RAY EXAM CHEST 2 VIEWS: CPT

## 2024-05-12 PROCEDURE — 84484 ASSAY OF TROPONIN QUANT: CPT

## 2024-05-12 PROCEDURE — 80053 COMPREHEN METABOLIC PANEL: CPT

## 2024-05-12 PROCEDURE — 83690 ASSAY OF LIPASE: CPT

## 2024-05-12 PROCEDURE — 85025 COMPLETE CBC W/AUTO DIFF WBC: CPT

## 2024-05-12 PROCEDURE — 99285 EMERGENCY DEPT VISIT HI MDM: CPT | Mod: FS | Performed by: EMERGENCY MEDICINE

## 2024-05-12 PROCEDURE — 99285 EMERGENCY DEPT VISIT HI MDM: CPT | Mod: 25 | Performed by: EMERGENCY MEDICINE

## 2024-05-12 RX ORDER — LIDOCAINE 4 G/G
1 PATCH TOPICAL ONCE
Status: DISCONTINUED | OUTPATIENT
Start: 2024-05-12 | End: 2024-05-12 | Stop reason: HOSPADM

## 2024-05-12 RX ADMIN — LIDOCAINE 1 PATCH: 4 PATCH TOPICAL at 18:12

## 2024-05-12 ASSESSMENT — ACTIVITIES OF DAILY LIVING (ADL)
ADLS_ACUITY_SCORE: 37

## 2024-05-12 NOTE — ED TRIAGE NOTES
Pt c/o pain in middle of chest and abd pain. Pt states there is a hard spot on chest where pain is. Significant surgical hx.

## 2024-05-12 NOTE — CONSULTS
Thoracic Surgery Consult Note  05/12/2024    Reason for consult: Focal chest pain after ventriculopleural shunt placement  Consult requested by: [facility]      ASSESSMENT: 29M with hx hydrocephalus who recently had a ventriculopleural shunt placed on 3/19/2024 and now presents with 1 day of focal chest pain with possible subcutaneous soft tissue mass. Could potentially be a piece of the  shunt vs lymph node. Mildly elevated CRP and borderline leukocytosis. No evidence of infection.    RECOMMENDATIONS:    - Obtain a soft tissue ultrasound of the chest wall to help determine what this mass is  - If nothing found on ultrasound, can discharge with lidocaine patches. Will work on scheduling follow-up with Dr. Noriega      Discussed with thoracic fellow who will discuss with staff.    Maulik Serrano MD  Surgery Resident      =======================    History of Present Illness:    Donald Jackson is a 29 year old male with hx of hydrocephalus s/p ventriculoperitoneal shunt c/b infection, now s/p revision to ventriculopleural shunt on 3/19/2024. Since yesterday, he has had focal chest pain just lateral to his sternum. He denies any trauma to the area. He feels a soft mass under the skin and is unsure whether this is related to his  shunt. He denies any fevers or chills, but did have an episode of diarrhea about a week ago. He is not having any shortness of breath or new cough.    Past Medical History:  Past Medical History:   Diagnosis Date    Hydrocephalus (H)     Lattice degeneration     Morbid obesity (H)     Other forms of retinal detachment(361.89)        Past Surgical History  Past Surgical History:   Procedure Laterality Date    LAPAROSCOPIC ASSISTED IMPLANT SHUNT VENTRICULOPERITONEAL Right 2/5/2024    Procedure: Implant Shunt Ventriculopleural;  Surgeon: Almas Noriega MD;  Location: [facility] OR    LAPAROSCOPY DIAGNOSTIC (GENERAL) N/A 4/15/2024    Procedure: Laparoscopy diagnostic, abdominal washout, lysis of  adhesion, removal of intra abdominal foreign body;  Surgeon: Ender Wright MD;  Location: [facility] OR    OPTICAL TRACKING SYSTEM IMPLANT SHUNT VENTRICULOPERITONEAL Right 2024    Procedure: RIGHT VENTRICULOPLEURAL Insertion;  Surgeon: Fabiola Martinez MD;  Location: [facility] OR    RETINAL REATTACHMENT  10/2010    left eye    RETINOPEXY LASER PROPHYLAXIS BREAK(S) OD (RIGHT EYE)  10/2011    shunt in head      a couple as a child    strabismus surery      age 5    THORACOSCOPY Right 3/19/2024    Procedure: RIGHT THORACOSCOPIC REPOSITIONING OF VENTRICULO-PLEURAL SHUNT CATHETER;  Surgeon: Almas Noriega MD;  Location: [facility] OR    TONSILLECTOMY      wisdom teeth         Family History:  Family History   Problem Relation Age of Onset    Diabetes Father         Type 2     Diabetes Paternal Grandfather         Type 2     Retinal detachment No family hx of     Amblyopia No family hx of     Glaucoma No family hx of     Macular Degeneration No family hx of     Anesthesia Reaction No family hx of     Deep Vein Thrombosis (DVT) No family hx of        Social History:  Social History     Social History Narrative    FAMILY INFORMATION     Date: May 23, 2008    Parent #1      Name: STEPHANIA FIGUEROA  Gender: MALE    : 1968     Education: DVM/MSC/PHD   Occupation: RESEARCH ASSOCIATE        Siblings:  HENRY CEJA        Relationship Status of Parent(s):     Who does the child live with? PARENTS    What language(s) is/are spoken at home? Luxembourger/ENGLISH            Lives alone.  Has a daughter 4-5 days per week.  Dad is in [place]. Mom is in [place]. Sister is in [place] in medical school.  Born in [place] came to [place] age 1. Parents .    Has a good support system.    Feels safe in all environments.    Wears seatbelt 100% of the time    Denies history of abuse, past or present, physical, sexual or emotional.    Annalise Carreon PA-C    18                    Medications:  Current Outpatient Medications    Medication Sig Dispense Refill    acetaminophen (TYLENOL) 325 MG tablet Take 2 tablets (650 mg) by mouth every 4 hours as needed for mild pain      ibuprofen (ADVIL/MOTRIN) 600 MG tablet Take 600 mg by mouth every 6 hours as needed for moderate pain      MAGNESIUM CITRATE PO Take 1-2 tablets by mouth daily      Omega-3 Fatty Acids (FISH OIL PO) Take 2 capsules by mouth daily         Allergies:  Allergies   Allergen Reactions    Penicillins Hives and Unknown       Review of Symptoms:  A 10 point review of symptoms has been conducted and is negative except for that mentioned in the above HPI.    Physical Exam:    Temp:  [98.1  F (36.7  C)] 98.1  F (36.7  C)  Pulse:  [96] 96  Resp:  [16] 16  BP: (131)/(91) 131/91  SpO2:  [96 %] 96 %    Gen: NAD, resting comfortably  HEENT: NCAT, sclera anicteric  CV: RRR by radial pulse  Chest: 2 cm mobile soft tissue swelling vs foreign object just lateral to the sternum, tender to the touch. Mildly erythematous from rubbing the area  Resp: nonlabored respirations, comfortable on room air  Abd: soft, nontender, nondistended   Ext: WWP w/o edema  Neuro: no focal deficits  Skin: No rashes    Labs:  CBC RESULTS:   Recent Labs   Lab Test 05/12/24  1537   WBC 10.6   RBC 6.10*   HGB 14.4   HCT 44.5   MCV 73*   MCH 23.6*   MCHC 32.4   RDW 17.8*        Last Comprehensive Metabolic Panel:  Lab Results   Component Value Date     05/12/2024    POTASSIUM 3.8 05/12/2024    CHLORIDE 103 05/12/2024    CO2 23 05/12/2024    ANIONGAP 12 05/12/2024    GLC 81 05/12/2024    BUN 12.7 05/12/2024    CR 0.95 05/12/2024    GFRESTIMATED >90 05/12/2024    ANTHONY 9.2 05/12/2024       CRP: 21.80    Imaging:  XR Chest 2 Views   Final Result   IMPRESSION:   No acute cardiopulmonary disease. Trace right pleural effusion   associated with a ventriculopleural shunt.      I have personally reviewed the examination and initial interpretation   and I agree with the findings.      KRISTIN MURRAY,              SYSTEM ID:  [id number]      XR Shunt Malfunction Survey   Final Result   IMPRESSION:    Interval removal of  shunt terminating in the pelvis. Otherwise,   stable position of multiple catheters.      I have personally reviewed the examination and initial interpretation   and I agree with the findings.      KATIE WALTERS MD            SYSTEM ID:  [id number]

## 2024-05-12 NOTE — ED PROVIDER NOTES
ED Provider Note  [facility]      History     Chief Complaint   Patient presents with    Chest Pain     The history is provided by the patient and medical records. No  was used.     Donald Jackson is a 29 year old male who presents to the ED with complaint of chest pain. Past medical history notable for  shunt, infection of  shunt, ADHD, abdominal pain, adjustment disorder.  Past admission and surgical history notable for perforated appendicitis c/b intraabdominal abscess (12/2023, s/p IR drain with Cx = PsdA and PO abx course), with  shunt changed to ventriculopleural due to residual RLQ abscess vs pseudocyst on 2/2/24 (old tip still in abd) further c/b new  shunt malposition with subcutaneous pseudocyst s/p revision 3/19/24 with most recent admission on 4/13/2024 with retained abdominal catheter resulting in diagnostic laparoscopy, abdominal washout and removal of catheter on 4/15/2024.    He presents today with complaints of right lower rib and chest discomfort just right of the sternum.  He states that this pain started yesterday while at work and has continued into today prompting his arrival for further evaluation.  He states the pain is only localized to one area on his chest and is reproducible with palpation.  He states the pain is also exacerbated with turning his head and neck to the left which gives him an idea that it may be his shunt irritating his lower chest.  He denies any upper anterior chest or left-sided chest pain.  He denies any radiation of the pain into his back or into his abdomen.  He denies any shortness of air symptoms worsened from his baseline.  He does report what he suspects is a viral gastroenteritis last week where he had several episodes of diarrhea and chills but states this has resolved.  He states he has had 2 normal bowel movements in the past 24 hours denying any melena or hematochezia.  He denies fever, nausea, or  vomiting otherwise.  He denies headache, vision changes, or paresthesias into his upper or lower extremities.  He also reports some generalized abdominal discomfort, mostly on the left side of his abdomen.  He states that the pain just comes and goes and does not describe it as sharp or stabbing in nature.  He denies any urinary symptoms.    Past Medical History  Past Medical History:   Diagnosis Date    Hydrocephalus (H)     Lattice degeneration     Morbid obesity (H)     Other forms of retinal detachment(361.89)      Past Surgical History:   Procedure Laterality Date    LAPAROSCOPIC ASSISTED IMPLANT SHUNT VENTRICULOPERITONEAL Right 2/5/2024    Procedure: Implant Shunt Ventriculopleural;  Surgeon: Almas Noriega MD;  Location: [facility] OR    LAPAROSCOPY DIAGNOSTIC (GENERAL) N/A 4/15/2024    Procedure: Laparoscopy diagnostic, abdominal washout, lysis of adhesion, removal of intra abdominal foreign body;  Surgeon: Ender Wright MD;  Location: [facility] OR    OPTICAL TRACKING SYSTEM IMPLANT SHUNT VENTRICULOPERITONEAL Right 2/5/2024    Procedure: RIGHT VENTRICULOPLEURAL Insertion;  Surgeon: Fabiola Martinez MD;  Location: [facility] OR    RETINAL REATTACHMENT  10/2010    left eye    RETINOPEXY LASER PROPHYLAXIS BREAK(S) OD (RIGHT EYE)  10/2011    shunt in head      a couple as a child    strabismus surery      age 5    THORACOSCOPY Right 3/19/2024    Procedure: RIGHT THORACOSCOPIC REPOSITIONING OF VENTRICULO-PLEURAL SHUNT CATHETER;  Surgeon: Almas Noriega MD;  Location: [facility] OR    TONSILLECTOMY      wisdom teeth       acetaminophen (TYLENOL) 325 MG tablet  ibuprofen (ADVIL/MOTRIN) 600 MG tablet  MAGNESIUM CITRATE PO  Omega-3 Fatty Acids (FISH OIL PO)      Allergies   Allergen Reactions    Penicillins Hives and Unknown     Family History  Family History   Problem Relation Age of Onset    Diabetes Father         Type 2     Diabetes Paternal Grandfather         Type 2     Retinal detachment No family hx of      "Amblyopia No family hx of     Glaucoma No family hx of     Macular Degeneration No family hx of     Anesthesia Reaction No family hx of     Deep Vein Thrombosis (DVT) No family hx of      Social History   Social History     Tobacco Use    Smoking status: Never    Smokeless tobacco: Never   Vaping Use    Vaping status: Never Used   Substance Use Topics    Alcohol use: No    Drug use: No      Past medical history, past surgical history, medications, allergies, family history, and social history were reviewed with the patient. No additional pertinent items.      A medically appropriate review of systems was performed with pertinent positives and negatives noted in the HPI, and all other systems negative.    Physical Exam   BP: (!) 131/91  Pulse: 96  Temp: 98.1  F (36.7  C)  Resp: 16  Height: 172.7 cm (5' 8\")  SpO2: 96 %  Physical Exam  Constitutional:       General: He is not in acute distress.     Appearance: He is well-developed. He is obese. He is not ill-appearing, toxic-appearing or diaphoretic.   HENT:      Mouth/Throat:      Mouth: Mucous membranes are moist.      Pharynx: Oropharynx is clear.   Eyes:      Extraocular Movements: Extraocular movements intact.      Pupils: Pupils are equal, round, and reactive to light.   Cardiovascular:      Rate and Rhythm: Normal rate and regular rhythm. No extrasystoles are present.     Heart sounds: Normal heart sounds. No murmur heard.  Pulmonary:      Effort: Pulmonary effort is normal. No respiratory distress.      Breath sounds: Normal breath sounds. No decreased breath sounds, wheezing, rhonchi or rales.   Chest:      Chest wall: Tenderness present. No deformity, swelling or crepitus.       Abdominal:      General: Bowel sounds are normal.      Palpations: Abdomen is soft.      Tenderness: There is no abdominal tenderness. There is no guarding or rebound.   Musculoskeletal:      Cervical back: Normal range of motion and neck supple.   Skin:     General: Skin is warm.     "  Findings: No rash.   Neurological:      General: No focal deficit present.      Mental Status: He is alert and oriented to person, place, and time. Mental status is at baseline.   Psychiatric:         Mood and Affect: Mood normal.         Behavior: Behavior normal.         ED Course, Procedures, & Data     ED Course as of 05/13/24 1145   Sun May 12, 2024   1607 Neurosurgery paged.    Discussed patient case and  shunt concerns with neurosurgery who recommended discussion with cardiothoracic surgery who they state places the lower part of the shunt rather than neurosurgery.   1608 Thoracic surgery paged.   1617 CRP Inflammation(!): 21.80   1617 Thoracic surgery to see the patient in the ED   1854 Thoracic surgery resident paged   1857 Thoracic surgery resident will confirm with her staff physician and call back with recommendations in the setting of soft tissue ultrasound findings     Procedures            EKG Interpretation:      Interpreted by Katrina Diggs PA-C  Time reviewed: 1420  Symptoms at time of EKG: Chest pain   Rhythm: normal sinus   Rate: Normal  Axis: Normal  Ectopy: none  Conduction: normal  ST Segments/ T Waves: No ST-T wave changes, No acute ischemic changes, T wave flattening III and V3, and T wave inversion V2  Q Waves: none  Comparison to prior: Sinus tachycardia on 4/17/2024 but otherwise similar waveform findings to today    Clinical Impression: normal EKG         Results for orders placed or performed during the hospital encounter of 05/12/24   XR Shunt Malfunction Survey     Status: None    Narrative    EXAM: Shunt malfunction survey.    HISTORY: Evaluate intraventricular shunt    COMPARISON: 4/30/2024, 3/19/2024.    FINDINGS:   There is a right frontal approach  shunt in the skull that appears  intact. The shunt in the neck appears intact.    In the thorax, the shunt appears intact. Cardiac silhouette appears  well-defined. Pulmonary vasculature appears distinct. No acute  airspace  opacity. There are multiple abandoned catheters within the  right neck and thorax. Interval removal of  shunt terminating in the  pelvis. Stable position of  shunt projecting over the right upper  quadrant, potentially a ventriculopleural shunt.         Impression    IMPRESSION:   Interval removal of  shunt terminating in the pelvis. Otherwise,  stable position of multiple catheters.    I have personally reviewed the examination and initial interpretation  and I agree with the findings.    KATIE WALTERS MD         SYSTEM ID:  [id number]   XR Chest 2 Views     Status: None    Narrative    XR CHEST 2 VIEWS  5/12/2024 2:53 PM     HISTORY:  chest pain       COMPARISON:  4/30/2024    TECHNIQUE: Upright PA and lateral projection radiograph of the chest.    FINDINGS:   Stable ventriculopleural catheters projecting over the right chest.  Cardiomediastinal silhouette is within normal limits. Trachea is  midline. Trace right pleural effusion. No pneumothorax. No focal  airspace opacity. Pulmonary vasculature is distinct.    The visualized upper abdomen, bones, and soft tissues are  unremarkable.        Impression    IMPRESSION:  No acute cardiopulmonary disease. Trace right pleural effusion  associated with a ventriculopleural shunt.    I have personally reviewed the examination and initial interpretation  and I agree with the findings.    KRISTIN MURRAY DO         SYSTEM ID:  [id number]   US Soft Tissue Chest Wall or Upper Back     Status: None    Narrative    EXAMINATION: US SOFT TISSUE CHEST WALL OR UPPER BACK, 5/12/2024 6:07  PM     COMPARISON: EXAMINATION: 5/12/2024 6:07 PM     COMPARISON: Same day radiographs    HISTORY: area of tenderness to anterior chest where  shunt is    TECHNIQUE: Clinical area of interest was scanned in the standard  fashion with specialized ultrasound transducer(s) using both gray  scale and limited color/spectral Doppler techniques.    FINDINGS:  Targeted grayscale and color Doppler  ultrasound of the anterior right  chest wall. 0.9 x 1.1 x 1.4 cm predominantly hypoechoic collection  with internal echoes about a segment of the ventriculopleural shunt  within the anterior right chest wall subcutaneous fat. No increased  internal or adjacent vascularity.      Impression    IMPRESSION:  Sonographic area of concern demonstrates a small fluid collection  along the right anterior chest wall ventriculopleural shunt measuring  approximately 1.4 cm at its maximum dimension. The differential  includes abscess, hematoma, and seroma. Recommend clinical  correlation, and follow-up ultrasound to assess stability or  resolution.    I have personally reviewed the examination and initial interpretation  and I agree with the findings.    KRISTIN MURRAY,          SYSTEM ID:  [id number]   Comprehensive metabolic panel     Status: Normal   Result Value Ref Range    Sodium 138 135 - 145 mmol/L    Potassium 3.8 3.4 - 5.3 mmol/L    Carbon Dioxide (CO2) 23 22 - 29 mmol/L    Anion Gap 12 7 - 15 mmol/L    Urea Nitrogen 12.7 6.0 - 20.0 mg/dL    Creatinine 0.95 0.67 - 1.17 mg/dL    GFR Estimate >90 >60 mL/min/1.73m2    Calcium 9.2 8.6 - 10.0 mg/dL    Chloride 103 98 - 107 mmol/L    Glucose 81 70 - 99 mg/dL    Alkaline Phosphatase 84 40 - 150 U/L    AST 21 0 - 45 U/L    ALT 52 0 - 70 U/L    Protein Total 8.0 6.4 - 8.3 g/dL    Albumin 4.3 3.5 - 5.2 g/dL    Bilirubin Total 0.7 <=1.2 mg/dL   Lipase     Status: Normal   Result Value Ref Range    Lipase 16 13 - 60 U/L   Magnesium     Status: Normal   Result Value Ref Range    Magnesium 2.2 1.7 - 2.3 mg/dL   Troponin T, High Sensitivity     Status: Normal   Result Value Ref Range    Troponin T, High Sensitivity <6 <=22 ng/L   CRP inflammation     Status: Abnormal   Result Value Ref Range    CRP Inflammation 21.80 (H) <5.00 mg/L   CBC with platelets and differential     Status: Abnormal   Result Value Ref Range    WBC Count 10.6 4.0 - 11.0 10e3/uL    RBC Count 6.10 (H) 4.40 - 5.90  10e6/uL    Hemoglobin 14.4 13.3 - 17.7 g/dL    Hematocrit 44.5 40.0 - 53.0 %    MCV 73 (L) 78 - 100 fL    MCH 23.6 (L) 26.5 - 33.0 pg    MCHC 32.4 31.5 - 36.5 g/dL    RDW 17.8 (H) 10.0 - 15.0 %    Platelet Count 239 150 - 450 10e3/uL    % Neutrophils 68 %    % Lymphocytes 24 %    % Monocytes 7 %    % Eosinophils 1 %    % Basophils 0 %    % Immature Granulocytes 0 %    NRBCs per 100 WBC 0 <1 /100    Absolute Neutrophils 7.1 1.6 - 8.3 10e3/uL    Absolute Lymphocytes 2.5 0.8 - 5.3 10e3/uL    Absolute Monocytes 0.8 0.0 - 1.3 10e3/uL    Absolute Eosinophils 0.1 0.0 - 0.7 10e3/uL    Absolute Basophils 0.0 0.0 - 0.2 10e3/uL    Absolute Immature Granulocytes 0.0 <=0.4 10e3/uL    Absolute NRBCs 0.0 10e3/uL   EKG 12-lead, tracing only     Status: None   Result Value Ref Range    Systolic Blood Pressure  mmHg    Diastolic Blood Pressure  mmHg    Ventricular Rate 84 BPM    Atrial Rate 84 BPM    OK Interval 142 ms    QRS Duration 90 ms     ms    QTc 432 ms    P Axis 35 degrees    R AXIS 23 degrees    T Axis 24 degrees    Interpretation ECG       Sinus rhythm  Minimal voltage criteria for LVH, may be normal variant  Borderline ECG  Unconfirmed report - interpretation of this ECG is computer generated - see medical record for final interpretation  Confirmed by - EMERGENCY ROOM, PHYSICIAN ([id number]),  MICHAEL FARRELL ([id number]) on 5/13/2024 6:51:56 AM     CBC with platelets differential     Status: Abnormal    Narrative    The following orders were created for panel order CBC with platelets differential.  Procedure                               Abnormality         Status                     ---------                               -----------         ------                     CBC with platelets and d...[[id number]]  Abnormal            Final result                 Please view results for these tests on the individual orders.     Medications - No data to display    Labs Ordered and Resulted from Time of ED Arrival to Time of ED  Departure   CRP INFLAMMATION - Abnormal       Result Value    CRP Inflammation 21.80 (*)    CBC WITH PLATELETS AND DIFFERENTIAL - Abnormal    WBC Count 10.6      RBC Count 6.10 (*)     Hemoglobin 14.4      Hematocrit 44.5      MCV 73 (*)     MCH 23.6 (*)     MCHC 32.4      RDW 17.8 (*)     Platelet Count 239      % Neutrophils 68      % Lymphocytes 24      % Monocytes 7      % Eosinophils 1      % Basophils 0      % Immature Granulocytes 0      NRBCs per 100 WBC 0      Absolute Neutrophils 7.1      Absolute Lymphocytes 2.5      Absolute Monocytes 0.8      Absolute Eosinophils 0.1      Absolute Basophils 0.0      Absolute Immature Granulocytes 0.0      Absolute NRBCs 0.0     COMPREHENSIVE METABOLIC PANEL - Normal    Sodium 138      Potassium 3.8      Carbon Dioxide (CO2) 23      Anion Gap 12      Urea Nitrogen 12.7      Creatinine 0.95      GFR Estimate >90      Calcium 9.2      Chloride 103      Glucose 81      Alkaline Phosphatase 84      AST 21      ALT 52      Protein Total 8.0      Albumin 4.3      Bilirubin Total 0.7     LIPASE - Normal    Lipase 16     MAGNESIUM - Normal    Magnesium 2.2     TROPONIN T, HIGH SENSITIVITY - Normal    Troponin T, High Sensitivity <6       US Soft Tissue Chest Wall or Upper Back   Final Result   IMPRESSION:   Sonographic area of concern demonstrates a small fluid collection   along the right anterior chest wall ventriculopleural shunt measuring   approximately 1.4 cm at its maximum dimension. The differential   includes abscess, hematoma, and seroma. Recommend clinical   correlation, and follow-up ultrasound to assess stability or   resolution.      I have personally reviewed the examination and initial interpretation   and I agree with the findings.      KRISTIN MURRAY DO            SYSTEM ID:  [id number]      XR Chest 2 Views   Final Result   IMPRESSION:   No acute cardiopulmonary disease. Trace right pleural effusion   associated with a ventriculopleural shunt.      I have  personally reviewed the examination and initial interpretation   and I agree with the findings.      KRISTIN MURRAY DO            SYSTEM ID:  [id number]      XR Shunt Malfunction Survey   Final Result   IMPRESSION:    Interval removal of  shunt terminating in the pelvis. Otherwise,   stable position of multiple catheters.      I have personally reviewed the examination and initial interpretation   and I agree with the findings.      KATIE WALTERS MD            SYSTEM ID:  [id number]             Critical care was not performed.     Medical Decision Making  The patient's presentation was of moderate complexity (an acute complicated injury).    The patient's evaluation involved:  review of external note(s) from 1 sources (recent admission encounter, patient consult notes)  review of 1 test result(s) ordered prior to this encounter (previous  shunt imaging)  ordering and/or review of 3+ test(s) in this encounter (see separate area of note for details)  discussion of management or test interpretation with another health professional (neurosurgery, thoracic surgery)    The patient's management necessitated moderate risk (prescription drug management including medications given in the ED).    Assessment & Plan    Donald is a 29-year-old male that presented to the ED with complaints of chest discomfort localized to the right lower chest as well as some generalized abdominal discomfort.  Acceptable vital signs on presentation without tachycardia, hypotension, hypertension, fever, hypoxia on room air.  Reproducible chest pain at the right lower ribs in the area of patient's discomfort and pain.  No adventitious lung sounds on auscultation.  EKG negative for any concerning ST segment changes or other waveform abnormalities indicating arrhythmia.  CRP 21.8.  Troponin negative, labs otherwise unremarkable and within normal limits.   shunt series notable for interval removal of  shunt terminating in the pelvis otherwise  stable position of multiple catheters.  Chest x-ray negative for acute cardiopulmonary disease with trace pleural effusion in the area of terminating catheter.  Initially discussed patient case over the phone with neurosurgery to discuss possible irritation from the distal tip of the catheter that could be causing patient's pain.  Their service stated that they do not place the lower part of the catheter and that thoracic surgery should be consulted for questions regarding position and pain.  Thoracic surgery consulted who was agreeable to evaluate the patient in the ED (see their consult note for further details).  They recommended soft tissue ultrasound of the area as well as lidocaine patch for further evaluation and pain management, respectively.  Soft tissue ultrasound notable for small fluid collection along the anterior right chest wall near the  shunt measuring about 1.4 cm concerning for abscess versus hematoma versus seroma.  Discussed ultrasound findings with thoracic surgery who, in the setting of reassuring WBC as well as overlying skin negative for signs of cellulitic changes, swelling, redness, rash, would err on the side of seroma versus hematoma at this time.  They recommend continuing topical lidocaine patches as well as Tylenol and ibuprofen for pain and follow-up with neurosurgery and PCP office.  They recommend repeat ultrasound in 1 week.  Discussed all lab and imaging findings with the patient at length as well as the recommendation for discharge home and continued outpatient follow-up and management of the small fluid collection.  Strict return precautions discussed and given in the discharge paperwork.  Discussed topical and over-the-counter pain management options as well as warm compresses to the site.  Instructed patient to contact his PCP office to help schedule outpatient repeat ultrasound in 1 week from today.  Patient was agreeable to the discharge treatment plan, voiced  understanding, and all questions answered prior to discharge.    I have reviewed the nursing notes. I have reviewed the findings, diagnosis, plan and need for follow up with the patient.    Discharge Medication List as of 5/12/2024  7:39 PM          Final diagnoses:   Fluid collection at surgical site, initial encounter   Chest discomfort     ARLETH Castle MD  [facility] EMERGENCY DEPARTMENT  5/12/2024     Katrina Diggs PA-C  05/13/24 1153

## 2024-05-13 LAB
ATRIAL RATE - MUSE: 84 BPM
DIASTOLIC BLOOD PRESSURE - MUSE: NORMAL MMHG
INTERPRETATION ECG - MUSE: NORMAL
P AXIS - MUSE: 35 DEGREES
PR INTERVAL - MUSE: 142 MS
QRS DURATION - MUSE: 90 MS
QT - MUSE: 366 MS
QTC - MUSE: 432 MS
R AXIS - MUSE: 23 DEGREES
SYSTOLIC BLOOD PRESSURE - MUSE: NORMAL MMHG
T AXIS - MUSE: 24 DEGREES
VENTRICULAR RATE- MUSE: 84 BPM

## 2024-05-13 NOTE — DISCHARGE INSTRUCTIONS
Utilize over-the-counter lidocaine patches to the area for topical pain control  Utilize Tylenol and ibuprofen as needed for pain otherwise  Use warm compresses to the area to help fluid collection resorb and to help with pain as well  Follow-up with your PCP office this week and request a follow-up ultrasound in 1 week from today for reevaluation of the fluid collection site  Follow-up with your neurosurgery office as needed for reevaluation recheck of your symptoms  Follow-up with your cardiothoracic surgery team as well for any concerns with the surgical site or fluid collection to the anterior chest  Otherwise do not hesitate to return to the ED if you have any worsening or concerning signs or symptoms

## 2024-05-14 ENCOUNTER — PATIENT OUTREACH (OUTPATIENT)
Dept: SURGERY | Facility: CLINIC | Age: 29
End: 2024-05-14

## 2024-05-14 ENCOUNTER — MYC MEDICAL ADVICE (OUTPATIENT)
Dept: SURGERY | Facility: CLINIC | Age: 29
End: 2024-05-14

## 2024-05-14 DIAGNOSIS — Z98.2 S/P VP SHUNT: Primary | ICD-10-CM

## 2024-05-14 NOTE — TELEPHONE ENCOUNTER
Received voicemail from patient requesting a return call. Calling to set up follow up appointment with Dr Noriega after recent ED visit.    Called patient to follow up.   No answer. In message to writer, patient stated he was active on MyChart. Sent patient MyChart message informing him that we can schedule him for a follow up appointment with our Thoracic Surgery APRN on Monday 5/20 with a chest xray prior and our scheduling department will be reaching to him to assist with scheduling.  Instructed patient to call writer with any questions. Call back information provided.     Luisa Bridges RN, BSN  Thoracic Surgery RN Care Coordinator

## 2024-05-16 ENCOUNTER — TELEPHONE (OUTPATIENT)
Dept: INFECTIOUS DISEASES | Facility: CLINIC | Age: 29
End: 2024-05-16

## 2024-05-16 NOTE — PROGRESS NOTES
THORACIC SURGERY FOLLOW UP VISIT      I saw Mr. Donald Jackson in follow-up today. The clinical summary follows:     PREOP DIAGNOSIS   Dislodged ventriculo-pleural shunt  PROCEDURE   RIGHT thoracoscopy with replacement of  shunt   DATE OF PROCEDURE  03/19/2024    COMPLICATIONS  None    INTERVAL STUDIES  CXR 05/21/2024:  1. No focal airspace disease.  2. Stable ventriculopleural shunt.    Past Medical History:   Diagnosis Date    Hydrocephalus (H)     Lattice degeneration     Morbid obesity (H)     Other forms of retinal detachment(361.89)       Past Surgical History:   Procedure Laterality Date    LAPAROSCOPIC ASSISTED IMPLANT SHUNT VENTRICULOPERITONEAL Right 2/5/2024    Procedure: Implant Shunt Ventriculopleural;  Surgeon: Almas Noriega MD;  Location: [facility]    LAPAROSCOPY DIAGNOSTIC (GENERAL) N/A 4/15/2024    Procedure: Laparoscopy diagnostic, abdominal washout, lysis of adhesion, removal of intra abdominal foreign body;  Surgeon: Ender Wright MD;  Location: [facility]    OPTICAL TRACKING SYSTEM IMPLANT SHUNT VENTRICULOPERITONEAL Right 2/5/2024    Procedure: RIGHT VENTRICULOPLEURAL Insertion;  Surgeon: Fabiola Martinez MD;  Location: [facility]    RETINAL REATTACHMENT  10/2010    left eye    RETINOPEXY LASER PROPHYLAXIS BREAK(S) OD (RIGHT EYE)  10/2011    shunt in head      a couple as a child    strabismus surery      age 5    THORACOSCOPY Right 3/19/2024    Procedure: RIGHT THORACOSCOPIC REPOSITIONING OF VENTRICULO-PLEURAL SHUNT CATHETER;  Surgeon: Almas Noriega MD;  Location: [facility]    TONSILLECTOMY      wisdom teeth        Social History     Socioeconomic History    Marital status: Single     Spouse name: Not on file    Number of children: Not on file    Years of education: Not on file    Highest education level: Bachelor's degree (e.g., BA, AB, BS)   Occupational History    Occupation: Hotel    Tobacco Use    Smoking status: Never    Smokeless tobacco: Never   Vaping Use     Vaping status: Never Used   Substance and Sexual Activity    Alcohol use: No    Drug use: No    Sexual activity: Not Currently     Partners: Female   Other Topics Concern    Parent/sibling w/ CABG, MI or angioplasty before 65F 55M? Not Asked   Social History Narrative    FAMILY INFORMATION     Date: May 23, 2008    Parent #1      Name: STEPHANIA FIGUEROA  Gender: MALE    : 1969     Education: DVM/MSC/PHD   Occupation: RESEARCH ASSOCIATE        Siblings:  HENRY CEJA        Relationship Status of Parent(s):     Who does the child live with? PARENTS    What language(s) is/are spoken at home? Kosovan/ENGLISH            Lives alone.  Has a daughter 4-5 days per week.  Dad is in [place]. Mom is in [place]. Sister is in [place] in medical school.  Born in [place] came to [place] age 1. Parents .    Has a good support system.    Feels safe in all environments.    Wears seatbelt 100% of the time    Denies history of abuse, past or present, physical, sexual or emotional.    Annalise Carreon PA-C    19                 Social Determinants of Health     Financial Resource Strain: Low Risk  (2024)    Financial Resource Strain     Within the past 12 months, have you or your family members you live with been unable to get utilities (heat, electricity) when it was really needed?: No   Food Insecurity: Low Risk  (2024)    Food Insecurity     Within the past 12 months, did you worry that your food would run out before you got money to buy more?: No     Within the past 12 months, did the food you bought just not last and you didn t have money to get more?: No   Transportation Needs: Low Risk  (2024)    Transportation Needs     Within the past 12 months, has lack of transportation kept you from medical appointments, getting your medicines, non-medical meetings or appointments, work, or from getting things that you need?: No   Physical Activity: Insufficiently Active (2024)    Exercise  Vital Sign     Days of Exercise per Week: 1 day     Minutes of Exercise per Session: 30 min   Stress: No Stress Concern Present (2/27/2024)    [employer] [place] [employer] - Occupational Stress Questionnaire     Feeling of Stress : Not at all   Social Connections: Unknown (2/27/2024)    Social Connection and Isolation Panel [NHANES]     Frequency of Communication with Friends and Family: Not on file     Frequency of Social Gatherings with Friends and Family: Never     Attends Rastafarian Services: Not on file     Active Member of Clubs or Organizations: Not on file     Attends Club or Organization Meetings: Not on file     Marital Status: Not on file   Interpersonal Safety: Low Risk  (1/5/2024)    Interpersonal Safety     Do you feel physically and emotionally safe where you currently live?: Yes     Within the past 12 months, have you been hit, slapped, kicked or otherwise physically hurt by someone?: No     Within the past 12 months, have you been humiliated or emotionally abused in other ways by your partner or ex-partner?: No   Housing Stability: Low Risk  (2/27/2024)    Housing Stability     Do you have housing? : Yes     Are you worried about losing your housing?: No      SUBJECTIVE  [place] is doing good. His main complaint is a lump next to his right nipple that is tender to touch and sometimes gives him a sharp pain depending on how he moves. He went to the Emergency Room for this and was told there was a small amount of fluid collection in that area but that there was nothing to be concerned about. He was advised to try a heating pad and ibuprofen but he has not tried any of those.    OBJECTIVE  BP (!) 138/92 (BP Location: Right arm, Patient Position: Sitting, Cuff Size: Adult Large)   Pulse 86   Temp 98.2  F (36.8  C) (Oral)   Resp 16   Wt 125 kg (275 lb 9.6 oz)   SpO2 98%   BMI 41.90 kg/m       His incisions are healing nicely. I am able to palpate a small round nodule that is firm just  medial to his right breast.     From a personal perspective, he is here alone today.    IMPRESSION   Donald is a 29 year-old male status post right thoracoscopic replacement of  shunt. He is here for post operative follow up.    I suggest he try a warm pack to the area, ibuprofen and gentle massage to the area. I cautioned him to only use the heating pad to the area for 15 minutes at a time and not to fall asleep with the heating pad on him. I reviewed his CXR images with him and showed him where the  shunt catheter is and reassured him this nodule he is feeling is not a knot in the  catheter.     PLAN  I spent 15 min on the date of the encounter in chart review, patient visit, review of tests, documentation and/or discussion with other providers about the issues documented above. I reviewed the plan as follows:  Heat and gentle massage to the affected area. Follow up with Thoracic Surgery as needed.  All questions were answered and the patient and present family were in agreement with the plan.  I appreciate the opportunity to participate in the care of your patient and will keep you updated.  Sincerely,

## 2024-05-20 ENCOUNTER — ONCOLOGY VISIT (OUTPATIENT)
Dept: SURGERY | Facility: CLINIC | Age: 29
End: 2024-05-20
Attending: CLINICAL NURSE SPECIALIST
Payer: COMMERCIAL

## 2024-05-20 ENCOUNTER — ANCILLARY PROCEDURE (OUTPATIENT)
Dept: GENERAL RADIOLOGY | Facility: CLINIC | Age: 29
End: 2024-05-20
Attending: CLINICAL NURSE SPECIALIST
Payer: COMMERCIAL

## 2024-05-20 VITALS
BODY MASS INDEX: 41.9 KG/M2 | TEMPERATURE: 98.2 F | HEART RATE: 86 BPM | OXYGEN SATURATION: 98 % | DIASTOLIC BLOOD PRESSURE: 92 MMHG | WEIGHT: 275.6 LBS | SYSTOLIC BLOOD PRESSURE: 138 MMHG | RESPIRATION RATE: 16 BRPM

## 2024-05-20 DIAGNOSIS — Z98.2 S/P VP SHUNT: ICD-10-CM

## 2024-05-20 DIAGNOSIS — Z98.2 S/P VP SHUNT: Primary | ICD-10-CM

## 2024-05-20 PROCEDURE — 71046 X-RAY EXAM CHEST 2 VIEWS: CPT | Mod: GC | Performed by: RADIOLOGY

## 2024-05-20 PROCEDURE — 99213 OFFICE O/P EST LOW 20 MIN: CPT | Performed by: CLINICAL NURSE SPECIALIST

## 2024-05-20 PROCEDURE — 99024 POSTOP FOLLOW-UP VISIT: CPT | Performed by: CLINICAL NURSE SPECIALIST

## 2024-05-20 ASSESSMENT — PAIN SCALES - GENERAL: PAINLEVEL: NO PAIN (0)

## 2024-05-20 NOTE — LETTER
5/20/2024         RE: Donald Jackson  [address]  [place] [address]        Dear Colleague,    Thank you for referring your patient, Donald Jackson, to the [facility]. Please see a copy of my visit note below.    THORACIC SURGERY FOLLOW UP VISIT      I saw Mr. Donald Jackson in follow-up today. The clinical summary follows:     PREOP DIAGNOSIS   Dislodged ventriculo-pleural shunt  PROCEDURE   RIGHT thoracoscopy with replacement of  shunt   DATE OF PROCEDURE  03/19/2024    COMPLICATIONS  None    INTERVAL STUDIES  CXR 05/21/2024:  1. No focal airspace disease.  2. Stable ventriculopleural shunt.    Past Medical History:   Diagnosis Date    Hydrocephalus (H)     Lattice degeneration     Morbid obesity (H)     Other forms of retinal detachment(361.89)       Past Surgical History:   Procedure Laterality Date    LAPAROSCOPIC ASSISTED IMPLANT SHUNT VENTRICULOPERITONEAL Right 2/5/2024    Procedure: Implant Shunt Ventriculopleural;  Surgeon: Almas Noriega MD;  [facility]    LAPAROSCOPY DIAGNOSTIC (GENERAL) N/A 4/15/2024    Procedure: Laparoscopy diagnostic, abdominal washout, lysis of adhesion, removal of intra abdominal foreign body;  Surgeon: Ender Wright MD;  Location: [facility]    OPTICAL TRACKING SYSTEM IMPLANT SHUNT VENTRICULOPERITONEAL Right 2/5/2024    Procedure: RIGHT VENTRICULOPLEURAL Insertion;  Surgeon: Fabiola Martinez MD;  Location: [facility]    RETINAL REATTACHMENT  10/2010    left eye    RETINOPEXY LASER PROPHYLAXIS BREAK(S) OD (RIGHT EYE)  10/2011    shunt in head      a couple as a child    strabismus surery      age 5    THORACOSCOPY Right 3/19/2024    Procedure: RIGHT THORACOSCOPIC REPOSITIONING OF VENTRICULO-PLEURAL SHUNT CATHETER;  Surgeon: Almas Noriega MD;  Location: [facility]    TONSILLECTOMY      wisdom teeth        Social History     Socioeconomic History    Marital status: Single     Spouse name: Not on file    Number of  children: Not on file    Years of education: Not on file    Highest education level: Bachelor's degree (e.g., BA, AB, BS)   Occupational History    Occupation: Hotel    Tobacco Use    Smoking status: Never    Smokeless tobacco: Never   Vaping Use    Vaping status: Never Used   Substance and Sexual Activity    Alcohol use: No    Drug use: No    Sexual activity: Not Currently     Partners: Female   Other Topics Concern    Parent/sibling w/ CABG, MI or angioplasty before 65F 55M? Not Asked   Social History Narrative    FAMILY INFORMATION     Date: May 23, 2008    Parent #1      Name: STEPHANIA FIGUEROA  Gender: MALE    : 1969     Education: DVM/MSC/PHD   Occupation: RESEARCH ASSOCIATE        Siblings:  HENRY CEJA        Relationship Status of Parent(s):     Who does the child live with? PARENTS    What language(s) is/are spoken at home? Eritrean/ENGLISH            Lives alone.  Has a daughter 4-5 days per week.  Dad is in [place]. Mom is in [place]. Sister is in [place] in medical school.  Born in [place] came [place] age 1. Parents .    Has a good support system.    Feels safe in all environments.    Wears seatbelt 100% of the time    Denies history of abuse, past or present, physical, sexual or emotional.    Annalise Carreon PA-C    19                 Social Determinants of Health     Financial Resource Strain: Low Risk  (2024)    Financial Resource Strain     Within the past 12 months, have you or your family members you live with been unable to get utilities (heat, electricity) when it was really needed?: No   Food Insecurity: Low Risk  (2024)    Food Insecurity     Within the past 12 months, did you worry that your food would run out before you got money to buy more?: No     Within the past 12 months, did the food you bought just not last and you didn t have money to get more?: No   Transportation Needs: Low Risk  (2024)    Transportation Needs     Within the  past 12 months, has lack of transportation kept you from medical appointments, getting your medicines, non-medical meetings or appointments, work, or from getting things that you need?: No   Physical Activity: Insufficiently Active (2/27/2024)    Exercise Vital Sign     Days of Exercise per Week: 1 day     Minutes of Exercise per Session: 30 min   Stress: No Stress Concern Present (2/27/2024)    [employer] [place] [employer]ational [employer] - Occupational Stress Questionnaire     Feeling of Stress : Not at all   Social Connections: Unknown (2/27/2024)    Social Connection and Isolation Panel [NHANES]     Frequency of Communication with Friends and Family: Not on file     Frequency of Social Gatherings with Friends and Family: Never     Attends Religion Services: Not on file     Active Member of Clubs or Organizations: Not on file     Attends Club or Organization Meetings: Not on file     Marital Status: Not on file   Interpersonal Safety: Low Risk  (1/5/2024)    Interpersonal Safety     Do you feel physically and emotionally safe where you currently live?: Yes     Within the past 12 months, have you been hit, slapped, kicked or otherwise physically hurt by someone?: No     Within the past 12 months, have you been humiliated or emotionally abused in other ways by your partner or ex-partner?: No   Housing Stability: Low Risk  (2/27/2024)    Housing Stability     Do you have housing? : Yes     Are you worried about losing your housing?: No      SUBJECTIVE  Donald is doing good. His main complaint is a lump next to his right nipple that is tender to touch and sometimes gives him a sharp pain depending on how he moves. He went to the Emergency Room for this and was told there was a small amount of fluid collection in that area but that there was nothing to be concerned about. He was advised to try a heating pad and ibuprofen but he has not tried any of those.    OBJECTIVE  BP (!) 138/92 (BP Location: Right arm, Patient  Position: Sitting, Cuff Size: Adult Large)   Pulse 86   Temp 98.2  F (36.8  C) (Oral)   Resp 16   Wt 125 kg (275 lb 9.6 oz)   SpO2 98%   BMI 41.90 kg/m       His incisions are healing nicely. I am able to palpate a small round nodule that is firm just medial to his right breast.     From a personal perspective, he is here alone today.    IMPRESSION   Donald is a 29 year-old male status post right thoracoscopic replacement of  shunt. He is here for post operative follow up.    I suggest he try a warm pack to the area, ibuprofen and gentle massage to the area. I cautioned him to only use the heating pad to the area for 15 minutes at a time and not to fall asleep with the heating pad on him. I reviewed his CXR images with him and showed him where the  shunt catheter is and reassured him this nodule he is feeling is not a knot in the  catheter.     PLAN  I spent 15 min on the date of the encounter in chart review, patient visit, review of tests, documentation and/or discussion with other providers about the issues documented above. I reviewed the plan as follows:  Heat and gentle massage to the affected area. Follow up with Thoracic Surgery as needed.  All questions were answered and the patient and present family were in agreement with the plan.  I appreciate the opportunity to participate in the care of your patient and will keep you updated.  Sincerely,    MERRICK Guillen CNS

## 2024-05-20 NOTE — NURSING NOTE
"Oncology Rooming Note    May 20, 2024 2:55 PM   Donald Jackson is a 29 year old male who presents for:    Chief Complaint   Patient presents with    Oncology Clinic Visit     Dislodged ventriculo-pleural shunt     Initial Vitals: BP (!) 138/92 (BP Location: Right arm, Patient Position: Sitting, Cuff Size: Adult Large)   Pulse 86   Temp 98.2  F (36.8  C) (Oral)   Resp 16   Wt 125 kg (275 lb 9.6 oz)   SpO2 98%   BMI 41.90 kg/m   Estimated body mass index is 41.9 kg/m  as calculated from the following:    Height as of 5/12/24: 1.727 m (5' 8\").    Weight as of this encounter: 125 kg (275 lb 9.6 oz). Body surface area is 2.45 meters squared.  No Pain (0) Comment: Data Unavailable   No LMP for male patient.  Allergies reviewed: Yes  Medications reviewed: No    Medications: Medication refills not needed today.  Pharmacy name entered into Patient Communicator: [id number] - [place], [place] - [address] AT [facility] [address] [facility] [address]    Frailty Screening:   Is the patient here for a new oncology consult visit in cancer care? 2. No      Clinical concerns: Discomfort with fluid retention in chest.      Amy Kearns, EMT  5/20/2024              "

## 2024-05-29 ENCOUNTER — TELEPHONE (OUTPATIENT)
Dept: SURGERY | Facility: CLINIC | Age: 29
End: 2024-05-29

## 2024-05-29 NOTE — TELEPHONE ENCOUNTER
"[facility] Call Center    Phone Message    May a detailed message be left on voicemail: yes     Reason for Call: Other: Pt called to schedule a follow up with John Mariee. Writer attempted to book under \"[facility] Return\" per protocols and is receiving a warning that there is no solution found up through 6/19 and then \"John Mariee MD in [facility] RETURN does not have template from 6/20\" and on. Please review and call pt back to schedule. Thank you!      Action Taken: Message routed to:  [facility] ([facility] SURGERY CLINIC GEN S NURSES-    Travel Screening: Not Applicable       05/29/24    3:12 PM    Returned call to patient and he would like a follow up visit after recent surgery. Patient can not make appointment with Dr. Mariee on 5/31 so he is scheduled with Dr. Bishop on 6/3.                                                                "

## 2024-05-30 ENCOUNTER — PATIENT OUTREACH (OUTPATIENT)
Dept: SURGERY | Facility: CLINIC | Age: 29
End: 2024-05-30

## 2024-05-30 NOTE — TELEPHONE ENCOUNTER
"Called patient, at the request of MERRICK Hernandez, to follow up and see if patient has tried heat and gentle massage to the lump he was feeling and to see if it has improved. Spoke with patient.   Patient reports that the lump is still there, \"at times it seems smaller but at times there is no change.\". Verbalizes that he has been using heat, but has not been doing any massaging. States he just started massaging yesterday. Denies any pain or discomfort from the lump and is not taking ibuprofen.     Encouraged patient to continue with applying heat and to try to incorporate the massage on a more consistent basis. Patient verbalized that he thought the heat and massage needed to be done at the same time, informed him they don't need to be done simultaneously. Patient verbalized he try to be more consistent with the heat and massage and writer will follow up with him next week. Patient appreciated writer's call.     Epic message with update sent to MERRICK Hernandez.     Luisa Bridges RN, BSN  Thoracic Surgery RN Care Coordinator            "

## 2024-06-02 ENCOUNTER — HEALTH MAINTENANCE LETTER (OUTPATIENT)
Age: 29
End: 2024-06-02

## 2024-06-05 ENCOUNTER — PATIENT OUTREACH (OUTPATIENT)
Dept: SURGERY | Facility: CLINIC | Age: 29
End: 2024-06-05
Payer: COMMERCIAL

## 2024-06-05 NOTE — TELEPHONE ENCOUNTER
"[facility]: Thoracic Surgery                                                                               Called patient to follow up on \"lump'. Patient verbalized that \"it's still there\". Reports that he has been massaging it more since our last conversation and is applying heat. States that he is not sure if it has gotten any smaller. Reports that is actually feels more solid and prominent that it did before. Denies pain.    Writer will update MERRICK Hernandez and will update patient with Cristy's response and if any new interventions/recommendations.    Patient appreciated writer's call.    Luisa Bridges RN, BSN  Thoracic Surgery RN Care Coordinator    "

## 2024-06-07 DIAGNOSIS — Z98.2 S/P VP SHUNT: Primary | ICD-10-CM

## 2024-06-07 DIAGNOSIS — R22.2 LUMP IN CHEST: ICD-10-CM

## 2024-06-18 ENCOUNTER — ANCILLARY PROCEDURE (OUTPATIENT)
Dept: CT IMAGING | Facility: CLINIC | Age: 29
End: 2024-06-18
Attending: CLINICAL NURSE SPECIALIST
Payer: COMMERCIAL

## 2024-06-18 ENCOUNTER — ANCILLARY PROCEDURE (OUTPATIENT)
Dept: CT IMAGING | Facility: CLINIC | Age: 29
End: 2024-06-18
Attending: PHYSICIAN ASSISTANT
Payer: COMMERCIAL

## 2024-06-18 ENCOUNTER — OFFICE VISIT (OUTPATIENT)
Dept: NEUROSURGERY | Facility: CLINIC | Age: 29
End: 2024-06-18
Payer: COMMERCIAL

## 2024-06-18 VITALS
DIASTOLIC BLOOD PRESSURE: 80 MMHG | RESPIRATION RATE: 16 BRPM | OXYGEN SATURATION: 99 % | HEART RATE: 80 BPM | SYSTOLIC BLOOD PRESSURE: 132 MMHG

## 2024-06-18 DIAGNOSIS — R22.2 LUMP IN CHEST: ICD-10-CM

## 2024-06-18 DIAGNOSIS — Q03.9 CONGENITAL HYDROCEPHALUS (H): ICD-10-CM

## 2024-06-18 DIAGNOSIS — Z98.2 S/P VP SHUNT: ICD-10-CM

## 2024-06-18 DIAGNOSIS — Z98.2 VENTRICULO-PERITONEAL SHUNT STATUS: Primary | ICD-10-CM

## 2024-06-18 PROCEDURE — 70450 CT HEAD/BRAIN W/O DYE: CPT | Mod: GC | Performed by: STUDENT IN AN ORGANIZED HEALTH CARE EDUCATION/TRAINING PROGRAM

## 2024-06-18 PROCEDURE — 99214 OFFICE O/P EST MOD 30 MIN: CPT | Performed by: NEUROLOGICAL SURGERY

## 2024-06-18 PROCEDURE — 71250 CT THORAX DX C-: CPT | Mod: GC | Performed by: RADIOLOGY

## 2024-06-18 ASSESSMENT — PAIN SCALES - GENERAL: PAINLEVEL: NO PAIN (0)

## 2024-06-18 NOTE — PATIENT INSTRUCTIONS
You may gradually increase your activity level as tolerated.  Be cautious and work into new activities slowly, i.e. recommending walking on treadmill before running, etc.        Please monitor your symptoms -- headache, n/v, imbalance, increased drowsiness could indicate a change for which a new head CT would be warranted more urgently.  Otherwise , follow up with Dr. Martinez in 6 weeks with repeat head CT.

## 2024-06-18 NOTE — PROGRESS NOTES
"    Neurosurgery Clinic Note    HPI: Donald Jackson is a pleasant 29 year old male with a shunt system placed as a child w/ last known revision when he was 4 y/o for HCP (parietal and anterior).   shunt Codman Certas setting 4.      Patient had appendectomy in 12/3/23 that was complicated by perforation (12/5/23) and intraabdominal abscess formation which was drained by IR on 12/12.  He was seen by neurosurgery 1/28/24, the proximal  catheter was externalized b/c of a small peritoneal fluid collection a the tip of the catheter and development of ventriculomegaly, the distal end was tied off.  ID cleared patient for re-internalization of the  catheter.  Neurosurgery and thoracic surgery placed a new  catheter into the abdomen on 2/5/24.  The catheter became dislodged and he required another procedure to re-insert into the abdomen, done 3/19/24.  He underwent another procedure 4/15/24 by general surgery, Dr. Mariee, to remove retained  shunt tubing in the abdomen that was in the area of persistent phlegmon, \"The proximal end of the  shunt tubing was retracted into the abdominal cavity under stretch, and then divided which allowed the retained proximal tubing to retract into the abdominal wall, well external to the peritoneum; the remaining distal tubing was gently extracted through the LUQ port.\"     He was last seen by thoracic surgery on 5/20/24 w/ a complaint of a lump next to his right nipple that was tender.  He had also gone to the ED and was told there is a small amount of fluid present in that area.  He was instructed to use heat, ibuprofen and massage.      Patient notes the lump has been present for about 1 month and he thinks it might be getting bigger and harder.  It is to the right of the sternum near the nipple.  There is an area of hyperpigmentation.  He had an US and CT of this area.  US notes the lump is about 1.4 cm.  External measurement today is about 15 mm long and 10 mm high.        " Physical Exam   /80 (BP Location: Right arm, Patient Position: Sitting)   Pulse 80   Resp 16   SpO2 99%     Constitutional: Alert, no acute distress.  Face symmetric, gaze is conjugate, speech is clear and fluent  BOYCE well, no involuntary movements.   Gait is steady and symmetrical  Hyperpigmenation noted in the right mid/lower chest below the nipple and between the nipple and sternum.  There is a mobile, firm, oblong, smooth lump that is palpable.  This does not seem to cause any discomfort.  This measures 15 mm long and 10 mm high.      IMAGING:    CT chest 6/18/24 -   FINDINGS:  LINES/TUBES: Right lateral chest approach vascular pleural catheter  with tip terminating in the anterior right lower lobe pleural space.  Additional heavily calcified right anterior chest ventricular  peritoneal shunt in stable position. Short segment blind-ending  catheter in the right anterior chest with new confluent hyperdensity  along the tract with extension into the superficial tissues at the  level of the right lower sternum.  LOWER NECK: Thyroid unremarkable.     LUNG/PLEURA: No focal consolidation. Unchanged 3 mm solid pulmonary  nodule in the lateral right lower lobe (series 5, image 147) unchanged  4 mm nodule in the right middle lobe (series 5, image 121). Unchanged  2 mm pulmonary nodule in the lateral left lower lobe (series 5, image  114). Additional sub-2 mm pulmonary nodule in the left lower lobe. No  pneumothorax or pleural effusion. No pleural mass or calcification.     LARGE AIRWAYS: Patent tracheobronchial tree without intraluminal mass.     VESSELS: Normal size/configuration of the great vessels.     HEART: No cardiomegaly. No pericardial effusion. Limited evaluation of  valves secondary to lack of contrast.      MEDIASTINUM AND CARLOS: No suspicious lymphadenopathy.      CHEST WALL: Resolved subcutaneous mixed air and fluid filled  collection.     UPPER ABDOMEN: Limited evaluation of the upper abdomen due  to lack of  contrast administration. The liver, gallbladder, adrenal glands,  kidneys, spleen, pancreas and partially visualized stomach and small  bowel are unremarkable.     BONES: No acute or suspicious/aggressive osseous lesions. Unremarkable  soft tissues. Slight upper thoracic levocurvature.  IMPRESSION:   1.  There is a confluent hyperdensity along the tract of the  prior/abandoned blind-ending catheter in the medial anterior right  chest wall with extension into the superficial tissues compatible with  provided history of lump in the region. This can represent a  pericatheter seroma versus early infectious/inflammatory fluid. Of  note, there is no discrete abscess or fluid collection.  2.  Resolved mixed air and fluid-filled collection in the right  lateral chest wall from prior malpositioned central pleural shunt on  3/19/2024, now in appropriate position in the anterior right lower  lobe pleural space.      CT head 6/18/24 -   Findings: Right frontal approach ventriculostomy catheter with tip  terminating within the third ventricle, position is unchanged. The  ventricular system is decompressed, similar in overall morphology  compared to 4/30/2024 CT. There is no intracranial hemorrhage, mass  effect, or midline shift. Gray-white matter differentiation is  preserved the spheres. The basal cisterns are clear.  The bony calvaria and the bones of the skull base are normal. The  visualized portions of the paranasal sinuses and mastoid air cells are  clear. The orbits are unremarkable.  Impression:  1. Right frontal approach ventriculostomy catheter position and  ventricular morphology are unchanged compared to 4/30/2024 CT.   2. No acute intracranial abnormality.    Chest XR 5/12/14 -   FINDINGS: Two views of the chest. Stable ventriculopleural catheters  projecting over the right chest. Unchanged linear calcification along  the medial  shunt catheter. Trachea is midline. Cardiac silhouette  is within normal  limits. No focal consolidation. No pleural effusion  or pneumothorax.  IMPRESSION:   1. No focal airspace disease.  2. Stable ventriculopleural shunt.      US soft tissue chest wall 5/12/24 -   FINDINGS:  Targeted grayscale and color Doppler ultrasound of the anterior right  chest wall. 0.9 x 1.1 x 1.4 cm predominantly hypoechoic collection  with internal echoes about a segment of the ventriculopleural shunt  within the anterior right chest wall subcutaneous fat. No increased  internal or adjacent vascularity.  IMPRESSION:  Sonographic area of concern demonstrates a small fluid collection  along the right anterior chest wall ventriculopleural shunt measuring  approximately 1.4 cm at its maximum dimension. The differential  includes abscess, hematoma, and seroma. Recommend clinical  correlation, and follow-up ultrasound to assess stability or  resolution.     XR chest 5/12/24 -   FINDINGS:   Stable ventriculopleural catheters projecting over the right chest.  Cardiomediastinal silhouette is within normal limits. Trachea is  midline. Trace right pleural effusion. No pneumothorax. No focal  airspace opacity. Pulmonary vasculature is distinct.  The visualized upper abdomen, bones, and soft tissues are  unremarkable.   IMPRESSION:  No acute cardiopulmonary disease. Trace right pleural effusion  associated with a ventriculopleural shunt.    XR shunt 5/12/24   FINDINGS:   There is a right frontal approach  shunt in the skull that appears  intact. The shunt in the neck appears intact.  In the thorax, the shunt appears intact. Cardiac silhouette appears  well-defined. Pulmonary vasculature appears distinct. No acute  airspace opacity. There are multiple abandoned catheters within the  right neck and thorax. Interval removal of  shunt terminating in the  pelvis. Stable position of  shunt projecting over the right upper  quadrant, potentially a ventriculopleural shunt.                                                                     IMPRESSION:   Interval removal of  shunt terminating in the pelvis. Otherwise,  stable position of multiple catheters.         ASSESSMENT & PLAN:  Donald Jackson is a 29 year old male who has had multiple surgeries involving the catheter pertaining to a  shunt system that was placed when he was 4 y/o for HCP.  Patient had infection/complications as a result of appendectomy and perforation that necessitated the externalization and re-insertion of the  catheter.  There still remains a portion of the old disconnected  catheter in the medial thorax, the proximal end of which was tied off by neurosurgery 1/28/24.  The retained  catheter now seems to be developing a fluid collection of some sort.  Since the proximal end is tied off and is no longer connected to CSF flow, differential includes infection, seroma, ascending fluid/drainage from abdomen.     The fluid collection appears to be about 15 mm long and 10 mm wide on measurement today, which is not similar to US measurement.      Recommend general surgery consult to see if the remaining retained medial catheter can be removed.    Avoid overworking this area.  Advise team immediately if fevers, growth, redness, swelling occur.    Repeat US of chest soft tissue to confirm stability of the fluid collection and trace the full tract of the catheter.    Follow up with Dr. Martinez in 1 month with head CT.        Patient seen and discussed with Dr. Martinez.        Patient is in agreement with this plan and states no further questions.      Vandana Smith PA-C  Department of Neurosurgery  Office: [phone]

## 2024-06-18 NOTE — LETTER
"6/18/2024       RE: Donald Jackson  [address]  [place] [address]     Dear Colleague,    Thank you for referring your patient, Donald Jackson, to the [facility] NEUROSURGERY CLINIC [place] at [facility]. Please see a copy of my visit note below.        Neurosurgery Clinic Note    HPI: Donald Jackson is a pleasant 29 year old male with a shunt system placed as a child w/ last known revision when he was 4 y/o for HCP (parietal and anterior).   shunt Codman Certas setting 4.      Patient had appendectomy in 12/3/23 that was complicated by perforation (12/5/23) and intraabdominal abscess formation which was drained by IR on 12/12.  He was seen by neurosurgery 1/28/24, the proximal  catheter was externalized b/c of a small peritoneal fluid collection a the tip of the catheter and development of ventriculomegaly, the distal end was tied off.  ID cleared patient for re-internalization of the  catheter.  Neurosurgery and thoracic surgery placed a new  catheter into the abdomen on 2/5/24.  The catheter became dislodged and he required another procedure to re-insert into the abdomen, done 3/19/24.  He underwent another procedure 4/15/24 by general surgery, Dr. Mariee, to remove retained  shunt tubing in the abdomen that was in the area of persistent phlegmon, \"The proximal end of the  shunt tubing was retracted into the abdominal cavity under stretch, and then divided which allowed the retained proximal tubing to retract into the abdominal wall, well external to the peritoneum; the remaining distal tubing was gently extracted through the LUQ port.\"     He was last seen by thoracic surgery on 5/20/24 w/ a complaint of a lump next to his right nipple that was tender.  He had also gone to the ED and was told there is a small amount of fluid present in that area.  He was instructed to use heat, ibuprofen and massage.      Patient notes the lump " has been present for about 1 month and he thinks it might be getting bigger and harder.  It is to the right of the sternum near the nipple.  There is an area of hyperpigmentation.  He had an US and CT of this area.  US notes the lump is about 1.4 cm.  External measurement today is about 15 mm long and 10 mm high.        Physical Exam   /80 (BP Location: Right arm, Patient Position: Sitting)   Pulse 80   Resp 16   SpO2 99%     Constitutional: Alert, no acute distress.  Face symmetric, gaze is conjugate, speech is clear and fluent  BOYCE well, no involuntary movements.   Gait is steady and symmetrical  Hyperpigmenation noted in the right mid/lower chest below the nipple and between the nipple and sternum.  There is a mobile, firm, oblong, smooth lump that is palpable.  This does not seem to cause any discomfort.  This measures 15 mm long and 10 mm high.      IMAGING:    CT chest 6/18/24 -   FINDINGS:  LINES/TUBES: Right lateral chest approach vascular pleural catheter  with tip terminating in the anterior right lower lobe pleural space.  Additional heavily calcified right anterior chest ventricular  peritoneal shunt in stable position. Short segment blind-ending  catheter in the right anterior chest with new confluent hyperdensity  along the tract with extension into the superficial tissues at the  level of the right lower sternum.  LOWER NECK: Thyroid unremarkable.     LUNG/PLEURA: No focal consolidation. Unchanged 3 mm solid pulmonary  nodule in the lateral right lower lobe (series 5, image 147) unchanged  4 mm nodule in the right middle lobe (series 5, image 121). Unchanged  2 mm pulmonary nodule in the lateral left lower lobe (series 5, image  114). Additional sub-2 mm pulmonary nodule in the left lower lobe. No  pneumothorax or pleural effusion. No pleural mass or calcification.     LARGE AIRWAYS: Patent tracheobronchial tree without intraluminal mass.     VESSELS: Normal size/configuration of the great  vessels.     HEART: No cardiomegaly. No pericardial effusion. Limited evaluation of  valves secondary to lack of contrast.      MEDIASTINUM AND CARLOS: No suspicious lymphadenopathy.      CHEST WALL: Resolved subcutaneous mixed air and fluid filled  collection.     UPPER ABDOMEN: Limited evaluation of the upper abdomen due to lack of  contrast administration. The liver, gallbladder, adrenal glands,  kidneys, spleen, pancreas and partially visualized stomach and small  bowel are unremarkable.     BONES: No acute or suspicious/aggressive osseous lesions. Unremarkable  soft tissues. Slight upper thoracic levocurvature.  IMPRESSION:   1.  There is a confluent hyperdensity along the tract of the  prior/abandoned blind-ending catheter in the medial anterior right  chest wall with extension into the superficial tissues compatible with  provided history of lump in the region. This can represent a  pericatheter seroma versus early infectious/inflammatory fluid. Of  note, there is no discrete abscess or fluid collection.  2.  Resolved mixed air and fluid-filled collection in the right  lateral chest wall from prior malpositioned central pleural shunt on  3/19/2024, now in appropriate position in the anterior right lower  lobe pleural space.      CT head 6/18/24 -   Findings: Right frontal approach ventriculostomy catheter with tip  terminating within the third ventricle, position is unchanged. The  ventricular system is decompressed, similar in overall morphology  compared to 4/30/2024 CT. There is no intracranial hemorrhage, mass  effect, or midline shift. Gray-white matter differentiation is  preserved the spheres. The basal cisterns are clear.  The bony calvaria and the bones of the skull base are normal. The  visualized portions of the paranasal sinuses and mastoid air cells are  clear. The orbits are unremarkable.  Impression:  1. Right frontal approach ventriculostomy catheter position and  ventricular morphology are  unchanged compared to 4/30/2024 CT.   2. No acute intracranial abnormality.    Chest XR 5/12/14 -   FINDINGS: Two views of the chest. Stable ventriculopleural catheters  projecting over the right chest. Unchanged linear calcification along  the medial  shunt catheter. Trachea is midline. Cardiac silhouette  is within normal limits. No focal consolidation. No pleural effusion  or pneumothorax.  IMPRESSION:   1. No focal airspace disease.  2. Stable ventriculopleural shunt.      US soft tissue chest wall 5/12/24 -   FINDINGS:  Targeted grayscale and color Doppler ultrasound of the anterior right  chest wall. 0.9 x 1.1 x 1.4 cm predominantly hypoechoic collection  with internal echoes about a segment of the ventriculopleural shunt  within the anterior right chest wall subcutaneous fat. No increased  internal or adjacent vascularity.  IMPRESSION:  Sonographic area of concern demonstrates a small fluid collection  along the right anterior chest wall ventriculopleural shunt measuring  approximately 1.4 cm at its maximum dimension. The differential  includes abscess, hematoma, and seroma. Recommend clinical  correlation, and follow-up ultrasound to assess stability or  resolution.     XR chest 5/12/24 -   FINDINGS:   Stable ventriculopleural catheters projecting over the right chest.  Cardiomediastinal silhouette is within normal limits. Trachea is  midline. Trace right pleural effusion. No pneumothorax. No focal  airspace opacity. Pulmonary vasculature is distinct.  The visualized upper abdomen, bones, and soft tissues are  unremarkable.   IMPRESSION:  No acute cardiopulmonary disease. Trace right pleural effusion  associated with a ventriculopleural shunt.    XR shunt 5/12/24   FINDINGS:   There is a right frontal approach  shunt in the skull that appears  intact. The shunt in the neck appears intact.  In the thorax, the shunt appears intact. Cardiac silhouette appears  well-defined. Pulmonary vasculature appears  distinct. No acute  airspace opacity. There are multiple abandoned catheters within the  right neck and thorax. Interval removal of  shunt terminating in the  pelvis. Stable position of  shunt projecting over the right upper  quadrant, potentially a ventriculopleural shunt.                                                                    IMPRESSION:   Interval removal of  shunt terminating in the pelvis. Otherwise,  stable position of multiple catheters.         ASSESSMENT & PLAN:  Donald Jackson is a 29 year old male who has had multiple surgeries involving the catheter pertaining to a  shunt system that was placed when he was 4 y/o for HCP.  Patient had infection/complications as a result of appendectomy and perforation that necessitated the externalization and re-insertion of the  catheter.  There still remains a portion of the old disconnected  catheter in the medial thorax, the proximal end of which was tied off by neurosurgery 1/28/24.  The retained  catheter now seems to be developing a fluid collection of some sort.  Since the proximal end is tied off and is no longer connected to CSF flow, differential includes infection, seroma, ascending fluid/drainage from abdomen.     The fluid collection appears to be about 15 mm long and 10 mm wide on measurement today, which is not similar to US measurement.      Recommend general surgery consult to see if the remaining retained medial catheter can be removed.    Avoid overworking this area.  Advise team immediately if fevers, growth, redness, swelling occur.    Repeat US of chest soft tissue to confirm stability of the fluid collection and trace the full tract of the catheter.    Follow up with Dr. Martinez in 1 month with head CT.        Patient seen and discussed with Dr. Martinez.        Patient is in agreement with this plan and states no further questions.            Again, thank you for allowing me to participate in the care of your patient.       Sincerely,    Fabiola Martinez MD

## 2024-06-19 ENCOUNTER — TELEPHONE (OUTPATIENT)
Dept: NEUROSURGERY | Facility: CLINIC | Age: 29
End: 2024-06-19
Payer: COMMERCIAL

## 2024-06-19 NOTE — TELEPHONE ENCOUNTER
Left Voicemail (1st Attempt) and Sent Mychart (1st Attempt) for the patient to call back and schedule the following:    Appointment type: RETURN ADULT NEUROSURG  Provider:   Return date: around 7/30/2024   Specialty phone number: [phone]  Additional appointment(s) needed:   - CT scan prior to follow up visit. Same day  Additonal Notes: WQ- appointment request.        Brenda Ogden on 6/19/2024 at 3:45 PM

## 2024-06-20 ENCOUNTER — TELEPHONE (OUTPATIENT)
Dept: SURGERY | Facility: CLINIC | Age: 29
End: 2024-06-20
Payer: COMMERCIAL

## 2024-06-21 ENCOUNTER — TELEPHONE (OUTPATIENT)
Dept: SURGERY | Facility: CLINIC | Age: 29
End: 2024-06-21
Payer: COMMERCIAL

## 2024-06-21 NOTE — TELEPHONE ENCOUNTER
Left Voicemail (1st Attempt), sent myc for the patient to call back and schedule the following:    Appointment type: New General Surgery  Provider: Dr. Jere Mariee  Return date: Next available  Specialty phone number: [phone] or [phone]  Additional appointment(s) needed: n/a  Additonal Notes: referred by Dr. Martinez for S/P  shunt    Schedule with Dr. Jere Mariee per Yolie MORA RN    Left CC#

## 2024-06-26 ENCOUNTER — TELEPHONE (OUTPATIENT)
Dept: SURGERY | Facility: CLINIC | Age: 29
End: 2024-06-26
Payer: COMMERCIAL

## 2024-06-26 ENCOUNTER — TELEPHONE (OUTPATIENT)
Dept: NEUROSURGERY | Facility: CLINIC | Age: 29
End: 2024-06-26
Payer: COMMERCIAL

## 2024-06-26 NOTE — TELEPHONE ENCOUNTER
Phone call tp patient per request to explain need for general surgery consult from DENISE Smith.  Patient ingris caballero.  Appointment established 7/24/24 @ 0800.  Patient verbalized understanding/agreement with current plan.

## 2024-06-26 NOTE — TELEPHONE ENCOUNTER
Patient confirmed scheduled appointment:  Date: 7/24/24  Time: 8:00 am  Visit type: New General Surgery  Provider: Dr. Jere Mariee  Location: [facility]  Testing/imaging: n/a  Additional notes: referred by Dr. Martinez for  fluid in anterior chest, likely around retained  catheter.

## 2024-06-27 NOTE — TELEPHONE ENCOUNTER
REFERRAL INFORMATION:  Referring Provider: Dr. Fabiola Martinez  Referring Clinic: [facility] - Neurosurgery  Reason for Visit/Diagnosis: fluid in anterior chest, likely around retained  catheter        FUTURE VISIT INFORMATION:  Appointment Date: 7/24/2024  Appointment Time: 8 AM     NOTES RECORD STATUS  DETAILS   OFFICE NOTE from Referring Provider Internal [facility]:  6/18/24 - NEUROSURG OV with Dr. Martinez   OFFICE NOTE from Other Specialists Care Everywhere / Internal [facility]:  5/2/24 - [facility] OV with Dr. Gomez    [facility]:  5/1/24 - NEUROSURG OV with DENISE Maldonado  4/29/24 - GEN SURG OV with Dr. Kodi Valadezet:  12/21/23 - GEN SURG OV with DENISE Irizarry  12/21/23 - ID OV with Dr. Messer    [facility]:  12/3/23 - UC OV with DENISE Wade   HOSPITAL DISCHARGE SUMMARY/ ED VISITS  Care Everywhere / Internal [facility]:  5/12/24 - ED OV with Dr. Olivia  4/13/24 - Admission with Dr. Hardy  1/27/24 - Admission with Dr. Martinez    [facility]:  12/5/23 - Admission with Dr. Allen  12/3/23 - ED OV with Dr. Fowler   OPERATIVE REPORT Care Everywhere / Internal [facility]:  4/15/24 - OP Note for Laparoscopy diagnostic, abdominal washout, lysis of adhesion, removal of intra abdominal foreign body with Dr. Wright  3/19/24 - OP Note for RIGHT THORACOSCOPY, REPOSITIONING OF VENTRICULO-PLEURAL SHUNT CATHETER with Dr. Mccallum  2/5/24 - OP Note for RIGHT VENTRICULOPLEURAL Insertion, Implant Shunt Ventriculopleural with Dr. Martinez    [facility]:  12/4/23 - OP Note for LAPAROSCOPIC APPENDECTOMY with Dr. Salcedo   PERTINENT LABS Care Everywhere / Internal    PATHOLOGY REPORTS (RELATED) Care Everywhere [facility]:  12/4/23 - Appendix (Case: [id number])   IMAGING (CT, MRI, US, XR)  Received / Internal [facility]:  6/18/24, 3/19/24, 3/14/24 - CT Chest  5/20/24 - XR Chest  4/14/24, 3/5/24, 2/2/24, 1/27/24 - CT Abd/Pelvis    [facility]:  12/15/23, 12/11/23, 12/6/23 - CT Abd/pelvis  12/12/23 - CT Peritoneal  12/12/23 - CTA  Abscess    [facility]:  12/3/23 - CT Abd/pelvis

## 2024-07-17 ENCOUNTER — APPOINTMENT (OUTPATIENT)
Dept: CT IMAGING | Facility: CLINIC | Age: 29
End: 2024-07-17
Attending: STUDENT IN AN ORGANIZED HEALTH CARE EDUCATION/TRAINING PROGRAM
Payer: COMMERCIAL

## 2024-07-17 ENCOUNTER — HOSPITAL ENCOUNTER (EMERGENCY)
Facility: CLINIC | Age: 29
Discharge: HOME | End: 2024-07-18
Attending: STUDENT IN AN ORGANIZED HEALTH CARE EDUCATION/TRAINING PROGRAM | Admitting: STUDENT IN AN ORGANIZED HEALTH CARE EDUCATION/TRAINING PROGRAM
Payer: COMMERCIAL

## 2024-07-17 DIAGNOSIS — R22.2 CHEST WALL MASS: ICD-10-CM

## 2024-07-17 DIAGNOSIS — Z98.2 S/P VP SHUNT: ICD-10-CM

## 2024-07-17 LAB
ANION GAP SERPL CALCULATED.3IONS-SCNC: 9 MMOL/L (ref 7–15)
BASOPHILS # BLD AUTO: 0.1 10E3/UL (ref 0–0.2)
BASOPHILS NFR BLD AUTO: 1 %
BUN SERPL-MCNC: 24 MG/DL (ref 6–20)
CALCIUM SERPL-MCNC: 8.9 MG/DL (ref 8.8–10.4)
CHLORIDE SERPL-SCNC: 104 MMOL/L (ref 98–107)
CREAT BLD-MCNC: 1.2 MG/DL (ref 0.7–1.3)
CREAT SERPL-MCNC: 1.15 MG/DL (ref 0.67–1.17)
CRP SERPL-MCNC: 7.63 MG/L
EGFRCR SERPLBLD CKD-EPI 2021: 88 ML/MIN/1.73M2
EGFRCR SERPLBLD CKD-EPI 2021: >60 ML/MIN/1.73M2
EOSINOPHIL # BLD AUTO: 0.1 10E3/UL (ref 0–0.7)
EOSINOPHIL NFR BLD AUTO: 1 %
ERYTHROCYTE [DISTWIDTH] IN BLOOD BY AUTOMATED COUNT: 17.3 % (ref 10–15)
GLUCOSE SERPL-MCNC: 76 MG/DL (ref 70–99)
HCO3 SERPL-SCNC: 24 MMOL/L (ref 22–29)
HCT VFR BLD AUTO: 45.8 % (ref 40–53)
HGB BLD-MCNC: 14.1 G/DL (ref 13.3–17.7)
IMM GRANULOCYTES # BLD: 0.1 10E3/UL
IMM GRANULOCYTES NFR BLD: 1 %
INR PPP: 1.03 (ref 0.85–1.15)
LYMPHOCYTES # BLD AUTO: 3.4 10E3/UL (ref 0.8–5.3)
LYMPHOCYTES NFR BLD AUTO: 32 %
MCH RBC QN AUTO: 23.8 PG (ref 26.5–33)
MCHC RBC AUTO-ENTMCNC: 30.8 G/DL (ref 31.5–36.5)
MCV RBC AUTO: 77 FL (ref 78–100)
MONOCYTES # BLD AUTO: 0.8 10E3/UL (ref 0–1.3)
MONOCYTES NFR BLD AUTO: 7 %
NEUTROPHILS # BLD AUTO: 6.3 10E3/UL (ref 1.6–8.3)
NEUTROPHILS NFR BLD AUTO: 58 %
NRBC # BLD AUTO: 0 10E3/UL
NRBC BLD AUTO-RTO: 0 /100
PLATELET # BLD AUTO: 230 10E3/UL (ref 150–450)
POTASSIUM SERPL-SCNC: 4.8 MMOL/L (ref 3.4–5.3)
RBC # BLD AUTO: 5.93 10E6/UL (ref 4.4–5.9)
SODIUM SERPL-SCNC: 137 MMOL/L (ref 135–145)
WBC # BLD AUTO: 10.7 10E3/UL (ref 4–11)

## 2024-07-17 PROCEDURE — 71260 CT THORAX DX C+: CPT | Mod: 26 | Performed by: RADIOLOGY

## 2024-07-17 PROCEDURE — 80048 BASIC METABOLIC PNL TOTAL CA: CPT | Performed by: STUDENT IN AN ORGANIZED HEALTH CARE EDUCATION/TRAINING PROGRAM

## 2024-07-17 PROCEDURE — 85610 PROTHROMBIN TIME: CPT | Performed by: STUDENT IN AN ORGANIZED HEALTH CARE EDUCATION/TRAINING PROGRAM

## 2024-07-17 PROCEDURE — 250N000011 HC RX IP 250 OP 636: Performed by: STUDENT IN AN ORGANIZED HEALTH CARE EDUCATION/TRAINING PROGRAM

## 2024-07-17 PROCEDURE — 99284 EMERGENCY DEPT VISIT MOD MDM: CPT | Performed by: STUDENT IN AN ORGANIZED HEALTH CARE EDUCATION/TRAINING PROGRAM

## 2024-07-17 PROCEDURE — 71260 CT THORAX DX C+: CPT

## 2024-07-17 PROCEDURE — 86140 C-REACTIVE PROTEIN: CPT

## 2024-07-17 PROCEDURE — 82565 ASSAY OF CREATININE: CPT | Mod: 91

## 2024-07-17 PROCEDURE — 99284 EMERGENCY DEPT VISIT MOD MDM: CPT | Mod: GC | Performed by: NEUROLOGICAL SURGERY

## 2024-07-17 PROCEDURE — 36415 COLL VENOUS BLD VENIPUNCTURE: CPT | Performed by: STUDENT IN AN ORGANIZED HEALTH CARE EDUCATION/TRAINING PROGRAM

## 2024-07-17 PROCEDURE — 85025 COMPLETE CBC W/AUTO DIFF WBC: CPT | Performed by: STUDENT IN AN ORGANIZED HEALTH CARE EDUCATION/TRAINING PROGRAM

## 2024-07-17 PROCEDURE — 96374 THER/PROPH/DIAG INJ IV PUSH: CPT | Mod: 59 | Performed by: STUDENT IN AN ORGANIZED HEALTH CARE EDUCATION/TRAINING PROGRAM

## 2024-07-17 PROCEDURE — 99285 EMERGENCY DEPT VISIT HI MDM: CPT | Mod: 25 | Performed by: STUDENT IN AN ORGANIZED HEALTH CARE EDUCATION/TRAINING PROGRAM

## 2024-07-17 RX ORDER — LIDOCAINE HYDROCHLORIDE 10 MG/ML
10 INJECTION, SOLUTION EPIDURAL; INFILTRATION; INTRACAUDAL; PERINEURAL ONCE
Status: COMPLETED | OUTPATIENT
Start: 2024-07-17 | End: 2024-07-18

## 2024-07-17 RX ORDER — IOPAMIDOL 755 MG/ML
100 INJECTION, SOLUTION INTRAVASCULAR ONCE
Status: COMPLETED | OUTPATIENT
Start: 2024-07-17 | End: 2024-07-17

## 2024-07-17 RX ADMIN — IOPAMIDOL 100 ML: 755 INJECTION, SOLUTION INTRAVENOUS at 18:20

## 2024-07-17 ASSESSMENT — COLUMBIA-SUICIDE SEVERITY RATING SCALE - C-SSRS
6. HAVE YOU EVER DONE ANYTHING, STARTED TO DO ANYTHING, OR PREPARED TO DO ANYTHING TO END YOUR LIFE?: NO
1. IN THE PAST MONTH, HAVE YOU WISHED YOU WERE DEAD OR WISHED YOU COULD GO TO SLEEP AND NOT WAKE UP?: NO
2. HAVE YOU ACTUALLY HAD ANY THOUGHTS OF KILLING YOURSELF IN THE PAST MONTH?: NO

## 2024-07-17 ASSESSMENT — ACTIVITIES OF DAILY LIVING (ADL)
ADLS_ACUITY_SCORE: 37

## 2024-07-17 NOTE — ED TRIAGE NOTES
Pt to ED with c/o pain from a possible abscess/fluid collection on his anterior chest. Reports he has a surgery appointment next Weds for evaluation of this .      Triage Assessment (Adult)       Row Name 07/17/24 1622          Triage Assessment    Airway WDL WDL        Respiratory WDL    Respiratory WDL WDL        Cardiac WDL    Cardiac WDL WDL

## 2024-07-17 NOTE — LETTER
[facility] EMERGENCY DEPARTMENT  [address] [address]  Phone: [phone]    July 18, 2024      Donald Jackson  [address] [place] [address]    To whom it may concern:    RE: Donald Jackson    Patient was seen and treated today at the emergency department 7/18/24 and will not be able to work today 7/18/24.    Please contact me for questions or concerns.      Sincerely,    Zack Yip MD  Surgery

## 2024-07-17 NOTE — ED PROVIDER NOTES
[place] [facility]ERGENC[facility] DEPARTMENT ([facility])    7/17/24       History     Chief Complaint   Patient presents with    Chest Pain    Wound Check     HPI  Donald Jackson is a 29 year old male who with PMH notable for  shunt, infection of  shunt, ADHD, abdominal pain, adjustment disorder.  Past admission and surgical history notable for perforated appendicitis c/b intraabdominal abscess (12/2023, s/p IR drain with Cx = PsdA and PO abx course), with  shunt changed to ventriculopleural due to residual RLQ abscess vs pseudocyst on 2/2/24 (old tip still in abd) further c/b new  shunt malposition with subcutaneous pseudocyst s/p revision 3/19/24 with most recent admission on 4/13/2024 with retained abdominal catheter resulting in diagnostic laparoscopy, abdominal washout and removal of catheter on 4/15/2024. Patient presents to the ED due to chest pain and for a wound check.     As per triage note, patient presents to the ED with reports of pain from a possible abscess/fluid collection on his anterior chest. He reports that he has a surgery appointment next wednesday 7/24/24 for evaluation of this.     Patient reports that he has pain on his chest region due to a skin lesion in a blister form.  He had felt it yesterday and today.  He states that whenever he breathes or have a moment there is pressure that is present.  He had a CT scan and ultrasound done here. The ultrasound had showed fluid collection. He also states that his ribs have been hurting for the past few days. Patient reports that he has a  shunt for hydrocephalus. He had appointment 2 weeks ago. His catheter looked fine. Denies being on any antibiotics for it. Denies having any fever.      CT CHEST W/O CONTRAST 6/18/2024:  IMPRESSION:   1.  There is a confluent hyperdensity along the tract of the  prior/abandoned blind-ending catheter in the medial anterior right  chest wall with extension into the superficial tissues compatible  with  provided history of lump in the region. This can represent a  pericatheter seroma versus early infectious/inflammatory fluid. Of  note, there is no discrete abscess or fluid collection.  2.  Resolved mixed air and fluid-filled collection in the right  lateral chest wall from prior malpositioned central pleural shunt on  3/19/2024, now in appropriate position in the anterior right lower  lobe pleural space.    Past Medical History  Past Medical History:   Diagnosis Date    Hydrocephalus (H)     Lattice degeneration     Morbid obesity (H)     Other forms of retinal detachment(361.89)      Past Surgical History:   Procedure Laterality Date    IMPLANT SHUNT VENTRICULOPERITONEAL Right 3/19/2024    Procedure: REPOSITIONING OF VENTRICULO-PLEURAL SHUNT CATHETER;  Surgeon: Almas Noriega MD;  Location: [facility] OR    LAPAROSCOPIC ASSISTED IMPLANT SHUNT VENTRICULOPERITONEAL Right 2/5/2024    Procedure: Implant Shunt Ventriculopleural;  Surgeon: Almas Noriega MD;  Location: [facility] OR    LAPAROSCOPY DIAGNOSTIC (GENERAL) N/A 4/15/2024    Procedure: Laparoscopy diagnostic, abdominal washout, lysis of adhesion, removal of intra abdominal foreign body;  Surgeon: Ender Wright MD;  Location: [facility] OR    OPTICAL TRACKING SYSTEM IMPLANT SHUNT VENTRICULOPERITONEAL Right 2/5/2024    Procedure: RIGHT VENTRICULOPLEURAL Insertion;  Surgeon: Fabiola Martinez MD;  Location: [facility] OR    RETINAL REATTACHMENT  10/2010    left eye    RETINOPEXY LASER PROPHYLAXIS BREAK(S) OD (RIGHT EYE)  10/2011    shunt in head      a couple as a child    strabismus surery      age 5    THORACOSCOPY Right 3/19/2024    Procedure: RIGHT thoracoscopy;  Surgeon: Almas Noriega MD;  Location: [facility] OR    TONSILLECTOMY      wisdom teeth       acetaminophen (TYLENOL) 325 MG tablet  ibuprofen (ADVIL/MOTRIN) 600 MG tablet  MAGNESIUM CITRATE PO  Omega-3 Fatty Acids (FISH OIL PO)      Allergies   Allergen Reactions    Penicillins Hives and  Unknown     Family History  Family History   Problem Relation Age of Onset    Diabetes Father         Type 2     Diabetes Paternal Grandfather         Type 2     Retinal detachment No family hx of     Amblyopia No family hx of     Glaucoma No family hx of     Macular Degeneration No family hx of     Anesthesia Reaction No family hx of     Deep Vein Thrombosis (DVT) No family hx of      Social History   Social History     Tobacco Use    Smoking status: Never    Smokeless tobacco: Never   Vaping Use    Vaping status: Never Used   Substance Use Topics    Alcohol use: No    Drug use: No      Past medical history, past surgical history, medications, allergies, family history, and social history were reviewed with the patient. No additional pertinent items.   A complete review of systems was performed with pertinent positives and negatives noted in the HPI, and all other systems negative.    Physical Exam   BP: 127/82  Pulse: 83  Temp: 98.3  F (36.8  C)  Resp: 16  SpO2: 98 %  Physical Exam  Constitutional:       General: He is not in acute distress.     Appearance: Normal appearance. He is not toxic-appearing.   HENT:      Head: Atraumatic.   Eyes:      General: No scleral icterus.     Conjunctiva/sclera: Conjunctivae normal.   Cardiovascular:      Rate and Rhythm: Normal rate.      Heart sounds: Normal heart sounds. No murmur heard.     Comments: Small area of fluctuance under right pec/chest, no surrounding erythema, mild tenderness to palpation which patient says is new  Pulmonary:      Effort: Pulmonary effort is normal. No respiratory distress.      Breath sounds: Normal breath sounds. No stridor. No wheezing, rhonchi or rales.   Chest:      Chest wall: No tenderness.   Abdominal:      Palpations: Abdomen is soft.      Tenderness: There is no abdominal tenderness.   Musculoskeletal:         General: No deformity.      Cervical back: Neck supple.   Skin:     General: Skin is warm.   Neurological:      Mental Status:  He is alert.           ED Course, Procedures, & Data     Procedures                Results for orders placed or performed during the hospital encounter of 07/17/24   INR     Status: Normal   Result Value Ref Range    INR 1.03 0.85 - 1.15   Basic metabolic panel     Status: Abnormal   Result Value Ref Range    Sodium 137 135 - 145 mmol/L    Potassium 4.8 3.4 - 5.3 mmol/L    Chloride 104 98 - 107 mmol/L    Carbon Dioxide (CO2) 24 22 - 29 mmol/L    Anion Gap 9 7 - 15 mmol/L    Urea Nitrogen 24.0 (H) 6.0 - 20.0 mg/dL    Creatinine 1.15 0.67 - 1.17 mg/dL    GFR Estimate 88 >60 mL/min/1.73m2    Calcium 8.9 8.8 - 10.4 mg/dL    Glucose 76 70 - 99 mg/dL   CBC with platelets and differential     Status: Abnormal   Result Value Ref Range    WBC Count 10.7 4.0 - 11.0 10e3/uL    RBC Count 5.93 (H) 4.40 - 5.90 10e6/uL    Hemoglobin 14.1 13.3 - 17.7 g/dL    Hematocrit 45.8 40.0 - 53.0 %    MCV 77 (L) 78 - 100 fL    MCH 23.8 (L) 26.5 - 33.0 pg    MCHC 30.8 (L) 31.5 - 36.5 g/dL    RDW 17.3 (H) 10.0 - 15.0 %    Platelet Count 230 150 - 450 10e3/uL    % Neutrophils 58 %    % Lymphocytes 32 %    % Monocytes 7 %    % Eosinophils 1 %    % Basophils 1 %    % Immature Granulocytes 1 %    NRBCs per 100 WBC 0 <1 /100    Absolute Neutrophils 6.3 1.6 - 8.3 10e3/uL    Absolute Lymphocytes 3.4 0.8 - 5.3 10e3/uL    Absolute Monocytes 0.8 0.0 - 1.3 10e3/uL    Absolute Eosinophils 0.1 0.0 - 0.7 10e3/uL    Absolute Basophils 0.1 0.0 - 0.2 10e3/uL    Absolute Immature Granulocytes 0.1 <=0.4 10e3/uL    Absolute NRBCs 0.0 10e3/uL   Creatinine POCT     Status: Normal   Result Value Ref Range    Creatinine POCT 1.2 0.7 - 1.3 mg/dL    GFR, ESTIMATED POCT >60 >60 mL/min/1.73m2   CBC with platelets differential     Status: Abnormal    Narrative    The following orders were created for panel order CBC with platelets differential.  Procedure                               Abnormality         Status                     ---------                                -----------         ------                     CBC with platelets and d...[[id number]]  Abnormal            Final result                 Please view results for these tests on the individual orders.     Medications   iopamidol (ISOVUE-370) solution 100 mL (100 mLs Intravenous $Given 7/17/24 1820)   sodium chloride (PF) 0.9% PF flush 83 mL (83 mLs Intravenous $Given 7/17/24 1820)     Labs Ordered and Resulted from Time of ED Arrival to Time of ED Departure   BASIC METABOLIC PANEL - Abnormal       Result Value    Sodium 137      Potassium 4.8      Chloride 104      Carbon Dioxide (CO2) 24      Anion Gap 9      Urea Nitrogen 24.0 (*)     Creatinine 1.15      GFR Estimate 88      Calcium 8.9      Glucose 76     CBC WITH PLATELETS AND DIFFERENTIAL - Abnormal    WBC Count 10.7      RBC Count 5.93 (*)     Hemoglobin 14.1      Hematocrit 45.8      MCV 77 (*)     MCH 23.8 (*)     MCHC 30.8 (*)     RDW 17.3 (*)     Platelet Count 230      % Neutrophils 58      % Lymphocytes 32      % Monocytes 7      % Eosinophils 1      % Basophils 1      % Immature Granulocytes 1      NRBCs per 100 WBC 0      Absolute Neutrophils 6.3      Absolute Lymphocytes 3.4      Absolute Monocytes 0.8      Absolute Eosinophils 0.1      Absolute Basophils 0.1      Absolute Immature Granulocytes 0.1      Absolute NRBCs 0.0     INR - Normal    INR 1.03     ISTAT CREATININE POCT - Normal    Creatinine POCT 1.2      GFR, ESTIMATED POCT >60     ISTAT CREATININE POCT     CT Chest w Contrast    (Results Pending)          Critical care was not performed.     Medical Decision Making  The patient's presentation was of high complexity (an acute health issue posing potential threat to life or bodily function).    The patient's evaluation involved:  review of external note(s) from 3+ sources (see separate area of note for details)  review of 3+ test result(s) ordered prior to this encounter (see separate area of note for details)  ordering and/or review of 3+  test(s) in this encounter (see separate area of note for details)    The patient's management necessitated moderate risk (IV contrast administration).    Assessment & Plan    Patient's labs as reviewed and interpreted by me are reassuring  And out to Dr. Stovall pending CT scan and likely consult to thoracic surgery  At this point I anticipate discharge          I have reviewed the nursing notes. I have reviewed the findings, diagnosis, plan and need for follow up with the patient.    New Prescriptions    No medications on file       Final diagnoses:   None     I, BRYANNA PEREZ, am serving as a trained medical scribe to document services personally performed by Watson Jimenez MD, based on the provider's statements to me.     IWatson MD, was physically present and have reviewed and verified the accuracy of this note documented by BRYANNA PEREZ.     Watson Jimenez MD.  [facility] EMERGENCY DEPARTMENT  7/17/2024     Watson Jimenez MD  07/17/24 0804

## 2024-07-18 VITALS
OXYGEN SATURATION: 100 % | DIASTOLIC BLOOD PRESSURE: 88 MMHG | TEMPERATURE: 98.3 F | SYSTOLIC BLOOD PRESSURE: 130 MMHG | RESPIRATION RATE: 16 BRPM | HEART RATE: 83 BPM

## 2024-07-18 LAB — ERYTHROCYTE [SEDIMENTATION RATE] IN BLOOD BY WESTERGREN METHOD: 2 MM/HR (ref 0–15)

## 2024-07-18 PROCEDURE — 87070 CULTURE OTHR SPECIMN AEROBIC: CPT

## 2024-07-18 PROCEDURE — 85652 RBC SED RATE AUTOMATED: CPT

## 2024-07-18 PROCEDURE — 87075 CULTR BACTERIA EXCEPT BLOOD: CPT

## 2024-07-18 PROCEDURE — 250N000009 HC RX 250

## 2024-07-18 PROCEDURE — 250N000011 HC RX IP 250 OP 636

## 2024-07-18 PROCEDURE — 36415 COLL VENOUS BLD VENIPUNCTURE: CPT

## 2024-07-18 RX ORDER — HYDROMORPHONE HYDROCHLORIDE 1 MG/ML
0.5 INJECTION, SOLUTION INTRAMUSCULAR; INTRAVENOUS; SUBCUTANEOUS ONCE
Status: COMPLETED | OUTPATIENT
Start: 2024-07-18 | End: 2024-07-18

## 2024-07-18 RX ADMIN — HYDROMORPHONE HYDROCHLORIDE 0.5 MG: 1 INJECTION, SOLUTION INTRAMUSCULAR; INTRAVENOUS; SUBCUTANEOUS at 00:21

## 2024-07-18 RX ADMIN — LIDOCAINE HYDROCHLORIDE 5 ML: 10 SOLUTION INTRAVENOUS at 00:22

## 2024-07-18 ASSESSMENT — ACTIVITIES OF DAILY LIVING (ADL): ADLS_ACUITY_SCORE: 37

## 2024-07-18 NOTE — ED NOTES
Pt signed out to me by Dr. Jimenez at 7 pm      Situation: 29-year-old male with a history of a  shunt who presented to the ER due to an area on his chest wall that was concerning for a possible subcu cyst from the  shunt.  Patient had this for some time as it appears to be growing.  Patient has been seen by neurosurgery and is now followed by CT surgery.    Plan: Plan is to obtain a CT chest to evaluate the small area.  Plan is to follow-up with CT surgery regarding the recommendations.    Shift Report:  Patient was seen by the CT surgery surgery team.  They discussed the case with neurosurgery and have decided to drain the area on his right chest wall.  They performed a small incision and drainage in the ER.  Patient tolerated the procedure well.  CT surgery resident recommends the patient stable for discharge home with outpatient follow-up.  They do not feel the patient needs to be discharged home with antibiotics.  Patient is overall well-appearing.  Patient is in agreement with plan of care.    Signed:  Tammie Stovall MD  July 18, 2024 at 1:08 AM       Tammie Stovall MD  07/18/24 0108

## 2024-07-18 NOTE — DISCHARGE INSTRUCTIONS
You had the fluid drained from you chest.     Please follow-up with CT surgery as scheduled.     Return to the ER if any other problems/concerns.

## 2024-07-18 NOTE — PROCEDURES
BEDSIDE PROCEDURE - INCISION & DRAINAGE    Date of Procedure: 07/18/2024   Procedure: incision & drainage - right anterior chest     Patient: Donald Jackson   MRN: [id number]     RESIDENT: Zack Yip MD PGY-2  SURGEON: Dr. Somers      SEDATION: local      ESTIMATED BLOOD LOSS: < 1 ml      COMPLICATIONS: None.        INDICATIONS FOR PROCEDURE:   Fluid collection, abscess     DESCRIPTION OF PROCEDURE:  The risks, benefits and alternatives were described to the patient, and they were willing to proceed; consent was obtained.     The patient was positioned appropriately. The right anterior chest was cleaned, prepped, and draped in the usual fashion. 3 ml 1% lidocaine without epi was injected as a local anesthetic. A 2-cm long cruciate incision was made with a #11 scalpel and 5 ml bloody pus was drained; aerobic & anaerobic culture was taken. Blunt dissection of loculated adhesions was performed and the wound was irrigated copious with saline. The wound extended 2 cm radially in all directions from the incision and was 1 cm deep. The wound was packed with iodoform gauze and dressed with sterile gauze and tape. The procedure was tolerated well without complications.    Dr. Somers was immediately available for the entire procedure.    - - - - - - - - - - - - - - - - - -  Zack Yip MD  PGY-2 Surgery

## 2024-07-18 NOTE — CONSULTS
[facility]    Consult  - Thoracic Surgery Service       Date of Admission:  7/17/2024   on * No surgery found *  Assessment & Plan: Surgery   Donald Jackson is a 29 year old male with history of hydrocephalus and shut system placed as a child with multiple past ventriculoperitoneal/ventriculopleural shunt revisions presenting to [facility] ED for wound check. After discussion with neurosurgery regarding fluid collection. Determined that area of interest near old ventriculoperitoneal shunt and not near current shunt system. Initial concern for seeding new shunt system with possible contamination during incision and drainage however reviewing imaging showing superficial fluid collection near old  shunt system. Incision and drainage of abscess completed in the ED with expression of purulent material, cultures sent. No need for antibiotics given no signs of systemic infection nor immunosuppression. No need to follow up with thoracic, will continue to have follow up with Dr. Mariee as scheduled 7/24.   -- Incision & Drainage of right chest wall completed in ED   -- Cultures sent   -- Patient to follow up with Dr. Mariee 7/24/24     The patient's care was discussed with the fellow who discussed with staff    Zack Yip MD  [facility]  All communications related to this note should be expressed to resident/YANETH on call for this team at the time of your communication.  ______________________________________________________________________    Chief Complaint   Chest pain/wound check    History is obtained from the patient    History of Present Illness   Donald Jackson is a 29 year old male with history of hydrocephalus and shut system placed as a child presenting to [facility] ED for wound check. Patient with long history beginning with appendectomy 12/3/23 which was complicated by a perforation and intraabdominal abscess formation  which was drained by IR on 12/12/23. He was seen by neurosurgery 1/28/24 and the proximal  catheter was externalized because of a small peritoneal fluid collection at the tip of the catheter and development of ventriculomegaly, and the distal end was tied off. The patient was then evaluated by both neurosurgery and thoracic surgery for which they placed a new ventriculopleural catheter into the abdomen on 2/5/24. This became dislodged and required another procedure to re-insert into the abdomen, done 3/19/24. He underwent another procedure 4/15/24 with Dr. Mariee to remove the retained ventriculoperitoneal shunt tubing in the abdomen that was in the area of the persistent phlegmon.     He was last seen by thoracic surgery on 5/20/24 with a complaint of a tender lump next to his right nipple for which this was managed conservatively with heat and ibuprofen. He states that the lump has been present for 2 months now and thinks that it may be getting bigger and harder. He presents again to the ED today with pain from the lump on his chest. He states that this lump has remained on his chest and has also noticed the development of a small lump interior to the original one. He states that neither of these have opened up recently, started to drain fluid, nor have been warm to the touch. Additionally he denies fevers, chills, neurologic changes apart from occasional headache which he states he has those at baseline, shortness of breath, difficulty breathing, chest pain or pressure. He states that he occasionally notices the lumps more when he moves certain ways however it does not cause him a great disturbance in his every day life. Of note, he is scheduled to see Dr. Mariee in clinic 7/24/24 for evaluation of this as well as following up with neurology at the end of this month.     On evaluation he is afebrile, not tachycardic, normotensive, saturating well on room air, and non-toxic appearing. His laboratory workup is  showing a creatinine of 1.15 (baseline appears to be 0.7-0.9), WBC of 10.7, hemoglobin of 14.1, INR of 1.03, and platelet count of 230. CT Chest today showing soft tissue density surrounding the ventriculopleural tubing in the more medial aspect with mild surrounding inflammatory changes similar to previous examination 6/18/24.      Past Medical History    Past Medical History:   Diagnosis Date    Hydrocephalus (H)     Lattice degeneration     Morbid obesity (H)     Other forms of retinal detachment(361.89)        Past Surgical History   Past Surgical History:   Procedure Laterality Date    IMPLANT SHUNT VENTRICULOPERITONEAL Right 3/19/2024    Procedure: REPOSITIONING OF VENTRICULO-PLEURAL SHUNT CATHETER;  Surgeon: Almas Noriega MD;  Location: [facility] OR    LAPAROSCOPIC ASSISTED IMPLANT SHUNT VENTRICULOPERITONEAL Right 2/5/2024    Procedure: Implant Shunt Ventriculopleural;  Surgeon: Almas Noriega MD;  Location: [facility] OR    LAPAROSCOPY DIAGNOSTIC (GENERAL) N/A 4/15/2024    Procedure: Laparoscopy diagnostic, abdominal washout, lysis of adhesion, removal of intra abdominal foreign body;  Surgeon: Ender Wright MD;  Location: [facility] OR    OPTICAL TRACKING SYSTEM IMPLANT SHUNT VENTRICULOPERITONEAL Right 2/5/2024    Procedure: RIGHT VENTRICULOPLEURAL Insertion;  Surgeon: Fabiola Martinez MD;  Location: [facility] OR    RETINAL REATTACHMENT  10/2010    left eye    RETINOPEXY LASER PROPHYLAXIS BREAK(S) OD (RIGHT EYE)  10/2011    shunt in head      a couple as a child    strabismus surery      age 5    THORACOSCOPY Right 3/19/2024    Procedure: RIGHT thoracoscopy;  Surgeon: Almas Noriega MD;  Location: [facility] OR    TONSILLECTOMY      wisdom teeth         Prior to Admission Medications   Prior to Admission Medications   Prescriptions Last Dose Informant Patient Reported? Taking?   MAGNESIUM CITRATE PO   Yes No   Sig: Take 1-2 tablets by mouth daily   Omega-3 Fatty Acids (FISH OIL PO)   Yes No   Sig: Take 2  capsules by mouth daily   acetaminophen (TYLENOL) 325 MG tablet   No No   Sig: Take 2 tablets (650 mg) by mouth every 4 hours as needed for mild pain   ibuprofen (ADVIL/MOTRIN) 600 MG tablet   Yes No   Sig: Take 600 mg by mouth every 6 hours as needed for moderate pain      Facility-Administered Medications: None        Review of Systems    The 10 point Review of Systems is negative other than noted in the HPI or here.      Physical Exam   Vital Signs: Temp: 98.3  F (36.8  C) Temp src: Oral BP: 127/82 Pulse: 83   Resp: 16 SpO2: 98 % O2 Device: None (Room air)    Weight: 0 lbs 0 ozNo intake or output data in the 24 hours ending 07/17/24 1938     Gen: Alert and conversational adult in NAD, shunt in place under skin on right superolateral head  Chest: breathing non-labored on room air    Normal rate, regular rhythm and by radial palpation    Abdomen: Soft, Non-tender, nondistended, previous laparoscopic surgical scars  Extremities: Warm well perfused      Data     I have personally reviewed the following data over the past 24 hrs:    10.7  \   14.1   / 230     137 104 24.0 (H) /  76   4.8 24 1.2 \     INR:  1.03 PTT:  N/A   D-dimer:  N/A Fibrinogen:  N/A       Imaging results reviewed over the past 24 hrs:   Recent Results (from the past 24 hour(s))   CT Chest w Contrast    Impression    RESIDENT PRELIMINARY INTERPRETATION  Impression:   1. Soft tissue lesion in the anterior right chest wall surrounding the  ventriculopleural tubing is unchanged compared to 6/18/2023 and may  represent a small postprocedural abscess or seroma (or CSF filled  collection). This would be amenable to ultrasound-guided aspiration of  the lesion.    2. Clear lungs.

## 2024-07-18 NOTE — CONSULTS
[facility]       NEUROSURGERY CONSULTATION NOTE    This consultation was requested by Dr. Stovall from the ED service.    Reason for Consultation: Fluid collection at the site of old retained catheter, general surgery considering tapping    Assessment:  Donald Jackson is a 29 year old male with a history of ventricular peritoneal shunt for hydrocephalus during childhood s/p multiple revisions and eventual ventriculopleural shunt Codman Certas 4.  She presents with pain in the setting of a fluid collection near the prior retained catheter which is not connected to the functioning ventriculopleural shunt system or the brain. General surgery is planning to drain the collection at bedside, and send to cultures.    Clinically Significant Risk Factors Present on Admission     Recommendations:  No neurosurgical intervention indicated at this time   Okay to tap/drain the fluid collection in a sterile fashion and sent for cultures  Monitor for fevers, growth, redness, swelling, and get assessed in such a case  Follow-up with general surgery for further management of fluid collection and further management of the retained catheter per general surgery  Follow cultures  Already has a follow up with neurosurgery on 7/30 with head CT.    Gaurav Parry MD  Neurosurgery Resident  Pager- [phone]    The patient was discussed with Dr. Sheppard, neurosurgery chief resident, and Dr. Martinez, neurosurgery staff.    Please contact neurosurgery resident on call with questions.    Dial * * *[phone], enter [phone] when prompted.     HPI:  Donald Jackson is a 29 year old male with a history of ventricular peritoneal shunt for hydrocephalus during childhood s/p multiple revisions and eventual ventriculopleural shunt Codman Certas 4.  He is very familiar to our neurosurgery service and has been following up with Vandana and Dr. Martinez as an outpatient.  His ventriculoperitoneal shunt was externalized in the setting  of perforated appendicitis and was eventually transition to a ventriculopleural shunt system.      He has 2 retained catheters in his right anterior chest from his old shunt systems which were not retained.  The lateral shunt system was heavily calcified and was the one that was initially placed when he was a child.  Medial to it is a remnant of the distal portion of the externalized shunt during his abdominal infection phase. Both these prior retained catheters were not connected to the functioning ventriculopleural shunt system or the brain     He had ED presentations and clinic notes documenting that he has some swelling in the right anterior chest which is causing him discomfort.  He reports that the discomfort and the pain range between 1-5 out of 10.  The pain is mostly associated with movement and activity.  He does not report that the pain is intolerable but he just wanted to get it checked 1 more time.  There was 1 small area of redness on the right anterior chest that started a few days ago.  He was seen in the clinic for the same swelling and was recommended to follow-up with general surgery who will be seeing him in 1 week.      CT chest demonstrates a fluid collection concerning for some residual fluid versus abscess.  Given his ED presentation our general surgery team was reconsulted who wanted to tap/drain that area to evaluate the nature of the fluid.  Clinically he reports no fevers chills nausea vomiting or any other neurological complaints.  There is no overt signs of inflammation except for that right spot.  There was no drainage or leaking from that area.  His labs are quite benign with normal inflammation markers including ESR CRP.    PAST MEDICAL HISTORY:   Past Medical History:   Diagnosis Date    Hydrocephalus (H)     Lattice degeneration     Morbid obesity (H)     Other forms of retinal detachment(361.89)        PAST SURGICAL HISTORY:   Past Surgical History:   Procedure Laterality Date     IMPLANT SHUNT VENTRICULOPERITONEAL Right 3/19/2024    Procedure: REPOSITIONING OF VENTRICULO-PLEURAL SHUNT CATHETER;  Surgeon: Almas Noriega MD;  Location: [facility] OR    LAPAROSCOPIC ASSISTED IMPLANT SHUNT VENTRICULOPERITONEAL Right 2/5/2024    Procedure: Implant Shunt Ventriculopleural;  Surgeon: Almas Noriega MD;  Location: [facility] OR    LAPAROSCOPY DIAGNOSTIC (GENERAL) N/A 4/15/2024    Procedure: Laparoscopy diagnostic, abdominal washout, lysis of adhesion, removal of intra abdominal foreign body;  Surgeon: Ender Wright MD;  Location: [facility] OR    OPTICAL TRACKING SYSTEM IMPLANT SHUNT VENTRICULOPERITONEAL Right 2/5/2024    Procedure: RIGHT VENTRICULOPLEURAL Insertion;  Surgeon: Fabiola Martinez MD;  Location: [facility] OR    RETINAL REATTACHMENT  10/2010    left eye    RETINOPEXY LASER PROPHYLAXIS BREAK(S) OD (RIGHT EYE)  10/2011    shunt in head      a couple as a child    strabismus surery      age 5    THORACOSCOPY Right 3/19/2024    Procedure: RIGHT thoracoscopy;  Surgeon: Almas Noriega MD;  Location: [facility] OR    TONSILLECTOMY      wisdom teeth         FAMILY HISTORY:   Family History   Problem Relation Age of Onset    Diabetes Father         Type 2     Diabetes Paternal Grandfather         Type 2     Retinal detachment No family hx of     Amblyopia No family hx of     Glaucoma No family hx of     Macular Degeneration No family hx of     Anesthesia Reaction No family hx of     Deep Vein Thrombosis (DVT) No family hx of        SOCIAL HISTORY:   Social History     Tobacco Use    Smoking status: Never    Smokeless tobacco: Never   Substance Use Topics    Alcohol use: No       MEDICATIONS:  Current Outpatient Medications   Medication Sig Dispense Refill    acetaminophen (TYLENOL) 325 MG tablet Take 2 tablets (650 mg) by mouth every 4 hours as needed for mild pain      ibuprofen (ADVIL/MOTRIN) 600 MG tablet Take 600 mg by mouth every 6 hours as needed for moderate pain      MAGNESIUM CITRATE PO  Take 1-2 tablets by mouth daily      Omega-3 Fatty Acids (FISH OIL PO) Take 2 capsules by mouth daily         Allergies:  Allergies   Allergen Reactions    Penicillins Hives and Unknown       ROS: 10 point ROS of systems including Constitutional, Eyes, Respiratory, Cardiovascular, Gastroenterology, Genitourinary, Integumentary, Muscularskeletal, Psychiatric were all negative except for pertinent positives noted in my HPI.    Physical exam:   Blood pressure 130/88, pulse 83, temperature 98.3  F (36.8  C), temperature source Oral, resp. rate 16, SpO2 100%.  General: awake and alert  HEENT: incisions healing appropriately   PULM: breathing comfortably on room air  Chest: Mobile, firm, oblong, smooth lump that is palpable with a singular point of redness with no expressible discharge. No tenderness or signs of inflammation.    NEUROLOGIC:  -- Awake; Alert; oriented x 3  -- Follows commands briskly  -- +repetition, calculation, and naming  -- Speech fluent, spontaneous. No aphasia or dysarthria.  -- no gaze preference. No apparent hemineglect.  Cranial Nerves:  -- visual fields full to confrontation, PERRL 3-2mm bilat and brisk, extraocular movements intact  -- face symmetrical, tongue midline  -- sensory V1-V3 intact bilaterally  -- palate elevates symmetrically, uvula midline  -- hearing grossly intact bilat  -- Trapezii 5/5 strength bilat symmetric  -- Cerebellar: Finger nose finger without dysmetria, intact rapid alternating motions bilaterally    Motor:  Normal bulk / tone; no tremor, rigidity, or bradykinesia.  No muscle wasting or fasciculations  No Pronator Drift     Delt Bi Tri Hand Flexion/  Extension Iliopsoas Quadriceps Hamstrings Tibialis Anterior Gastroc    C5 C6 C7 C8/T1 L2 L3 L4-S1 L4 S1   R 5 5 5 5 5 5 5 5 5   L 5 5 5 5 5 5 5 5 5     Sensory:  intact to LT x 4 extremities       Reflexes: no clonus       Bi Tri BR Elena Pat Ach Bab     C5-6 C7-8 C6 UMN L2-4 S1 UMN   R 2+ 2+ 2+ Norm 2+ 2+ Norm   L 2+ 2+ 2+  Norm 2+ 2+ Norm      Gait: deferred       IMAGING:  Recent Results (from the past 24 hour(s))   CT Chest w Contrast    Narrative    Exam: Chest CT without contrast 7/17/2024 6:33 PM    History: right chest wall possible abscess vs pericatheter fluid  collection, known, worsening pain    Comparison: Chest CT without contrast 6/18/2024. Chest radiograph  5/20/2024.    Technique: Helical CT imaging of the chest was obtained without the  administration of intravenous contrast.    Findings:    Devices: Right lateral chest wall ventriculopleural catheter tip  terminates in the right lower lobe pleural space similar to prior.  Stable heavily calcified right anterior chest wall shunt tubing with  tip terminating in the right upper quadrant. Confluent soft tissue  density surrounding the ventriculopleural tubing in the more medial  aspect of the anterior right chest wall (2/34) with a tract extending  to the dermis was also present on 6/18/2024. Mild surrounding  inflammatory changes similar to prior. No discrete drainable organized  fluid collection in this area.    Chest:   Mediastinum: The thyroid, pulmonary trunk, thoracic aorta, heart, and  esophagus are normal. Bovine arch. No thoracic adenopathy.     Lungs: Clear airways. Slightly increased confluent atelectasis in the  lingula. No pneumothorax or pleural effusion. No pulmonary mass or  less opacity.    Upper abdomen: Normal where visualized.     Bones/soft tissues: No acute fracture or dislocation.       Impression    Impression:   1. Soft tissue lesion in the anterior right chest wall surrounding the  ventriculopleural tubing is unchanged compared to 6/18/2023 and may  represent a small postprocedural abscess or seroma (or CSF filled  collection).   2. Clear lungs.    I have personally reviewed the examination and initial interpretation  and I agree with the findings.    HAILEE HWANG MD         SYSTEM ID:  [id number]         LABS:   Last Comprehensive Metabolic  Panel:  Lab Results   Component Value Date     07/17/2024    POTASSIUM 4.8 07/17/2024    CHLORIDE 104 07/17/2024    CO2 24 07/17/2024    ANIONGAP 9 07/17/2024    GLC 76 07/17/2024    BUN 24.0 (H) 07/17/2024    CR 1.2 07/17/2024    GFRESTIMATED >60 07/17/2024    ANTHONY 8.9 07/17/2024         Lab Results   Component Value Date    WBC 10.7 07/17/2024    WBC 8.0 08/16/2020     Lab Results   Component Value Date    RBC 5.93 07/17/2024    RBC 5.97 08/16/2020     Lab Results   Component Value Date    HGB 14.1 07/17/2024    HGB 15.2 08/16/2020     Lab Results   Component Value Date    HCT 45.8 07/17/2024    HCT 47.1 08/16/2020     Lab Results   Component Value Date    MCV 77 07/17/2024    MCV 79 08/16/2020     Lab Results   Component Value Date    MCH 23.8 07/17/2024    MCH 25.5 08/16/2020     Lab Results   Component Value Date    MCHC 30.8 07/17/2024    MCHC 32.3 08/16/2020     Lab Results   Component Value Date    RDW 17.3 07/17/2024    RDW 14.9 08/16/2020     Lab Results   Component Value Date     07/17/2024     08/16/2020     INR   Date Value Ref Range Status   07/17/2024 1.03 0.85 - 1.15 Final    I have seen this patient with the resident and formulated a plan and agree with this note.  AMP

## 2024-07-19 ENCOUNTER — TELEPHONE (OUTPATIENT)
Dept: NURSING | Facility: CLINIC | Age: 29
End: 2024-07-19
Payer: COMMERCIAL

## 2024-07-19 ENCOUNTER — PATIENT OUTREACH (OUTPATIENT)
Dept: SURGERY | Facility: CLINIC | Age: 29
End: 2024-07-19
Payer: COMMERCIAL

## 2024-07-19 DIAGNOSIS — L08.9 WOUND INFECTION: Primary | ICD-10-CM

## 2024-07-19 DIAGNOSIS — T14.8XXA WOUND INFECTION: Primary | ICD-10-CM

## 2024-07-19 RX ORDER — LEVOFLOXACIN 500 MG/1
500 TABLET, FILM COATED ORAL DAILY
Qty: 7 TABLET | Refills: 0 | Status: SHIPPED | OUTPATIENT
Start: 2024-07-19 | End: 2024-07-26

## 2024-07-19 NOTE — TELEPHONE ENCOUNTER
Called patient to inform him that Dr Noriega would like to see him in clinic on July 31 for a follow-up after his recent ED visit at which time the I&D was done.   Patient scheduled for CT scan and appointment with Neurosurgery 7/30.     Patient agreed to 8:45am in-person clinic visit with Dr Noriega on 7/31. Epic message sent to scheduled.     Patient with no questions or concerns at this time. Patient appreciated writer's call.    Luisa Bridges RN, BSN  Thoracic Surgery RN Care Coordinator

## 2024-07-19 NOTE — TELEPHONE ENCOUNTER
Bow unable to reach the pharmacy and is wanting to confirm that we sent the antibiotic into his pharmacy.          Confirmation of receipt    Chauncey Kurtz RN  [employer] [facility]  Emergency Dept Lab Result RN  Ph# [phone]

## 2024-07-19 NOTE — TELEPHONE ENCOUNTER
[facility] ([place])    Reason for call: Lab Result Notification     Lab Result (including Rx patient on, if applicable).  If culture, copy of lab report at bottom.  Lab Result: wound culture    Creatinine Level (mg/dl)   Creatinine   Date Value Ref Range Status   07/17/2024 1.15 0.67 - 1.17 mg/dL Final   10/17/2014 1.3 0.8 - 1.5 mg/dL Final     Creatinine POCT   Date Value Ref Range Status   07/17/2024 1.2 0.7 - 1.3 mg/dL Final    Creatinine clearance (ml/min), if applicable    Serum creatinine: 1.2 mg/dL 07/17/24 1734  Estimated creatinine clearance: 115.9 mL/min     Patient's current Symptoms:   Unchanged, has mild pain, changed dressing yesterday and it is still clean, dry and intact. Redness unchanged.    RN Recommendations/Instructions per [place] ED lab result protocol:   Instruct to start antibiotic, sent to preferred pharmacy.   RN will consult with ED provider.    [facility] Emergency Department Provider: Dr Stovall  Consultation Time: 1500  Provider Recommendation:  She consulted surgery fellow Dr Manning who recommended ABT  Patient/parent notified of Providers recommendations (YES/NO): yes  .Pt agreed to hold Mg Citrate while on Levoquin    Patient/care giver notified to contact your PCP clinic or return to the Emergency department if your:  Symptoms return.  Symptoms do not improve after 3 days on antibiotic.  Symptoms do not resolve after completing antibiotic.  Symptoms worsen or other concerning symptoms.    Meera Mahajan RN

## 2024-07-21 LAB
BACTERIA ABSC ANAEROBE+AEROBE CULT: ABNORMAL
GRAM STAIN RESULT: ABNORMAL

## 2024-07-21 NOTE — TELEPHONE ENCOUNTER
[facility] ([place])    Reason for call: Lab Result Notification     Lab Result (including Rx patient on, if applicable).  If culture, copy of lab report at bottom.  Lab Result: Wound Culture - Chest (see below)    7/17/24 I&D performed in ED    7/19/24 ED provider was consulted after preliminary culture resulted and prescribed levofloxacin (LEVAQUIN) 500 MG tablet -Take 1 tablet (500 mg) by mouth daily for 7 days - Susceptible    Creatinine Level (mg/dl)   Creatinine   Date Value Ref Range Status   07/17/2024 1.15 0.67 - 1.17 mg/dL Final   10/17/2014 1.3 0.8 - 1.5 mg/dL Final     Creatinine POCT   Date Value Ref Range Status   07/17/2024 1.2 0.7 - 1.3 mg/dL Final    Creatinine clearance (ml/min), if applicable    Serum creatinine: 1.2 mg/dL 07/17/24 1734  Estimated creatinine clearance: 115.9 mL/min   Patient presented to [facility] ED on 7/17/24 for chest pain and wound check  RN Recommendations/Instructions per [place] ED lab result protocol:   [facility] ED lab result protocol utilized: Wound Culture - final positive    Patient's current Symptoms:   Patient states that he feels lethargic especially in the morning, but is doing ok. Thinks there is some pus in the wound, but isn't having any increased swelling, redness, or pain. Denies fever. Advised patient to continue on antibiotic until gone and follow-up with providers as scheduled.    Patient/care giver notified to contact your PCP clinic or return to the Emergency department if your:  Symptoms return.  Symptoms do not improve after 3 days on antibiotic.  Symptoms do not resolve after completing antibiotic.  Symptoms worsen or other concerning symptoms.       Jill Keene RN

## 2024-07-24 ENCOUNTER — PRE VISIT (OUTPATIENT)
Dept: SURGERY | Facility: CLINIC | Age: 29
End: 2024-07-24

## 2024-07-25 LAB — BACTERIA ABSC ANAEROBE+AEROBE CULT: NORMAL

## 2024-07-30 ENCOUNTER — ANCILLARY PROCEDURE (OUTPATIENT)
Dept: ULTRASOUND IMAGING | Facility: CLINIC | Age: 29
End: 2024-07-30
Attending: PHYSICIAN ASSISTANT
Payer: COMMERCIAL

## 2024-07-30 ENCOUNTER — LAB (OUTPATIENT)
Dept: LAB | Facility: CLINIC | Age: 29
End: 2024-07-30
Payer: COMMERCIAL

## 2024-07-30 ENCOUNTER — OFFICE VISIT (OUTPATIENT)
Dept: NEUROSURGERY | Facility: CLINIC | Age: 29
End: 2024-07-30
Attending: NEUROLOGICAL SURGERY
Payer: COMMERCIAL

## 2024-07-30 ENCOUNTER — ANCILLARY PROCEDURE (OUTPATIENT)
Dept: CT IMAGING | Facility: CLINIC | Age: 29
End: 2024-07-30
Attending: NEUROLOGICAL SURGERY
Payer: COMMERCIAL

## 2024-07-30 VITALS
RESPIRATION RATE: 18 BRPM | SYSTOLIC BLOOD PRESSURE: 119 MMHG | HEART RATE: 75 BPM | DIASTOLIC BLOOD PRESSURE: 80 MMHG | OXYGEN SATURATION: 99 %

## 2024-07-30 DIAGNOSIS — G89.29 CHRONIC NONINTRACTABLE HEADACHE, UNSPECIFIED HEADACHE TYPE: Primary | ICD-10-CM

## 2024-07-30 DIAGNOSIS — R51.9 CHRONIC NONINTRACTABLE HEADACHE, UNSPECIFIED HEADACHE TYPE: Primary | ICD-10-CM

## 2024-07-30 DIAGNOSIS — Z98.2 S/P VP SHUNT: ICD-10-CM

## 2024-07-30 DIAGNOSIS — Z98.2 VENTRICULO-PERITONEAL SHUNT STATUS: ICD-10-CM

## 2024-07-30 DIAGNOSIS — T85.730A INFECTION OF VENTRICULOPERITONEAL SHUNT, INITIAL ENCOUNTER (H): ICD-10-CM

## 2024-07-30 DIAGNOSIS — Q03.9 CONGENITAL HYDROCEPHALUS (H): ICD-10-CM

## 2024-07-30 PROCEDURE — 99000 SPECIMEN HANDLING OFFICE-LAB: CPT | Performed by: PATHOLOGY

## 2024-07-30 PROCEDURE — 76604 US EXAM CHEST: CPT | Mod: 52 | Performed by: RADIOLOGY

## 2024-07-30 PROCEDURE — 99213 OFFICE O/P EST LOW 20 MIN: CPT | Mod: GC | Performed by: NEUROLOGICAL SURGERY

## 2024-07-30 PROCEDURE — 87040 BLOOD CULTURE FOR BACTERIA: CPT | Performed by: FAMILY MEDICINE

## 2024-07-30 PROCEDURE — 70450 CT HEAD/BRAIN W/O DYE: CPT | Mod: GC | Performed by: RADIOLOGY

## 2024-07-30 PROCEDURE — 36415 COLL VENOUS BLD VENIPUNCTURE: CPT | Performed by: PATHOLOGY

## 2024-07-30 ASSESSMENT — PAIN SCALES - GENERAL: PAINLEVEL: NO PAIN (0)

## 2024-07-30 NOTE — PROGRESS NOTES
7/30/2024  Neurosurgery Clinic Visit      History of present illness:  Donald Jackson is a 29 year old male with a past medical history of ventricular peritoneal shunt for hydrocephalus during childhood s/p multiple revisions and eventual ventriculopleural shunt Codman Certas 4 (last with Dr. Martinez on 3/19/24), presenting for scheduled follow-up with a head CT.    Of note, the patient recently presented to the emergency department on 7/17/2024 with pain from a location correlating to a fluid collection at the right anterior chest wall near the prior retained catheter which is not connected to the functioning ventriculopleural shunt system or the brain. This fluid collection was tapped and sent for culture, which speciated +1 Pseudomonas aeruginosa (pan-susceptible), for which he was instructed to start a 7 day course of levofloxacin on 7/18.    Today he reports no headaches/blurry or double vision/nausea or vomiting, or any stigmata of an infectious process. No pleuritic type chest pain or SOB endorsed.      Physical exam:   /80 (BP Location: Right arm, Patient Position: Sitting, Cuff Size: Adult Large)   Pulse 75   Resp 18   SpO2 99%   General: Awake and alert and in no acute distress.  Pulm: Breathing comfortably on room air  CN: Symmetric browlift, smile, tongue protrusion, palate elevation, and sternocleidomastoids. No dysarthria. Extraocular muscles are all intact. Pupils react bilaterally and equally  Coordination: Intact finger-nose-finger bilaterally. Symmetric rapid alternating movements in bilateral upper extremities   Motor: No pronator drift. Good muscle bulk throughout. 5/5 strength in bilateral upper and lower extremities   Sensation: Sensation grossly intact to light touch in all extremities.   Gait: Intact tandem gait.   Reflexes: 2+ reflexes bilateral biceps, brachioradialis, and patellar tendons. Brand's reflex negative  Babinski reflex negative. Bilateral clonus negative.     Wound  review: I&D site at the anterior chest wall with mild seropurulent discharge noted.  No active bleeding.    Imaging:  CT HEAD W/O CONTRAST 7/30/2024 11:20 AM     Impression:   Stable stable shunted decompressed ventricles since  6/18/2024.  No acute intracranial pathology.      Assessment/Plan:  29 year old male with a past medical history of ventricular peritoneal shunt for hydrocephalus during childhood s/p multiple revisions and eventual ventriculopleural shunt Codman Certas 4 (last with Dr. Martinez on 3/19/24), presenting for scheduled follow-up with a head CT, which showed stable ventricles.  - No retained catheter seen on review of imaging, and as such, it's unclear if there's retained tubing vs calcification around old shunt tract. General surgery did not document visualizing any tubing on exploration of abscess site.  -No active role for neurosurgical management or follow-up at this time  -Advocate for general surgery to manage drainage at I&D site  -Will defer the decision to dr Noriega (Cardiovascular)  -We will be more than happy to answer any questions or address concerns should they arise.  -Patient advised to contact us with questions or concerns.    Patient seen and discussed with Dr. Michelle MD.      Jessica Finley MD, PGY-1  Department of Neurosurgery  Pager: [phone]    Please contact neurosurgery resident on call with questions.    Dial * * [phone], enter [phone] when prompted.         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    Hydrocephalus (H)     Lattice degeneration     Morbid obesity (H)     Other forms of retinal detachment(361.89)        Surgical History:   Past Surgical History:   Procedure Laterality Date    IMPLANT SHUNT VENTRICULOPERITONEAL Right 03/19/2024    Procedure: REPOSITIONING OF VENTRICULO-PLEURAL SHUNT CATHETER;  Surgeon: Hari  Almas Bowling MD;  Location: [facility] OR    LAPAROSCOPIC ASSISTED IMPLANT SHUNT VENTRICULOPERITONEAL Right 02/05/2024    Procedure: Implant Shunt Ventriculopleural;  Surgeon: Almas Noriega MD;  Location: [facility]    LAPAROSCOPY DIAGNOSTIC (GENERAL) N/A 04/15/2024    Procedure: Laparoscopy diagnostic, abdominal washout, lysis of adhesion, removal of intra abdominal foreign body;  Surgeon: Ender Wright MD;  Location: [facility] OR    OPTICAL TRACKING SYSTEM IMPLANT SHUNT VENTRICULOPERITONEAL Right 02/05/2024    Procedure: RIGHT VENTRICULOPLEURAL Insertion;  Surgeon: Fabiola Martinez MD;  Location: [facility] OR    RETINAL REATTACHMENT  10/2010    left eye    RETINOPEXY LASER PROPHYLAXIS BREAK(S) OD (RIGHT EYE)  10/2011    strabismus surery      age 5    THORACOSCOPY Right 03/19/2024    Procedure: RIGHT thoracoscopy;  Surgeon: Almas Noriega MD;  Location: [facility] OR    TONSILLECTOMY      wisdom teeth         Social history:   Social History     Tobacco Use    Smoking status: Never    Smokeless tobacco: Never   Vaping Use    Vaping status: Never Used   Substance Use Topics    Alcohol use: No    Drug use: No       Family history:   Family History   Problem Relation Age of Onset    Diabetes Father         Type 2     Diabetes Paternal Grandfather         Type 2     Retinal detachment No family hx of     Amblyopia No family hx of     Glaucoma No family hx of     Macular Degeneration No family hx of     Anesthesia Reaction No family hx of     Deep Vein Thrombosis (DVT) No family hx of        Medications:  Current Outpatient Medications   Medication Sig Dispense Refill    MAGNESIUM CITRATE PO Take 1-2 tablets by mouth daily      Omega-3 Fatty Acids (FISH OIL PO) Take 2 capsules by mouth daily      acetaminophen (TYLENOL) 325 MG tablet Take 2 tablets (650 mg) by mouth every 4 hours as needed for mild pain      ibuprofen (ADVIL/MOTRIN) 600 MG tablet Take 600 mg by mouth every 6 hours as needed for moderate pain       No  current facility-administered medications for this visit.       Allergies:     Allergies   Allergen Reactions    Penicillins Hives and Unknown   I have seen this patient with the resident and formulated a plan and agree with this note.  AMP

## 2024-07-30 NOTE — LETTER
7/30/2024       RE: Donald Jackson  [address]  [place] [address]     Dear Colleague,    Thank you for referring your patient, Donald Jackson, to the [facility] [place] [facility]. Please see a copy of my visit note below.    7/30/2024  Neurosurgery Clinic Visit      History of present illness:  Donald Jackson is a 29 year old male with a past medical history of ventricular peritoneal shunt for hydrocephalus during childhood s/p multiple revisions and eventual ventriculopleural shunt Codman Certas 4 (last with Dr. Martinez on 3/19/24), presenting for scheduled follow-up with a head CT.    Of note, the patient recently presented to the emergency department on 7/17/2024 with pain from a location correlating to a fluid collection at the right anterior chest wall near the prior retained catheter which is not connected to the functioning ventriculopleural shunt system or the brain. This fluid collection was tapped and sent for culture, which speciated +1 Pseudomonas aeruginosa (pan-susceptible), for which he was instructed to start a 7 day course of levofloxacin on 7/18.    Today he reports no headaches/blurry or double vision/nausea or vomiting, or any stigmata of an infectious process. No pleuritic type chest pain or SOB endorsed.      Physical exam:   /80 (BP Location: Right arm, Patient Position: Sitting, Cuff Size: Adult Large)   Pulse 75   Resp 18   SpO2 99%   General: Awake and alert and in no acute distress.  Pulm: Breathing comfortably on room air  CN: Symmetric browlift, smile, tongue protrusion, palate elevation, and sternocleidomastoids. No dysarthria. Extraocular muscles are all intact. Pupils react bilaterally and equally  Coordination: Intact finger-nose-finger bilaterally. Symmetric rapid alternating movements in bilateral upper extremities   Motor: No pronator drift. Good muscle bulk throughout. 5/5  strength in bilateral upper and lower extremities   Sensation: Sensation grossly intact to light touch in all extremities.   Gait: Intact tandem gait.   Reflexes: 2+ reflexes bilateral biceps, brachioradialis, and patellar tendons. Brand's reflex negative  Babinski reflex negative. Bilateral clonus negative.     Wound review: I&D site at the anterior chest wall with mild seropurulent discharge noted.  No active bleeding.    Imaging:  CT HEAD W/O CONTRAST 7/30/2024 11:20 AM     Impression:   Stable stable shunted decompressed ventricles since  6/18/2024.  No acute intracranial pathology.      Assessment/Plan:  29 year old male with a past medical history of ventricular peritoneal shunt for hydrocephalus during childhood s/p multiple revisions and eventual ventriculopleural shunt Codman Certas 4 (last with Dr. Martinez on 3/19/24), presenting for scheduled follow-up with a head CT, which showed stable ventricles.  - No retained catheter seen on review of imaging, and as such, it's unclear if there's retained tubing vs calcification around old shunt tract. General surgery did not document visualizing any tubing on exploration of abscess site.  -No active role for neurosurgical management or follow-up at this time  -Advocate for general surgery to manage drainage at I&D site  -Will defer the decision to dr Noriega (Cardiovascular)  -We will be more than happy to answer any questions or address concerns should they arise.  -Patient advised to contact us with questions or concerns.    Patient seen and discussed with Dr. Michelle MD.      Jessica Finley MD, PGY-1  Department of Neurosurgery  Pager: [phone]    Please contact neurosurgery resident on call with questions.    Dial * * *[phone], enter [phone] when prompted.         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     Hydrocephalus (H)     Lattice degeneration     Morbid obesity (H)     Other forms of retinal detachment(361.89)        Surgical History:   Past Surgical History:   Procedure Laterality Date    IMPLANT SHUNT VENTRICULOPERITONEAL Right 03/19/2024    Procedure: REPOSITIONING OF VENTRICULO-PLEURAL SHUNT CATHETER;  Surgeon: Almas Noriega MD;  Location: [facility] OR    LAPAROSCOPIC ASSISTED IMPLANT SHUNT VENTRICULOPERITONEAL Right 02/05/2024    Procedure: Implant Shunt Ventriculopleural;  Surgeon: Almas Noriega MD;  Location: [facility] OR    LAPAROSCOPY DIAGNOSTIC (GENERAL) N/A 04/15/2024    Procedure: Laparoscopy diagnostic, abdominal washout, lysis of adhesion, removal of intra abdominal foreign body;  Surgeon: Ender Wright MD;  Location: [facility] OR    OPTICAL TRACKING SYSTEM IMPLANT SHUNT VENTRICULOPERITONEAL Right 02/05/2024    Procedure: RIGHT VENTRICULOPLEURAL Insertion;  Surgeon: Fabiola Martinez MD;  Location: [facility] OR    RETINAL REATTACHMENT  10/2010    left eye    RETINOPEXY LASER PROPHYLAXIS BREAK(S) OD (RIGHT EYE)  10/2011    strabismus surery      age 5    THORACOSCOPY Right 03/19/2024    Procedure: RIGHT thoracoscopy;  Surgeon: Almas Noriega MD;  Location: [facility] OR    TONSILLECTOMY      wisdom teeth         Social history:   Social History     Tobacco Use    Smoking status: Never    Smokeless tobacco: Never   Vaping Use    Vaping status: Never Used   Substance Use Topics    Alcohol use: No    Drug use: No       Family history:   Family History   Problem Relation Age of Onset    Diabetes Father         Type 2     Diabetes Paternal Grandfather         Type 2     Retinal detachment No family hx of     Amblyopia No family hx of     Glaucoma No family hx of     Macular Degeneration No family hx of     Anesthesia Reaction No family hx of     Deep Vein Thrombosis (DVT) No family hx of        Medications:  Current Outpatient Medications   Medication Sig Dispense Refill    MAGNESIUM CITRATE PO Take  1-2 tablets by mouth daily      Omega-3 Fatty Acids (FISH OIL PO) Take 2 capsules by mouth daily      acetaminophen (TYLENOL) 325 MG tablet Take 2 tablets (650 mg) by mouth every 4 hours as needed for mild pain      ibuprofen (ADVIL/MOTRIN) 600 MG tablet Take 600 mg by mouth every 6 hours as needed for moderate pain       No current facility-administered medications for this visit.       Allergies:     Allergies   Allergen Reactions    Penicillins Hives and Unknown       Again, thank you for allowing me to participate in the care of your patient.      Sincerely,    Fabiola Martinez MD

## 2024-07-30 NOTE — NURSING NOTE
Chief Complaint   Patient presents with    RECHECK     /80 (BP Location: Right arm, Patient Position: Sitting, Cuff Size: Adult Large)   Pulse 75   Resp 18   SpO2 99%     JOHANNE JOSE JUAN

## 2024-07-31 ENCOUNTER — PREP FOR PROCEDURE (OUTPATIENT)
Dept: SURGERY | Facility: CLINIC | Age: 29
End: 2024-07-31

## 2024-07-31 ENCOUNTER — ONCOLOGY VISIT (OUTPATIENT)
Dept: SURGERY | Facility: CLINIC | Age: 29
End: 2024-07-31
Attending: THORACIC SURGERY (CARDIOTHORACIC VASCULAR SURGERY)
Payer: COMMERCIAL

## 2024-07-31 ENCOUNTER — ANESTHESIA EVENT (OUTPATIENT)
Dept: SURGERY | Facility: CLINIC | Age: 29
End: 2024-07-31
Payer: COMMERCIAL

## 2024-07-31 ENCOUNTER — TELEPHONE (OUTPATIENT)
Dept: SURGERY | Facility: CLINIC | Age: 29
End: 2024-07-31

## 2024-07-31 ENCOUNTER — VIRTUAL VISIT (OUTPATIENT)
Dept: SURGERY | Facility: CLINIC | Age: 29
End: 2024-07-31
Payer: COMMERCIAL

## 2024-07-31 VITALS
WEIGHT: 280 LBS | SYSTOLIC BLOOD PRESSURE: 127 MMHG | HEART RATE: 75 BPM | TEMPERATURE: 98 F | DIASTOLIC BLOOD PRESSURE: 82 MMHG | RESPIRATION RATE: 18 BRPM | OXYGEN SATURATION: 99 % | BODY MASS INDEX: 42.57 KG/M2

## 2024-07-31 VITALS — WEIGHT: 270 LBS | BODY MASS INDEX: 40.92 KG/M2 | HEIGHT: 68 IN

## 2024-07-31 DIAGNOSIS — Z98.2 S/P VP SHUNT: Primary | ICD-10-CM

## 2024-07-31 DIAGNOSIS — L02.213 CHEST WALL ABSCESS: Primary | ICD-10-CM

## 2024-07-31 DIAGNOSIS — R22.2 LUMP IN CHEST: ICD-10-CM

## 2024-07-31 DIAGNOSIS — Z01.818 PRE-OP EVALUATION: Primary | ICD-10-CM

## 2024-07-31 PROCEDURE — G0463 HOSPITAL OUTPT CLINIC VISIT: HCPCS | Performed by: THORACIC SURGERY (CARDIOTHORACIC VASCULAR SURGERY)

## 2024-07-31 PROCEDURE — 99215 OFFICE O/P EST HI 40 MIN: CPT | Performed by: THORACIC SURGERY (CARDIOTHORACIC VASCULAR SURGERY)

## 2024-07-31 PROCEDURE — 99212 OFFICE O/P EST SF 10 MIN: CPT | Mod: 95 | Performed by: PHYSICIAN ASSISTANT

## 2024-07-31 RX ORDER — FERROUS SULFATE 325(65) MG
325 TABLET ORAL EVERY EVENING
COMMUNITY
End: 2024-08-08

## 2024-07-31 RX ORDER — ENOXAPARIN SODIUM 100 MG/ML
40 INJECTION SUBCUTANEOUS
Status: CANCELLED | OUTPATIENT
Start: 2024-07-31

## 2024-07-31 RX ORDER — ACETAMINOPHEN 325 MG/1
975 TABLET ORAL ONCE
Status: CANCELLED | OUTPATIENT
Start: 2024-07-31 | End: 2024-07-31

## 2024-07-31 ASSESSMENT — PAIN SCALES - GENERAL
PAINLEVEL: NO PAIN (0)
PAINLEVEL: NO PAIN (0)

## 2024-07-31 ASSESSMENT — LIFESTYLE VARIABLES: TOBACCO_USE: 0

## 2024-07-31 ASSESSMENT — ENCOUNTER SYMPTOMS: SEIZURES: 0

## 2024-07-31 NOTE — NURSING NOTE
"Oncology Rooming Note    July 31, 2024 8:41 AM   Donald Jackson is a 29 year old male who presents for:    Chief Complaint   Patient presents with    Oncology Clinic Visit     S/P  shunt     Initial Vitals: /82 (BP Location: Right arm, Patient Position: Sitting, Cuff Size: Adult Large)   Pulse 75   Temp 98  F (36.7  C) (Oral)   Resp 18   Wt 127 kg (280 lb)   SpO2 99%   BMI 42.57 kg/m   Estimated body mass index is 42.57 kg/m  as calculated from the following:    Height as of 5/12/24: 1.727 m (5' 8\").    Weight as of this encounter: 127 kg (280 lb). Body surface area is 2.47 meters squared.  No Pain (0) Comment: Data Unavailable   No LMP for male patient.  Allergies reviewed: Yes  Medications reviewed: Yes    Medications: Medication refills not needed today.  Pharmacy name entered into [facility]: [id number] - [place], [place] - [address] AT [facility]    Frailty Screening:   Is the patient here for a new oncology consult visit in cancer care? 2. No      Clinical concerns:  Patient states there are no new concerns to discuss with provider.  Dr Noriega was not notified.        Alejandra Evans CMA              "

## 2024-07-31 NOTE — TELEPHONE ENCOUNTER
Spoke with patient to schedule procedure with Dr. Noriega   Procedure was scheduled on 8/2/24 at [facility]  Patient will have H&P with PAC video    Informed pt of surgery details:  Date:    Friday, August 02, 2024  Arrival Time:  11:20 am    Pt is aware surgery start time may change, and someone will reach out with the new arrival time, if so.    Patient is aware a COVID-19 test is needed before their procedure ONLY IF symptomatic.   (Patient is aware Thoracic is no longer requiring COVID-19 test)       Patient is aware a / is needed day of surgery.   Surgery Letter was sent via [employer],     Patient has my direct contact information for any further questions.      Pt is needing a document for work regarding today clinic visit as well as for surgery Friday. Routing to nurse. Leona Cabrera on 7/31/2024 at 12:55 PM     yes

## 2024-07-31 NOTE — LETTER
7/31/2024      Donald Jackson  [address]  [place] [address]      Dear Colleague,    Thank you for referring your patient, Donald Jackson, to the [facility] CANCER CLINIC. Please see a copy of my visit note below.    THORACIC SURGERY FOLLOW UP VISIT     Dear Dr. Gomez,  I saw Mr. Donald Jackson in follow-up today. The clinical summary follows:     DIAGNOSIS   Ventriculopleural shunt dislodgement    PROCEDURE   RIGHT intrapleural placement of  shunt (2/5/2024)  Revision due to dislodgement of the shunt into the soft tissues of the chest (3/19/2024)        INTERVAL STUDIES  US 7/30/2024 Fluid surrounding a  shunt catheter  CT chest 7/17/2024:   Soft tissue lesion surrounding the ventriculopleural tubing is unchanged compared to 6/18/2023 and may  represent a small postprocedural abscess or seroma (or CSF filled collection).       ETOH neg  TOB neg  BMI 41     SUBJECTIVE  Mr. Jackson has a spontaneously draining sinus tract in his anterior chest wall.     EXAM  /82 (BP Location: Right arm, Patient Position: Sitting, Cuff Size: Adult Large)   Pulse 75   Temp 98  F (36.7  C) (Oral)   Resp 18   Wt 127 kg (280 lb)   SpO2 99%   BMI 42.57 kg/m      Well-healed right lateral chest scar  draining sinus tract in his right anterior chest wall     From a personal perspective, he is a single father. His daughter is ~4 years old.        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 I reviewed the plan as follows:    His situation has been somewhat challenging, but after careful review of his CT and a discussion with Dr. Martinez, it appears that there is an anterior subcutaneous  non-functioning  shunt catheter associated with this infection. There are NO signs of infection around his RIGHT lateral  shunt, which is the functioning one.     I plan to do an incision and drainage of the anterior chest wall wound with excision of the sinus tract and resection of the remaining  shunt tubing.    All questions were answered and the patient and present family were in agreement with the plan.  I appreciate the opportunity to participate in the care of your patient and will keep you updated.  Sincerely,       Again, thank you for allowing me to participate in the care of your patient.        Sincerely,        Almas Noriega MD

## 2024-07-31 NOTE — PATIENT INSTRUCTIONS
Preparing for Your Surgery      Name:  Donald Jackson   MRN:  [id number]   :  1995   Today's Date:  2024       Arriving for surgery:  Surgery date:  2024  Arrival time:  11:15 am    Please come to:     Please come to:       [facility]    [address] [place] [place]  [address]     The [facility] [place] Patient/Visitor Ramp is at [address]. Patients and visitors who self-park will receive the reduced hospital parking rate. If the Patient /Visitor Ramp is full, please follow the signs to the [employer] car park located at the main hospital entrance.       parking is available (24 hours/ 7 days a week)      Discounted parking pass options are available for patients and visitors. They can be purchased at the [employer] desk at the main hospital entrance.     -    Stop at the security desk and they will direct surgery patients to the [facility] [phone]        - If you need directions, a wheelchair or an escort please stop at the Information/security desk in the lobby.     What can I eat or drink?  -  You may eat and drink normally up to 8 hours prior to arrival time. (Until 3 am)  -  You may have clear liquids until 2 hours prior to arrival time. (Until 9 am)    Examples of clear liquids:  Water  Clear broth  Juices (apple, white grape, white cranberry  and cider) without pulp  Noncarbonated, powder based beverages  (lemonade and Dell-Aid)  Sodas (Sprite, 7-Up, ginger ale and seltzer)  Coffee or tea (without milk or cream)  Gatorade    -  No Alcohol or cannabis products for at least 24 hours before surgery.     Which medicines can I take?    Hold Aspirin for 7 days before surgery.   Hold Multivitamins for 7 days before surgery.  Hold Supplements for 7 days before surgery. (Omega 3, Vitamin K 2)  Hold Ibuprofen (Advil, Motrin) for 1 day(s) before  surgery--unless otherwise directed by surgeon.  Hold Naproxen (Aleve) for 4 days before surgery.      -  PLEASE TAKE these medications the day of surgery:  NONE      How do I prepare myself?  - Please take 2 showers (one the night prior to surgery and one the morning of surgery) using Scrubcare or Hibiclens soap.    Use this soap only from the neck to your toes.     Leave the soap on your skin for one minute--then rinse thoroughly.      You may use your own shampoo and conditioner. No other hair products.   - Please remove all jewelry and body piercings.  - No lotions, deodorants or fragrance.  - No makeup or fingernail polish.   - Bring your ID and insurance card.    -If you use a CPAP machine, please bring the CPAP machine, tubing, and mask to hospital.    -If you have a Deep Brain Stimulator, Spinal Cord Stimulator, or any Neuro Stimulator device---you must bring the remote control to the hospital.      ALL PATIENTS GOING HOME THE SAME DAY OF SURGERY ARE REQUIRED TO HAVE A RESPONSIBLE ADULT TO DRIVE AND BE IN ATTENDANCE WITH THEM FOR 24 HOURS FOLLOWING SURGERY.    Covid testing policy as of 12/06/2022  Your surgeon will notify and schedule you for a COVID test if one is needed before surgery--please direct any questions or COVID symptoms to your surgeon      Questions or Concerns:    - For any questions regarding the day of surgery or your hospital stay, please contact the Pre Admission Nursing Office at [phone].       - If you have health changes between today and your surgery, please call your surgeon.       - For questions after surgery, please call your surgeons office.           Current Visitor Guidelines    You may have 2 visitors in the pre op area.    Visiting hours: 8 a.m. to 8:30 p.m.    Patients confirmed or suspected to have symptoms of COVID 19 or flu:     No visitors allowed for adult patients.   Children (under age 18) can have 1 named visitor.     People who are sick or showing symptoms of  COVID 19 or flu:    Are not allowed to visit patients--we can only make exceptions in special situations.       Please follow these guidelines for your visit:          Please maintain social distance          Masking is optional--however at times you may be asked to wear a mask for the safety of yourself and others     Clean your hands with alcohol hand . Do this when you arrive at and leave the building and patient room,    And again after you touch your mask or anything in the room.     Go directly to and from the room you are visiting.     Stay in the patient s room during your visit. Limit going to other places in the hospital as much as possible     Leave bags and jackets at home or in the car.     For everyone s health, please don t come and go during your visit. That includes for smoking   during your visit.

## 2024-07-31 NOTE — H&P
Pre-Operative H & P     CC:  Preoperative exam to assess for increased cardiopulmonary risk while undergoing surgery and anesthesia.    Date of Encounter: 7/31/2024  Primary Care Physician:  Roberto Gomez     Reason for visit:   Encounter Diagnosis   Name Primary?    Pre-op evaluation Yes       HPI  Donald Jackson is a 29 year old male who presents for pre-operative H & P in preparation for  Procedure Information       Case: [id number] Date/Time: 08/02/24 1320    Procedure: REMOVAL OF OLD VENTRICULOPERITONEAL SHUNT TUBING (Right: Neck)    Anesthesia type: General    Diagnosis: Chest wall abscess [L02.213]    Pre-op diagnosis: Chest wall abscess [L02.213]    Location:  OR  /  OR    Providers: Almas Noriega MD          Patient is being evaluated for comorbid conditions of hereditary coproporphyria, obesity, h/o retinal detachment, s/p  shunt     Mr. Jackson had an intra pleural placement of  shunt February 2024.  He then had a revision due to dislodgment of the shunt March 2024.  He was seen by Dr. Noriega today and was found to have a chest wall abscess.  He is now scheduled for removal of the  shunt as above.    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/1999    Hydrocephalus (H)     Lattice degeneration     Morbid obesity (H)     Other forms of retinal detachment(361.89)        Past Surgical History  Past Surgical History:   Procedure Laterality Date    IMPLANT SHUNT VENTRICULOPERITONEAL Right 03/19/2024    Procedure: REPOSITIONING OF VENTRICULO-PLEURAL SHUNT CATHETER;  Surgeon: Almas Noriega MD;  [facility]    LAPAROSCOPIC ASSISTED IMPLANT SHUNT VENTRICULOPERITONEAL Right 02/05/2024    Procedure: Implant Shunt Ventriculopleural;  Surgeon: Almas Noriega MD;  Location:  OR     LAPAROSCOPY DIAGNOSTIC (GENERAL) N/A 04/15/2024    Procedure: Laparoscopy diagnostic, abdominal washout, lysis of adhesion, removal of intra abdominal foreign body;  Surgeon: Ender Wright MD;  [facility]: [facility]    OPTICAL TRACKING SYSTEM IMPLANT SHUNT VENTRICULOPERITONEAL Right 02/05/2024    Procedure: RIGHT VENTRICULOPLEURAL Insertion;  Surgeon: Fabiola Martinez MD;  Location: [facility]    RETINAL REATTACHMENT  10/2010    left eye    RETINOPEXY LASER PROPHYLAXIS BREAK(S) OD (RIGHT EYE)  10/2011    strabismus surery      age 5    THORACOSCOPY Right 03/19/2024    Procedure: RIGHT thoracoscopy;  Surgeon: Almas Noriega MD;  Location: [facility]    TONSILLECTOMY      wisdom teeth         Prior to Admission Medications  Current Outpatient Medications   Medication Sig Dispense Refill    ferrous sulfate (FEROSUL) 325 (65 Fe) MG tablet Take 325 mg by mouth every evening      MAGNESIUM CITRATE PO Take 1-2 tablets by mouth every evening      Omega-3 Fatty Acids (FISH OIL PO) Take 2 capsules by mouth every evening      acetaminophen (TYLENOL) 325 MG tablet Take 2 tablets (650 mg) by mouth every 4 hours as needed for mild pain      ibuprofen (ADVIL/MOTRIN) 600 MG tablet Take 600 mg by mouth every 6 hours as needed for moderate pain         Allergies  Allergies   Allergen Reactions    Penicillins Hives and Unknown       Social History  Social History     Socioeconomic History    Marital status: Single     Spouse name: Not on file    Number of children: Not on file    Years of education: Not on file    Highest education level: Bachelor's degree (e.g., BA, AB, BS)   Occupational History    Occupation: Hotel    Tobacco Use    Smoking status: Never     Passive exposure: Never    Smokeless tobacco: Never   Vaping Use    Vaping status: Never Used   Substance and Sexual Activity    Alcohol use: No    Drug use: No    Sexual activity: Not Currently     Partners: Female   Other Topics Concern    Parent/sibling w/ CABG,  MI or angioplasty before 65F 55M? Not Asked   Social History Narrative    FAMILY INFORMATION     Date: May 23, 2008    Parent #1      Name: STEPHANIA FIGUEROA  Gender: MALE    : 1968     Education: DVM/MSC/PHD   Occupation: RESEARCH ASSOCIATE        Siblings:  HENRY CEJA        Relationship Status of Parent(s):     Who does the child live with? PARENTS    What language(s) is/are spoken at home? Japanese/ENGLISH            Lives alone.  Has a daughter 4-5 days per week.  Dad is in [place]. Mom is in [place]. Sister is in [place] in medical school.  Born in [place] came to [place] 1. Parents .    Has a good support system.    Feels safe in all environments.    Wears seatbelt 100% of the time    Denies history of abuse, past or present, physical, sexual or emotional.    Annalise Carreon PA-C    18                 Social Determinants of Health     Financial Resource Strain: Low Risk  (2024)    Financial Resource Strain     Within the past 12 months, have you or your family members you live with been unable to get utilities (heat, electricity) when it was really needed?: No   Food Insecurity: Low Risk  (2024)    Food Insecurity     Within the past 12 months, did you worry that your food would run out before you got money to buy more?: No     Within the past 12 months, did the food you bought just not last and you didn t have money to get more?: No   Transportation Needs: Low Risk  (2024)    Transportation Needs     Within the past 12 months, has lack of transportation kept you from medical appointments, getting your medicines, non-medical meetings or appointments, work, or from getting things that you need?: No   Physical Activity: Insufficiently Active (2024)    Exercise Vital Sign     Days of Exercise per Week: 1 day     Minutes of Exercise per Session: 30 min   Stress: No Stress Concern Present (2024)    [employer] - Occupational  Stress Questionnaire     Feeling of Stress : Not at all   Social Connections: Unknown (2/27/2024)    Social Connection and Isolation Panel [NHANES]     Frequency of Communication with Friends and Family: Not on file     Frequency of Social Gatherings with Friends and Family: Never     Attends [employer] Services: Not on file     Active Member of Clubs or Organizations: Not on file     Attends Club or Organization Meetings: Not on file     Marital Status: Not on file   Interpersonal Safety: Low Risk  (1/5/2024)    Interpersonal Safety     Do you feel physically and emotionally safe where you currently live?: Yes     Within the past 12 months, have you been hit, slapped, kicked or otherwise physically hurt by someone?: No     Within the past 12 months, have you been humiliated or emotionally abused in other ways by your partner or ex-partner?: No   Housing Stability: Low Risk  (2/27/2024)    Housing Stability     Do you have housing? : Yes     Are you worried about losing your housing?: No       Family History  Family History   Problem Relation Age of Onset    Diabetes Father         Type 2     Diabetes Paternal Grandfather         Type 2     Retinal detachment No family hx of     Amblyopia No family hx of     Glaucoma No family hx of     Macular Degeneration No family hx of     Anesthesia Reaction No family hx of     Deep Vein Thrombosis (DVT) No family hx of        Review of Systems  The complete review of systems is negative other than noted in the HPI or here.   Anesthesia Evaluation   Pt has had prior anesthetic. Type: General.    History of anesthetic complications  - .  patient reports he was told after his 12/5/23 procedure he woke up aggressive.    ROS/MED HX  ENT/Pulmonary:    (-) tobacco use   Neurologic: Comment: S/p  shunt, now with abscess    (-) no seizures and no CVA   Cardiovascular:     (+)  - -   -  - -                                 Previous cardiac testing   Echo: Date: Results:    Stress Test:   Date: Results:    ECG Reviewed:  Date: 5/2024 Results:  Sinus rhythm   Minimal voltage criteria for LVH, may be normal variant   Borderline ECG     Cath:  Date: Results:   (-) taking anticoagulants/antiplatelets   METS/Exercise Tolerance: >4 METS Comment: Going to gym 5 days weekly, elliptical, walking on incline   Hematologic:  - neg hematologic  ROS  (-) history of blood clots and history of blood transfusion   Musculoskeletal:  - neg musculoskeletal ROS     GI/Hepatic:    (-) GERD   Renal/Genitourinary:  - neg Renal ROS     Endo:     (+)               Obesity,    (-) chronic steroid usage   Psychiatric/Substance Use:  - neg psychiatric ROS     Infectious Disease: Comment: Current abscess       Malignancy:  - neg malignancy ROS     Other:  - neg other ROS          Virtual visit -  No vitals were obtained    Physical Exam  Constitutional: Awake, alert, cooperative, no apparent distress, and appears stated age.  HENT: Normocephalic  Respiratory: non labored breathing   Neurologic: Awake, alert, oriented to name, place and time.   Neuropsychiatric: Calm, cooperative. Normal affect.      Prior Labs/Diagnostic Studies   All labs and imaging personally reviewed     EKG/ stress test - if available please see in ROS above   No results found.       No data to display                  The patient's records and results personally reviewed by this provider.     Outside records reviewed from: Care Everywhere      Assessment    Donald Jackson is a 29 year old male seen as a PAC referral for risk assessment and optimization for anesthesia.    Plan/Recommendations  Pt will be optimized for the proposed procedure.  See below for details on the assessment, risk, and preoperative recommendations    NEUROLOGY  H/o hydrocephalus s/p  shunt. Now with multiple revisions over the past 6 months. Current abscess with the above procedure planned   -Post Op delirium risk factors:  No risk identified    ENT  - No current airway concerns.   "Will need to be reassessed day of surgery.  Mallampati: Unable to assess  TM: Unable to assess    CARDIAC  - No history of CAD, Hypertension, and Afib  - METS (Metabolic Equivalents)  Patient performs 4 or more METS exercise without symptoms             Total Score: 0      RCRI-Low risk: Class 2 0.9% complication rate             Total Score: 1    RCRI: High Risk Surgery        PULMONARY  SAVANNA Low Risk             Total Score: 2    SAVANNA: BMI over 35 kg/m2    SAVANNA: Male      - Denies asthma or inhaler use  - Tobacco History    History   Smoking Status    Never   Smokeless Tobacco    Never       GI  PONV Medium Risk  Total Score: 2           1 AN PONV: Patient is not a current smoker    1 AN PONV: Intended Post Op Opioids        /RENAL  - Baseline Creatinine  WNL    ENDOCRINE    - BMI: Estimated body mass index is 41.05 kg/m  as calculated from the following:    Height as of this encounter: 1.727 m (5' 8\").    Weight as of this encounter: 122.5 kg (270 lb).  Class 3 Obesity (BMI > 40)  - No history of Diabetes Mellitus    HEME  VTE Low Risk 0.5%             Total Score: 3    VTE: BMI greater than 39    VTE: Male      - No history of abnormal bleeding or antiplatelet use.  -Surgery is in 2 days. Patient is unable to get to the clinic tomorrow to update T&S, so this will need to be updated DOS      Different anesthesia methods/types have been discussed with the patient, but they are aware that the final plan will be decided by the assigned anesthesia provider on the date of service.    The patient is optimized for their procedure. AVS with information on surgery time/arrival time, meds and NPO status given by nursing staff. No further diagnostic testing indicated.    Please refer to the physical examination documented by the anesthesiologist in the anesthesia record on the day of surgery.    Video-Visit Details    Type of service:  Video Visit    Provider received verbal consent for a Video Visit from the patient? Yes "     Originating Location (pt. Location): Home    Distant Location (provider location):  Off-site  Mode of Communication:  Video Conference via [email or web address]  On the day of service:     Prep time: 3 minutes  Visit time: 9 minutes  Documentation time: 3 minutes  ------------------------------------------  Total time: 15 minutes      Pallavi Moreland PA-C  Preoperative Assessment Center  [facility]  [facility]  Phone: [phone]  Fax: [phone]

## 2024-07-31 NOTE — PROGRESS NOTES
Donald is a 29 year old who is being evaluated via a billable video visit.    How would you like to obtain your AVS? MyChart  If the video visit is dropped, the invitation should be resent by: Text to cell phone: [phone]    Subjective   Donald is a 29 year old, presenting for the following health issues:  Pre-Op Exam      HPI           Physical Exam

## 2024-07-31 NOTE — PROGRESS NOTES
THORACIC SURGERY FOLLOW UP VISIT     Dear Dr. Gomez,  I saw Mr. Donald Jackson in follow-up today. The clinical summary follows:     DIAGNOSIS   Ventriculopleural shunt dislodgement    PROCEDURE   RIGHT intrapleural placement of  shunt (2/5/2024)  Revision due to dislodgement of the shunt into the soft tissues of the chest (3/19/2024)        INTERVAL STUDIES  US 7/30/2024 Fluid surrounding a  shunt catheter  CT chest 7/17/2024:   Soft tissue lesion surrounding the ventriculopleural tubing is unchanged compared to 6/18/2023 and may  represent a small postprocedural abscess or seroma (or CSF filled collection).       ETOH neg  TOB neg  BMI 41     SUBJECTIVE  Mr. Jackson has a spontaneously draining sinus tract in his anterior chest wall.     EXAM  /82 (BP Location: Right arm, Patient Position: Sitting, Cuff Size: Adult Large)   Pulse 75   Temp 98  F (36.7  C) (Oral)   Resp 18   Wt 127 kg (280 lb)   SpO2 99%   BMI 42.57 kg/m      Well-healed right lateral chest scar  draining sinus tract in his right anterior chest wall     From a personal perspective, he is a single father. His daughter is ~4 years old.        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 I reviewed the plan as follows:    His situation has been somewhat challenging, but after careful review of his CT and a discussion with Dr. Martinez, it appears that there is an anterior subcutaneous non-functioning  shunt catheter associated with this infection. There are NO signs of infection around his RIGHT lateral  shunt, which is the functioning one.     I plan to do an incision and drainage of the anterior chest wall wound with excision  of the sinus tract and resection of the remaining  shunt tubing.    All questions were answered and the patient and present family were in agreement with the plan.  I appreciate the opportunity to participate in the care of your patient and will keep you updated.  Sincerely,

## 2024-08-01 ENCOUNTER — PATIENT OUTREACH (OUTPATIENT)
Dept: SURGERY | Facility: CLINIC | Age: 29
End: 2024-08-01
Payer: COMMERCIAL

## 2024-08-01 ASSESSMENT — ENCOUNTER SYMPTOMS: SEIZURES: 0

## 2024-08-01 ASSESSMENT — LIFESTYLE VARIABLES: TOBACCO_USE: 0

## 2024-08-01 NOTE — TELEPHONE ENCOUNTER
Patient scheduled for REMOVAL OF OLD VENTRICULOPERITONEAL SHUNT TUBING with Dr Noriega on 8/2/2024.     Called to review pre-op Education.  Medications reviewed. Patient instructed to stop aspirin, ibuprofen, naproxen, NSAIDS, fish oil/flax seed oil, Vit E immediately.  NPO guidelines and showering instructions reviewed. Patient is aware he is scheduled for a same day surgery but may require an overnoc hospital stay. Aware that he will need a  after hospital discharge and a responsible caregiver to stay with them for 24 hours afterwards. The patient was instructed to notify the provider if there are any signs of a cold/flu prior to surgery.  The patient was instructed to avoid smoking tobacco/marijuana, vaping, chewing tobacco, or drinking alcohol prior to surgery.  Patient is aware she needs to remain well hydrated and eat a protein-rich diet post op.     Patient saw PAC yesterday for pre-op H&P. No additional questions at this time.     Needing a document for work regarding yesterday's clinic visit and surgery Friday. Will clarify post-op restrictions with Dr Noriega.    Patient requested that if he ends up having to stay over night in the hospital, if he could please be put in a room with working A/C... verbalized that last time the A/C wasn't working in the room he was in and it was 85 degrees. Writer informed patient that the information will be passed on to the inpatient surgery team.    Patient verbalized understanding and in agreement with plan.      Patient has writer's contact information and will reach out to care coordinator with additional questions or concerns.  The hospital arrival time and estimated time for procedure was reviewed with patient.       Patient appreciated writer's call.     Luisa Bridges RN, BSN  Thoracic Surgery RN Care Coordinator

## 2024-08-01 NOTE — TELEPHONE ENCOUNTER
Called and spoke with pt to let him know surgery arrival/start time has changed and he is to now arrive by 7:40am . Pt understood and agreed.     Leona Cabrera on 8/1/2024 at 1:42 PM

## 2024-08-01 NOTE — ANESTHESIA PREPROCEDURE EVALUATION
Pre-Operative H & P     CC:  Preoperative exam to assess for increased cardiopulmonary risk while undergoing surgery and anesthesia.    Date of Encounter: 7/31/2024  Primary Care Physician:  Roberto Gomez     Reason for visit:   No diagnosis found.      HPI  Donald Jackson is a 29 year old male who presents for pre-operative H & P in preparation for  Procedure Information       Case: [id number] Date/Time: 08/02/24 0940    Procedure: REMOVAL OF OLD VENTRICULOPERITONEAL SHUNT TUBING (Right: Neck)    Anesthesia type: General    Diagnosis: Chest wall abscess [L02.213]    Pre-op diagnosis: Chest wall abscess [L02.213]    [facility]    Providers: Almas Noriega MD          Patient is being evaluated for comorbid conditions of hereditary coproporphyria, obesity, h/o retinal detachment, s/p  shunt     Mr. Jackson had an intra pleural placement of  shunt February 2024.  He then had a revision due to dislodgment of the shunt March 2024.  He was seen by Dr. Noriega today and was found to have a chest wall abscess.  He is now scheduled for removal of the  shunt as above.    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/1999    Hydrocephalus (H)     Lattice degeneration     Morbid obesity (H)     Other forms of retinal detachment(361.89)        Past Surgical History  Past Surgical History:   Procedure Laterality Date    APPENDECTOMY      IMPLANT SHUNT VENTRICULOPERITONEAL Right 03/19/2024    Procedure: REPOSITIONING OF VENTRICULO-PLEURAL SHUNT CATHETER;  Surgeon: Almas Noriega MD;  [facility]    LAPAROSCOPIC ASSISTED IMPLANT SHUNT VENTRICULOPERITONEAL Right 02/05/2024    Procedure: Implant Shunt Ventriculopleural;  Surgeon: Almas Noriega MD;  [facility]    LAPAROSCOPY DIAGNOSTIC (GENERAL)  N/A 04/15/2024    Procedure: Laparoscopy diagnostic, abdominal washout, lysis of adhesion, removal of intra abdominal foreign body;  Surgeon: Ender Wright MD;  [facility]    OPTICAL TRACKING SYSTEM IMPLANT SHUNT VENTRICULOPERITONEAL Right 2024    Procedure: RIGHT VENTRICULOPLEURAL Insertion;  Surgeon: Fabiola Martinez MD;  [facility]    RETINAL REATTACHMENT  10/2010    left eye    RETINOPEXY LASER PROPHYLAXIS BREAK(S) OD (RIGHT EYE)  10/2011    strabismus surery      age 5    THORACOSCOPY Right 2024    Procedure: RIGHT thoracoscopy;  Surgeon: Almas Noriega MD;  [facility]    TONSILLECTOMY      wisdom teeth         Prior to Admission Medications  Current Outpatient Medications   Medication Sig Dispense Refill    ferrous sulfate (FEROSUL) 325 (65 Fe) MG tablet Take 325 mg by mouth every evening      MAGNESIUM CITRATE PO Take 1-2 tablets by mouth every evening      Omega-3 Fatty Acids (FISH OIL PO) Take 2 capsules by mouth every evening      OVER-THE-COUNTER Take 1 capsule by mouth every evening VITAMIN K 2         Allergies  Allergies   Allergen Reactions    Penicillins Hives and Unknown       Social History  Social History     Socioeconomic History    Marital status: Single     Spouse name: Not on file    Number of children: Not on file    Years of education: Not on file    Highest education level: Bachelor's degree (e.g., BA, AB, BS)   Occupational History    Occupation: [employer]    Tobacco Use    Smoking status: Never     Passive exposure: Never    Smokeless tobacco: Never   Vaping Use    Vaping status: Never Used   Substance and Sexual Activity    Alcohol use: No    Drug use: No    Sexual activity: Not Currently     Partners: Female   Other Topics Concern    Parent/sibling w/ CABG, MI or angioplasty before 65F 55M? Not Asked   Social History Narrative    FAMILY INFORMATION     Date: May 23, 2008    Parent #1      Name: STEPHANIA FIGUEROA  Gender: MALE    :  12/03/1968     Education: DVM/MSC/PHD   Occupation: RESEARCH ASSOCIATE        Siblings:  HENRY CEJA        Relationship Status of Parent(s):     Who does the child live with? PARENTS    What language(s) is/are spoken at home? Romansh/ENGLISH            Lives alone.  Has a daughter 4-5 days per week.  Dad is in [place]. Mom is in [place]. Sister is in [place] in medical school.  Born in [place] came to [place] 1. Parents .    Has a good support system.    Feels safe in all environments.    Wears seatbelt 100% of the time    Denies history of abuse, past or present, physical, sexual or emotional.    Annalise Carreon PA-C    06/26/19                 Social Determinants of Health     Financial Resource Strain: Low Risk  (2/27/2024)    Financial Resource Strain     Within the past 12 months, have you or your family members you live with been unable to get utilities (heat, electricity) when it was really needed?: No   Food Insecurity: Low Risk  (2/27/2024)    Food Insecurity     Within the past 12 months, did you worry that your food would run out before you got money to buy more?: No     Within the past 12 months, did the food you bought just not last and you didn t have money to get more?: No   Transportation Needs: Low Risk  (2/27/2024)    Transportation Needs     Within the past 12 months, has lack of transportation kept you from medical appointments, getting your medicines, non-medical meetings or appointments, work, or from getting things that you need?: No   Physical Activity: Insufficiently Active (2/27/2024)    Exercise Vital Sign     Days of Exercise per Week: 1 day     Minutes of Exercise per Session: 30 min   Stress: No Stress Concern Present (2/27/2024)    [place] [employer] - Occupational Stress Questionnaire     Feeling of Stress : Not at all   Social Connections: Unknown (2/27/2024)    Social Connection and Isolation Panel [NHANES]     Frequency of Communication  with Friends and Family: Not on file     Frequency of Social Gatherings with Friends and Family: Never     Attends Moravian Services: Not on file     Active Member of Clubs or Organizations: Not on file     Attends Club or Organization Meetings: Not on file     Marital Status: Not on file   Interpersonal Safety: Low Risk  (1/5/2024)    Interpersonal Safety     Do you feel physically and emotionally safe where you currently live?: Yes     Within the past 12 months, have you been hit, slapped, kicked or otherwise physically hurt by someone?: No     Within the past 12 months, have you been humiliated or emotionally abused in other ways by your partner or ex-partner?: No   Housing Stability: Low Risk  (2/27/2024)    Housing Stability     Do you have housing? : Yes     Are you worried about losing your housing?: No       Family History  Family History   Problem Relation Age of Onset    Diabetes Father         Type 2     Diabetes Paternal Grandfather         Type 2     Retinal detachment No family hx of     Amblyopia No family hx of     Glaucoma No family hx of     Macular Degeneration No family hx of     Anesthesia Reaction No family hx of     Deep Vein Thrombosis (DVT) No family hx of        Review of Systems  The complete review of systems is negative other than noted in the HPI or here.   Anesthesia Evaluation   Pt has had prior anesthetic. Type: General.    History of anesthetic complications  - .  patient reports he was told after his 12/5/23 procedure he woke up aggressive.    ROS/MED HX  ENT/Pulmonary:    (-) tobacco use   Neurologic: Comment: S/p  shunt, now with abscess    (-) no seizures and no CVA   Cardiovascular:     (+)  - -   -  - -                                 Previous cardiac testing   Echo: Date: Results:    Stress Test:  Date: Results:    ECG Reviewed:  Date: 5/2024 Results:  Sinus rhythm   Minimal voltage criteria for LVH, may be normal variant   Borderline ECG     Cath:  Date: Results:   (-)  taking anticoagulants/antiplatelets   METS/Exercise Tolerance: >4 METS Comment: Going to gym 5 days weekly, elliptical, walking on incline   Hematologic:  - neg hematologic  ROS  (-) history of blood clots and history of blood transfusion   Musculoskeletal:  - neg musculoskeletal ROS     GI/Hepatic:    (-) GERD   Renal/Genitourinary:  - neg Renal ROS     Endo:     (+)               Obesity,    (-) chronic steroid usage   Psychiatric/Substance Use:  - neg psychiatric ROS     Infectious Disease: Comment: Current abscess       Malignancy:  - neg malignancy ROS     Other:  - neg other ROS          Virtual visit -  No vitals were obtained    Physical Exam  Constitutional: Awake, alert, cooperative, no apparent distress, and appears stated age.  HENT: Normocephalic  Respiratory: non labored breathing   Neurologic: Awake, alert, oriented to name, place and time.   Neuropsychiatric: Calm, cooperative. Normal affect.      Prior Labs/Diagnostic Studies   All labs and imaging personally reviewed     EKG/ stress test - if available please see in ROS above   No results found.       No data to display                  The patient's records and results personally reviewed by this provider.     Outside records reviewed from: Care Everywhere      Assessment    Donald Jackson is a 29 year old male seen as a PAC referral for risk assessment and optimization for anesthesia.    Plan/Recommendations  Pt will be optimized for the proposed procedure.  See below for details on the assessment, risk, and preoperative recommendations    NEUROLOGY  H/o hydrocephalus s/p  shunt. Now with multiple revisions over the past 6 months. Current abscess with the above procedure planned   -Post Op delirium risk factors:  No risk identified    ENT  - No current airway concerns.  Will need to be reassessed day of surgery.  Mallampati: Unable to assess  TM: Unable to assess    CARDIAC  - No history of CAD, Hypertension, and Afib  - METS (Metabolic  "Equivalents)  Patient performs 4 or more METS exercise without symptoms             Total Score: 0      RCRI-Low risk: Class 2 0.9% complication rate             Total Score: 1    RCRI: High Risk Surgery        PULMONARY  SAVANNA Low Risk             Total Score: 2    SAVANNA: BMI over 35 kg/m2    SAVANNA: Male      - Denies asthma or inhaler use  - Tobacco History    History   Smoking Status    Never   Smokeless Tobacco    Never       GI  PONV Medium Risk  Total Score: 2           1 AN PONV: Patient is not a current smoker    1 AN PONV: Intended Post Op Opioids        /RENAL  - Baseline Creatinine  WNL    ENDOCRINE    - BMI: Estimated body mass index is 41.05 kg/m  as calculated from the following:    Height as of 7/31/24: 1.727 m (5' 8\").    Weight as of 7/31/24: 122.5 kg (270 lb).  Class 3 Obesity (BMI > 40)  - No history of Diabetes Mellitus    HEME  VTE Low Risk 0.5%             Total Score: 3    VTE: BMI greater than 39    VTE: Male      - No history of abnormal bleeding or antiplatelet use.  -Surgery is in 2 days. Patient is unable to get to the clinic tomorrow to update T&S, so this will need to be updated DOS      Different anesthesia methods/types have been discussed with the patient, but they are aware that the final plan will be decided by the assigned anesthesia provider on the date of service.    The patient is optimized for their procedure. AVS with information on surgery time/arrival time, meds and NPO status given by nursing staff. No further diagnostic testing indicated.    Please refer to the physical examination documented by the anesthesiologist in the anesthesia record on the day of surgery.    Video-Visit Details    Type of service:  Video Visit    Provider received verbal consent for a Video Visit from the patient? Yes     Originating Location (pt. Location): Home    Distant Location (provider location):  Off-site  Mode of Communication:  Video Conference via [employer]  On the day of service:     Prep " time: 3 minutes  Visit time: 9 minutes  Documentation time: 3 minutes  ------------------------------------------  Total time: 15 minutes      Pallavi Moreland PA-C  Preoperative Assessment Center  [facility]  Phone: [phone]  Fax: [phone]    Physical Exam    Airway        Mallampati: II   TM distance: > 3 FB   Neck ROM: full   Mouth opening: > 3 cm    Respiratory Devices and Support         Dental       (+) Minor Abnormalities - some fillings, tiny chips      Cardiovascular          Rhythm and rate: regular and normal     Pulmonary           breath sounds clear to auscultation           Anesthesia Plan    ASA Status:  3    NPO Status:  NPO Appropriate    Anesthesia Type: General.     - Airway: ETT   Induction: Intravenous, Propofol.   Maintenance: Balanced.   Techniques and Equipment:     - Airway: Shoulder Carlene/Ramp     - Lines/Monitors: 2nd IV     - Drips/Meds: Dexmed. infusion     Consents    Anesthesia Plan(s) and associated risks, benefits, and realistic alternatives discussed. Questions answered and patient/representative(s) expressed understanding.     - Discussed:     - Discussed with:  Patient      - Extended Intubation/Ventilatory Support Discussed: No.      - Patient is DNR/DNI Status: No     Use of blood products discussed: No .     Postoperative Care    Pain management: Multi-modal analgesia, IV analgesics, Oral pain medications.   PONV prophylaxis: Ondansetron (or other 5HT-3), Dexamethasone or Solumedrol     Comments:

## 2024-08-02 ENCOUNTER — HOSPITAL ENCOUNTER (OUTPATIENT)
Facility: CLINIC | Age: 29
Discharge: HOME | End: 2024-08-02
Attending: THORACIC SURGERY (CARDIOTHORACIC VASCULAR SURGERY) | Admitting: THORACIC SURGERY (CARDIOTHORACIC VASCULAR SURGERY)
Payer: COMMERCIAL

## 2024-08-02 ENCOUNTER — APPOINTMENT (OUTPATIENT)
Dept: GENERAL RADIOLOGY | Facility: CLINIC | Age: 29
End: 2024-08-02
Attending: PHYSICIAN ASSISTANT
Payer: COMMERCIAL

## 2024-08-02 ENCOUNTER — ANESTHESIA (OUTPATIENT)
Dept: SURGERY | Facility: CLINIC | Age: 29
End: 2024-08-02
Payer: COMMERCIAL

## 2024-08-02 VITALS
OXYGEN SATURATION: 98 % | TEMPERATURE: 98 F | RESPIRATION RATE: 16 BRPM | SYSTOLIC BLOOD PRESSURE: 96 MMHG | BODY MASS INDEX: 42.43 KG/M2 | HEART RATE: 59 BPM | DIASTOLIC BLOOD PRESSURE: 49 MMHG | WEIGHT: 279.98 LBS | HEIGHT: 68 IN

## 2024-08-02 DIAGNOSIS — T85.618A SHUNT MALFUNCTION, INITIAL ENCOUNTER: Primary | ICD-10-CM

## 2024-08-02 LAB
ABO/RH(D): NORMAL
ANTIBODY SCREEN: NEGATIVE
BACTERIA SPEC CULT: NORMAL
GRAM STAIN RESULT: NORMAL
GRAM STAIN RESULT: NORMAL
SPECIMEN EXPIRATION DATE: NORMAL

## 2024-08-02 PROCEDURE — 250N000011 HC RX IP 250 OP 636: Performed by: NURSE ANESTHETIST, CERTIFIED REGISTERED

## 2024-08-02 PROCEDURE — 86900 BLOOD TYPING SEROLOGIC ABO: CPT | Performed by: THORACIC SURGERY (CARDIOTHORACIC VASCULAR SURGERY)

## 2024-08-02 PROCEDURE — 250N000025 HC SEVOFLURANE, PER MIN: Performed by: THORACIC SURGERY (CARDIOTHORACIC VASCULAR SURGERY)

## 2024-08-02 PROCEDURE — 87205 SMEAR GRAM STAIN: CPT | Performed by: THORACIC SURGERY (CARDIOTHORACIC VASCULAR SURGERY)

## 2024-08-02 PROCEDURE — 21501 I&D DP ABSC/HMTMA SFT TS NCK: CPT | Performed by: NURSE ANESTHETIST, CERTIFIED REGISTERED

## 2024-08-02 PROCEDURE — 250N000013 HC RX MED GY IP 250 OP 250 PS 637: Performed by: THORACIC SURGERY (CARDIOTHORACIC VASCULAR SURGERY)

## 2024-08-02 PROCEDURE — 71045 X-RAY EXAM CHEST 1 VIEW: CPT | Mod: 26 | Performed by: STUDENT IN AN ORGANIZED HEALTH CARE EDUCATION/TRAINING PROGRAM

## 2024-08-02 PROCEDURE — 999N000065 XR CHEST PORT 1 VIEW

## 2024-08-02 PROCEDURE — 710N000011 HC RECOVERY PHASE 1, LEVEL 3, PER MIN: Performed by: THORACIC SURGERY (CARDIOTHORACIC VASCULAR SURGERY)

## 2024-08-02 PROCEDURE — 360N000075 HC SURGERY LEVEL 2, PER MIN: Performed by: THORACIC SURGERY (CARDIOTHORACIC VASCULAR SURGERY)

## 2024-08-02 PROCEDURE — 258N000003 HC RX IP 258 OP 636: Performed by: NURSE ANESTHETIST, CERTIFIED REGISTERED

## 2024-08-02 PROCEDURE — 710N000012 HC RECOVERY PHASE 2, PER MINUTE: Performed by: THORACIC SURGERY (CARDIOTHORACIC VASCULAR SURGERY)

## 2024-08-02 PROCEDURE — 272N000001 HC OR GENERAL SUPPLY STERILE: Performed by: THORACIC SURGERY (CARDIOTHORACIC VASCULAR SURGERY)

## 2024-08-02 PROCEDURE — 999N000248 HC STATISTIC IV INSERT WITH US BY RN

## 2024-08-02 PROCEDURE — 250N000011 HC RX IP 250 OP 636: Performed by: ANESTHESIOLOGY

## 2024-08-02 PROCEDURE — 87102 FUNGUS ISOLATION CULTURE: CPT | Performed by: THORACIC SURGERY (CARDIOTHORACIC VASCULAR SURGERY)

## 2024-08-02 PROCEDURE — 87070 CULTURE OTHR SPECIMN AEROBIC: CPT | Performed by: THORACIC SURGERY (CARDIOTHORACIC VASCULAR SURGERY)

## 2024-08-02 PROCEDURE — 250N000009 HC RX 250: Performed by: NURSE ANESTHETIST, CERTIFIED REGISTERED

## 2024-08-02 PROCEDURE — 36415 COLL VENOUS BLD VENIPUNCTURE: CPT | Performed by: THORACIC SURGERY (CARDIOTHORACIC VASCULAR SURGERY)

## 2024-08-02 PROCEDURE — 370N000017 HC ANESTHESIA TECHNICAL FEE, PER MIN: Performed by: THORACIC SURGERY (CARDIOTHORACIC VASCULAR SURGERY)

## 2024-08-02 PROCEDURE — 21501 I&D DP ABSC/HMTMA SFT TS NCK: CPT | Performed by: THORACIC SURGERY (CARDIOTHORACIC VASCULAR SURGERY)

## 2024-08-02 PROCEDURE — 999N000141 HC STATISTIC PRE-PROCEDURE NURSING ASSESSMENT: Performed by: THORACIC SURGERY (CARDIOTHORACIC VASCULAR SURGERY)

## 2024-08-02 PROCEDURE — 21501 I&D DP ABSC/HMTMA SFT TS NCK: CPT | Performed by: ANESTHESIOLOGY

## 2024-08-02 PROCEDURE — 250N000011 HC RX IP 250 OP 636: Performed by: THORACIC SURGERY (CARDIOTHORACIC VASCULAR SURGERY)

## 2024-08-02 RX ORDER — HALOPERIDOL 5 MG/ML
1 INJECTION INTRAMUSCULAR
Status: DISCONTINUED | OUTPATIENT
Start: 2024-08-02 | End: 2024-08-02 | Stop reason: HOSPADM

## 2024-08-02 RX ORDER — DEXAMETHASONE SODIUM PHOSPHATE 4 MG/ML
4 INJECTION, SOLUTION INTRA-ARTICULAR; INTRALESIONAL; INTRAMUSCULAR; INTRAVENOUS; SOFT TISSUE
Status: DISCONTINUED | OUTPATIENT
Start: 2024-08-02 | End: 2024-08-02 | Stop reason: HOSPADM

## 2024-08-02 RX ORDER — OXYCODONE HYDROCHLORIDE 5 MG/1
5 TABLET ORAL EVERY 4 HOURS PRN
Status: CANCELLED | OUTPATIENT
Start: 2024-08-02

## 2024-08-02 RX ORDER — OXYCODONE HYDROCHLORIDE 5 MG/1
5 TABLET ORAL EVERY 4 HOURS PRN
Qty: 20 TABLET | Refills: 0 | Status: SHIPPED | OUTPATIENT
Start: 2024-08-02 | End: 2024-08-08

## 2024-08-02 RX ORDER — ENOXAPARIN SODIUM 100 MG/ML
40 INJECTION SUBCUTANEOUS EVERY 24 HOURS
Status: CANCELLED | OUTPATIENT
Start: 2024-08-03

## 2024-08-02 RX ORDER — SODIUM CHLORIDE, SODIUM LACTATE, POTASSIUM CHLORIDE, CALCIUM CHLORIDE 600; 310; 30; 20 MG/100ML; MG/100ML; MG/100ML; MG/100ML
INJECTION, SOLUTION INTRAVENOUS CONTINUOUS PRN
Status: DISCONTINUED | OUTPATIENT
Start: 2024-08-02 | End: 2024-08-02

## 2024-08-02 RX ORDER — PIPERACILLIN SODIUM, TAZOBACTAM SODIUM 3; .375 G/15ML; G/15ML
INJECTION, POWDER, LYOPHILIZED, FOR SOLUTION INTRAVENOUS PRN
Status: DISCONTINUED | OUTPATIENT
Start: 2024-08-02 | End: 2024-08-02

## 2024-08-02 RX ORDER — METHOCARBAMOL 750 MG/1
750 TABLET, FILM COATED ORAL EVERY 6 HOURS PRN
Status: CANCELLED | OUTPATIENT
Start: 2024-08-02

## 2024-08-02 RX ORDER — HYDROMORPHONE HCL IN WATER/PF 6 MG/30 ML
0.4 PATIENT CONTROLLED ANALGESIA SYRINGE INTRAVENOUS EVERY 5 MIN PRN
Status: DISCONTINUED | OUTPATIENT
Start: 2024-08-02 | End: 2024-08-02 | Stop reason: HOSPADM

## 2024-08-02 RX ORDER — ENOXAPARIN SODIUM 100 MG/ML
40 INJECTION SUBCUTANEOUS
Status: COMPLETED | OUTPATIENT
Start: 2024-08-02 | End: 2024-08-02

## 2024-08-02 RX ORDER — ONDANSETRON 4 MG/1
4 TABLET, ORALLY DISINTEGRATING ORAL EVERY 30 MIN PRN
Status: DISCONTINUED | OUTPATIENT
Start: 2024-08-02 | End: 2024-08-02 | Stop reason: HOSPADM

## 2024-08-02 RX ORDER — HYDROMORPHONE HCL IN WATER/PF 6 MG/30 ML
0.2 PATIENT CONTROLLED ANALGESIA SYRINGE INTRAVENOUS
Status: CANCELLED | OUTPATIENT
Start: 2024-08-02

## 2024-08-02 RX ORDER — OXYCODONE HYDROCHLORIDE 10 MG/1
10 TABLET ORAL EVERY 4 HOURS PRN
Status: CANCELLED | OUTPATIENT
Start: 2024-08-02

## 2024-08-02 RX ORDER — ONDANSETRON 2 MG/ML
INJECTION INTRAMUSCULAR; INTRAVENOUS PRN
Status: DISCONTINUED | OUTPATIENT
Start: 2024-08-02 | End: 2024-08-02

## 2024-08-02 RX ORDER — LABETALOL HYDROCHLORIDE 5 MG/ML
10 INJECTION, SOLUTION INTRAVENOUS
Status: DISCONTINUED | OUTPATIENT
Start: 2024-08-02 | End: 2024-08-02 | Stop reason: HOSPADM

## 2024-08-02 RX ORDER — POLYETHYLENE GLYCOL 3350 17 G/17G
17 POWDER, FOR SOLUTION ORAL DAILY
Status: CANCELLED | OUTPATIENT
Start: 2024-08-03

## 2024-08-02 RX ORDER — DEXMEDETOMIDINE HYDROCHLORIDE 4 UG/ML
INJECTION, SOLUTION INTRAVENOUS CONTINUOUS PRN
Status: DISCONTINUED | OUTPATIENT
Start: 2024-08-02 | End: 2024-08-02

## 2024-08-02 RX ORDER — OXYCODONE HYDROCHLORIDE 5 MG/1
5 TABLET ORAL
Status: DISCONTINUED | OUTPATIENT
Start: 2024-08-02 | End: 2024-08-02 | Stop reason: HOSPADM

## 2024-08-02 RX ORDER — DEXMEDETOMIDINE HYDROCHLORIDE 4 UG/ML
.1-1.2 INJECTION, SOLUTION INTRAVENOUS CONTINUOUS
Status: DISCONTINUED | OUTPATIENT
Start: 2024-08-02 | End: 2024-08-02 | Stop reason: HOSPADM

## 2024-08-02 RX ORDER — PANTOPRAZOLE SODIUM 40 MG/1
40 TABLET, DELAYED RELEASE ORAL DAILY
Status: CANCELLED | OUTPATIENT
Start: 2024-08-02

## 2024-08-02 RX ORDER — GLYCOPYRROLATE 0.2 MG/ML
INJECTION, SOLUTION INTRAMUSCULAR; INTRAVENOUS PRN
Status: DISCONTINUED | OUTPATIENT
Start: 2024-08-02 | End: 2024-08-02

## 2024-08-02 RX ORDER — IBUPROFEN 600 MG/1
600 TABLET, FILM COATED ORAL EVERY 6 HOURS PRN
COMMUNITY
Start: 2024-08-02

## 2024-08-02 RX ORDER — FENTANYL CITRATE 50 UG/ML
50 INJECTION, SOLUTION INTRAMUSCULAR; INTRAVENOUS EVERY 5 MIN PRN
Status: DISCONTINUED | OUTPATIENT
Start: 2024-08-02 | End: 2024-08-02 | Stop reason: HOSPADM

## 2024-08-02 RX ORDER — PROPOFOL 10 MG/ML
INJECTION, EMULSION INTRAVENOUS PRN
Status: DISCONTINUED | OUTPATIENT
Start: 2024-08-02 | End: 2024-08-02

## 2024-08-02 RX ORDER — AMOXICILLIN 250 MG
1 CAPSULE ORAL 2 TIMES DAILY
Status: CANCELLED | OUTPATIENT
Start: 2024-08-02

## 2024-08-02 RX ORDER — FENTANYL CITRATE 50 UG/ML
25 INJECTION, SOLUTION INTRAMUSCULAR; INTRAVENOUS EVERY 5 MIN PRN
Status: DISCONTINUED | OUTPATIENT
Start: 2024-08-02 | End: 2024-08-02 | Stop reason: HOSPADM

## 2024-08-02 RX ORDER — SODIUM CHLORIDE, SODIUM LACTATE, POTASSIUM CHLORIDE, CALCIUM CHLORIDE 600; 310; 30; 20 MG/100ML; MG/100ML; MG/100ML; MG/100ML
INJECTION, SOLUTION INTRAVENOUS CONTINUOUS
Status: DISCONTINUED | OUTPATIENT
Start: 2024-08-02 | End: 2024-08-02 | Stop reason: HOSPADM

## 2024-08-02 RX ORDER — ONDANSETRON 2 MG/ML
4 INJECTION INTRAMUSCULAR; INTRAVENOUS EVERY 30 MIN PRN
Status: DISCONTINUED | OUTPATIENT
Start: 2024-08-02 | End: 2024-08-02 | Stop reason: HOSPADM

## 2024-08-02 RX ORDER — ACETAMINOPHEN 325 MG/1
650 TABLET ORAL EVERY 4 HOURS PRN
COMMUNITY
Start: 2024-08-02 | End: 2024-08-08

## 2024-08-02 RX ORDER — ACETAMINOPHEN 325 MG/1
975 TABLET ORAL EVERY 6 HOURS
Status: CANCELLED | OUTPATIENT
Start: 2024-08-02

## 2024-08-02 RX ORDER — HYDROMORPHONE HCL IN WATER/PF 6 MG/30 ML
0.2 PATIENT CONTROLLED ANALGESIA SYRINGE INTRAVENOUS EVERY 5 MIN PRN
Status: DISCONTINUED | OUTPATIENT
Start: 2024-08-02 | End: 2024-08-02 | Stop reason: HOSPADM

## 2024-08-02 RX ORDER — LIDOCAINE 40 MG/G
CREAM TOPICAL
Status: DISCONTINUED | OUTPATIENT
Start: 2024-08-02 | End: 2024-08-02 | Stop reason: HOSPADM

## 2024-08-02 RX ORDER — ACETAMINOPHEN 325 MG/1
975 TABLET ORAL ONCE
Status: COMPLETED | OUTPATIENT
Start: 2024-08-02 | End: 2024-08-02

## 2024-08-02 RX ORDER — ACETAMINOPHEN 325 MG/1
975 TABLET ORAL ONCE
Status: DISCONTINUED | OUTPATIENT
Start: 2024-08-02 | End: 2024-08-02 | Stop reason: HOSPADM

## 2024-08-02 RX ORDER — FENTANYL CITRATE 50 UG/ML
INJECTION, SOLUTION INTRAMUSCULAR; INTRAVENOUS PRN
Status: DISCONTINUED | OUTPATIENT
Start: 2024-08-02 | End: 2024-08-02

## 2024-08-02 RX ORDER — ONDANSETRON 2 MG/ML
4 INJECTION INTRAMUSCULAR; INTRAVENOUS EVERY 6 HOURS PRN
Status: CANCELLED | OUTPATIENT
Start: 2024-08-02

## 2024-08-02 RX ORDER — IBUPROFEN 200 MG
600 TABLET ORAL EVERY 6 HOURS PRN
Status: CANCELLED | OUTPATIENT
Start: 2024-08-02

## 2024-08-02 RX ORDER — METHOCARBAMOL 750 MG/1
750 TABLET, FILM COATED ORAL
Status: DISCONTINUED | OUTPATIENT
Start: 2024-08-02 | End: 2024-08-02 | Stop reason: HOSPADM

## 2024-08-02 RX ORDER — LIDOCAINE HYDROCHLORIDE 20 MG/ML
INJECTION, SOLUTION INFILTRATION; PERINEURAL PRN
Status: DISCONTINUED | OUTPATIENT
Start: 2024-08-02 | End: 2024-08-02

## 2024-08-02 RX ORDER — HYDRALAZINE HYDROCHLORIDE 20 MG/ML
2.5-5 INJECTION INTRAMUSCULAR; INTRAVENOUS EVERY 10 MIN PRN
Status: DISCONTINUED | OUTPATIENT
Start: 2024-08-02 | End: 2024-08-02 | Stop reason: HOSPADM

## 2024-08-02 RX ORDER — HYDROMORPHONE HCL IN WATER/PF 6 MG/30 ML
0.4 PATIENT CONTROLLED ANALGESIA SYRINGE INTRAVENOUS
Status: CANCELLED | OUTPATIENT
Start: 2024-08-02

## 2024-08-02 RX ORDER — ACETAMINOPHEN 325 MG/1
650 TABLET ORAL
Status: DISCONTINUED | OUTPATIENT
Start: 2024-08-02 | End: 2024-08-02 | Stop reason: HOSPADM

## 2024-08-02 RX ORDER — ONDANSETRON 4 MG/1
4 TABLET, ORALLY DISINTEGRATING ORAL EVERY 6 HOURS PRN
Status: CANCELLED | OUTPATIENT
Start: 2024-08-02

## 2024-08-02 RX ORDER — PROCHLORPERAZINE MALEATE 5 MG
10 TABLET ORAL EVERY 6 HOURS PRN
Status: CANCELLED | OUTPATIENT
Start: 2024-08-02

## 2024-08-02 RX ORDER — NALOXONE HYDROCHLORIDE 0.4 MG/ML
0.1 INJECTION, SOLUTION INTRAMUSCULAR; INTRAVENOUS; SUBCUTANEOUS
Status: DISCONTINUED | OUTPATIENT
Start: 2024-08-02 | End: 2024-08-02 | Stop reason: HOSPADM

## 2024-08-02 RX ADMIN — PHENYLEPHRINE HYDROCHLORIDE 100 MCG: 10 INJECTION INTRAVENOUS at 10:32

## 2024-08-02 RX ADMIN — PROPOFOL 200 MG: 10 INJECTION, EMULSION INTRAVENOUS at 10:11

## 2024-08-02 RX ADMIN — ACETAMINOPHEN 975 MG: 325 TABLET, FILM COATED ORAL at 08:59

## 2024-08-02 RX ADMIN — Medication 10 MG: at 10:36

## 2024-08-02 RX ADMIN — MIDAZOLAM 2 MG: 1 INJECTION INTRAMUSCULAR; INTRAVENOUS at 09:57

## 2024-08-02 RX ADMIN — Medication 200 MG: at 11:09

## 2024-08-02 RX ADMIN — FENTANYL CITRATE 100 MCG: 50 INJECTION INTRAMUSCULAR; INTRAVENOUS at 09:57

## 2024-08-02 RX ADMIN — HYDROMORPHONE HYDROCHLORIDE 0.5 MG: 1 INJECTION, SOLUTION INTRAMUSCULAR; INTRAVENOUS; SUBCUTANEOUS at 10:54

## 2024-08-02 RX ADMIN — DEXMEDETOMIDINE HYDROCHLORIDE 24 MCG: 100 INJECTION, SOLUTION INTRAVENOUS at 10:18

## 2024-08-02 RX ADMIN — SODIUM CHLORIDE, POTASSIUM CHLORIDE, SODIUM LACTATE AND CALCIUM CHLORIDE: 600; 310; 30; 20 INJECTION, SOLUTION INTRAVENOUS at 11:26

## 2024-08-02 RX ADMIN — Medication 10 MG: at 10:48

## 2024-08-02 RX ADMIN — PHENYLEPHRINE HYDROCHLORIDE 100 MCG: 10 INJECTION INTRAVENOUS at 10:44

## 2024-08-02 RX ADMIN — LIDOCAINE HYDROCHLORIDE 100 MG: 20 INJECTION, SOLUTION INFILTRATION; PERINEURAL at 10:11

## 2024-08-02 RX ADMIN — FENTANYL CITRATE 50 MCG: 50 INJECTION, SOLUTION INTRAMUSCULAR; INTRAVENOUS at 12:16

## 2024-08-02 RX ADMIN — Medication 50 MG: at 10:11

## 2024-08-02 RX ADMIN — ONDANSETRON 4 MG: 2 INJECTION INTRAMUSCULAR; INTRAVENOUS at 11:08

## 2024-08-02 RX ADMIN — PIPERACILLIN AND TAZOBACTAM 3.38 G: 3; .375 INJECTION, POWDER, FOR SOLUTION INTRAVENOUS at 10:07

## 2024-08-02 RX ADMIN — ENOXAPARIN SODIUM 40 MG: 40 INJECTION SUBCUTANEOUS at 08:59

## 2024-08-02 RX ADMIN — GLYCOPYRROLATE 0.2 MG: 0.2 INJECTION, SOLUTION INTRAMUSCULAR; INTRAVENOUS at 10:45

## 2024-08-02 RX ADMIN — Medication 0.5 MCG/KG/HR: at 10:20

## 2024-08-02 RX ADMIN — SODIUM CHLORIDE, POTASSIUM CHLORIDE, SODIUM LACTATE AND CALCIUM CHLORIDE: 600; 310; 30; 20 INJECTION, SOLUTION INTRAVENOUS at 10:04

## 2024-08-02 ASSESSMENT — ACTIVITIES OF DAILY LIVING (ADL)
ADLS_ACUITY_SCORE: 21
ADLS_ACUITY_SCORE: 20
ADLS_ACUITY_SCORE: 20
ADLS_ACUITY_SCORE: 21
ADLS_ACUITY_SCORE: 20
ADLS_ACUITY_SCORE: 18

## 2024-08-02 ASSESSMENT — VISUAL ACUITY: OU: BASELINE;GLASSES

## 2024-08-02 NOTE — ANESTHESIA CARE TRANSFER NOTE
Patient: Donald Jackson    Procedure: Procedure(s):  Incision and Drasinage of Anterior Chest Wound, REMOVAL OF OLD VENTRICULOPERITONEAL SHUNT TUBING       Diagnosis: Chest wall abscess [L02.213]  Diagnosis Additional Information: No value filed.    Anesthesia Type:   General     Note:    Oropharynx: oropharynx clear of all foreign objects and spontaneously breathing  Level of Consciousness: awake  Oxygen Supplementation: face mask  Level of Supplemental Oxygen (L/min / FiO2): 6  Independent Airway: airway patency satisfactory and stable  Dentition: dentition unchanged  Vital Signs Stable: post-procedure vital signs reviewed and stable  Report to RN Given: handoff report given  Patient transferred to: PACU    Handoff Report: Identifed the Patient, Identified the Reponsible Provider, Reviewed the pertinent medical history, Discussed the surgical course, Reviewed Intra-OP anesthesia mangement and issues during anesthesia, Set expectations for post-procedure period and Allowed opportunity for questions and acknowledgement of understanding      Vitals:  Vitals Value Taken Time   /71 08/02/24 1122   Temp     Pulse 70 08/02/24 1126   Resp 17 08/02/24 1126   SpO2 100 % 08/02/24 1126   Vitals shown include unfiled device data.    Electronically Signed By: MERRICK Rand CRNA  August 2, 2024  11:27 AM

## 2024-08-02 NOTE — ANESTHESIA PROCEDURE NOTES
Airway       Patient location during procedure: OR       Procedure Start/Stop Times: 8/2/2024 10:17 AM  Staff -        CRNA: Brian Estrada APRN CRNA       Performed By: other anesthesia staff  Consent for Airway        Urgency: elective  Indications and Patient Condition       Indications for airway management: yojana-procedural       Induction type:intravenous       Mask difficulty assessment: 1 - vent by mask    Final Airway Details       Final airway type: endotracheal airway       Successful airway: ETT - single  Endotracheal Airway Details        ETT size (mm): 7.0       Cuffed: yes       Successful intubation technique: direct laryngoscopy       DL Blade Type: MAC 4       Grade View of Cords: 1       Adjucts: stylet       Position: Right       Measured from: lips       Secured at (cm): 24       Bite block used: None    Post intubation assessment        Placement verified by: capnometry, equal breath sounds and chest rise        Number of attempts at approach: 1       Number of other approaches attempted: 0       Secured with: tape       Ease of procedure: easy       Dentition: Intact and Unchanged    Medication(s) Administered   Medication Administration Time: 8/2/2024 10:17 AM    Additional Comments       Placed by ICU fellow

## 2024-08-02 NOTE — OP NOTE
Preoperative diagnosis: RIGHT anterior pectoral non-functioning  shunt catheter infection    Postoperative diagnosis: The same as preoperative diagnosis    Procedure:   1) Incision and drainage of RIGHT anterior pectoral draining sinus  2) RIGHT anterior pectoral non-functioning  shunt catheter removal (~15 cm)    Anesthesia: General    Surgeon: Almas Noriega (present and participated in the entire procedure)    Resident surgeon: Wilton Mosley MS4    EBL: 5 ml    Complications: None     Findings:  Sinus tract went down to the catheter. We were able to easily remove 15 cm of catheter without additional counter incisions. The proximal end was attached with a silk stitch in the subcutaneous tissue at the level of the RIGHT clavicle. The distal end was about 5-6 cm long and easily pulled out. We excised the sinus tract entirely. The catheter and sinus tract were sent for aerobic bacterial and fungal cultures.     Procedure:  We placed [place] TYRA Kurtzn in the supine position and the chest and abdomen were prepared and draped. We made a 3 cm transverse, elliptical incision overlying the sinus tract in the low anterior RIGHT chest wall. We cauterized to the level the pectoralis fascia and completely excised the tract and surrounding fat to the level of the fascia. We identified the catheter at the deep portion of the tract and easily pulled out the distal end. The proximal end was tethered at the level of the clavicle, but with traction it eventually came out with an attached silk stitch. Next, we irrigated the wound and packed it with a single non-radiopaque gauze.      Donald F Tessien tolerated the procedure well.

## 2024-08-02 NOTE — DISCHARGE INSTRUCTIONS
Contacting your Doctor -   To contact a doctor, call Dr PATO Noriega's office @ [phone] or:  [phone] and ask for the resident on call for Thoracic Surgery (answered 24 hours a day)   Emergency Department:  [facility]: [phone]  [facility]: [phone] 911 if you are in need of immediate or emergent help

## 2024-08-02 NOTE — ANESTHESIA POSTPROCEDURE EVALUATION
Patient: Donald Jackson    Procedure: Procedure(s):  Incision and Drasinage of Anterior Chest Wound, REMOVAL OF OLD VENTRICULOPERITONEAL SHUNT TUBING       Anesthesia Type:  General    Note:  Disposition: Inpatient   Postop Pain Control: Uneventful            Sign Out: Well controlled pain   PONV: No   Neuro/Psych: Uneventful            Sign Out: Acceptable/Baseline neuro status   Airway/Respiratory: Uneventful            Sign Out: Acceptable/Baseline resp. status   CV/Hemodynamics: Uneventful            Sign Out: Acceptable CV status; No obvious hypovolemia; No obvious fluid overload   Other NRE: NONE   DID A NON-ROUTINE EVENT OCCUR? No           Last vitals:  Vitals Value Taken Time   /62 08/02/24 1145   Temp 36.7  C (98  F) 08/02/24 1125   Pulse 64 08/02/24 1155   Resp 15 08/02/24 1155   SpO2 96 % 08/02/24 1155   Vitals shown include unfiled device data.    Electronically Signed By: CLIFF ROCKWELL MD  August 2, 2024  11:56 AM

## 2024-08-04 LAB
BACTERIA BLD CULT: NO GROWTH
BACTERIA SPEC CULT: ABNORMAL
BACTERIA SPEC CULT: ABNORMAL

## 2024-08-06 ENCOUNTER — PATIENT OUTREACH (OUTPATIENT)
Dept: SURGERY | Facility: CLINIC | Age: 29
End: 2024-08-06
Payer: COMMERCIAL

## 2024-08-06 NOTE — TELEPHONE ENCOUNTER
Post Op Discharge Call    Surgery:  Incision and drainage of RIGHT anterior pectoral draining sinus and RIGHT anterior pectoral non-functioning  shunt catheter removal    Surgery date: 8/2/2024    Discharge Date:  8/2/2024    Immediate Concerns: No immediate concerns. Patient reports that he saw the culture results and is wondering if he needs to be on an antibiotic.  Writer will follow up with Dr Noriega and get back to patient with his response.    Pain:  No concerns with pain management.     Incision:   Dressing changes per discharge. No changes in wound status.   Doing daily daily dressing changes and packing with gauze. No concerns.  No redness or warmth around wound. No fevers.     Drains:   No drain.     Diet:   Regular diet     Bowels:   Passing gas.   Patient reports he has had bowel movement post op.     Activity:   No difficulty with ADLs reported.   Patient is up independently at home.     Post op/follow up plans:   Post op appointment scheduled,confirmed date and time with patient.       Future Appointments   Date Time Provider Department Center   8/15/2024  2:20 PM Roberto Gomez MD [facility]   9/11/2024 11:15 AM Almas Noriega MD [facility]       Patient with no additional questions at this time. Writer will follow up with Dr Noriega regarding wound cultures and get back to patient with his response.   Patient has our direct number for any questions or concerns that may arise.  Patient appreciated writer's call.      Luisa Bridges RN, BSN  Thoracic Surgery RN Care Coordinator

## 2024-08-08 ENCOUNTER — OFFICE VISIT (OUTPATIENT)
Dept: PULMONOLOGY | Facility: CLINIC | Age: 29
End: 2024-08-08
Payer: COMMERCIAL

## 2024-08-08 VITALS
DIASTOLIC BLOOD PRESSURE: 68 MMHG | SYSTOLIC BLOOD PRESSURE: 122 MMHG | WEIGHT: 282 LBS | BODY MASS INDEX: 42.88 KG/M2 | HEART RATE: 99 BPM | OXYGEN SATURATION: 97 %

## 2024-08-08 DIAGNOSIS — T14.8XXA OPEN WOUND: Primary | ICD-10-CM

## 2024-08-08 PROCEDURE — 99024 POSTOP FOLLOW-UP VISIT: CPT | Performed by: CLINICAL NURSE SPECIALIST

## 2024-08-14 NOTE — PROGRESS NOTES
Assessment & Plan   Problem List Items Addressed This Visit    None  Visit Diagnoses       Infection of  (ventriculoperitoneal) shunt, subsequent encounter    -  Primary    Hospital discharge follow-up              Wound from recent surgery appears to be healing well. Donald had some concerns given that his recent labs were positive for Pseudomonas. I don't think repeat blood cultures or ABX are indicated at this time. It looks like a inquiry was sent to neurosurgery but I don't see any response from them. Donald has follow up scheduled in mid September. Encouraged Donald to contact me with any questions.         MED REC REQUIRED  Post Medication Reconciliation Status: discharge medications reconciled, continue medications without change    The longitudinal plan of care for the diagnosis(es)/condition(s) as documented were addressed during this visit. Due to the added complexity in care, I will continue to support Donald in the subsequent management and with ongoing continuity of care.    32 minutes spent on the date of the encounter doing chart review, history and exam, documentation and further activities as noted.      Roberto Gomez MD  2:47 PM, August 15, 2024        Subjective   Donald is a 29 year old, presenting for the following health issues:  Hospital F/U (Thoracic surgery on 8/2 - reports the wound is healing) and Tingling (Tingling, twitching, and random sharp, shooting pains in RIGHT glute/thigh/leg - noticed since hospital stay, concerns it may be related to weight)    HPI   Hospital Follow Up  - discharged from [facility] on 8/2/24 after surgery for I&D of anterior chest wound and removal of on  shunt tubing  - patient continues to change dressing regularly  - no current concerns for infection, denies pain, fever, chills, nausea, fatigue  - no change in medications since discharge    Review of Systems  Constitutional, HEENT, cardiovascular, pulmonary, gi and gu systems are negative, except as otherwise noted.       Objective    /78 (BP Location: Left arm, Patient Position: Sitting, Cuff Size: Adult Large)   Pulse 84   Temp 98  F (36.7  C) (Skin)   Resp 14   SpO2 98%   There is no height or weight on file to calculate BMI.  Physical Exam   GENERAL: alert and no distress  NECK: no adenopathy, no asymmetry, masses, or scars  RESP: lungs clear to auscultation - no rales, rhonchi or wheezes  CV: regular rate and rhythm, normal S1 S2, no S3 or S4, no murmur, click or rub, no peripheral edema  ABDOMEN: soft, nontender, no hepatosplenomegaly, no masses and bowel sounds normal  MS: no gross musculoskeletal defects noted, no edema  SKIN: anterior chest open wound is clean, dry, intact with no evidence of swelling, drainage, bleeding. Packing and covering bandaging replaced in clinic today        Signed Electronically by: Roberto Gomez MD

## 2024-08-15 ENCOUNTER — OFFICE VISIT (OUTPATIENT)
Dept: FAMILY MEDICINE | Facility: CLINIC | Age: 29
End: 2024-08-15
Payer: COMMERCIAL

## 2024-08-15 VITALS
TEMPERATURE: 98 F | DIASTOLIC BLOOD PRESSURE: 78 MMHG | HEART RATE: 84 BPM | RESPIRATION RATE: 14 BRPM | OXYGEN SATURATION: 98 % | SYSTOLIC BLOOD PRESSURE: 124 MMHG

## 2024-08-15 DIAGNOSIS — T85.730D INFECTION OF VP (VENTRICULOPERITONEAL) SHUNT, SUBSEQUENT ENCOUNTER: Primary | ICD-10-CM

## 2024-08-15 DIAGNOSIS — Z09 HOSPITAL DISCHARGE FOLLOW-UP: ICD-10-CM

## 2024-08-15 RX ORDER — FERROUS SULFATE 325(65) MG
325 TABLET ORAL
COMMUNITY
End: 2024-09-11

## 2024-08-15 NOTE — NURSING NOTE
Valentine  29 year old    Chief Complaint   Patient presents with    Hospital F/U     Thoracic surgery on 8/2 - reports the wound is healing    Tingling     Tingling, twitching, and random sharp, shooting pains in RIGHT glute/thigh/leg - noticed since hospital stay, concerns it may be related to weight            Blood pressure 124/78, pulse 84, temperature 98  F (36.7  C), temperature source Skin, resp. rate 14, SpO2 98%. There is no height or weight on file to calculate BMI.    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    Cognitive disorder    Pain in joint, shoulder region    Premature ejaculation    Adjustment disorder with mixed anxiety and depressed mood    Shunt malfunction, initial encounter    S/P appendectomy    Abdominal pain, generalized    Infection of ventriculoperitoneal shunt, initial encounter (H24)    HCP (hereditary coproporphyria) (H)              Wt Readings from Last 2 Encounters:   08/08/24 127.9 kg (282 lb)   08/02/24 127 kg (279 lb 15.8 oz)       BP Readings from Last 3 Encounters:   08/15/24 124/78   08/08/24 122/68   08/02/24 96/49                Current Outpatient Medications   Medication Sig Dispense Refill    ferrous sulfate (FEROSUL) 325 (65 Fe) MG tablet Take 325 mg by mouth daily (with breakfast)      ibuprofen (ADVIL/MOTRIN) 600 MG tablet Take 1 tablet (600 mg) by mouth every 6 hours as needed for other (mild and/or inflammatory pain)      MAGNESIUM CITRATE PO Take 1-2 tablets by mouth every evening      Omega-3 Fatty Acids (FISH OIL PO) Take 2 capsules by mouth every evening      OVER-THE-COUNTER Take 1 capsule by mouth every evening VITAMIN K 2       No current facility-administered medications for this visit.              Social History     Tobacco Use    Smoking status: Never     Passive exposure: Never    Smokeless tobacco: Never   Vaping Use     "Vaping status: Never Used   Substance Use Topics    Alcohol use: No    Drug use: No              Health Maintenance Due   Topic Date Due    ADVANCE CARE PLANNING  Never done    HIV SCREENING  Never done    Pneumococcal Vaccine: Pediatrics (0 to 5 Years) and At-Risk Patients (6 to 64 Years) (2 of 2 - PCV) 03/16/2011    HEPATITIS C SCREENING  Never done    YEARLY PREVENTIVE VISIT  06/26/2020    COVID-19 Vaccine (3 - Pfizer risk series) 06/30/2021    DTAP/TDAP/TD IMMUNIZATION (9 - Td or Tdap) 06/26/2022            No results found for: \"PAP\"           August 15, 2024 2:18 PM    "

## 2024-08-16 ENCOUNTER — PATIENT OUTREACH (OUTPATIENT)
Dept: SURGERY | Facility: CLINIC | Age: 29
End: 2024-08-16
Payer: COMMERCIAL

## 2024-08-16 NOTE — TELEPHONE ENCOUNTER
[facility]: Thoracic Surgery                                                          Called patient, at the request of MERRICK Hernandez, to follow up and see how his wound is.     Spoke with patient. Patient reports that his wound is getting smaller and he his feeling much better. No pain except for an occasional burning/zinging that only lasts a short time. Afebrile. States that he gets tired easily but otherwise no complaints.  Went back to work on Monday.     Patient informed writer that he has not heard from Dr Martinez's team or Infectious Disease. Inquired if antibiotics were needed now that object has been removed and he is feeling better. Writer will reach out to Dr Martinez's team to inquired about status of ID referral and ask if someone can follow up with patient. Patient appreciated writer's call.     Luisa Bridges RN, BSN  Thoracic Surgery RN Care Coordinator

## 2024-08-21 ENCOUNTER — TELEPHONE (OUTPATIENT)
Dept: CT IMAGING | Facility: CLINIC | Age: 29
End: 2024-08-21
Payer: COMMERCIAL

## 2024-08-21 ENCOUNTER — TELEPHONE (OUTPATIENT)
Dept: INFECTIOUS DISEASES | Facility: CLINIC | Age: 29
End: 2024-08-21
Payer: COMMERCIAL

## 2024-08-21 DIAGNOSIS — T85.730D INFECTION OF VP (VENTRICULOPERITONEAL) SHUNT, SUBSEQUENT ENCOUNTER: Primary | ICD-10-CM

## 2024-08-21 RX ORDER — CIPROFLOXACIN 500 MG/1
500 TABLET, FILM COATED ORAL 2 TIMES DAILY
Qty: 56 TABLET | Refills: 0 | Status: SHIPPED | OUTPATIENT
Start: 2024-08-21 | End: 2024-09-11

## 2024-08-21 NOTE — TELEPHONE ENCOUNTER
"  Patient Returning Call    Reason for call:  Pt received a call from Dr. DORIS Braswell this morning regarding a request that he stat on ciprofloxacin. Pt reports the call quality was \"choppy\" and certain instructions/information to be relayed were missed. Pt just has questions about medication instructions, side effects, etc. Requesting a call back from ID nurse team.    Information relayed to patient:  Dr. Braswell had placed orders for cipro. A return call, hopefully with better quality, would be made by Dr. Braswell and/or her nurse(s).    Patient has additional questions:  No      Could we send this information to you in [place] or would you prefer to receive a phone call?:   Patient would prefer a phone call   Okay to leave a detailed message?: Yes at Cell number on file:    Telephone Information:   Mobile [phone]       "

## 2024-08-21 NOTE — TELEPHONE ENCOUNTER
I was notified that Donald underwent an excision surgery to remove an abscess/sinus tract that was residual from a prior ventriculoperitoneal shunt that developed infection following an episode of appendicitis. Please see prior documentation for details. Briefly, his shunt was re-sited to a ventriculopleural shunt, and he received antibiotics.     Based on my review, about 2 weeks after stopping antibiotics for his prior ventriculoperitoneal shunt infection, he developed swelling of his anterior thorax. He ultimately underwent excision of the distal remnant of this catheter on 8/2. The sinus tract was removed, along with about 5-6cm of tubing.   He has been closely followed by NSGY, by my review there is no current concern for malfunction/infection of his ventriculopleural shunt.    Given that this is now a 2nd instance of infection involving his residual (unused) ventriculoperitoneal shunt, and the organism is pseudomonas, which can be persistent, I think there could be some value in using antibiotics at this juncture, though I also acknowledge that surgery may have been sufficient to eradicate the infection.     I recommend ciprofloxacin, standard dose (500mg PO twice daily). I would suggest a 4 week course of treatment. I think this will further reduce the likelihood that the remnant has relapsed infection.    I called [facility], but he was not available so I left a semi-detailed message that I recommended antibiotics.  I will order the cipro, and will request the ID clinic staff make additional attempts to contact Donald and convey this recommendation. I would like to see him in the next 2-3 weeks for a 30 min visit.    Anita Braswell MD   of Medicine, Division of Infectious Diseases  Contact me on the i[employer] roberta or console  [facility] [phone]

## 2024-08-21 NOTE — TELEPHONE ENCOUNTER
Called Donald to relay below note. Answered all patient questions and assisted patient in scheduling follow up. Patient will call back with any further questions prior to appointment.

## 2024-08-30 LAB — BACTERIA SPEC CULT: NO GROWTH

## 2024-09-11 ENCOUNTER — ONCOLOGY VISIT (OUTPATIENT)
Dept: SURGERY | Facility: CLINIC | Age: 29
End: 2024-09-11
Attending: INTERNAL MEDICINE
Payer: COMMERCIAL

## 2024-09-11 ENCOUNTER — OFFICE VISIT (OUTPATIENT)
Dept: INFECTIOUS DISEASES | Facility: CLINIC | Age: 29
End: 2024-09-11
Attending: INTERNAL MEDICINE
Payer: COMMERCIAL

## 2024-09-11 VITALS
BODY MASS INDEX: 43.54 KG/M2 | WEIGHT: 287.31 LBS | TEMPERATURE: 97.8 F | DIASTOLIC BLOOD PRESSURE: 91 MMHG | HEIGHT: 68 IN | HEART RATE: 90 BPM | SYSTOLIC BLOOD PRESSURE: 135 MMHG | OXYGEN SATURATION: 96 %

## 2024-09-11 VITALS
HEART RATE: 90 BPM | TEMPERATURE: 97.8 F | HEIGHT: 68 IN | OXYGEN SATURATION: 96 % | SYSTOLIC BLOOD PRESSURE: 135 MMHG | DIASTOLIC BLOOD PRESSURE: 91 MMHG | BODY MASS INDEX: 43.54 KG/M2 | WEIGHT: 287.26 LBS

## 2024-09-11 DIAGNOSIS — L02.213 CHEST WALL ABSCESS: Primary | ICD-10-CM

## 2024-09-11 DIAGNOSIS — T85.730D INFECTION OF VP (VENTRICULOPERITONEAL) SHUNT, SUBSEQUENT ENCOUNTER: ICD-10-CM

## 2024-09-11 PROCEDURE — 99024 POSTOP FOLLOW-UP VISIT: CPT | Performed by: THORACIC SURGERY (CARDIOTHORACIC VASCULAR SURGERY)

## 2024-09-11 PROCEDURE — G0463 HOSPITAL OUTPT CLINIC VISIT: HCPCS | Performed by: INTERNAL MEDICINE

## 2024-09-11 PROCEDURE — 99215 OFFICE O/P EST HI 40 MIN: CPT | Mod: 24 | Performed by: INTERNAL MEDICINE

## 2024-09-11 RX ORDER — CIPROFLOXACIN 500 MG/1
500 TABLET, FILM COATED ORAL 2 TIMES DAILY
Qty: 56 TABLET | Refills: 0 | Status: SHIPPED | OUTPATIENT
Start: 2024-09-11 | End: 2024-10-09

## 2024-09-11 ASSESSMENT — PAIN SCALES - GENERAL
PAINLEVEL: NO PAIN (0)
PAINLEVEL: NO PAIN (0)

## 2024-09-11 NOTE — NURSING NOTE
"Oncology Rooming Note    September 11, 2024 11:10 AM   Donald Jackson is a 29 year old male who presents for:    Chief Complaint   Patient presents with    Oncology Clinic Visit     S/P  shunt     Initial Vitals: BP (!) 135/91   Pulse 90   Temp 97.8  F (36.6  C) (Oral)   Ht 1.727 m (5' 7.99\")   Wt 130.3 kg (287 lb 4.2 oz)   SpO2 96%   BMI 43.69 kg/m   Estimated body mass index is 43.69 kg/m  as calculated from the following:    Height as of this encounter: 1.727 m (5' 7.99\").    Weight as of this encounter: 130.3 kg (287 lb 4.2 oz). Body surface area is 2.5 meters squared.  No Pain (0) Comment: Data Unavailable   No LMP for male patient.  Allergies reviewed: Yes  Medications reviewed: Yes    Medications: Medication refills not needed today.  Pharmacy name entered into [employer]: [facility] [address] [place] [address]    Frailty Screening:   Is the patient here for a new oncology consult visit in cancer care? 2. No      Clinical concerns: Patient did some deep cleaning in the house this weekend and had to do some heavy lifting. He can feel something in his neck from time to time (not pain), so he is wondering if he needs to get it checked/see if everything is in place.       Christian Pearce EMT            "

## 2024-09-11 NOTE — PROGRESS NOTES
Assessment & Plan     Infection of retained intraabdominal portion of  shunt s/p OR removal 4/15/24. Cultures negative. Treated with 5 days of systemic antibiotic therapy; did well until about 2 months post-operatively when there was reappearance of drainage at the site of his retained catheter prompting further removal on 8/2 (Dr. Noriega) of a segment (5-6cm) of his retained ventriculoperitoneal catheter and removal of a sinus tract; cultures grew Pseudomonas, unsurprisingly  Ventriculoperitoneal shunt since childhood for hydrocephalus, recently re-sited (in 3/2024) to ventriculopleural location due to intra-abdominal process  Appendicitis s/p lap appendectomy further complicated by leak in 12/2023, managed at [facility] with percutaneous drain placement and culture recovery of Pseudomonas, treated with ~3 weeks of levofloxacin + metronidazole.    Given that this is now a 2nd instance of infection involving his residual (unused) ventriculoperitoneal shunt, and the organism is pseudomonas, which can be persistent, I think there could be some value in using antibiotics at this juncture, though I also acknowledge that surgery may have been sufficient to eradicate the infection.     The fact that his wound is scabbed, but not healed, could represent slow to respond infection or it could be simply related to the natural course of his wound healing (it was left to heal by secondary intent). Given the possibility of an infection that is slower to respond, I prefer to extend his course of therapy to a max of 8 weeks; if there is still a scab and/or there is dehiscence of his wound in that timeframe, repeat imaging may be necessary.    I appreciate the NSGY service re-siting his shunt in 3/2024 because had this not occurred, the intra-abdominal abscess at his ventriculoperitoneal shunt may have gained access to the CNS.    Plan:  - continue cipro 500mg Po BID, separate from cation supplements (eg Ca, Mag) by 2  "hrs to avoid interference of cations with absorption of cipro. Avoid strenuous activity; return to cardio with close attention to any new muscle strain/tendonitis  - notify me of any wound worsening or failure of the scab to completely heal  - follow up to be determined by clinic course; he may not require any ID followup if his wound closes       45 minutes spent by me on the date of the encounter doing chart review, history and exam, documentation and further activities per the note    Subjective       HPI     Followup after 8/2 surgery.   Please see my telephone encounter detailing decision to start cipro 3 weeks ago (about 2-3 weeks after his 8/2 surgery).    Has done well in interim. Trying to focus on his health a bit more and asking about doing cardio.    He occasionally has a different sensation in his mid to R lower abdominal quadrant. This is transient when it occurs.    He has noticed that things don't taste quite right over the last 1-2 weeks.          Objective    BP (!) 135/91 (BP Location: Right arm, Cuff Size: Adult Regular)   Pulse 90   Temp 97.8  F (36.6  C) (Oral)   Ht 1.727 m (5' 8\")   Wt 130.3 kg (287 lb 5 oz)   SpO2 96%   BMI 43.69 kg/m    Body mass index is 43.69 kg/m .  Physical Exam   GENERAL: alert and no distress  NECK: no adenopathy, no asymmetry, masses, or scars  RESP: lungs clear to auscultation - no rales, rhonchi or wheezes  CV: regular rate and rhythm, normal S1 S2, no S3 or S4, no murmur, click or rub, no peripheral edema  ABDOMEN: soft, nontender, no hepatosplenomegaly, no masses and bowel sounds normal  MS: there is a small defect in the R thorax anteriorly. There is a scab and no active drainage. There is violaceous discoloration. The entire involved area is about 1.5cm in height by 2-3cm in length. No surrounding erythema or induration. The area    I personally reviewed CT images from 7/2024 revealing a retained fragment of tubing in the chest wall with surrounding " inflammation    Signed Electronically by: Anita Braswell MD

## 2024-09-11 NOTE — PATIENT INSTRUCTIONS
It was nice to see you today! Here is what we discussed:    Continue cipro until your chest wall scab has closed, up to 8 total weeks (including the 3 weeks you've already taken).  Let me know if the scab opens and/or up gets worse or does not close completely.

## 2024-09-11 NOTE — LETTER
"9/11/2024      Donald Jackson  [address]  [place] [address]      Dear Colleague,    Thank you for referring your patient, Donald Jackson, to the [facility]. Please see a copy of my visit note below.    THORACIC SURGERY FOLLOW UP VISIT     Dear Roberto Raines,    I saw Donald Jackson in follow-up today. The clinical summary follows:      PREOP DIAGNOSIS   Infected  shunt     PROCEDURE   Removal of old  shunt catheter     DATE OF PROCEDURE  8/2/24     COMPLICATIONS  None       BP (!) 135/91   Pulse 90   Temp 97.8  F (36.6  C) (Oral)   Ht 1.727 m (5' 7.99\")   Wt 130.3 kg (287 lb 4.2 oz)   SpO2 96%   BMI 43.69 kg/m      Physical Exam:    Gen- alert and oriented x3, NAD  HEENT- NC/AT, EOMI  Cardiac- RRR  Pulm- normal respiratory effort  Chest - well healing surgical wound with some residual scab  Abd- soft, NT/ND, no rebound or guarding  - deferred  Extremities- no peripheral edema  Neuro- gross motor intact  Skin- no obvious rashes, bruises, or cuts      SUBJECTIVE   Donald Jackson is doing well, his surgical wound is almost entirely healed. He stopped packing his wound about a week ago and still has a little scab over the area.     IMPRESSION   No diagnosis found.    Donald Jackson is a 29 year old man s/p removal of previous  shunt on 8/2/24; wound left open with gauze packing changes. His wound has healed well in the interim.      PLAN    Mr. Jackson was instructed to follow up as needed if he develops any problems; otherwise does not need to follow up again.     Yoselin Meza MD 9/11/2024 at 11:51 AM  Cardiothoracic Surgery Fellow  Pager: [phone]    Staff addendum  I did not see Mr. Jackson, he was seen by Dr. Meza.      Again, thank you for allowing me to participate in the care of your patient.        Sincerely,        Almas Noriega MD  "

## 2024-09-11 NOTE — PROGRESS NOTES
"THORACIC SURGERY FOLLOW UP VISIT     Dear Roberto Raines,    I saw Donald Jackson in follow-up today. The clinical summary follows:      PREOP DIAGNOSIS   Infected  shunt     PROCEDURE   Removal of old  shunt catheter     DATE OF PROCEDURE  8/2/24     COMPLICATIONS  None       BP (!) 135/91   Pulse 90   Temp 97.8  F (36.6  C) (Oral)   Ht 1.727 m (5' 7.99\")   Wt 130.3 kg (287 lb 4.2 oz)   SpO2 96%   BMI 43.69 kg/m      Physical Exam:    Gen- alert and oriented x3, NAD  HEENT- NC/AT, EOMI  Cardiac- RRR  Pulm- normal respiratory effort  Chest - well healing surgical wound with some residual scab  Abd- soft, NT/ND, no rebound or guarding  - deferred  Extremities- no peripheral edema  Neuro- gross motor intact  Skin- no obvious rashes, bruises, or cuts      SUBJECTIVE   Donald Jackson is doing well, his surgical wound is almost entirely healed. He stopped packing his wound about a week ago and still has a little scab over the area.     IMPRESSION   No diagnosis found.    Donald Jackson is a 29 year old man s/p removal of previous  shunt on 8/2/24; wound left open with gauze packing changes. His wound has healed well in the interim.      PLAN    Mr. Jackson was instructed to follow up as needed if he develops any problems; otherwise does not need to follow up again.     Yoselin Meza MD 9/11/2024 at 11:51 AM  Cardiothoracic Surgery Fellow  Pager: [phone]    Staff addendum  I did not see Mr. Jackson, he was seen by Dr. Meza.    "

## 2024-09-11 NOTE — NURSING NOTE
"Chief Complaint   Patient presents with    RECHECK     Follow up   BP (!) 135/91 (BP Location: Right arm, Cuff Size: Adult Regular)   Pulse 90   Temp 97.8  F (36.6  C) (Oral)   Ht 1.727 m (5' 8\")   Wt 130.3 kg (287 lb 5 oz)   SpO2 96%   BMI 43.69 kg/m    Shanita Coffman, CMA on 9/11/2024 at 9:39 AM    "

## 2024-09-11 NOTE — LETTER
9/11/2024       RE: Donald Jackson  [address]  [place] [address]     Dear Colleague,    Thank you for referring your patient, Donald Jackson, to the [facility] [place] at [facility]. Please see a copy of my visit note below.      Assessment & Plan    Infection of retained intraabdominal portion of  shunt s/p OR removal 4/15/24. Cultures negative. Treated with 5 days of systemic antibiotic therapy; did well until about 2 months post-operatively when there was reappearance of drainage at the site of his retained catheter prompting further removal on 8/2 (Dr. Noriega) of a segment (5-6cm) of his retained ventriculoperitoneal catheter and removal of a sinus tract; cultures grew Pseudomonas, unsurprisingly  Ventriculoperitoneal shunt since childhood for hydrocephalus, recently re-sited (in 3/2024) to ventriculopleural location due to intra-abdominal process  Appendicitis s/p lap appendectomy further complicated by leak in 12/2023, managed at [facility] with percutaneous drain placement and culture recovery of Pseudomonas, treated with ~3 weeks of levofloxacin + metronidazole.    Given that this is now a 2nd instance of infection involving his residual (unused) ventriculoperitoneal shunt, and the organism is pseudomonas, which can be persistent, I think there could be some value in using antibiotics at this juncture, though I also acknowledge that surgery may have been sufficient to eradicate the infection.     The fact that his wound is scabbed, but not healed, could represent slow to respond infection or it could be simply related to the natural course of his wound healing (it was left to heal by secondary intent). Given the possibility of an infection that is slower to respond, I prefer to extend his course of therapy to a max of 8 weeks; if there is still a scab and/or there is dehiscence of his wound in  "that timeframe, repeat imaging may be necessary.    I appreciate the NSGY service re-siting his shunt in 3/2024 because had this not occurred, the intra-abdominal abscess at his ventriculoperitoneal shunt may have gained access to the CNS.    Plan:  - continue cipro 500mg Po BID, separate from cation supplements (eg Ca, Mag) by 2 hrs to avoid interference of cations with absorption of cipro. Avoid strenuous activity; return to cardio with close attention to any new muscle strain/tendonitis  - notify me of any wound worsening or failure of the scab to completely heal  - follow up to be determined by clinic course; he may not require any ID followup if his wound closes       45 minutes spent by me on the date of the encounter doing chart review, history and exam, documentation and further activities per the note    Subjective      HPI     Followup after 8/2 surgery.   Please see my telephone encounter detailing decision to start cipro 3 weeks ago (about 2-3 weeks after his 8/2 surgery).    Has done well in interim. Trying to focus on his health a bit more and asking about doing cardio.    He occasionally has a different sensation in his mid to R lower abdominal quadrant. This is transient when it occurs.    He has noticed that things don't taste quite right over the last 1-2 weeks.          Objective   BP (!) 135/91 (BP Location: Right arm, Cuff Size: Adult Regular)   Pulse 90   Temp 97.8  F (36.6  C) (Oral)   Ht 1.727 m (5' 8\")   Wt 130.3 kg (287 lb 5 oz)   SpO2 96%   BMI 43.69 kg/m    Body mass index is 43.69 kg/m .  Physical Exam   GENERAL: alert and no distress  NECK: no adenopathy, no asymmetry, masses, or scars  RESP: lungs clear to auscultation - no rales, rhonchi or wheezes  CV: regular rate and rhythm, normal S1 S2, no S3 or S4, no murmur, click or rub, no peripheral edema  ABDOMEN: soft, nontender, no hepatosplenomegaly, no masses and bowel sounds normal  MS: there is a small defect in the R thorax " anteriorly. There is a scab and no active drainage. There is violaceous discoloration. The entire involved area is about 1.5cm in height by 2-3cm in length. No surrounding erythema or induration. The area    I personally reviewed CT images from 7/2024 revealing a retained fragment of tubing in the chest wall with surrounding inflammation    Signed Electronically by: Anita Braswell MD      Again, thank you for allowing me to participate in the care of your patient.      Sincerely,    Anita Braswell MD

## 2024-10-17 DIAGNOSIS — H43.393 VITREOUS SYNERESIS OF BOTH EYES: Primary | ICD-10-CM

## 2025-03-20 NOTE — PROGRESS NOTES
[facility]  SPORTS MEDICINE CLINIC VISIT     Apr 2, 2025        ASSESSMENT & PLAN    31 yo with posterior left shoudler pain, seems consistent with impingment. Possibly posterior instability    Reviewed imaging and assessment with patient in detail  Have recommended RC strengthening. Provided with HEP. Referred to PT  Activity as tolerated. Avoid presses until shoulder gets stronger  Recommend follow up in 6-8 weeks for re jenae Cameron MD  [facility] SPORTS MEDICINE [facility]    -----  Chief Complaint   Patient presents with    Consult For     Left shoulder pain       SUBJECTIVE  Donald Jackson is a/an 30 year old male who is seen as a self referral for evaluation of left shoulder pain.     The patient is seen by themselves.  The patient is Right handed    Onset: 2 month(s) ago. Reports insidious onset without acute precipitating event.   Location of Pain: left shoulder- posterior   Worsened by: Unsure - not specific; lifting heavy  Better with: Ibuprofen  Treatments tried:  icing, resting, ibuprofen  Associated symptoms: no distal numbness or tingling; denies swelling or warmth    Orthopedic/Surgical history: NO  Social History/Occupation: Hospitality      REVIEW OF SYSTEMS:  Do you have fever, chills, weight loss? No  Do you have any vision problems? No  Do you have any chest pain or edema? No  Do you have any shortness of breath or wheezing?  No  Do you have stomach problems? No  Do you have any numbness or focal weakness? No  Do you have diabetes? No  Do you have problems with bleeding or clotting? No  Do you have an rashes or other skin lesions? No    OBJECTIVE:  There were no vitals taken for this visit.     EXAM:  Alert, pleasant and conversational      left shoulder:   Skin intact. No skin changes, deformity or atrophy    AROM:   Forward Flexion: 180    Abduction: 180    External rotation: ~70  with pain in posterior shoulder  Internal rotation: T7.     Strength  testing:   Abduction: 5/5,   External rotation: 5/5   Internal rotation 5/5     Deltoids 5/5, Biceps 5/5, Triceps 5/5,  5/5.    Palpation: negative TTP of the Acromioclavicular joint  negative TTP of Sternoclavicular joint  positive  TTP of posterior glenoid  negative TTP of scapular borders  negative TTP of the bicipital tendon.     Special Tests: negative Montano test  positive  Neer's test.   negativeO'William's test   negative Speed's test.    Neurovascularly intact bilateral upper extremities.      RADIOLOGY:    4 view xrays of left shoulder performed and reviewed independently demonstrating no acute fx. No djd. See EMR for formal radiology report.

## 2025-03-24 DIAGNOSIS — M25.512 LEFT SHOULDER PAIN: Primary | ICD-10-CM

## 2025-03-24 NOTE — TELEPHONE ENCOUNTER
REASON FOR VISIT: I have had shoulder pain on left side for on going as of now 3 weeks, I have been applying ICE treatment not as consistent however, will do until upon appointment. I would come in on earlier date however, no availability due to work life balance.    DATE OF APPT: 4/02/2025   NOTES (FOR ALL VISITS) STATUS DETAILS   OFFICE NOTE from referring provider N/A Self   EMG N/A    MEDICATION LIST N/A    IMAGING  (FOR ALL VISITS)     XR N/A    MRI (HEAD, NECK, SPINE) N/A    CT (HEAD, NECK, SPINE) N/A

## 2025-04-02 ENCOUNTER — ANCILLARY PROCEDURE (OUTPATIENT)
Dept: GENERAL RADIOLOGY | Facility: CLINIC | Age: 30
End: 2025-04-02
Attending: FAMILY MEDICINE
Payer: COMMERCIAL

## 2025-04-02 ENCOUNTER — PRE VISIT (OUTPATIENT)
Dept: ORTHOPEDICS | Facility: CLINIC | Age: 30
End: 2025-04-02

## 2025-04-02 ENCOUNTER — OFFICE VISIT (OUTPATIENT)
Dept: ORTHOPEDICS | Facility: CLINIC | Age: 30
End: 2025-04-02
Payer: COMMERCIAL

## 2025-04-02 DIAGNOSIS — M25.512 LEFT SHOULDER PAIN: ICD-10-CM

## 2025-04-02 DIAGNOSIS — M25.512 ACUTE PAIN OF LEFT SHOULDER: Primary | ICD-10-CM

## 2025-04-02 PROCEDURE — 99203 OFFICE O/P NEW LOW 30 MIN: CPT | Performed by: FAMILY MEDICINE

## 2025-04-02 PROCEDURE — 73030 X-RAY EXAM OF SHOULDER: CPT | Mod: LT | Performed by: STUDENT IN AN ORGANIZED HEALTH CARE EDUCATION/TRAINING PROGRAM

## 2025-04-02 NOTE — LETTER
4/2/2025      Donald Jackson  [address]  [place] [address]      Dear Colleague,    Thank you for referring your patient, Donald Jackson, to the [facility] [place]. Please see a copy of my visit note below.      [facility]  SPORTS MEDICINE CLINIC VISIT     Apr 2, 2025        ASSESSMENT & PLAN    29 yo with posterior left shoudler pain, seems consistent with impingment. Possibly posterior instability    Reviewed imaging and assessment with patient in detail  Have recommended RC strengthening. Provided with HEP. Referred to PT  Activity as tolerated. Avoid presses until shoulder gets stronger  Recommend follow up in 6-8 weeks for re jenae Cameron MD  [facility]    -----  Chief Complaint   Patient presents with     Consult For     Left shoulder pain       SUBJECTIVE  Donald Jackson is a/an 30 year old male who is seen as a self referral for evaluation of left shoulder pain.     The patient is seen by themselves.  The patient is Right handed    Onset: 2 month(s) ago. Reports insidious onset without acute precipitating event.   Location of Pain: left shoulder- posterior   Worsened by: Unsure - not specific; lifting heavy  Better with: Ibuprofen  Treatments tried:  icing, resting, ibuprofen  Associated symptoms: no distal numbness or tingling; denies swelling or warmth    Orthopedic/Surgical history: NO  Social History/Occupation: Hospitality      REVIEW OF SYSTEMS:  Do you have fever, chills, weight loss? No  Do you have any vision problems? No  Do you have any chest pain or edema? No  Do you have any shortness of breath or wheezing?  No  Do you have stomach problems? No  Do you have any numbness or focal weakness? No  Do you have diabetes? No  Do you have problems with bleeding or clotting? No  Do you have an rashes or other skin lesions? No    OBJECTIVE:  There were no vitals taken for this visit.      EXAM:  Alert, pleasant and conversational      left shoulder:   Skin intact. No skin changes, deformity or atrophy    AROM:   Forward Flexion: 180    Abduction: 180    External rotation: ~70  with pain in posterior shoulder  Internal rotation: T7.     Strength testing:   Abduction: 5/5,   External rotation: 5/5   Internal rotation 5/5     Deltoids 5/5, Biceps 5/5, Triceps 5/5,  5/5.    Palpation: negative TTP of the Acromioclavicular joint  negative TTP of Sternoclavicular joint  positive  TTP of posterior glenoid  negative TTP of scapular borders  negative TTP of the bicipital tendon.     Special Tests: negative Montano test  positive  Neer's test.   negativeO'William's test   negative Speed's test.    Neurovascularly intact bilateral upper extremities.      RADIOLOGY:    4 view xrays of left shoulder performed and reviewed independently demonstrating no acute fx. No djd. See EMR for formal radiology report.                Again, thank you for allowing me to participate in the care of your patient.        Sincerely,        Eladio Cameron MD    Electronically signed

## 2025-04-02 NOTE — PATIENT INSTRUCTIONS
Rotator Cuff Injury     WHAT IS A ROTATOR CUFF INJURY?    A rotator cuff injury is irritation of or damage to the group of tendons and muscles that hold your shoulder joint together. Tendons are strong bands of tissue that attach muscle to bone. You use the muscles and tendons in your shoulder joint to move your shoulder and raise your arm over your head.        WHAT IS THE CAUSE?    A rotator cuff injury can be caused by:    Overuse of your shoulder in a sport or work activity that involves repetitive overhead movement of your shoulder, like swimming, baseball (mainly pitching), football, tennis, painting, plastering, or housework.  A sudden activity that twists your shoulder or tears your tendon, such as using your arm to break a fall, falling onto your arm, or lifting a heavy object  You may be at higher risk for a rotator cuff injury if you have poor head and shoulder posture, especially if you are older.    WHAT ARE THE SYMPTOMS?    Symptoms may include:    Pain and weakness in your arm and shoulder  Loss of shoulder movement, especially when you try to raise your arm overhead    HOW IS IT DIAGNOSED?    Your healthcare provider will ask about your symptoms, activities, and medical history and examine you. You may have X-rays or other scans or procedures, such as:    An ultrasound, which uses sound waves to show pictures of your shoulder joint  An MRI, which uses a strong magnetic field and radio waves to show detailed pictures of your shoulder joint  An arthrogram, which is an X-ray or MRI taken after a dye is injected into your joint to outline its shape  Arthroscopy, which is a type of surgery done with a small scope inserted into your joint so your provider can look directly at your joint    HOW IS IT TREATED?    You will need to change or stop doing the activities that cause pain until your injury has healed. For example, avoid strenuous activity or any overhead motion that causes pain. Also, try to make  sure that you are practicing good posture and are not slouching forward.    Your healthcare provider may recommend stretching and strengthening exercises to help you heal.    If you have a bad tear, you may need to have it repaired with surgery. After surgery, your treatment plan will include physical therapy to strengthen your shoulder as it heals.    The pain often gets better within a few weeks with self-care, but some injuries may take several months or longer to heal. It s important to follow all of your healthcare provider s instructions.    HOW CAN I TAKE CARE OF MYSELF?    To keep swelling down and help relieve pain:    Put an ice pack, gel pack, or package of frozen vegetables wrapped in a cloth on the area every 3 to 4 hours for up to 20 minutes at a time.  Take nonprescription pain medicine, such as acetaminophen, ibuprofen, or naproxen. Nonsteroidal anti-inflammatory medicines (NSAIDs), such as ibuprofen and naproxen, may cause stomach bleeding and other problems. These risks increase with age. Read the label and take as directed. Unless recommended by your healthcare provider, you should not take this medicine for more than 10 days.  Moist heat may help relieve pain, relax your muscles, and make it easier to move your arm and shoulder. Put moist heat on the injured area for 10 to 15 minutes before you do warm-up and stretching exercises. Moist heat includes heat patches or moist heating pads that you can buy at most drugstores, a warm wet washcloth, or a hot shower. To prevent burns to your skin, follow directions on the package and do not lie on any type of hot pad. Don t use heat if you have swelling.    Follow your healthcare provider's instructions, including any exercises recommended by your provider. Ask your provider:    How and when you will hear your test results  How long it will take to recover  What activities you should avoid, including how much you can lift, and when you can return to your  normal activities  How to take care of yourself at home  What symptoms or problems you should watch for and what to do if you have them  Make sure you know when you should come back for a checkup.    HOW CAN I HELP PREVENT A ROTATOR CUFF INJURY?    Warm-up exercises and stretching before activities can help prevent injuries. For example, do exercises that strengthen your shoulder muscles. If your arm or shoulder hurts after exercise, putting ice on it may help keep it from getting injured.    Follow safety rules and use any protective equipment recommended for your work or sport.    Avoid activities that cause pain. For example, avoid lifting heavy objects over your head.    Developed by [employer].  Published by [employer].  Copyright  2014 [employer] and/or one of its subsidiaries. All rights reserved.    Rotator Cuff Exercises    Isometric shoulder external rotation:  a doorway with your elbow bent 90 degrees and the back of the wrist on your injured side pressed against the door frame. Try to press your hand outward into the door frame. Hold for 5 seconds. Do 2 sets of 15.    Isometric shoulder internal rotation:  a doorway with your elbow bent 90 degrees and the front of the wrist on your injured side pressed against the door frame. Try to press your palm into the door frame. Hold for 5 seconds. Do 2 sets of 15.  Wand exercise, flexion: Stand upright and hold a stick in both hands, palms down. Stretch your arms by lifting them over your head, keeping your arms straight. Hold for 5 seconds and return to the starting position. Repeat 10 times.    Wand exercise, extension: Stand upright and hold a stick in both hands behind your back. Move the stick away from your back. Hold this position for 5 seconds. Relax and return to the starting position. Repeat 10 times.  Wand exercise, external rotation: Lie on your back and hold a stick in both hands, palms up. Your upper arms should be  resting on the floor with your elbows at your sides and bent 90 degrees. Use your uninjured arm to push your injured arm out away from your body. Keep the elbow of your injured arm at your side while it is being pushed. Hold the stretch for 5 seconds. Repeat 10 times.    Wand exercise, shoulder abduction and adduction: Stand and hold a stick with both hands, palms facing away from your body. Rest the stick against the front of your thighs. Use your uninjured arm to push your injured arm out to the side and up as high as possible. Keep your arms straight. Hold for 5 seconds. Repeat 10 times.    Resisted shoulder external rotation: Stand sideways next to a door with your injured arm farther from the door. Tie a knot in the end of the tubing and shut the knot in the door at waist level (or use cable weight machine at gym). Hold the other end of the tubing with the hand of your injured arm. Rest the hand of your injured arm across your stomach. Keeping your elbow in at your side, rotate your arm outward and away from your waist. Slowly return your arm to the starting position. Make sure you keep your elbow bent 90 degrees and your forearm parallel to the floor. Repeat 10 times. Build up to 2 sets of 15.    Resisted shoulder internal rotation: Stand sideways next to a door with your injured arm closest to the door. Tie a knot in the end of the tubing and shut the knot in the door at waist level (or use cable weight machine at gym). Hold the other end of the tubing with the hand of your injured arm. Bend the elbow of your injured arm 90 degrees. Keeping your elbow in at your side, rotate your forearm across your body and then slowly back to the starting position. Make sure you keep your forearm parallel to the floor. Do 2 sets of 8 to 12.    Scaption: Stand with your arms at your sides and with your elbows straight. Slowly raise your arms to eye level. As you raise your arms, spread them apart so that they are only  "slightly in front of your body (at about a 30-degree angle to the front of your body). Point your thumbs toward the ceiling. Hold for 2 seconds and lower your arms slowly. Do 2 sets of 15. Progress to holding a soup can or light weight when you are doing the exercise and increase the weight as the exercise gets easier.    Side-lying external rotation: Lie on your uninjured side with your injured arm at your side and your elbow bent 90 degrees. Keeping your elbow against your side, raise your forearm toward the ceiling and hold for 2 seconds. Slowly lower your arm. Do 2 sets of 15. You can start doing this exercise holding a soup can or light weight and gradually increase the weight as long as there is no pain.    Horizontal abduction: Lie on your stomach on a table or the edge of a bed with the arm on your injured side hanging down over the edge. Raise your arm out to the side, with your thumb pointed toward the ceiling, until your arm is parallel to the floor. Hold for 2 seconds and then lower it slowly. Start this exercise with no weight. As you get stronger, add a light weight or hold a soup can. Do 2 sets of 15.    Push-up with a plus: Begin on the floor on your hands and knees. Keep your hands a shoulder width apart and lift your feet off the floor. Arch your back as high as possible and round your shoulders (this is the \"plus\" part or the exercise). Bend your elbows and lower your body to the floor. Return to the starting position and arch your back again. Do 2 sets of 15.          "

## 2025-04-02 NOTE — LETTER
2025    Donald Jackson   1995        To Whom it May Concern;    Please excuse Donald Jackson from work/school for a healthcare visit for his left shoulder on 2025.    Sincerely,        Eladio Cameron MD

## 2025-06-02 ENCOUNTER — OFFICE VISIT (OUTPATIENT)
Dept: OPHTHALMOLOGY | Facility: CLINIC | Age: 30
End: 2025-06-02
Attending: OPHTHALMOLOGY
Payer: COMMERCIAL

## 2025-06-02 DIAGNOSIS — H43.393 VITREOUS SYNERESIS OF BOTH EYES: ICD-10-CM

## 2025-06-02 PROCEDURE — 92134 CPTRZ OPH DX IMG PST SGM RTA: CPT | Performed by: OPHTHALMOLOGY

## 2025-06-02 PROCEDURE — G0463 HOSPITAL OUTPT CLINIC VISIT: HCPCS | Performed by: OPHTHALMOLOGY

## 2025-06-02 PROCEDURE — 99214 OFFICE O/P EST MOD 30 MIN: CPT | Mod: GC | Performed by: OPHTHALMOLOGY

## 2025-06-02 ASSESSMENT — VISUAL ACUITY
OD_PH_CC: 20/30
CORRECTION_TYPE: GLASSES
OD_PH_CC+: +2
OS_CC: 20/25
METHOD: SNELLEN - LINEAR
OD_CC+: -2
OD_CC: 20/30

## 2025-06-02 ASSESSMENT — CONF VISUAL FIELD
METHOD: COUNTING FINGERS
OD_INFERIOR_NASAL_RESTRICTION: 0
OS_INFERIOR_TEMPORAL_RESTRICTION: 0
OS_SUPERIOR_TEMPORAL_RESTRICTION: 0
OS_SUPERIOR_NASAL_RESTRICTION: 0
OS_NORMAL: 1
OD_NORMAL: 1
OS_INFERIOR_NASAL_RESTRICTION: 0
OD_SUPERIOR_TEMPORAL_RESTRICTION: 0
OD_INFERIOR_TEMPORAL_RESTRICTION: 0
OD_SUPERIOR_NASAL_RESTRICTION: 0

## 2025-06-02 ASSESSMENT — REFRACTION_WEARINGRX
OS_AXIS: 084
OD_AXIS: 089
OS_SPHERE: -9.75
SPECS_TYPE: SVL
OD_CYLINDER: +2.75
OD_SPHERE: -6.75
OS_CYLINDER: +4.25

## 2025-06-02 ASSESSMENT — TONOMETRY
OS_IOP_MMHG: 11
IOP_METHOD: TONOPEN
OD_IOP_MMHG: 12

## 2025-06-02 ASSESSMENT — CUP TO DISC RATIO
OS_RATIO: 0.3
OD_RATIO: 0.5

## 2025-06-02 ASSESSMENT — EXTERNAL EXAM - LEFT EYE: OS_EXAM: NORMAL

## 2025-06-02 ASSESSMENT — SLIT LAMP EXAM - LIDS
COMMENTS: NORMAL
COMMENTS: NORMAL

## 2025-06-02 ASSESSMENT — EXTERNAL EXAM - RIGHT EYE: OD_EXAM: NORMAL

## 2025-06-02 NOTE — NURSING NOTE
Chief Complaints and History of Present Illnesses   Patient presents with    Follow Up     H/o RD     Chief Complaint(s) and History of Present Illness(es)       Follow Up              Comments: H/o RD              Comments    Pt states no change in [place] since last visit  Floaters same as last visit  No flashes, eye pain pain or redness    Brenda Hardy COT 7:21 AM June 2, 2025

## 2025-06-02 NOTE — PROGRESS NOTES
CC: H/o RD    INTERVAL HISTORY: Reports that vision has been stable, has noticed some floaters and flashes in the right eye. 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  RD OD  s/p pexy 10/2011, 2010   - Far IT periphery from lattice with atrophic holes   - No further spread of Retinal detachment, well contained by laser   - No new retinal tears/RD    - recheck 1 year   - d/w patient S/Sx RD 6/2025    #. H/o RD OS   -S/p SCLERAL BUCKLE 2010   -attached    #. Lattice OU      #. Syneresis OU      #. H/o amblyopia OD   - ?prior strabismus surgery, unsure   - was premature    # DIANE    - artificial tears         Return in about 1 year (around 6/2/2026) for DFE OU, OCT OU, OCT 55 deree, [employer].     Heber Claudio MD  PGY-6 Vitreo-retina surgery Fellow  Department of Ophthalmology   [facility]      ATTESTATION     Attending Physician Attestation:      Complete documentation of historical and exam elements from today's encounter can be found in the full encounter summary report (not reduplicated in this progress note).  I personally obtained the chief complaint(s) and history of present illness.  I confirmed and edited as necessary the review of systems, past medical/surgical history, family history, social history, and examination findings as documented by others; and I examined the patient myself.  I personally reviewed the relevant tests, images, and reports as documented above.  I personally  reviewed the ophthalmic test(s) associated with this encounter, agree with the interpretation(s) as documented by the resident/fellow, and have edited the corresponding report(s) as necessary.   I formulated and edited as necessary the assessment and plan and discussed the findings and management plan with the patient and family    Shasha Santos MD, PhD  , Vitreoretinal Surgery  Department of Ophthalmology  [facility]

## 2025-06-15 ENCOUNTER — HEALTH MAINTENANCE LETTER (OUTPATIENT)
Age: 30
End: 2025-06-15

## 2025-07-22 ENCOUNTER — TELEPHONE (OUTPATIENT)
Dept: NEUROSURGERY | Facility: CLINIC | Age: 30
End: 2025-07-22
Payer: COMMERCIAL

## 2025-07-22 NOTE — TELEPHONE ENCOUNTER
[facility] Call Center    Phone Message    May a detailed message be left on voicemail: yes     Reason for Call: Pt said that he is due for a yearly shunt check.  Please call him back to schedule.

## 2025-07-29 NOTE — TELEPHONE ENCOUNTER
Returned a call to patient regarding the need for a 'yearly shunt check'. I advised patient that at his last appointment with Neurosurgery, Dr Martinez noted 'No active role for neurosurgical management or follow-up at this time'. Also noted that he had shut tubing removed by General Surgery in August 2024. Patient understands that at this time no Neurosurgery follow up is needed and is agreeable to this plan.     Patient expressed appreciation for the call and reported no additional concerns at this time.    KATLIN SanchezN, RN  RN Coordinator Neurosurgery

## (undated) DEVICE — PREP POVIDONE-IODINE 7.5% SCRUB 4OZ BOTTLE MDS093945

## (undated) DEVICE — GLOVE BIOGEL PI MICRO INDICATOR UNDERGLOVE SZ 8.5 48985

## (undated) DEVICE — SU VICRYL 2-0 CT-2 CR 8X18" J726D

## (undated) DEVICE — SUCTION MANIFOLD NEPTUNE 2 SYS 4 PORT 0702-020-000

## (undated) DEVICE — ADH LIQUID MASTISOL TOPICAL VIAL 2-3ML 0523-48

## (undated) DEVICE — SU SILK 0 TIE 6X30" A306H

## (undated) DEVICE — NDL INSUFFLATION 13GA 120MM C2201

## (undated) DEVICE — SU VICRYL 4-0 PS-2 18" UND J496H

## (undated) DEVICE — DRAPE SHEET REV FOLD 3/4 9349

## (undated) DEVICE — COVER CAMERA IN-LIGHT DISP LT-C02

## (undated) DEVICE — LINEN TOWEL PACK X5 5464

## (undated) DEVICE — LINEN TOWEL PACK X6 WHITE 5487

## (undated) DEVICE — SU DERMABOND ADVANCED .7ML DNX12

## (undated) DEVICE — ANTIFOG SOLUTION W/FOAM PAD 31142527

## (undated) DEVICE — SURGICEL HEMOSTAT 4X8" 1952

## (undated) DEVICE — DRAPE POUCH IRR 1016

## (undated) DEVICE — PREP POVIDONE-IODINE 10% SOLUTION 4OZ BOTTLE MDS093944

## (undated) DEVICE — TUBING SUCTION 10'X3/16" N510

## (undated) DEVICE — GLOVE BIOGEL PI MICRO SZ 8.0 48580

## (undated) DEVICE — SUTURE BOOTS 051003PBX

## (undated) DEVICE — PACK NEURO MINOR UMMC SNE32MNMU4

## (undated) DEVICE — TUBING SMOKE EVAC PNEUMOCLEAR HIGH FLOW 0620050250

## (undated) DEVICE — ESU GROUND PAD ADULT W/CORD E7507

## (undated) DEVICE — SOL NACL 0.9% IRRIG 1000ML BOTTLE 2F7124

## (undated) DEVICE — SOL NACL 0.9% 10ML VIAL 0409-4888-02

## (undated) DEVICE — SHUNT TUNNELER SHEATH 61CM NT901124

## (undated) DEVICE — DRSG PRIMAPORE 02X3" 7133

## (undated) DEVICE — SU SILK 2-0 TIE 12X30" A305H

## (undated) DEVICE — TEST TUBE W/SCREW CAP 17361

## (undated) DEVICE — PAD CHUX UNDERPAD 23X24" 7136

## (undated) DEVICE — PREP CHLORAPREP 26ML TINTED HI-LITE ORANGE 930815

## (undated) DEVICE — SU VICRYL 2-0 SH 27" UND J417H

## (undated) DEVICE — ENDO SCOPE WARMER SEAL  C3101

## (undated) DEVICE — Device

## (undated) DEVICE — DRAPE IOBAN INCISE 23X17" 6650EZ

## (undated) DEVICE — SU VICRYL+ 3-0 27IN SH UND VCP416H

## (undated) DEVICE — APPLICATORS COTTON TIP 6"X2 STERILE LF C15053-006

## (undated) DEVICE — SHUNT STYLET 23CM AXIEM NAVIGATION SYSTEM 9735428

## (undated) DEVICE — ENDO TROCAR SLEEVE KII Z-THREADED 05X100MM CTS02

## (undated) DEVICE — PREP SKIN SCRUB TRAY 4461A

## (undated) DEVICE — LINEN GOWN XLG 5407

## (undated) DEVICE — SYR 30ML LL W/O NDL 302832

## (undated) DEVICE — SOL WATER IRRIG 1000ML BOTTLE 2F7114

## (undated) DEVICE — SPONGE COTTONOID 1/2X1/2" 20-04S

## (undated) DEVICE — SPONGE SURGIFOAM 12 1972

## (undated) DEVICE — DRAPE IOBAN INCISE 13X13" 6640EZ

## (undated) DEVICE — GLOVE BIOGEL PI MICRO INDICATOR UNDERGLOVE SZ 7.0 48970

## (undated) DEVICE — SU MONOCRYL 4-0 PS-2 18" UND Y496G

## (undated) DEVICE — DRSG XEROFORM 5X9" CUR253590W

## (undated) DEVICE — SHUNT TUNNELER SHEATH 70CM NT9MD170

## (undated) DEVICE — SUCTION TIP YANKAUER STR K87

## (undated) DEVICE — SU VICRYL 3-0 SH 27" J316H

## (undated) DEVICE — LINEN TOWEL PACK X30 5481

## (undated) DEVICE — SOL NACL 0.9% INJ 1000ML BAG 2B1324X

## (undated) DEVICE — TRACKER PATIENT NON-INVASIVE AXIEM 9734887

## (undated) DEVICE — SYR BULB IRRIG DOVER 60 ML LATEX FREE 67000

## (undated) DEVICE — DRAPE U SPLIT 74X120" 29440

## (undated) DEVICE — DRAPE C-ARM W/STRAPS 42X72" 07-CA104

## (undated) DEVICE — LIGHT HANDLE X1 31140133

## (undated) DEVICE — LABEL MEDICATION SYSTEM 3303-P

## (undated) DEVICE — DECANTER VIAL 2006S

## (undated) DEVICE — ESU ELEC BLADE 2.75" COATED/INSULATED E1455

## (undated) DEVICE — ENDO TROCAR FIRST ENTRY KII FIOS Z-THRD 05X100MM CTF03

## (undated) DEVICE — SU SILK 0 SH 30" K834H

## (undated) DEVICE — SYR 10ML LL W/O NDL 302995

## (undated) DEVICE — SYRINGE EAR/ULCER STERILE 2 OZ BULB 4172

## (undated) DEVICE — DRSG STERI STRIP 1/2X4" R1547

## (undated) DEVICE — SUCTION IRR STRYKERFLOW II W/TIP 250-070-520

## (undated) DEVICE — NDL SPINAL 22GA 7" QUINCKE 405149

## (undated) DEVICE — SU VICRYL 0 UR-6 27" J603H

## (undated) DEVICE — NDL BLUNT 18GA 1" W/O FILTER 305181

## (undated) DEVICE — GOWN XLG DISP 9545

## (undated) DEVICE — GLOVE BIOGEL PI MICRO SZ 7.0 48570

## (undated) DEVICE — DRSG PRIMAPORE 03 1/8X6" 66000318

## (undated) DEVICE — ESU PENCIL SMOKE EVAC W/ROCKER SWITCH 0703-047-000

## (undated) DEVICE — SU MONOCRYL 4-0 PS-2 27" UND Y426H

## (undated) DEVICE — SU ETHILON 3-0 PS-1 18" 1663H

## (undated) DEVICE — ESU ELEC BLADE E-SEP INSULATED NEPTUNE 70MM 0703-070-002

## (undated) RX ORDER — BUPIVACAINE HYDROCHLORIDE AND EPINEPHRINE 5; 5 MG/ML; UG/ML
INJECTION, SOLUTION EPIDURAL; INTRACAUDAL; PERINEURAL
Status: DISPENSED
Start: 2024-02-05

## (undated) RX ORDER — FENTANYL CITRATE 50 UG/ML
INJECTION, SOLUTION INTRAMUSCULAR; INTRAVENOUS
Status: DISPENSED
Start: 2024-08-02

## (undated) RX ORDER — CEFAZOLIN SODIUM/WATER 2 G/20 ML
SYRINGE (ML) INTRAVENOUS
Status: DISPENSED
Start: 2024-02-05

## (undated) RX ORDER — HYDROMORPHONE HCL IN WATER/PF 6 MG/30 ML
PATIENT CONTROLLED ANALGESIA SYRINGE INTRAVENOUS
Status: DISPENSED
Start: 2024-02-05

## (undated) RX ORDER — FENTANYL CITRATE-0.9 % NACL/PF 10 MCG/ML
PLASTIC BAG, INJECTION (ML) INTRAVENOUS
Status: DISPENSED
Start: 2024-04-15

## (undated) RX ORDER — DEXAMETHASONE SODIUM PHOSPHATE 4 MG/ML
INJECTION, SOLUTION INTRA-ARTICULAR; INTRALESIONAL; INTRAMUSCULAR; INTRAVENOUS; SOFT TISSUE
Status: DISPENSED
Start: 2024-02-05

## (undated) RX ORDER — BUPIVACAINE HYDROCHLORIDE AND EPINEPHRINE 2.5; 5 MG/ML; UG/ML
INJECTION, SOLUTION EPIDURAL; INFILTRATION; INTRACAUDAL; PERINEURAL
Status: DISPENSED
Start: 2024-08-02

## (undated) RX ORDER — ONDANSETRON 2 MG/ML
INJECTION INTRAMUSCULAR; INTRAVENOUS
Status: DISPENSED
Start: 2024-02-05

## (undated) RX ORDER — GENTAMICIN 40 MG/ML
INJECTION, SOLUTION INTRAMUSCULAR; INTRAVENOUS
Status: DISPENSED
Start: 2024-02-05

## (undated) RX ORDER — BUPIVACAINE HYDROCHLORIDE 2.5 MG/ML
INJECTION, SOLUTION EPIDURAL; INFILTRATION; INTRACAUDAL
Status: DISPENSED
Start: 2024-02-05

## (undated) RX ORDER — CEFAZOLIN SODIUM 1 G/3ML
INJECTION, POWDER, FOR SOLUTION INTRAMUSCULAR; INTRAVENOUS
Status: DISPENSED
Start: 2024-08-02

## (undated) RX ORDER — FENTANYL CITRATE 50 UG/ML
INJECTION, SOLUTION INTRAMUSCULAR; INTRAVENOUS
Status: DISPENSED
Start: 2024-04-15

## (undated) RX ORDER — ACETAMINOPHEN 325 MG/1
TABLET ORAL
Status: DISPENSED
Start: 2024-08-02

## (undated) RX ORDER — MANNITOL 20 G/100ML
INJECTION, SOLUTION INTRAVENOUS
Status: DISPENSED
Start: 2024-08-02

## (undated) RX ORDER — FENTANYL CITRATE-0.9 % NACL/PF 10 MCG/ML
PLASTIC BAG, INJECTION (ML) INTRAVENOUS
Status: DISPENSED
Start: 2024-02-05

## (undated) RX ORDER — ENOXAPARIN SODIUM 100 MG/ML
INJECTION SUBCUTANEOUS
Status: DISPENSED
Start: 2024-08-02

## (undated) RX ORDER — FENTANYL CITRATE 50 UG/ML
INJECTION, SOLUTION INTRAMUSCULAR; INTRAVENOUS
Status: DISPENSED
Start: 2024-02-05

## (undated) RX ORDER — FENTANYL CITRATE-0.9 % NACL/PF 10 MCG/ML
PLASTIC BAG, INJECTION (ML) INTRAVENOUS
Status: DISPENSED
Start: 2024-08-02

## (undated) RX ORDER — BUPIVACAINE HYDROCHLORIDE 2.5 MG/ML
INJECTION, SOLUTION EPIDURAL; INFILTRATION; INTRACAUDAL
Status: DISPENSED
Start: 2024-04-15

## (undated) RX ORDER — ACETAMINOPHEN 325 MG/1
TABLET ORAL
Status: DISPENSED
Start: 2024-02-05

## (undated) RX ORDER — GLYCOPYRROLATE 0.2 MG/ML
INJECTION, SOLUTION INTRAMUSCULAR; INTRAVENOUS
Status: DISPENSED
Start: 2024-08-02

## (undated) RX ORDER — FENTANYL CITRATE 50 UG/ML
INJECTION, SOLUTION INTRAMUSCULAR; INTRAVENOUS
Status: DISPENSED
Start: 2024-03-19

## (undated) RX ORDER — LIDOCAINE HYDROCHLORIDE 10 MG/ML
INJECTION, SOLUTION EPIDURAL; INFILTRATION; INTRACAUDAL; PERINEURAL
Status: DISPENSED
Start: 2024-04-15

## (undated) RX ORDER — HYDROMORPHONE HYDROCHLORIDE 1 MG/ML
INJECTION, SOLUTION INTRAMUSCULAR; INTRAVENOUS; SUBCUTANEOUS
Status: DISPENSED
Start: 2024-08-02

## (undated) RX ORDER — ACETAMINOPHEN 325 MG/1
TABLET ORAL
Status: DISPENSED
Start: 2024-04-15

## (undated) RX ORDER — LIDOCAINE HYDROCHLORIDE AND EPINEPHRINE 10; 10 MG/ML; UG/ML
INJECTION, SOLUTION INFILTRATION; PERINEURAL
Status: DISPENSED
Start: 2024-02-05

## (undated) RX ORDER — FUROSEMIDE 10 MG/ML
INJECTION INTRAMUSCULAR; INTRAVENOUS
Status: DISPENSED
Start: 2024-08-02

## (undated) RX ORDER — CEFAZOLIN SODIUM 1 G/3ML
INJECTION, POWDER, FOR SOLUTION INTRAMUSCULAR; INTRAVENOUS
Status: DISPENSED
Start: 2024-02-05

## (undated) RX ORDER — DEXAMETHASONE SODIUM PHOSPHATE 4 MG/ML
INJECTION, SOLUTION INTRA-ARTICULAR; INTRALESIONAL; INTRAMUSCULAR; INTRAVENOUS; SOFT TISSUE
Status: DISPENSED
Start: 2024-08-02

## (undated) RX ORDER — PROPOFOL 10 MG/ML
INJECTION, EMULSION INTRAVENOUS
Status: DISPENSED
Start: 2024-08-02

## (undated) RX ORDER — ONDANSETRON 2 MG/ML
INJECTION INTRAMUSCULAR; INTRAVENOUS
Status: DISPENSED
Start: 2024-04-15

## (undated) RX ORDER — HYDROMORPHONE HYDROCHLORIDE 1 MG/ML
INJECTION, SOLUTION INTRAMUSCULAR; INTRAVENOUS; SUBCUTANEOUS
Status: DISPENSED
Start: 2024-04-15

## (undated) RX ORDER — LIDOCAINE HYDROCHLORIDE AND EPINEPHRINE 10; 10 MG/ML; UG/ML
INJECTION, SOLUTION INFILTRATION; PERINEURAL
Status: DISPENSED
Start: 2024-08-02

## (undated) RX ORDER — ACETAMINOPHEN 325 MG/1
TABLET ORAL
Status: DISPENSED
Start: 2024-03-19

## (undated) RX ORDER — PROPOFOL 10 MG/ML
INJECTION, EMULSION INTRAVENOUS
Status: DISPENSED
Start: 2024-02-05

## (undated) RX ORDER — CLINDAMYCIN PHOSPHATE 900 MG/50ML
INJECTION, SOLUTION INTRAVENOUS
Status: DISPENSED
Start: 2024-03-19

## (undated) RX ORDER — ONDANSETRON 2 MG/ML
INJECTION INTRAMUSCULAR; INTRAVENOUS
Status: DISPENSED
Start: 2024-08-02

## (undated) RX ORDER — PROPOFOL 10 MG/ML
INJECTION, EMULSION INTRAVENOUS
Status: DISPENSED
Start: 2024-04-15

## (undated) RX ORDER — BUPIVACAINE HYDROCHLORIDE AND EPINEPHRINE 2.5; 5 MG/ML; UG/ML
INJECTION, SOLUTION EPIDURAL; INFILTRATION; INTRACAUDAL; PERINEURAL
Status: DISPENSED
Start: 2024-03-19

## (undated) RX ORDER — SODIUM CHLORIDE 9 MG/ML
INJECTION, SOLUTION INTRAVENOUS
Status: DISPENSED
Start: 2024-02-05

## (undated) RX ORDER — HYDROMORPHONE HYDROCHLORIDE 1 MG/ML
INJECTION, SOLUTION INTRAMUSCULAR; INTRAVENOUS; SUBCUTANEOUS
Status: DISPENSED
Start: 2024-03-19

## (undated) RX ORDER — ENOXAPARIN SODIUM 100 MG/ML
INJECTION SUBCUTANEOUS
Status: DISPENSED
Start: 2024-03-19

## (undated) RX ORDER — HYDROMORPHONE HYDROCHLORIDE 1 MG/ML
INJECTION, SOLUTION INTRAMUSCULAR; INTRAVENOUS; SUBCUTANEOUS
Status: DISPENSED
Start: 2024-02-05

## (undated) RX ORDER — DEXAMETHASONE SODIUM PHOSPHATE 4 MG/ML
INJECTION, SOLUTION INTRA-ARTICULAR; INTRALESIONAL; INTRAMUSCULAR; INTRAVENOUS; SOFT TISSUE
Status: DISPENSED
Start: 2024-04-15

## (undated) RX ORDER — ESMOLOL HYDROCHLORIDE 10 MG/ML
INJECTION INTRAVENOUS
Status: DISPENSED
Start: 2024-02-05